# Patient Record
Sex: FEMALE | URBAN - METROPOLITAN AREA
[De-identification: names, ages, dates, MRNs, and addresses within clinical notes are randomized per-mention and may not be internally consistent; named-entity substitution may affect disease eponyms.]

---

## 2018-08-03 ENCOUNTER — EMERGENCY (EMERGENCY)
Facility: HOSPITAL | Age: 63
LOS: 1 days | Discharge: ANOTHER HOSPITAL | End: 2018-08-03
Attending: EMERGENCY MEDICINE
Payer: MEDICAID

## 2018-08-03 VITALS
TEMPERATURE: 98 F | DIASTOLIC BLOOD PRESSURE: 75 MMHG | OXYGEN SATURATION: 100 % | WEIGHT: 119.05 LBS | SYSTOLIC BLOOD PRESSURE: 184 MMHG | RESPIRATION RATE: 16 BRPM | HEART RATE: 70 BPM

## 2018-08-03 LAB
ALBUMIN SERPL ELPH-MCNC: 3.5 G/DL — SIGNIFICANT CHANGE UP (ref 3.3–5)
ALP SERPL-CCNC: 99 U/L — SIGNIFICANT CHANGE UP (ref 40–120)
ALT FLD-CCNC: 15 U/L — SIGNIFICANT CHANGE UP (ref 12–78)
ANION GAP SERPL CALC-SCNC: 9 MMOL/L — SIGNIFICANT CHANGE UP (ref 5–17)
APPEARANCE UR: CLEAR — SIGNIFICANT CHANGE UP
APTT BLD: 30.6 SEC — SIGNIFICANT CHANGE UP (ref 27.5–37.4)
AST SERPL-CCNC: 23 U/L — SIGNIFICANT CHANGE UP (ref 15–37)
BASOPHILS # BLD AUTO: 0.11 K/UL — SIGNIFICANT CHANGE UP (ref 0–0.2)
BASOPHILS NFR BLD AUTO: 1.3 % — SIGNIFICANT CHANGE UP (ref 0–2)
BILIRUB SERPL-MCNC: 0.3 MG/DL — SIGNIFICANT CHANGE UP (ref 0.2–1.2)
BILIRUB UR-MCNC: NEGATIVE — SIGNIFICANT CHANGE UP
BUN SERPL-MCNC: 19 MG/DL — SIGNIFICANT CHANGE UP (ref 7–23)
CALCIUM SERPL-MCNC: 9.2 MG/DL — SIGNIFICANT CHANGE UP (ref 8.5–10.1)
CHLORIDE SERPL-SCNC: 106 MMOL/L — SIGNIFICANT CHANGE UP (ref 96–108)
CK MB CFR SERPL CALC: <1 NG/ML — SIGNIFICANT CHANGE UP (ref 0–3.6)
CO2 SERPL-SCNC: 24 MMOL/L — SIGNIFICANT CHANGE UP (ref 22–31)
COLOR SPEC: SIGNIFICANT CHANGE UP
CREAT SERPL-MCNC: 1.4 MG/DL — HIGH (ref 0.5–1.3)
D DIMER BLD IA.RAPID-MCNC: <150 NG/ML DDU — SIGNIFICANT CHANGE UP
DIFF PNL FLD: NEGATIVE — SIGNIFICANT CHANGE UP
EOSINOPHIL # BLD AUTO: 0.5 K/UL — SIGNIFICANT CHANGE UP (ref 0–0.5)
EOSINOPHIL NFR BLD AUTO: 5.8 % — SIGNIFICANT CHANGE UP (ref 0–6)
GLUCOSE SERPL-MCNC: 147 MG/DL — HIGH (ref 70–99)
GLUCOSE UR QL: NEGATIVE — SIGNIFICANT CHANGE UP
HCT VFR BLD CALC: 40.7 % — SIGNIFICANT CHANGE UP (ref 34.5–45)
HGB BLD-MCNC: 13.8 G/DL — SIGNIFICANT CHANGE UP (ref 11.5–15.5)
IMM GRANULOCYTES NFR BLD AUTO: 0.2 % — SIGNIFICANT CHANGE UP (ref 0–1.5)
INR BLD: 0.96 RATIO — SIGNIFICANT CHANGE UP (ref 0.88–1.16)
KETONES UR-MCNC: NEGATIVE — SIGNIFICANT CHANGE UP
LACTATE SERPL-SCNC: 2.3 MMOL/L — HIGH (ref 0.7–2)
LEUKOCYTE ESTERASE UR-ACNC: ABNORMAL
LYMPHOCYTES # BLD AUTO: 2.76 K/UL — SIGNIFICANT CHANGE UP (ref 1–3.3)
LYMPHOCYTES # BLD AUTO: 32.3 % — SIGNIFICANT CHANGE UP (ref 13–44)
MCHC RBC-ENTMCNC: 27.6 PG — SIGNIFICANT CHANGE UP (ref 27–34)
MCHC RBC-ENTMCNC: 33.9 GM/DL — SIGNIFICANT CHANGE UP (ref 32–36)
MCV RBC AUTO: 81.4 FL — SIGNIFICANT CHANGE UP (ref 80–100)
MONOCYTES # BLD AUTO: 0.84 K/UL — SIGNIFICANT CHANGE UP (ref 0–0.9)
MONOCYTES NFR BLD AUTO: 9.8 % — SIGNIFICANT CHANGE UP (ref 2–14)
NEUTROPHILS # BLD AUTO: 4.32 K/UL — SIGNIFICANT CHANGE UP (ref 1.8–7.4)
NEUTROPHILS NFR BLD AUTO: 50.6 % — SIGNIFICANT CHANGE UP (ref 43–77)
NITRITE UR-MCNC: NEGATIVE — SIGNIFICANT CHANGE UP
PH UR: 8 — SIGNIFICANT CHANGE UP (ref 5–8)
PLATELET # BLD AUTO: 263 K/UL — SIGNIFICANT CHANGE UP (ref 150–400)
POTASSIUM SERPL-MCNC: 4.9 MMOL/L — SIGNIFICANT CHANGE UP (ref 3.5–5.3)
POTASSIUM SERPL-SCNC: 4.9 MMOL/L — SIGNIFICANT CHANGE UP (ref 3.5–5.3)
PROT SERPL-MCNC: 7.6 G/DL — SIGNIFICANT CHANGE UP (ref 6–8.3)
PROT UR-MCNC: NEGATIVE — SIGNIFICANT CHANGE UP
PROTHROM AB SERPL-ACNC: 10.4 SEC — SIGNIFICANT CHANGE UP (ref 9.8–12.7)
RBC # BLD: 5 M/UL — SIGNIFICANT CHANGE UP (ref 3.8–5.2)
RBC # FLD: 14.3 % — SIGNIFICANT CHANGE UP (ref 10.3–14.5)
SODIUM SERPL-SCNC: 139 MMOL/L — SIGNIFICANT CHANGE UP (ref 135–145)
SP GR SPEC: 1.01 — SIGNIFICANT CHANGE UP (ref 1.01–1.02)
TROPONIN I SERPL-MCNC: <.015 NG/ML — SIGNIFICANT CHANGE UP (ref 0.01–0.04)
UROBILINOGEN FLD QL: NEGATIVE — SIGNIFICANT CHANGE UP
WBC # BLD: 8.55 K/UL — SIGNIFICANT CHANGE UP (ref 3.8–10.5)
WBC # FLD AUTO: 8.55 K/UL — SIGNIFICANT CHANGE UP (ref 3.8–10.5)

## 2018-08-03 PROCEDURE — 99285 EMERGENCY DEPT VISIT HI MDM: CPT

## 2018-08-03 PROCEDURE — 99291 CRITICAL CARE FIRST HOUR: CPT

## 2018-08-03 RX ORDER — SODIUM CHLORIDE 9 MG/ML
1000 INJECTION INTRAMUSCULAR; INTRAVENOUS; SUBCUTANEOUS ONCE
Qty: 0 | Refills: 0 | Status: COMPLETED | OUTPATIENT
Start: 2018-08-03 | End: 2018-08-03

## 2018-08-03 RX ORDER — ASPIRIN/CALCIUM CARB/MAGNESIUM 324 MG
325 TABLET ORAL ONCE
Qty: 0 | Refills: 0 | Status: COMPLETED | OUTPATIENT
Start: 2018-08-03 | End: 2018-08-03

## 2018-08-03 RX ADMIN — SODIUM CHLORIDE 1000 MILLILITER(S): 9 INJECTION INTRAMUSCULAR; INTRAVENOUS; SUBCUTANEOUS at 22:56

## 2018-08-03 RX ADMIN — SODIUM CHLORIDE 1000 MILLILITER(S): 9 INJECTION INTRAMUSCULAR; INTRAVENOUS; SUBCUTANEOUS at 22:31

## 2018-08-03 NOTE — ED ADULT NURSE NOTE - NSIMPLEMENTINTERV_GEN_ALL_ED
Implemented All Universal Safety Interventions:  [place] to call system. Call bell, personal items and telephone within reach. Instruct patient to call for assistance. Room bathroom lighting operational. Non-slip footwear when patient is off stretcher. Physically safe environment: no spills, clutter or unnecessary equipment. Stretcher in lowest position, wheels locked, appropriate side rails in place.

## 2018-08-03 NOTE — ED PROVIDER NOTE - OBJECTIVE STATEMENT
61yo F h/o htn, dm flight from [facility] 2 wks prior p/w bilateral upper chest pain sharp intermittent radiating to LUE, neck x 2-3 days associated w/ sob. denies f/c/n/v/d/cough/abd pain/swelling. pos family h/o cardiac disease in brother.

## 2018-08-03 NOTE — ED PROVIDER NOTE - MEDICAL DECISION MAKING DETAILS
61yo F h/o htn, dm flight from [facility] 2 wks prior p/w bilateral upper chest pain sharp intermittent radiating to LUE, neck x 2-3 days associated w/ sob. denies f/c/n/v/d/cough/abd pain/swelling. pos family h/o cardiac disease in brother.

## 2018-08-03 NOTE — ED PROVIDER NOTE - NS ED ROS FT
GEN: no fever, no chills, no weakness  HENT: no eye pain, no visual changes, no ear pain, no visual or hearing changes, no sore throat, no swelling or neck pain  CV: +chest pain, no palpitations, no dizziness, no swelling  RESP: no coughing, +sob, no IWOB, no ALVAREZ  GI: no abd pain, no distension, no nausea, no vomiting, no diarrhea, no constipation  : no dysuria,  no frequency, no hematuria, no discharge, no flank pain  MUSCULOSKELETAL: no myalgia, no arthralgia, no joint swelling, no bruising   SKIN: no rash, no wounds, no itching  NEURO: no change in mentation, no visual changes, no HA, no focal weakness, no trouble speaking, no gait abnormalities, no dizziness  PSYCH: no suidical ideation, no homicidal ideation, no depression, no anxiety, no hallucinations

## 2018-08-03 NOTE — ED PROVIDER NOTE - PHYSICAL EXAMINATION
GEN: awake, alert, well appearing, NAD   HENT: atraumatic, normocephalic, CINDY, EOMI, no midline instability, oropharynx w/o erythema or exudates, no lymphadenopathy  CV: normal rate and rhythm, S1, S2, no MRG, equal pulses throughout, no JVD  RESP: no distress, no IWOB, no retraction, clear to auscultation bilaterally   ABD: soft, nontender, nondistended, no rebound, no guarding, normoactive bowel sounds, no organomegally  MUSCULOSKELETAL: strenght 5/5 x 4, full range of motion, CMS intact   SKIN: normal color, no turgor, no wounds or rash   NEURO: Awake alert oriented x 3, no facial asymmetry, no slurred speech, no pronator drift, moving all extremities  PSYCH: no suicial ideation, no homicidal ideation, no depression, no anxiety, no hallucination

## 2018-08-04 ENCOUNTER — INPATIENT (INPATIENT)
Facility: HOSPITAL | Age: 63
LOS: 10 days | Discharge: HOME | DRG: 233 | End: 2018-08-15
Attending: STUDENT IN AN ORGANIZED HEALTH CARE EDUCATION/TRAINING PROGRAM | Admitting: STUDENT IN AN ORGANIZED HEALTH CARE EDUCATION/TRAINING PROGRAM
Payer: MEDICAID

## 2018-08-04 VITALS
RESPIRATION RATE: 18 BRPM | TEMPERATURE: 98 F | DIASTOLIC BLOOD PRESSURE: 81 MMHG | SYSTOLIC BLOOD PRESSURE: 164 MMHG | OXYGEN SATURATION: 100 % | HEART RATE: 73 BPM

## 2018-08-04 VITALS
DIASTOLIC BLOOD PRESSURE: 65 MMHG | WEIGHT: 119.05 LBS | HEART RATE: 73 BPM | OXYGEN SATURATION: 98 % | RESPIRATION RATE: 16 BRPM | HEIGHT: 55 IN | SYSTOLIC BLOOD PRESSURE: 150 MMHG | TEMPERATURE: 98 F

## 2018-08-04 DIAGNOSIS — I25.10 ATHEROSCLEROTIC HEART DISEASE OF NATIVE CORONARY ARTERY WITHOUT ANGINA PECTORIS: ICD-10-CM

## 2018-08-04 DIAGNOSIS — R07.9 CHEST PAIN, UNSPECIFIED: ICD-10-CM

## 2018-08-04 DIAGNOSIS — Z90.49 ACQUIRED ABSENCE OF OTHER SPECIFIED PARTS OF DIGESTIVE TRACT: Chronic | ICD-10-CM

## 2018-08-04 DIAGNOSIS — E11.9 TYPE 2 DIABETES MELLITUS WITHOUT COMPLICATIONS: ICD-10-CM

## 2018-08-04 LAB
ALBUMIN SERPL ELPH-MCNC: 3.6 G/DL — SIGNIFICANT CHANGE UP (ref 3.3–5)
ALP SERPL-CCNC: 94 U/L — SIGNIFICANT CHANGE UP (ref 40–120)
ALT FLD-CCNC: 11 U/L — SIGNIFICANT CHANGE UP (ref 10–45)
APTT BLD: 25 SEC — LOW (ref 27.5–37.4)
AST SERPL-CCNC: 14 U/L — SIGNIFICANT CHANGE UP (ref 10–40)
BILIRUB DIRECT SERPL-MCNC: <0.1 MG/DL — SIGNIFICANT CHANGE UP (ref 0–0.2)
BILIRUB INDIRECT FLD-MCNC: >0.1 MG/DL — LOW (ref 0.2–1)
BILIRUB SERPL-MCNC: 0.2 MG/DL — SIGNIFICANT CHANGE UP (ref 0.2–1.2)
BLD GP AB SCN SERPL QL: NEGATIVE — SIGNIFICANT CHANGE UP
CHOLEST SERPL-MCNC: 179 MG/DL — SIGNIFICANT CHANGE UP (ref 10–199)
CK MB BLD-MCNC: 2.4 % — SIGNIFICANT CHANGE UP (ref 0–3.5)
CK MB CFR SERPL CALC: 1 NG/ML — SIGNIFICANT CHANGE UP (ref 0–3.6)
CK MB CFR SERPL CALC: 1.1 NG/ML — SIGNIFICANT CHANGE UP (ref 0–3.6)
CK SERPL-CCNC: 46 U/L — SIGNIFICANT CHANGE UP (ref 26–192)
GLUCOSE BLDC GLUCOMTR-MCNC: 264 MG/DL — HIGH (ref 70–99)
GLUCOSE BLDC GLUCOMTR-MCNC: 335 MG/DL — HIGH (ref 70–99)
HBA1C BLD-MCNC: 7.2 % — HIGH (ref 4–5.6)
HDLC SERPL-MCNC: 35 MG/DL — LOW (ref 40–125)
INR BLD: 0.99 RATIO — SIGNIFICANT CHANGE UP (ref 0.88–1.16)
LACTATE SERPL-SCNC: 0.9 MMOL/L — SIGNIFICANT CHANGE UP (ref 0.7–2)
LIPID PNL WITH DIRECT LDL SERPL: 100 MG/DL — SIGNIFICANT CHANGE UP
MRSA PCR RESULT.: SIGNIFICANT CHANGE UP
NT-PROBNP SERPL-SCNC: 1589 PG/ML — HIGH (ref 0–300)
PROT SERPL-MCNC: 7.2 G/DL — SIGNIFICANT CHANGE UP (ref 6–8.3)
PROTHROM AB SERPL-ACNC: 10.8 SEC — SIGNIFICANT CHANGE UP (ref 9.8–12.7)
RH IG SCN BLD-IMP: POSITIVE — SIGNIFICANT CHANGE UP
S AUREUS DNA NOSE QL NAA+PROBE: SIGNIFICANT CHANGE UP
T3 SERPL-MCNC: 81 NG/DL — SIGNIFICANT CHANGE UP (ref 80–200)
T4 AB SER-ACNC: 6.9 UG/DL — SIGNIFICANT CHANGE UP (ref 4.6–12)
TOTAL CHOLESTEROL/HDL RATIO MEASUREMENT: 5.1 RATIO — SIGNIFICANT CHANGE UP (ref 3.3–7.1)
TRIGL SERPL-MCNC: 221 MG/DL — HIGH (ref 10–149)
TROPONIN I SERPL-MCNC: <.015 NG/ML — SIGNIFICANT CHANGE UP (ref 0.01–0.04)
TROPONIN I SERPL-MCNC: <.015 NG/ML — SIGNIFICANT CHANGE UP (ref 0.01–0.04)
TSH SERPL-MCNC: 4.04 UIU/ML — SIGNIFICANT CHANGE UP (ref 0.27–4.2)

## 2018-08-04 PROCEDURE — 71275 CT ANGIOGRAPHY CHEST: CPT

## 2018-08-04 PROCEDURE — 71045 X-RAY EXAM CHEST 1 VIEW: CPT | Mod: 26

## 2018-08-04 PROCEDURE — 99152 MOD SED SAME PHYS/QHP 5/>YRS: CPT

## 2018-08-04 PROCEDURE — 85610 PROTHROMBIN TIME: CPT

## 2018-08-04 PROCEDURE — 80053 COMPREHEN METABOLIC PANEL: CPT

## 2018-08-04 PROCEDURE — 36415 COLL VENOUS BLD VENIPUNCTURE: CPT

## 2018-08-04 PROCEDURE — 71275 CT ANGIOGRAPHY CHEST: CPT | Mod: 26

## 2018-08-04 PROCEDURE — 87040 BLOOD CULTURE FOR BACTERIA: CPT

## 2018-08-04 PROCEDURE — 93306 TTE W/DOPPLER COMPLETE: CPT | Mod: 26

## 2018-08-04 PROCEDURE — 99285 EMERGENCY DEPT VISIT HI MDM: CPT | Mod: 25

## 2018-08-04 PROCEDURE — 85730 THROMBOPLASTIN TIME PARTIAL: CPT

## 2018-08-04 PROCEDURE — 82553 CREATINE MB FRACTION: CPT

## 2018-08-04 PROCEDURE — 85027 COMPLETE CBC AUTOMATED: CPT

## 2018-08-04 PROCEDURE — 81001 URINALYSIS AUTO W/SCOPE: CPT

## 2018-08-04 PROCEDURE — 84484 ASSAY OF TROPONIN QUANT: CPT

## 2018-08-04 PROCEDURE — 83605 ASSAY OF LACTIC ACID: CPT

## 2018-08-04 PROCEDURE — 82550 ASSAY OF CK (CPK): CPT

## 2018-08-04 PROCEDURE — 71045 X-RAY EXAM CHEST 1 VIEW: CPT

## 2018-08-04 PROCEDURE — 85379 FIBRIN DEGRADATION QUANT: CPT

## 2018-08-04 PROCEDURE — 93458 L HRT ARTERY/VENTRICLE ANGIO: CPT | Mod: 26

## 2018-08-04 PROCEDURE — 93005 ELECTROCARDIOGRAM TRACING: CPT

## 2018-08-04 RX ORDER — ATORVASTATIN CALCIUM 80 MG/1
40 TABLET, FILM COATED ORAL AT BEDTIME
Qty: 0 | Refills: 0 | Status: DISCONTINUED | OUTPATIENT
Start: 2018-08-04 | End: 2018-08-08

## 2018-08-04 RX ORDER — AMLODIPINE BESYLATE 2.5 MG/1
10 TABLET ORAL DAILY
Qty: 0 | Refills: 0 | Status: DISCONTINUED | OUTPATIENT
Start: 2018-08-04 | End: 2018-08-08

## 2018-08-04 RX ORDER — DEXTROSE 50 % IN WATER 50 %
12.5 SYRINGE (ML) INTRAVENOUS ONCE
Qty: 0 | Refills: 0 | Status: DISCONTINUED | OUTPATIENT
Start: 2018-08-04 | End: 2018-08-08

## 2018-08-04 RX ORDER — SODIUM CHLORIDE 9 MG/ML
1000 INJECTION, SOLUTION INTRAVENOUS
Qty: 0 | Refills: 0 | Status: DISCONTINUED | OUTPATIENT
Start: 2018-08-04 | End: 2018-08-08

## 2018-08-04 RX ORDER — SODIUM CHLORIDE 9 MG/ML
1000 INJECTION INTRAMUSCULAR; INTRAVENOUS; SUBCUTANEOUS
Qty: 0 | Refills: 0 | Status: DISCONTINUED | OUTPATIENT
Start: 2018-08-04 | End: 2018-08-07

## 2018-08-04 RX ORDER — DEXTROSE 50 % IN WATER 50 %
25 SYRINGE (ML) INTRAVENOUS ONCE
Qty: 0 | Refills: 0 | Status: DISCONTINUED | OUTPATIENT
Start: 2018-08-04 | End: 2018-08-08

## 2018-08-04 RX ORDER — GLUCAGON INJECTION, SOLUTION 0.5 MG/.1ML
1 INJECTION, SOLUTION SUBCUTANEOUS ONCE
Qty: 0 | Refills: 0 | Status: DISCONTINUED | OUTPATIENT
Start: 2018-08-04 | End: 2018-08-08

## 2018-08-04 RX ORDER — ASPIRIN/CALCIUM CARB/MAGNESIUM 324 MG
325 TABLET ORAL ONCE
Qty: 0 | Refills: 0 | Status: COMPLETED | OUTPATIENT
Start: 2018-08-04 | End: 2018-08-04

## 2018-08-04 RX ORDER — ASPIRIN/CALCIUM CARB/MAGNESIUM 324 MG
81 TABLET ORAL DAILY
Qty: 0 | Refills: 0 | Status: DISCONTINUED | OUTPATIENT
Start: 2018-08-04 | End: 2018-08-08

## 2018-08-04 RX ORDER — METOPROLOL TARTRATE 50 MG
12.5 TABLET ORAL
Qty: 0 | Refills: 0 | Status: DISCONTINUED | OUTPATIENT
Start: 2018-08-04 | End: 2018-08-08

## 2018-08-04 RX ORDER — INSULIN LISPRO 100/ML
VIAL (ML) SUBCUTANEOUS AT BEDTIME
Qty: 0 | Refills: 0 | Status: DISCONTINUED | OUTPATIENT
Start: 2018-08-04 | End: 2018-08-08

## 2018-08-04 RX ORDER — HEPARIN SODIUM 5000 [USP'U]/ML
5000 INJECTION INTRAVENOUS; SUBCUTANEOUS EVERY 8 HOURS
Qty: 0 | Refills: 0 | Status: DISCONTINUED | OUTPATIENT
Start: 2018-08-04 | End: 2018-08-05

## 2018-08-04 RX ORDER — DEXTROSE 50 % IN WATER 50 %
15 SYRINGE (ML) INTRAVENOUS ONCE
Qty: 0 | Refills: 0 | Status: DISCONTINUED | OUTPATIENT
Start: 2018-08-04 | End: 2018-08-08

## 2018-08-04 RX ORDER — SODIUM CHLORIDE 9 MG/ML
1000 INJECTION INTRAMUSCULAR; INTRAVENOUS; SUBCUTANEOUS ONCE
Qty: 0 | Refills: 0 | Status: COMPLETED | OUTPATIENT
Start: 2018-08-04 | End: 2018-08-04

## 2018-08-04 RX ORDER — INSULIN LISPRO 100/ML
VIAL (ML) SUBCUTANEOUS
Qty: 0 | Refills: 0 | Status: DISCONTINUED | OUTPATIENT
Start: 2018-08-04 | End: 2018-08-08

## 2018-08-04 RX ADMIN — Medication 3: at 16:38

## 2018-08-04 RX ADMIN — ATORVASTATIN CALCIUM 40 MILLIGRAM(S): 80 TABLET, FILM COATED ORAL at 21:39

## 2018-08-04 RX ADMIN — AMLODIPINE BESYLATE 10 MILLIGRAM(S): 2.5 TABLET ORAL at 14:24

## 2018-08-04 RX ADMIN — SODIUM CHLORIDE 1000 MILLILITER(S): 9 INJECTION INTRAMUSCULAR; INTRAVENOUS; SUBCUTANEOUS at 00:00

## 2018-08-04 RX ADMIN — Medication 2: at 21:40

## 2018-08-04 RX ADMIN — SODIUM CHLORIDE 75 MILLILITER(S): 9 INJECTION INTRAMUSCULAR; INTRAVENOUS; SUBCUTANEOUS at 10:02

## 2018-08-04 RX ADMIN — Medication 12.5 MILLIGRAM(S): at 17:05

## 2018-08-04 RX ADMIN — HEPARIN SODIUM 5000 UNIT(S): 5000 INJECTION INTRAVENOUS; SUBCUTANEOUS at 21:39

## 2018-08-04 RX ADMIN — SODIUM CHLORIDE 1000 MILLILITER(S): 9 INJECTION INTRAMUSCULAR; INTRAVENOUS; SUBCUTANEOUS at 00:56

## 2018-08-04 RX ADMIN — Medication 325 MILLIGRAM(S): at 09:49

## 2018-08-04 NOTE — CONSULT NOTE ADULT - SUBJECTIVE AND OBJECTIVE BOX
History of Present Illness:  62y Female    Past Medical History  Retinopathy  Neuropathy  DM (diabetes mellitus)  HTN (hypertension)      Past Surgical History  History of cholecystectomy  No significant past surgical history      MEDICATIONS  (STANDING):  amLODIPine   Tablet 10 milliGRAM(s) Oral daily  aspirin enteric coated 81 milliGRAM(s) Oral daily  atorvastatin 40 milliGRAM(s) Oral at bedtime  dextrose 5%. 1000 milliLiter(s) (50 mL/Hr) IV Continuous <Continuous>  dextrose 50% Injectable 12.5 Gram(s) IV Push once  dextrose 50% Injectable 25 Gram(s) IV Push once  dextrose 50% Injectable 25 Gram(s) IV Push once  insulin lispro (HumaLOG) corrective regimen sliding scale   SubCutaneous three times a day before meals  insulin lispro (HumaLOG) corrective regimen sliding scale   SubCutaneous at bedtime    MEDICATIONS  (PRN):  dextrose 40% Gel 15 Gram(s) Oral once PRN Blood Glucose LESS THAN 70 milliGRAM(s)/deciliter  glucagon  Injectable 1 milliGRAM(s) IntraMuscular once PRN Glucose LESS THAN 70 milligrams/deciliter    Antiplatelet therapy:                           Last dose/amt:    Allergies: No Known Allergies      SOCIAL HISTORY:  Smoker: [ ] Yes  [ ] No        PACK YEARS:                         WHEN QUIT?  ETOH use: [ ] Yes  [ ] No              FREQUENCY / QUANTITY:  Ilicit Drug use:  [ ] Yes  [ ] No  Occupation:  Live with:  Assist device use:    PMD:  Referring Cardiologist:    Relevant Family History  FAMILY HISTORY:  Coronary artery disease (Sibling): pre mature CAD      Review of Systems  GENERAL:  Fevers[] chills[] sweats[] fatigue[] weight loss[] weight gain []                                      [x] NEGATIVE  NEURO:    positive b/l feet neuropathy                                                                                                     EYES:   H  eye bleed                                                                                                                        ENMT:  difficulty hearing []  vertigo[]  dysphagia[] epistaxis[]                                                      [ x] NEGATIVE                 CV:    occasional chest pain  denies  palpitations ALVAREZ denies                                                                                          RESPIRATORY:  wheezing[] SOB[] cough [] sputum[] hemoptysis[]                                                   x[x ] NEGATIVE               GI:  nausea[]  vomiting []  diarrhea[] constipation [] melena []                                                          [x ] NEGATIVE            : hematuria[ ]  dysuria[ ] urgency[] incontinence[]                                                                          [ x] NEGATIVE                    MUSKULOSKELETAL:  arthritis[ ]  joint swelling [ ] muscle weakness [ ]                                            [x ] NEGATIVE                     SKIN/BREAST:  rash[ ] itching [ ]  hair loss[ ] masses[ ]                                                                          [x] NEGATIVE                     PSYCH:  dementia [ ] depresion [ ] anxiety[ ]                                                                                          [x ] NEGATIVE                                            ENDOCRINE:    +DM2 cold intolerance[ ] heat intolerance[ ] polydipsia[ ]                                                      [ ] NEGATIVE                        PHYSICAL EXAM  Vital Signs Last 24 Hrs  T(C): 36.7 (04 Aug 2018 13:20), Max: 37.1 (04 Aug 2018 00:12)  T(F): 98.1 (04 Aug 2018 13:20), Max: 98.7 (04 Aug 2018 00:12)  HR: 63 (04 Aug 2018 13:20) (63 - 77)  BP: 151/69 (04 Aug 2018 13:20) (140/71 - 184/75)  BP(mean): 93 (04 Aug 2018 11:26) (93 - 93)  RR: 18 (04 Aug 2018 13:20) (16 - 18)  SpO2: 100% (04 Aug 2018 13:20) (97% - 100%)    General: Well nourished, well developed, no acute distress.                                                         Neuro: Normal exam oriented to person/place & time with no focal motor or sensory  deficits.                    Eyes: Normal exam of conjunctiva & lids, pupils equally reactive.   ENT: Normal exam of nasal/oral mucosa with absence of cyanosis.   Neck: Normal exam of jugular veins, trachea & thyroid.   Chest: Normal lung exam with good air movement absence of wheezes, rales, or rhonchi:                                                                          CV:  Auscultation: normal [x ] S3[ ] S4[ ] Irregular [ ] Rub[ ] Clicks[ ]  Murmurs none:[x ]  Carotids: No Bruits Other____________ Abdominal Aorta: normal [ ] nonpalpable[ ]                                                                         GI: Normal exam of abdomen, liver & spleen with no noted masses or tenderness.                                                                                              Extremities: Normal no evidence of cyanosis or deformity Edema: none[ x]trace  Lower Extremity Pulses:   SKIN : Normal exam to inspection & palation.                                                           LABS:                        13.8   8.55  )-----------( 263      ( 03 Aug 2018 21:55 )             40.7         139  |  106  |  19  ----------------------------<  147<H>  4.9   |  24  |  1.40<H>    Ca    9.2      03 Aug 2018 21:55    TPro  7.2  /  Alb  3.6  /  TBili  0.2  /  DBili  <0.1  /  AST  14  /  ALT  11  /  AlkPhos  94  08-04    PT/INR - ( 04 Aug 2018 12:11 )   PT: 10.8 sec;   INR: 0.99 ratio         PTT - ( 04 Aug 2018 12:11 )  PTT:25.0 sec  Urinalysis Basic - ( 03 Aug 2018 22:41 )    Color: Pale Yellow / Appearance: Clear / S.010 / pH: x  Gluc: x / Ketone: Negative  / Bili: Negative / Urobili: Negative   Blood: x / Protein: Negative / Nitrite: Negative   Leuk Esterase: Small / RBC: 0-2 /HPF / WBC 0-2   Sq Epi: x / Non Sq Epi: Occasional / Bacteria: Occasional      CARDIAC MARKERS ( 04 Aug 2018 08:26 )  <.015 ng/mL / x     / 46 U/L / x     / 1.1 ng/mL  CARDIAC MARKERS ( 04 Aug 2018 03:19 )  <.015 ng/mL / x     / x     / x     / 1.0 ng/mL  CARDIAC MARKERS ( 03 Aug 2018 21:55 )  <.015 ng/mL / x     / x     / x     / <1.0 ng/mL

## 2018-08-04 NOTE — H&P CARDIOLOGY - HISTORY OF PRESENT ILLNESS
This is a 63 yo female,  from [facility] arrived to [place] on 7/24, with PMH of HTN and long standing hx of  type 2 DM ( last A1C unknown,  complicated by neuropathy and retinopathy,  well managed as per patient). She reports as strong family hx of premature atherosclerosis.  She does not have any known structural heart disease.  She is generally sedentary at baseline due to pain in her feet, likely neuropathic.  She has not had prior S&S  of angina, HR or arrythmia.  She presents to [facility] ED on 8/3 with c/c of several days of " random left sided chest discomfort", with intermittent radiation to her left arm, occasionally associated with dyspnea.  The sxs may be brief, but have lasted up to 30 minutes.  CP is without clear provocation, and without a convincing explanation.  She had several episodes yesterday, and continued to have sxs even yesterday in the ER.  In the ER she was found to have an abnormal ekg, without comparison available.  Jyoti x3 negative.  Consultation w/ cards, Dr Presley  obtained, whom despite x3 neg enzymes given FH and symptomatolgy transferred pt to [facility] for cardiac cath to r/o significant CAD.  Now meeting patient for the first time, post cath, and as per unofficial cath report: mLAD 90%, Cx 100% and  % via RFA.  Needs CTS, awaiting on Dr Zaragoza for consult.  Presently patient is asymptomatic, recovering from cath.     < from: 12 Lead ECG (08.04.18 @ 08:14) >  Ventricular Rate 63 BPM    Atrial Rate 63 BPM    P-R Interval 158 ms    QRS Duration 88 ms    Q-T Interval 430 ms    QTC Calculation(Bezet) 440 ms    P Axis 61 degrees    R Axis -21 degrees    T Axis 122 degrees    Diagnosis Line Normal sinus rhythm  ST & T wave abnormality, consider lateral ischemia  Abnormal ECG  When compared with ECG of 03-AUG-2018 21:54,  No significant change was found  Confirmed by Maico Presley MD ([id number]) on 8/4/2018 9:52:19 AM    < end of copied text >      < from: CT Angio Chest w/ IV Cont (08.04.18 @ 00:15) >  EXAM:  CT ANGIO CHEST (W)AW IC                            PROCEDURE DATE:  08/04/2018          INTERPRETATION:  CLINICAL INDICATION: Shortness of breath, chest pain.   Check for pulmonary embolism    COMPARISON: Same day radiograph    TECHNIQUE: CTangiogram of the chest was performed utilizing a PE   protocol. 96 mL of Optiray were intravenously administered without   adverse reaction.  4 mL of contrast were discarded. Coronal and sagittal   reconstructions are provided. In addition, maximum intensity projection   (MIP) images were also acquired on the axial     plane on a separate   workstation.      FINDINGS: There is no evidence for pulmonary embolism. The main pulmonary   artery is of normal caliber.    There is moderate atherosclerotic calcification of the aorta. Heart size   is normal and there is no pericardial effusion.    The lungs are clear. No pleural effusions are seen. The central airways   are patent.    No enlarged thoracic lymph nodes are seen.    There is a severe atherosclerotic calcification of the splenic artery.     No significant osseous abnormality is seen.    IMPRESSION: No pulmonary embolism.    < end of copied text >    < from: Xray Chest 1 View- PORTABLE-Urgent (08.04.18 @ 01:24) >  EXAM:  XR CHEST PORTABLE URGENT 1V                            PROCEDURE DATE:  08/04/2018          INTERPRETATION:  Short of breath.    AP chest.    Normal heart size. Atherosclerotic aortic arch. No consolidation or   effusion. Mild thoracic levoscoliosis.    Impression: No active disease.    < end of copied text > This is a 61 yo female,  from [facility] arrived to [place] on 7/24, with PMH of HTN and long standing hx of  type 2 DM ( last A1C unknown,  complicated by neuropathy and retinopathy,  well managed as per patient). She reports as strong family hx of premature atherosclerosis.  She does not have any known structural heart disease.  She is generally sedentary at baseline due to pain in her feet, likely neuropathic.  She has not had prior S&S  of angina, HR or arrythmia.  She presents to [facility] ED on 8/3 with c/c of several days of " random left sided chest discomfort", with intermittent radiation to her left arm, occasionally associated with dyspnea.  The sxs may be brief, but have lasted up to 30 minutes.  CP is without clear provocation, and without a convincing explanation.  She had several episodes yesterday, and continued to have sxs even yesterday in the ER.  In the ER she was found to have an abnormal ekg, without comparison available, loaded with Aspirin 325mg.  Jyoti x3 negative.  Consultation w/ cards, Dr Presley  obtained, whom despite x3 neg enzymes given FH and symptomatolgy transferred pt to [facility] for cardiac cath to r/o significant CAD.  Now meeting patient for the first time, post cath, and as per unofficial cath report: mLAD 90%, Cx 100% and  % via RFA.  Needs CTS, awaiting on Dr Zaragoza for consult.  Presently patient is asymptomatic, recovering from cath.     < from: 12 Lead ECG (08.04.18 @ 08:14) >  Ventricular Rate 63 BPM    Atrial Rate 63 BPM    P-R Interval 158 ms    QRS Duration 88 ms    Q-T Interval 430 ms    QTC Calculation(Bezet) 440 ms    P Axis 61 degrees    R Axis -21 degrees    T Axis 122 degrees    Diagnosis Line Normal sinus rhythm  ST & T wave abnormality, consider lateral ischemia  Abnormal ECG  When compared with ECG of 03-AUG-2018 21:54,  No significant change was found  Confirmed by Maico Presley MD ([id number]) on 8/4/2018 9:52:19 AM    < end of copied text >      < from: CT Angio Chest w/ IV Cont (08.04.18 @ 00:15) >  EXAM:  CT ANGIO CHEST (W)AW IC                            PROCEDURE DATE:  08/04/2018          INTERPRETATION:  CLINICAL INDICATION: Shortness of breath, chest pain.   Check for pulmonary embolism    COMPARISON: Same day radiograph    TECHNIQUE: CTangiogram of the chest was performed utilizing a PE   protocol. 96 mL of Optiray were intravenously administered without   adverse reaction.  4 mL of contrast were discarded. Coronal and sagittal   reconstructions are provided. In addition, maximum intensity projection   (MIP) images were also acquired on the axial     plane on a separate   workstation.      FINDINGS: There is no evidence for pulmonary embolism. The main pulmonary   artery is of normal caliber.    There is moderate atherosclerotic calcification of the aorta. Heart size   is normal and there is no pericardial effusion.    The lungs are clear. No pleural effusions are seen. The central airways   are patent.    No enlarged thoracic lymph nodes are seen.    There is a severe atherosclerotic calcification of the splenic artery.     No significant osseous abnormality is seen.    IMPRESSION: No pulmonary embolism.    < end of copied text >    < from: Xray Chest 1 View- PORTABLE-Urgent (08.04.18 @ 01:24) >  EXAM:  XR CHEST PORTABLE URGENT 1V                            PROCEDURE DATE:  08/04/2018          INTERPRETATION:  Short of breath.    AP chest.    Normal heart size. Atherosclerotic aortic arch. No consolidation or   effusion. Mild thoracic levoscoliosis.    Impression: No active disease.    < end of copied text > This is a 61 yo female,  from [facility] arrived to [place] on 7/24 here visiting, with PMH of HTN and long standing hx of  type 2 DM ( last A1C unknown,  complicated by neuropathy and retinopathy,  well managed as per patient). She reports as strong family hx of premature atherosclerosis ( both her brothers had MI in their early 40's).  She does not have any known structural heart disease.  She is generally sedentary at baseline due to pain in her feet, likely neuropathic.  She has not had prior S&S  of angina, HR or arrythmia.  She presents to [facility] ED on 8/3 with c/c of several days of " random left sided chest discomfort", with intermittent radiation to her left arm, occasionally associated with dyspnea.  The sxs may be brief, but have lasted up to 30 minutes.  CP is without clear provocation, and without a convincing explanation.  She had several episodes yesterday, and continued to have sxs even yesterday in the ER.  In the ER she was found to have an abnormal ekg, without comparison available, loaded with Aspirin 325mg.  Jyoti x3 negative.  Consultation w/ cards, Dr Presley  obtained, whom despite x3 neg enzymes given FH and symptomatolgy transferred pt to [facility] for cardiac cath to r/o significant CAD.  Now meeting patient for the first time, post cath, and as per unofficial cath report: mLAD 90%, Cx 100% and  % via RFA.  Needs CTS, awaiting on Dr Zaragoza for consult.  Presently patient is asymptomatic, recovering from cath.     < from: 12 Lead ECG (08.04.18 @ 08:14) >  Ventricular Rate 63 BPM    Atrial Rate 63 BPM    P-R Interval 158 ms    QRS Duration 88 ms    Q-T Interval 430 ms    QTC Calculation(Bezet) 440 ms    P Axis 61 degrees    R Axis -21 degrees    T Axis 122 degrees    Diagnosis Line Normal sinus rhythm  ST & T wave abnormality, consider lateral ischemia  Abnormal ECG  When compared with ECG of 03-AUG-2018 21:54,  No significant change was found  Confirmed by Maico Presley MD ([id number]) on 8/4/2018 9:52:19 AM    < end of copied text >      < from: CT Angio Chest w/ IV Cont (08.04.18 @ 00:15) >  EXAM:  CT ANGIO CHEST (W)AW IC                            PROCEDURE DATE:  08/04/2018          INTERPRETATION:  CLINICAL INDICATION: Shortness of breath, chest pain.   Check for pulmonary embolism    COMPARISON: Same day radiograph    TECHNIQUE: CTangiogram of the chest was performed utilizing a PE   protocol. 96 mL of Optiray were intravenously administered without   adverse reaction.  4 mL of contrast were discarded. Coronal and sagittal   reconstructions are provided. In addition, maximum intensity projection   (MIP) images were also acquired on the axial     plane on a separate   workstation.      FINDINGS: There is no evidence for pulmonary embolism. The main pulmonary   artery is of normal caliber.    There is moderate atherosclerotic calcification of the aorta. Heart size   is normal and there is no pericardial effusion.    The lungs are clear. No pleural effusions are seen. The central airways   are patent.    No enlarged thoracic lymph nodes are seen.    There is a severe atherosclerotic calcification of the splenic artery.     No significant osseous abnormality is seen.    IMPRESSION: No pulmonary embolism.    < end of copied text >    < from: Xray Chest 1 View- PORTABLE-Urgent (08.04.18 @ 01:24) >  EXAM:  XR CHEST PORTABLE URGENT 1V                            PROCEDURE DATE:  08/04/2018          INTERPRETATION:  Short of breath.    AP chest.    Normal heart size. Atherosclerotic aortic arch. No consolidation or   effusion. Mild thoracic levoscoliosis.    Impression: No active disease.    < end of copied text >

## 2018-08-04 NOTE — CONSULT NOTE ADULT - SUBJECTIVE AND OBJECTIVE BOX
[facility]         Keith Torrez, James, Fernandez, Nohelia, Osvaldo, Loraine        [phone] (office)    CHIEF COMPLAINT: Patient is a 62y old  Female who presents with a chief complaint of cp    HPI: 62f visiting from [facility], arrived 7/24.  She has long-standing dm with associated neuropathy and retinopathy, likely with mild nephropathy.  History of htn, and a strong family hx of premature atherosclerosis.  She does not have any known structural heart disease.  She is generally sedentary at baseline due to pain in her feet, likely neuropathic.  She has not had prior sxs of angina, hf or arhythmia.    She presents with several days of random left sided chest discomfort, with intermittent radiation to her left arm, occasionally associated with dyspnea.  The sxs may be brief, but have lasted up to 30 minutes.  She had several episodes yesterday, and continued to have sxs even yesterday in the er.  In the er she was found to have an abnormal ekg, without comparison available.  ce x3 negative.  Consultation is now being obtained.      PAST MEDICAL & SURGICAL HISTORY:  DM (diabetes mellitus)  HTN (hypertension)  laser retinal procedures    SOCIAL HISTORY: No active tobacco, alcohol or illicit drug use    FAMILY HISTORY:   No pertinent family history of CAD    Outpatient medications:  metformin  metoprolol  MEDICATIONS  (STANDING):    MEDICATIONS  (PRN):      Allergies    No Known Allergies    Intolerances        REVIEW OF SYSTEMS: Is negative for eye, ENT, GI, , allergic, dermatologic, musculoskeletal and neurologic, except as described above.    VITAL SIGNS:   Vital Signs Last 24 Hrs  T(C): 36.7 (04 Aug 2018 07:28), Max: 37.1 (04 Aug 2018 00:12)  T(F): 98 (04 Aug 2018 07:28), Max: 98.7 (04 Aug 2018 00:12)  HR: 70 (04 Aug 2018 07:28) (65 - 77)  BP: 162/79 (04 Aug 2018 07:28) (140/71 - 184/75)  BP(mean): --  RR: 18 (04 Aug 2018 07:28) (16 - 18)  SpO2: 100% (04 Aug 2018 07:28) (97% - 100%)    I&O's Summary      PHYSICAL EXAM:    Constitutional: NAD, awake and alert, well-developed  Eyes:  EOMI, no oral cyanosis, conjunctivae clear, anicteric.  Pulmonary: Non-labored, breath sounds are clear bilaterally, no wheezing, rales or rhonchi  Cardiovascular:  regular S1 and S2. No murmur.  No rubs, gallops or clicks  Gastrointestinal: Bowel Sounds present, soft, nontender.   Lymph: No peripheral edema.   Neurological: Alert, strength and sensitivity are grossly intact  Skin: No obvious lesions/rashes.   Psych:  Mood & affect appropriate .    LABS: All Labs Reviewed:                        13.8   8.55  )-----------( 263      ( 03 Aug 2018 21:55 )             40.7     03 Aug 2018 21:55    139    |  106    |  19     ----------------------------<  147    4.9     |  24     |  1.40     Ca    9.2        03 Aug 2018 21:55    TPro  7.6    /  Alb  3.5    /  TBili  0.3    /  DBili  x      /  AST  23     /  ALT  15     /  AlkPhos  99     03 Aug 2018 21:55    PT/INR - ( 03 Aug 2018 21:55 )   PT: 10.4 sec;   INR: 0.96 ratio         PTT - ( 03 Aug 2018 21:55 )  PTT:30.6 sec  CARDIAC MARKERS ( 04 Aug 2018 08:26 )  <.015 ng/mL / x     / 46 U/L / x     / 1.1 ng/mL  CARDIAC MARKERS ( 04 Aug 2018 03:19 )  <.015 ng/mL / x     / x     / x     / 1.0 ng/mL  CARDIAC MARKERS ( 03 Aug 2018 21:55 )  <.015 ng/mL / x     / x     / x     / <1.0 ng/mL      Blood Culture:         RADIOLOGY:  < from: CT Angio Chest w/ IV Cont (08.04.18 @ 00:15) >    EXAM:  CT ANGIO CHEST (W)AW IC                            PROCEDURE DATE:  08/04/2018          INTERPRETATION:  CLINICAL INDICATION: Shortness of breath, chest pain.   Check for pulmonary embolism    COMPARISON: Same day radiograph    TECHNIQUE: CTangiogram of the chest was performed utilizing a PE   protocol. 96 mL of Optiray were intravenously administered without   adverse reaction.  4 mL of contrast were discarded. Coronal and sagittal   reconstructions are provided. In addition, maximum intensity projection   (MIP) images were also acquired on the axial     plane on a separate   workstation.      FINDINGS: There is no evidence for pulmonary embolism. The main pulmonary   artery is of normal caliber.    There is moderate atherosclerotic calcification of the aorta. Heart size   is normal and there is no pericardial effusion.    The lungs are clear. No pleural effusions are seen. The central airways   are patent.    No enlarged thoracic lymph nodes are seen.    There is a severe atherosclerotic calcification of the splenic artery.     No significant osseous abnormality is seen.    IMPRESSION: No pulmonary embolism.                ROSALINDA MARTINEZ M.D., ATTENDING RADIOLOGIST  This document has been electronically signed. Aug  4 2018 12:33AM                < end of copied text >    EKG: sr, lateral stt abn

## 2018-08-04 NOTE — CONSULT NOTE ADULT - ASSESSMENT
61 y/o  female sp cardiac cath  with LAD 90 %, Cx 100% and % disease  H diabetes 2 , diabetic retinopathy with "eye bleed" per patient 2012.  preop for CABG

## 2018-08-04 NOTE — CONSULT NOTE ADULT - ASSESSMENT
62f visiting from [facility], arrived 7/24.  She has long-standing dm with associated neuropathy and retinopathy, likely with mild nephropathy.  History of htn, and a strong family hx of premature atherosclerosis.  She does not have any known structural heart disease.  She is generally sedentary at baseline due to pain in her feet, likely neuropathic.  She has not had prior sxs of angina, hf or arhythmia.    She presents with several days of random left sided chest discomfort, with intermittent radiation to her left arm, occasionally associated with dyspnea.  The sxs may be brief, but have lasted up to 30 minutes.  She had several episodes yesterday, and continued to have sxs even yesterday in the er.  In the er she was found to have an abnormal ekg, without comparison available.  ce x3 negative.  Consultation is now being obtained.    -there is no convincing evidence of ischemia at this time.  -her ekg is abnormal without a comparison to review  -she is a very elevated risk individual, with long-standing dm with complications, a strong fh of atherosclerosis, and atherosclerotic calcification noted on ct of the chest  -she has had recurrent  episodes of cp without clear provocation, and without a convincing explanation thus far  -it seems likely that her sxs will recur in the near future  -despite negative cardiac enzymes, there is no non-invasive test sufficiently accurate in this patient to exclude cad as the etiology of her sxs.  -in my opinion, given her risks and clinical course, she requires angiography  -will to try to expedite these arrangements, though this may need to wait till monday.  If we can arrange the transfer today, she can be tx from the er.  Otherwise will need to be admitted.  -asa 81  -cont bb  -atorva 40  -there is no evidence of significant arrhythmia.  -there is no evidence for meaningful  volume overload.  -DVT prophylaxis  -monitor electrolytes, keep k>4, Mg>2  -will follow    The patient is at risk of abrupt decompensation.  35 minutes was spent with this patient.

## 2018-08-04 NOTE — H&P CARDIOLOGY - FAMILY HISTORY
Father  Still living? Unknown  Coronary artery disease, Age at diagnosis: Age Unknown     Mother  Still living? Unknown  Coronary artery disease, Age at diagnosis: Age Unknown Sibling  Still living? Unknown  Coronary artery disease, Age at diagnosis: Age Unknown

## 2018-08-04 NOTE — ED ADULT NURSE REASSESSMENT NOTE - NS ED NURSE REASSESS COMMENT FT1
Patient in CT scan at this time.
Received patient from Priscilla RN lying comfortably in bed informed of the plan of care with understanding.
Patient was able to sleep during the night informed of the plan of care with understanding with daughter at the bedside.
Pt received in report alert and oriented and resting in bed. Pt denies any pain or discomfort as of this time. Pt awaiting cardiology consult. Nursing care ongoing safety maintained.

## 2018-08-05 LAB
ANION GAP SERPL CALC-SCNC: 12 MMOL/L — SIGNIFICANT CHANGE UP (ref 5–17)
BASOPHILS # BLD AUTO: 0.1 K/UL — SIGNIFICANT CHANGE UP (ref 0–0.2)
BASOPHILS NFR BLD AUTO: 0.8 % — SIGNIFICANT CHANGE UP (ref 0–2)
BUN SERPL-MCNC: 15 MG/DL — SIGNIFICANT CHANGE UP (ref 7–23)
CALCIUM SERPL-MCNC: 9.5 MG/DL — SIGNIFICANT CHANGE UP (ref 8.4–10.5)
CHLORIDE SERPL-SCNC: 103 MMOL/L — SIGNIFICANT CHANGE UP (ref 96–108)
CO2 SERPL-SCNC: 21 MMOL/L — LOW (ref 22–31)
CREAT SERPL-MCNC: 1.1 MG/DL — SIGNIFICANT CHANGE UP (ref 0.5–1.3)
EOSINOPHIL # BLD AUTO: 0.5 K/UL — SIGNIFICANT CHANGE UP (ref 0–0.5)
EOSINOPHIL NFR BLD AUTO: 7 % — HIGH (ref 0–6)
GLUCOSE BLDC GLUCOMTR-MCNC: 153 MG/DL — HIGH (ref 70–99)
GLUCOSE BLDC GLUCOMTR-MCNC: 174 MG/DL — HIGH (ref 70–99)
GLUCOSE BLDC GLUCOMTR-MCNC: 218 MG/DL — HIGH (ref 70–99)
GLUCOSE BLDC GLUCOMTR-MCNC: 233 MG/DL — HIGH (ref 70–99)
GLUCOSE SERPL-MCNC: 148 MG/DL — HIGH (ref 70–99)
HCT VFR BLD CALC: 45.3 % — HIGH (ref 34.5–45)
HGB BLD-MCNC: 15.1 G/DL — SIGNIFICANT CHANGE UP (ref 11.5–15.5)
LYMPHOCYTES # BLD AUTO: 2.1 K/UL — SIGNIFICANT CHANGE UP (ref 1–3.3)
LYMPHOCYTES # BLD AUTO: 27.3 % — SIGNIFICANT CHANGE UP (ref 13–44)
MCHC RBC-ENTMCNC: 27.5 PG — SIGNIFICANT CHANGE UP (ref 27–34)
MCHC RBC-ENTMCNC: 33.5 GM/DL — SIGNIFICANT CHANGE UP (ref 32–36)
MCV RBC AUTO: 82.1 FL — SIGNIFICANT CHANGE UP (ref 80–100)
MONOCYTES # BLD AUTO: 0.5 K/UL — SIGNIFICANT CHANGE UP (ref 0–0.9)
MONOCYTES NFR BLD AUTO: 6.9 % — SIGNIFICANT CHANGE UP (ref 2–14)
NEUTROPHILS # BLD AUTO: 4.4 K/UL — SIGNIFICANT CHANGE UP (ref 1.8–7.4)
NEUTROPHILS NFR BLD AUTO: 57.9 % — SIGNIFICANT CHANGE UP (ref 43–77)
PA ADP PRP-ACNC: 163 PRU — LOW (ref 194–417)
PLATELET # BLD AUTO: 230 K/UL — SIGNIFICANT CHANGE UP (ref 150–400)
POTASSIUM SERPL-MCNC: 4.5 MMOL/L — SIGNIFICANT CHANGE UP (ref 3.5–5.3)
POTASSIUM SERPL-SCNC: 4.5 MMOL/L — SIGNIFICANT CHANGE UP (ref 3.5–5.3)
RBC # BLD: 5.51 M/UL — HIGH (ref 3.8–5.2)
RBC # FLD: 12.9 % — SIGNIFICANT CHANGE UP (ref 10.3–14.5)
SODIUM SERPL-SCNC: 136 MMOL/L — SIGNIFICANT CHANGE UP (ref 135–145)
WBC # BLD: 7.6 K/UL — SIGNIFICANT CHANGE UP (ref 3.8–10.5)
WBC # FLD AUTO: 7.6 K/UL — SIGNIFICANT CHANGE UP (ref 3.8–10.5)

## 2018-08-05 PROCEDURE — 93880 EXTRACRANIAL BILAT STUDY: CPT | Mod: 26

## 2018-08-05 PROCEDURE — 99255 IP/OBS CONSLTJ NEW/EST HI 80: CPT

## 2018-08-05 RX ORDER — ENOXAPARIN SODIUM 100 MG/ML
50 INJECTION SUBCUTANEOUS EVERY 12 HOURS
Qty: 0 | Refills: 0 | Status: DISCONTINUED | OUTPATIENT
Start: 2018-08-05 | End: 2018-08-07

## 2018-08-05 RX ADMIN — Medication 2: at 17:11

## 2018-08-05 RX ADMIN — ATORVASTATIN CALCIUM 40 MILLIGRAM(S): 80 TABLET, FILM COATED ORAL at 21:54

## 2018-08-05 RX ADMIN — AMLODIPINE BESYLATE 10 MILLIGRAM(S): 2.5 TABLET ORAL at 05:23

## 2018-08-05 RX ADMIN — Medication 1: at 07:55

## 2018-08-05 RX ADMIN — Medication 12.5 MILLIGRAM(S): at 17:11

## 2018-08-05 RX ADMIN — HEPARIN SODIUM 5000 UNIT(S): 5000 INJECTION INTRAVENOUS; SUBCUTANEOUS at 05:23

## 2018-08-05 RX ADMIN — Medication 81 MILLIGRAM(S): at 13:14

## 2018-08-05 RX ADMIN — Medication 12.5 MILLIGRAM(S): at 05:22

## 2018-08-05 RX ADMIN — ENOXAPARIN SODIUM 50 MILLIGRAM(S): 100 INJECTION SUBCUTANEOUS at 17:25

## 2018-08-05 NOTE — CONSULT NOTE ADULT - ASSESSMENT
62 year old [place]ol[place] female with evidence of CAD, and carotid stenosis, asymptomatic and neurologically stable.    - care per CTSx team  - plan for outpatient follow up with Dr. Kaplan following recovery from cardiac surgery, to discuss carotid surgery  - no acute vascular surgery needs, please contact Vascular team ([phone]) with further questions or concerns    Discussed with Dr. Marinelli,  --LATISHA Suggs, [phone] 62 year old [place] female with evidence of CAD, and R carotid stenosis, asymptomatic and neurologically stable.    - care per CTSx team  - plan for outpatient follow up with Dr. Kaplan following recovery from cardiac surgery, to discuss carotid surgery  - no acute vascular surgery needs, please contact Vascular team ([phone]) with further questions or concerns    Discussed with Dr. Marinelli,  --LATISHA Suggs, [phone]

## 2018-08-05 NOTE — PROGRESS NOTE ADULT - ASSESSMENT
61 y/o  female sp cardiac cath  with LAD 90 %, Cx 100% and % disease  H diabetes 2 , diabetic retinopathy with "eye bleed" per patient 2012.  preop for CABG  8/5 P2Y12 163, Vascular Surgery consulted for right ICA stenosis

## 2018-08-05 NOTE — CONSULT NOTE ADULT - ASSESSMENT
Pt is a 61 y/o F with PMH HTN, DM, fam hx CAD who presented with CP found to have multi-vessel disease, plan for CABG     - CTS input greatly appreciated  - c/w ASA  - c/w statin  - c/w metoprolol, amlodipine  - No signs of active ischemia or HF  - monitor electrolytes  - will follow along closely

## 2018-08-05 NOTE — PROGRESS NOTE ADULT - SUBJECTIVE AND OBJECTIVE BOX
VITAL SIGNS    Telemetry:  SR 60-70  Vital Signs Last 24 Hrs  T(C): 36.7 (08-05-18 @ 12:27), Max: 36.8 (08-05-18 @ 05:00)  T(F): 98.1 (08-05-18 @ 12:27), Max: 98.2 (08-05-18 @ 05:00)  HR: 66 (08-05-18 @ 12:27) (66 - 77)  BP: 147/79 (08-05-18 @ 12:27) (121/82 - 148/81)  RR: 18 (08-05-18 @ 12:27) (18 - 18)  SpO2: 99% (08-05-18 @ 12:27) (98% - 100%)            08-04 @ 07:01  -  08-05 @ 07:00  --------------------------------------------------------  IN: 220 mL / OUT: 450 mL / NET: -230 mL    08-05 @ 07:01  -  08-05 @ 15:33  --------------------------------------------------------  IN: 600 mL / OUT: 0 mL / NET: 600 mL    Daily   Admit Wt: Drug Dosing Weight  Height (cm): 134.62 (04 Aug 2018 11:50)  Weight (kg): 54 (04 Aug 2018 11:50)  BMI (kg/m2): 29.8 (04 Aug 2018 11:50)  BSA (m2): 1.37 (04 Aug 2018 11:50)      POCT Blood Glucose.: 174 mg/dL (05 Aug 2018 11:21)  POCT Blood Glucose.: 153 mg/dL (05 Aug 2018 07:29)  POCT Blood Glucose.: 335 mg/dL (04 Aug 2018 21:37)  POCT Blood Glucose.: 264 mg/dL (04 Aug 2018 16:32)          MEDICATIONS  amLODIPine   Tablet 10 milliGRAM(s) Oral daily  aspirin enteric coated 81 milliGRAM(s) Oral daily  atorvastatin 40 milliGRAM(s) Oral at bedtime  dextrose 40% Gel 15 Gram(s) Oral once PRN  dextrose 5%. 1000 milliLiter(s) IV Continuous <Continuous>  dextrose 50% Injectable 12.5 Gram(s) IV Push once  dextrose 50% Injectable 25 Gram(s) IV Push once  dextrose 50% Injectable 25 Gram(s) IV Push once  enoxaparin Injectable 50 milliGRAM(s) SubCutaneous every 12 hours  glucagon  Injectable 1 milliGRAM(s) IntraMuscular once PRN  insulin lispro (HumaLOG) corrective regimen sliding scale   SubCutaneous three times a day before meals  insulin lispro (HumaLOG) corrective regimen sliding scale   SubCutaneous at bedtime  metoprolol tartrate 12.5 milliGRAM(s) Oral two times a day      PHYSICAL EXAM  Subjective: NAD  Neurology: alert and oriented x 3, nonfocal, no gross deficits  CV : S1S2  Sternal Wound :  CDI , Stable  Lungs: CTA  Abdomen: soft, NT,ND, (+ )BM  :  voiding  Extremities: -c/c/e      LABS  08-05    136  |  103  |  15  ----------------------------<  148<H>  4.5   |  21<L>  |  1.10    Ca    9.5      05 Aug 2018 06:26    TPro  7.2  /  Alb  3.6  /  TBili  0.2  /  DBili  <0.1  /  AST  14  /  ALT  11  /  AlkPhos  94  08-04                                 15.1   7.6   )-----------( 230      ( 05 Aug 2018 06:26 )             45.3          PT/INR - ( 04 Aug 2018 12:11 )   PT: 10.8 sec;   INR: 0.99 ratio         PTT - ( 04 Aug 2018 12:11 )  PTT:25.0 sec       PAST MEDICAL & SURGICAL HISTORY:  Retinopathy  Neuropathy  DM (diabetes mellitus)  HTN (hypertension)  History of cholecystectomy

## 2018-08-05 NOTE — CONSULT NOTE ADULT - SUBJECTIVE AND OBJECTIVE BOX
· Subjective and Objective:   [facility]:    Keith Torrez, James, Fernandez, Osvaldo Pozo, Jose Baron      [phone]    CHIEF COMPLAINT: Patient is a 62y old  Female who presents with a chief complaint of     This is a 63 yo female,  from [facility] arrived to [place] on  here visiting, with PMH of HTN and long standing hx of  type 2 DM ( last A1C unknown, complicated by neuropathy and retinopathy). She reports a strong family hx of premature atherosclerosis ( both her brothers had MI in their early 40's).  She does not have any known structural heart disease.  She presented to [facility] ED on 8/3 with c/c of several days of " random left sided chest discomfort", with intermittent radiation to her left arm, occasionally associated with dyspnea.  The sxs may be brief, but have lasted up to 30 minutes.  CP is without clear provocation, and without a convincing explanation.  She had several episodes yesterday, and continued to have sxs even yesterday in the ER.  In the ER she was found to have an abnormal ekg, without comparison available, loaded with Aspirin 325mg.  Jyoti x3 negative. PT transferred pt to [facility] for cardiac cath to r/o significant CAD.  Cath showed: mLAD 90%, Cx 100% and  % via RFA.  Plan for CABG.  Presently patient is asymptomatic, with daughter at bedside.     TELEMETRY: NSR 60-70's    REVIEW OF SYSTEMS:  CONSTITUTIONAL: No fever, weight loss, or fatigue  EYES: No eye pain, visual disturbances, or discharge  ENMT:  No difficulty hearing, tinnitus, vertigo; No sinus or throat pain  NECK: No pain or stiffness  RESPIRATORY: denies cough,No wheezing, chills or hemoptysis; No shortness of breath  CARDIOVASCULAR: No chest pain,No palpitations, dizziness, or leg swelling  GASTROINTESTINAL: No abdominal or epigastric pain. No nausea, vomiting, or hematemesis; No diarrhea , no constipation. No melena or hematochezia.  GENITOURINARY: No dysuria, frequency, hematuria, or incontinence  NEUROLOGICAL: No headaches, memory loss, loss of strength, numbness, or tremors  SKIN: No itching, burning, rashes, or lesions   LYMPH NODES: No enlarged glands  ENDOCRINE: No heat or cold intolerance; No hair loss  MUSCULOSKELETAL: No joint pain or swelling; No muscle, back, or extremity pain  PSYCHIATRIC: No depression, anxiety, mood swings, or difficulty sleeping  HEME/LYMPH: No easy bruising, or bleeding gums  ALLERGY AND IMMUNOLOGIC: No hives or eczema          PAST MEDICAL & SURGICAL HISTORY:  Retinopathy  Neuropathy  DM (diabetes mellitus)  HTN (hypertension)  History of cholecystectomy      MEDICATIONS  (STANDING):  amLODIPine   Tablet 10 milliGRAM(s) Oral daily  aspirin enteric coated 81 milliGRAM(s) Oral daily  atorvastatin 40 milliGRAM(s) Oral at bedtime  dextrose 5%. 1000 milliLiter(s) (50 mL/Hr) IV Continuous <Continuous>  dextrose 50% Injectable 12.5 Gram(s) IV Push once  dextrose 50% Injectable 25 Gram(s) IV Push once  dextrose 50% Injectable 25 Gram(s) IV Push once  enoxaparin Injectable 50 milliGRAM(s) SubCutaneous every 12 hours  insulin lispro (HumaLOG) corrective regimen sliding scale   SubCutaneous three times a day before meals  insulin lispro (HumaLOG) corrective regimen sliding scale   SubCutaneous at bedtime  metoprolol tartrate 12.5 milliGRAM(s) Oral two times a day      Allergies    No Known Allergies    Intolerances                              15.1   7.6   )-----------( 230      ( 05 Aug 2018 06:26 )             45.3       08-05    136  |  103  |  15  ----------------------------<  148<H>  4.5   |  21<L>  |  1.10    Ca    9.5      05 Aug 2018 06:26    TPro  7.2  /  Alb  3.6  /  TBili  0.2  /  DBili  <0.1  /  AST  14  /  ALT  11  /  AlkPhos  94  08-04      LIVER FUNCTIONS - ( 04 Aug 2018 12:11 )  Alb: 3.6 g/dL / Pro: 7.2 g/dL / ALK PHOS: 94 U/L / ALT: 11 U/L / AST: 14 U/L / GGT: x             PT/INR - ( 04 Aug 2018 12:11 )   PT: 10.8 sec;   INR: 0.99 ratio         PTT - ( 04 Aug 2018 12:11 )  PTT:25.0 sec    CARDIAC MARKERS ( 04 Aug 2018 08:26 )  <.015 ng/mL / x     / 46 U/L / x     / 1.1 ng/mL  CARDIAC MARKERS ( 04 Aug 2018 03:19 )  <.015 ng/mL / x     / x     / x     / 1.0 ng/mL  CARDIAC MARKERS ( 03 Aug 2018 21:55 )  <.015 ng/mL / x     / x     / x     / <1.0 ng/mL                    Daily Height in cm: 134.62 (04 Aug 2018 11:26)    Daily Weight in k (04 Aug 2018 11:26)    I&O's Summary    04 Aug 2018 07:01  -  05 Aug 2018 07:00  --------------------------------------------------------  IN: 220 mL / OUT: 450 mL / NET: -230 mL        Vital Signs Last 24 Hrs  T(C): 36.8 (05 Aug 2018 05:00), Max: 36.8 (05 Aug 2018 05:00)  T(F): 98.2 (05 Aug 2018 05:00), Max: 98.2 (05 Aug 2018 05:00)  HR: 77 (05 Aug 2018 05:00) (63 - 77)  BP: 137/78 (05 Aug 2018 05:00) (121/82 - 151/69)  BP(mean): 93 (04 Aug 2018 11:26) (93 - 93)  RR: 18 (05 Aug 2018 05:00) (16 - 18)  SpO2: 98% (05 Aug 2018 05:00) (98% - 100%)    PHYSICAL EXAM:   · Constitutional	Well-developed, well nourished  · Eyes	EOMI; PERRL; no drainage or redness  · ENMT	No oral lesions; no gross abnormalities  · Neck	No bruits; no thyromegaly or nodules  · Respiratory	Normal breath sounds b/l, No RRW  · Cardiovascular	Regular rate & rhythm, normal S1, S2; no murmurs, gallops or rubs; no S3, S4  · Gastrointestinal	Soft, non-tender, no hepatosplenomegaly, normal bowel sounds  · Extremities	No cyanosis, clubbing or edema  · Vascular	Equal and normal pulses (carotid, femoral, dorsalis pedis)  · Neurological	Alert & oriented; no sensory, motor or coordination deficits, normal reflexes

## 2018-08-05 NOTE — PROGRESS NOTE ADULT - PROBLEM SELECTOR PLAN 1
BBlocker, asa, statin  Vascular consult BBlocker, asa, statin  Vascular consult  vein mapping pending

## 2018-08-05 NOTE — CONSULT NOTE ADULT - SUBJECTIVE AND OBJECTIVE BOX
HPI: 62 year old female admitted for cardiac workup, underwent a screening carotid duplex that showed stenosis. Patient has never had any neurologic symptoms, no CVA or TIAs in the past. She has never smoked, never had prior screening of carotids. Otherwise feeling well.    PMHx: HTN, DM II (neuropathy, retinopathy)  PSHx: History of cholecystectomy    Medications (inpatient): amLODIPine   Tablet 10 milliGRAM(s) Oral daily  aspirin enteric coated 81 milliGRAM(s) Oral daily  atorvastatin 40 milliGRAM(s) Oral at bedtime  dextrose 5%. 1000 milliLiter(s) IV Continuous <Continuous>  dextrose 50% Injectable 12.5 Gram(s) IV Push once  dextrose 50% Injectable 25 Gram(s) IV Push once  dextrose 50% Injectable 25 Gram(s) IV Push once  enoxaparin Injectable 50 milliGRAM(s) SubCutaneous every 12 hours  insulin lispro (HumaLOG) corrective regimen sliding scale   SubCutaneous three times a day before meals  insulin lispro (HumaLOG) corrective regimen sliding scale   SubCutaneous at bedtime  metoprolol tartrate 12.5 milliGRAM(s) Oral two times a day    Medications (PRN):dextrose 40% Gel 15 Gram(s) Oral once PRN  glucagon  Injectable 1 milliGRAM(s) IntraMuscular once PRN    Allergies: No Known Allergies  (Intolerances: None)  Social Hx: Never smoker, denies EtOH, eats a vegan diet, lives in [facility], visiting on a visa  Family Hx: Coronary artery disease (Sibling): pre mature CAD      Physical Exam  T(C): 36.7  HR: 66 (66 - 77)  BP: 147/79 (121/82 - 148/81)  RR: 18 (18 - 18)  SpO2: 99% (98% - 100%)  Tmax: T(C): , Max: 36.8 (18 @ 05:00)    18  -  18  --------------------------------------------------------  IN:    Oral Fluid: 220 mL  Total IN: 220 mL    OUT:    Voided: 450 mL  Total OUT: 450 mL    Total NET: -230 mL      18  -  18  --------------------------------------------------------  IN:    Oral Fluid: 600 mL  Total IN: 600 mL    OUT:  Total OUT: 0 mL    Total NET: 600 mL      General: well developed, well nourished, NAD  Neuro: alert and oriented, no focal deficits, moves all extremities spontaneously  HEENT: NCAT, anicteric, mucosa moist  Respiratory: airway patent, respirations unlabored  CVS: regular rate and rhythm  Abdomen: soft, nontender, nondistended  Extremities: no edema, sensation and movement grossly intact  Skin: warm, dry, appropriate color    Labs:                        15.1   7.6   )-----------( 230      ( 05 Aug 2018 06:26 )             45.3     PT/INR - ( 04 Aug 2018 12:11 )   PT: 10.8 sec;   INR: 0.99 ratio         PTT - ( 04 Aug 2018 12:11 )  PTT:25.0 sec      136  |  103  |  15  ----------------------------<  148<H>  4.5   |  21<L>  |  1.10    Ca    9.5      05 Aug 2018 06:26    TPro  7.2  /  Alb  3.6  /  TBili  0.2  /  DBili  <0.1  /  AST  14  /  ALT  11  /  AlkPhos  94  08-04    Urinalysis Basic - ( 03 Aug 2018 22:41 )    Color: Pale Yellow / Appearance: Clear / S.010 / pH: x  Gluc: x / Ketone: Negative  / Bili: Negative / Urobili: Negative   Blood: x / Protein: Negative / Nitrite: Negative   Leuk Esterase: Small / RBC: 0-2 /HPF / WBC 0-2   Sq Epi: x / Non Sq Epi: Occasional / Bacteria: Occasional    Imaging and other studies:  Carotid duplex final read pending HPI: 62 year old female admitted for cardiac workup, underwent a screening carotid duplex that showed stenosis of the R carotid (> 70%). Patient has never had any neurologic symptoms, no CVA or TIAs in the past. She has never smoked, never had prior screening of carotids. Otherwise feeling well.    PMHx: HTN, DM II (neuropathy, retinopathy)  PSHx: History of cholecystectomy    Medications (inpatient): amLODIPine   Tablet 10 milliGRAM(s) Oral daily  aspirin enteric coated 81 milliGRAM(s) Oral daily  atorvastatin 40 milliGRAM(s) Oral at bedtime  dextrose 5%. 1000 milliLiter(s) IV Continuous <Continuous>  dextrose 50% Injectable 12.5 Gram(s) IV Push once  dextrose 50% Injectable 25 Gram(s) IV Push once  dextrose 50% Injectable 25 Gram(s) IV Push once  enoxaparin Injectable 50 milliGRAM(s) SubCutaneous every 12 hours  insulin lispro (HumaLOG) corrective regimen sliding scale   SubCutaneous three times a day before meals  insulin lispro (HumaLOG) corrective regimen sliding scale   SubCutaneous at bedtime  metoprolol tartrate 12.5 milliGRAM(s) Oral two times a day    Medications (PRN):dextrose 40% Gel 15 Gram(s) Oral once PRN  glucagon  Injectable 1 milliGRAM(s) IntraMuscular once PRN    Allergies: No Known Allergies  (Intolerances: None)  Social Hx: Never smoker, denies EtOH, eats a vegan diet, lives in [facility], visiting on a visa  Family Hx: Coronary artery disease (Sibling): pre mature CAD      Physical Exam  T(C): 36.7  HR: 66 (66 - 77)  BP: 147/79 (121/82 - 148/81)  RR: 18 (18 - 18)  SpO2: 99% (98% - 100%)  Tmax: T(C): , Max: 36.8 (18 @ 05:00)    18  -  18  --------------------------------------------------------  IN:    Oral Fluid: 220 mL  Total IN: 220 mL    OUT:    Voided: 450 mL  Total OUT: 450 mL    Total NET: -230 mL      18  18  --------------------------------------------------------  IN:    Oral Fluid: 600 mL  Total IN: 600 mL    OUT:  Total OUT: 0 mL    Total NET: 600 mL      General: well developed, well nourished, NAD  Neuro: alert and oriented, no focal deficits, moves all extremities spontaneously  HEENT: NCAT, anicteric, mucosa moist  Respiratory: airway patent, respirations unlabored  CVS: regular rate and rhythm  Abdomen: soft, nontender, nondistended  Extremities: no edema, sensation and movement grossly intact  Skin: warm, dry, appropriate color    Labs:                        15.1   7.6   )-----------( 230      ( 05 Aug 2018 06:26 )             45.3     PT/INR - ( 04 Aug 2018 12:11 )   PT: 10.8 sec;   INR: 0.99 ratio         PTT - ( 04 Aug 2018 12:11 )  PTT:25.0 sec      136  |  103  |  15  ----------------------------<  148<H>  4.5   |  21<L>  |  1.10    Ca    9.5      05 Aug 2018 06:26    TPro  7.2  /  Alb  3.6  /  TBili  0.2  /  DBili  <0.1  /  AST  14  /  ALT  11  /  AlkPhos  94  08-04    Urinalysis Basic - ( 03 Aug 2018 22:41 )    Color: Pale Yellow / Appearance: Clear / S.010 / pH: x  Gluc: x / Ketone: Negative  / Bili: Negative / Urobili: Negative   Blood: x / Protein: Negative / Nitrite: Negative   Leuk Esterase: Small / RBC: 0-2 /HPF / WBC 0-2   Sq Epi: x / Non Sq Epi: Occasional / Bacteria: Occasional    Imaging and other studies:  < from: [facility] Duplex Carotid, Bilat (18 @ 12:37) >  RIGHT CAROTID ARTERIAL SYSTEM: There is extensive calcified plaque extending from the carotid bulb into the proximal internal carotid artery causing luminal narrowing of the proximal ICA. Elevated peak systolic velocity in the proximal right internal carotid artery reach up to 337 cm/s. Normal peak systolic velocities in the right mid internal carotid artery up to 115 cm/s and distal right internal carotid artery up to 68 cm/s. Elevated right ICA to CCA ratio of 8.1.    LEFT CAROTID ARTERIAL SYSTEM: Moderate intimal thickening and soft plaque in the left common carotid artery. Mild to moderate calcified plaque is seen in the left carotid bulb extending into the left external and proximal internal carotid arteries. Normal peak systolic velocities are seen in the left internal carotid artery, reaching up to 54 cm/s. Normal left ICA to CCA ratio of 1.5.     < end of copied text >

## 2018-08-06 LAB
ANION GAP SERPL CALC-SCNC: 11 MMOL/L — SIGNIFICANT CHANGE UP (ref 5–17)
BUN SERPL-MCNC: 16 MG/DL — SIGNIFICANT CHANGE UP (ref 7–23)
CALCIUM SERPL-MCNC: 9.8 MG/DL — SIGNIFICANT CHANGE UP (ref 8.4–10.5)
CHLORIDE SERPL-SCNC: 104 MMOL/L — SIGNIFICANT CHANGE UP (ref 96–108)
CO2 SERPL-SCNC: 23 MMOL/L — SIGNIFICANT CHANGE UP (ref 22–31)
CREAT SERPL-MCNC: 1.27 MG/DL — SIGNIFICANT CHANGE UP (ref 0.5–1.3)
GLUCOSE BLDC GLUCOMTR-MCNC: 163 MG/DL — HIGH (ref 70–99)
GLUCOSE BLDC GLUCOMTR-MCNC: 225 MG/DL — HIGH (ref 70–99)
GLUCOSE BLDC GLUCOMTR-MCNC: 238 MG/DL — HIGH (ref 70–99)
GLUCOSE BLDC GLUCOMTR-MCNC: 269 MG/DL — HIGH (ref 70–99)
GLUCOSE SERPL-MCNC: 159 MG/DL — HIGH (ref 70–99)
HCT VFR BLD CALC: 43.9 % — SIGNIFICANT CHANGE UP (ref 34.5–45)
HGB BLD-MCNC: 15.2 G/DL — SIGNIFICANT CHANGE UP (ref 11.5–15.5)
INR BLD: 0.98 RATIO — SIGNIFICANT CHANGE UP (ref 0.88–1.16)
MCHC RBC-ENTMCNC: 28.6 PG — SIGNIFICANT CHANGE UP (ref 27–34)
MCHC RBC-ENTMCNC: 34.7 GM/DL — SIGNIFICANT CHANGE UP (ref 32–36)
MCV RBC AUTO: 82.3 FL — SIGNIFICANT CHANGE UP (ref 80–100)
PLATELET # BLD AUTO: 212 K/UL — SIGNIFICANT CHANGE UP (ref 150–400)
POTASSIUM SERPL-MCNC: 5.1 MMOL/L — SIGNIFICANT CHANGE UP (ref 3.5–5.3)
POTASSIUM SERPL-SCNC: 5.1 MMOL/L — SIGNIFICANT CHANGE UP (ref 3.5–5.3)
PROTHROM AB SERPL-ACNC: 10.6 SEC — SIGNIFICANT CHANGE UP (ref 9.8–12.7)
RBC # BLD: 5.33 M/UL — HIGH (ref 3.8–5.2)
RBC # FLD: 12.9 % — SIGNIFICANT CHANGE UP (ref 10.3–14.5)
SODIUM SERPL-SCNC: 138 MMOL/L — SIGNIFICANT CHANGE UP (ref 135–145)
WBC # BLD: 7.5 K/UL — SIGNIFICANT CHANGE UP (ref 3.8–10.5)
WBC # FLD AUTO: 7.5 K/UL — SIGNIFICANT CHANGE UP (ref 3.8–10.5)

## 2018-08-06 PROCEDURE — 99231 SBSQ HOSP IP/OBS SF/LOW 25: CPT

## 2018-08-06 PROCEDURE — 99233 SBSQ HOSP IP/OBS HIGH 50: CPT

## 2018-08-06 PROCEDURE — 99221 1ST HOSP IP/OBS SF/LOW 40: CPT

## 2018-08-06 RX ADMIN — Medication 12.5 MILLIGRAM(S): at 06:56

## 2018-08-06 RX ADMIN — AMLODIPINE BESYLATE 10 MILLIGRAM(S): 2.5 TABLET ORAL at 06:56

## 2018-08-06 RX ADMIN — ENOXAPARIN SODIUM 50 MILLIGRAM(S): 100 INJECTION SUBCUTANEOUS at 06:56

## 2018-08-06 RX ADMIN — Medication 12.5 MILLIGRAM(S): at 17:29

## 2018-08-06 RX ADMIN — ENOXAPARIN SODIUM 50 MILLIGRAM(S): 100 INJECTION SUBCUTANEOUS at 17:29

## 2018-08-06 RX ADMIN — ATORVASTATIN CALCIUM 40 MILLIGRAM(S): 80 TABLET, FILM COATED ORAL at 22:03

## 2018-08-06 RX ADMIN — Medication 2: at 12:01

## 2018-08-06 RX ADMIN — Medication 81 MILLIGRAM(S): at 12:02

## 2018-08-06 RX ADMIN — Medication 1: at 07:25

## 2018-08-06 RX ADMIN — Medication 3: at 16:52

## 2018-08-06 NOTE — CONSULT NOTE ADULT - ATTENDING COMMENTS
I have interviewed and examined the patient and reviewed the residents note including the history, exam, assessment, and plan.  I agree with the residents assessment and plan.     62 y.o F with history of proliferative diabetic retinopathy (PDR) OS> OD. Patient s/p PRP with room for fill in laser.   Risks of medically necessary anticoagulation/antiplatelets in the setting of a life threatening condition outweighs the risks of any visually threatening condition. Diabetic retinopathy can be further managed and treated in an outpatient setting.   - BP/BG management as per primary team, strict control  - Cont management of acute condition as per primary team  - Outpatient follow up stressed to patient; pt reports will be staying in [place] until November at least; directed to follow up in ophthalmology clinic for further evaluation within 1 week of discharge.   - findings and plan discussed with patient and primary team    Follow-Up:  Patient should follow up his/her ophthalmologist or in the [facility] within 1 week of discharge, sooner if symptoms worsen or change.    Neli Holliday MD

## 2018-08-06 NOTE — GOALS OF CARE CONVERSATION - PERSONAL ADVANCE DIRECTIVE - CONVERSATION DETAILS
LMSW assisted patient with completing HCP form. Patient assigned primary agent as daughter Merary Alberto ([phone]) and alternative agent as Son in law, Oxana Kinney ([phone]). Original HCP form provided to patient. Copy placed in paper chart. Copy uploaded to [facility].

## 2018-08-06 NOTE — PROGRESS NOTE ADULT - PROBLEM SELECTOR PLAN 1
preop workup in progress  continue asa/ statin/ b-blockers  shower daily  opth consult pending  vein mapping today  ? OR date- d/w Dr. patel

## 2018-08-06 NOTE — PROGRESS NOTE ADULT - SUBJECTIVE AND OBJECTIVE BOX
[facility]    Keith Torrez, James, Fernandez, Nohelia, Osvaldo, Loraine      [phone]    CHIEF COMPLAINT: Patient is a 62y old  Female who presents with a chief complaint of     Follow Up: cad, carotid disease    Interim history: The patient reports no new symptoms.  Reports "slight" chest discomfort, without clear provocation, unlikely anginal.  She denies shortness of breath.  No abdominal pain.  No new neurologic symptoms.      MEDICATIONS  (STANDING):  amLODIPine   Tablet 10 milliGRAM(s) Oral daily  aspirin enteric coated 81 milliGRAM(s) Oral daily  atorvastatin 40 milliGRAM(s) Oral at bedtime  dextrose 5%. 1000 milliLiter(s) (50 mL/Hr) IV Continuous <Continuous>  dextrose 50% Injectable 12.5 Gram(s) IV Push once  dextrose 50% Injectable 25 Gram(s) IV Push once  dextrose 50% Injectable 25 Gram(s) IV Push once  enoxaparin Injectable 50 milliGRAM(s) SubCutaneous every 12 hours  insulin lispro (HumaLOG) corrective regimen sliding scale   SubCutaneous three times a day before meals  insulin lispro (HumaLOG) corrective regimen sliding scale   SubCutaneous at bedtime  metoprolol tartrate 12.5 milliGRAM(s) Oral two times a day    MEDICATIONS  (PRN):  dextrose 40% Gel 15 Gram(s) Oral once PRN Blood Glucose LESS THAN 70 milliGRAM(s)/deciliter  glucagon  Injectable 1 milliGRAM(s) IntraMuscular once PRN Glucose LESS THAN 70 milligrams/deciliter      REVIEW OF SYSTEMS:  eye, ent, GI, , allergic, dermatologic, musculoskeletal and neurologic are negative except as described above    Vital Signs Last 24 Hrs  T(C): 36.8 (06 Aug 2018 06:41), Max: 36.8 (06 Aug 2018 06:41)  T(F): 98.3 (06 Aug 2018 06:41), Max: 98.3 (06 Aug 2018 06:41)  HR: 81 (06 Aug 2018 06:41) (66 - 81)  BP: 135/88 (06 Aug 2018 06:41) (135/88 - 147/79)  BP(mean): --  RR: 18 (06 Aug 2018 06:41) (18 - 18)  SpO2: 98% (06 Aug 2018 06:41) (98% - 100%)    I&O's Summary    05 Aug 2018 07:01  -  06 Aug 2018 07:00  --------------------------------------------------------  IN: 860 mL / OUT: 0 mL / NET: 860 mL        Telemetry past 24h: sr    PHYSICAL EXAM:    Constitutional: well-nourished, well-developed, NAD   HEENT:  MMM, sclerae anicteric, conjunctivae clear, no oral cyanosis.  Pulmonary: Non-labored, breath sounds are clear bilaterally, No wheezing, rales or rhonchi  Cardiovascular: Regular, S1 and S2.  No murmur.  No rubs, gallops or clicks  Gastrointestinal: Bowel Sounds present, soft, nontender.   Lymph: No peripheral edema.   Neurological: Alert, no focal deficits  Skin: No rashes.  Psych:  Mood & affect appropriate    LABS: All Labs Reviewed:                        15.2   7.5   )-----------( 212      ( 06 Aug 2018 06:06 )             43.9                         15.1   7.6   )-----------( 230      ( 05 Aug 2018 06:26 )             45.3                         13.8   8.55  )-----------( 263      ( 03 Aug 2018 21:55 )             40.7     06 Aug 2018 06:11    138    |  104    |  16     ----------------------------<  159    5.1     |  23     |  1.27   05 Aug 2018 06:26    136    |  103    |  15     ----------------------------<  148    4.5     |  21     |  1.10   03 Aug 2018 21:55    139    |  106    |  19     ----------------------------<  147    4.9     |  24     |  1.40     Ca    9.8        06 Aug 2018 06:11  Ca    9.5        05 Aug 2018 06:26  Ca    9.2        03 Aug 2018 21:55    TPro  7.2    /  Alb  3.6    /  TBili  0.2    /  DBili  <0.1   /  AST  14     /  ALT  11     /  AlkPhos  94     04 Aug 2018 12:11  TPro  7.6    /  Alb  3.5    /  TBili  0.3    /  DBili  x      /  AST  23     /  ALT  15     /  AlkPhos  99     03 Aug 2018 21:55    PT/INR - ( 06 Aug 2018 06:06 )   PT: 10.6 sec;   INR: 0.98 ratio         PTT - ( 04 Aug 2018 12:11 )  PTT:25.0 sec      Blood Culture: Organism --  Gram Stain Blood -- Gram Stain --  Specimen Source .Blood Blood  Culture-Blood --      08-04 @ 12:11  Pro Bnp [id number]    08-04 @ 14:18  TSH: 4.04      RADIOLOGY:    EKG:    Echo:    < from: Transthoracic Echocardiogram (08.04.18 @ 12:05) >    Patient name: AGAPITO REYNOSO  YOB: 1955   Age: 62 (F)   MR#: [id number]  Study Date: 8/4/2018  [id number]  Study quality: Technically good  Referring Physician: Joce Dawson MD  Blood Pressure: 163/74 mmHg  Height: 152 cm  Weight: 54 kg  BSA: 1.5 m2  ------------------------------------------------------------------------  PROCEDURE: Transthoracic echocardiogram with 2-D, M-Mode  and complete spectral and color flow Doppler.  INDICATION: Atherosclerotic heart disease of native  coronary artery without angina pectoris (I25.10)  ------------------------------------------------------------------------  Dimensions:    Normal Values:  LA:     2.8    2.0 - 4.0 cm  Ao:     3.2    2.0 - 3.8 cm  SEPTUM: 1.0    0.6 - 1.2 cm  PWT:    0.8    0.6 - 1.1 cm  LVIDd:  4.1    3.0 - 5.6 cm  LVIDs:  3.0    1.8 - 4.0 cm  Derived variables:  LVMI: 76 g/m2  RWT: 0.39  Fractional short: 27 %  EF (Teicholtz): 53 %  ------------------------------------------------------------------------  Observations:  Mitral Valve: Mitral annular calcification, otherwise  normal mitral valve. Mild-moderate mitral regurgitation.  Aortic Valve/Aorta: Calcified trileaflet aortic valve with  normal opening. Mild aortic regurgitation.  Aortic Root: 3.2 cm.  Left Atrium: LA volume index = 14 cc/m2.  Left Ventricle: Endocardium not well visualized; grossly  preserved  left ventricular systolic function. The basal  inferior wall is hypokinetic.  Normal left ventricular  internal dimensions and wall thicknesses. Mild diastolic  dysfunction (Stage I).  Right Heart: Normal right atrium. The right ventricle is  not well visualized; grossly normal right ventricular  systolic function. Tricuspid valve not well visualized.  Pulmonic valve not well visualized.  Pericardium/Pleura: Normal pericardium with no pericardial  effusion.  Hemodynamic: RAP about  3 mmHG (IVC small)  ------------------------------------------------------------------------  Conclusions:  1. Mild-moderate mitral regurgitation.  2. Calcified trileaflet aortic valve with normal opening.  Mild aortic regurgitation.  3. Endocardium not well visualized; grossly preserved  left  ventricular systolic function. The basal inferior wall is  hypokinetic.  4. The right ventricle is not well visualized; grossly  normal right ventricular systolic function.  5. RAP about  3 mmHG (IVC small)  *** No previous Echo exam.  ------------------------------------------------------------------------  Confirmedon  8/4/2018 - 18:30:49 by Shonda Longoria M.D.  ------------------------------------------------------------------------    < end of copied text >

## 2018-08-06 NOTE — PROGRESS NOTE ADULT - SUBJECTIVE AND OBJECTIVE BOX
VITAL SIGNS    Telemetry:  rsr 70-80   Vital Signs Last 24 Hrs  T(C): 36.8 (08-06-18 @ 06:41), Max: 36.8 (08-06-18 @ 06:41)  T(F): 98.3 (08-06-18 @ 06:41), Max: 98.3 (08-06-18 @ 06:41)  HR: 81 (08-06-18 @ 06:41) (71 - 81)  BP: 135/88 (08-06-18 @ 06:41) (135/88 - 137/80)  RR: 18 (08-06-18 @ 06:41) (18 - 18)  SpO2: 98% (08-06-18 @ 06:41) (98% - 100%)            08-05 @ 07:01  -  08-06 @ 07:00  --------------------------------------------------------  IN: 860 mL / OUT: 0 mL / NET: 860 mL       Daily     Daily   Admit Wt: Drug Dosing Weight  Height (cm): 134.62 (04 Aug 2018 11:50)  Weight (kg): 54 (04 Aug 2018 11:50)  BMI (kg/m2): 29.8 (04 Aug 2018 11:50)  BSA (m2): 1.37 (04 Aug 2018 11:50)      CAPILLARY BLOOD GLUCOSE      POCT Blood Glucose.: 238 mg/dL (06 Aug 2018 11:45)  POCT Blood Glucose.: 163 mg/dL (06 Aug 2018 06:51)  POCT Blood Glucose.: 218 mg/dL (05 Aug 2018 21:52)  POCT Blood Glucose.: 233 mg/dL (05 Aug 2018 16:27)          amLODIPine   Tablet 10 milliGRAM(s) Oral daily  aspirin enteric coated 81 milliGRAM(s) Oral daily  atorvastatin 40 milliGRAM(s) Oral at bedtime  dextrose 40% Gel 15 Gram(s) Oral once PRN  dextrose 5%. 1000 milliLiter(s) IV Continuous <Continuous>  dextrose 50% Injectable 12.5 Gram(s) IV Push once  dextrose 50% Injectable 25 Gram(s) IV Push once  dextrose 50% Injectable 25 Gram(s) IV Push once  enoxaparin Injectable 50 milliGRAM(s) SubCutaneous every 12 hours  glucagon  Injectable 1 milliGRAM(s) IntraMuscular once PRN  insulin lispro (HumaLOG) corrective regimen sliding scale   SubCutaneous three times a day before meals  insulin lispro (HumaLOG) corrective regimen sliding scale   SubCutaneous at bedtime  metoprolol tartrate 12.5 milliGRAM(s) Oral two times a day      PHYSICAL EXAM    Subjective: "When is my surgery."   Neurology: alert and oriented x 3, nonfocal, no gross deficits  CV : tele:  RSR 70-80   Lungs: clear. RR easy, unlabored   Abdomen: soft, nontender, nondistended, positive bowel sounds, + bowel movement   Neg N/V/D   :  pt voiding without difficulty   Extremities:   ELMORE; neg LE edema, neg calf tenderness.   PPP bilaterally; cath site cdi mitchel - neg hematoma

## 2018-08-06 NOTE — PROGRESS NOTE ADULT - ASSESSMENT
61 y/o  female sp cardiac cath  with LAD 90 %, Cx 100% and % disease  H diabetes 2 , diabetic retinopathy with "eye bleed" per patient 2012.  preop for CABG  8/5 P2Y12 163, Vascular Surgery consulted for right ICA stenosis - no intervention at this time; f/u as an outpatient after OHS  preop workup in progress  8/6 VSS; pt denies cp/sob; obtain vein mapping today by PA's; opth consulted called for bilateral retinal bleeding s/p laser sx 2013; continue asa/statin/bb  ?OR date - d/w Dr. Zaragoza

## 2018-08-06 NOTE — CONSULT NOTE ADULT - SUBJECTIVE AND OBJECTIVE BOX
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 PT transferred pt to [facility] for cardiac cath to r/o significant CAD.  Cath showed: mLAD 90%, Cx 100% and  % via RFA.  Pending CABG.  Ophthalmology consulted due to history of diabetic retinopathy. Patient reports laser treatement OU. h.o CE/PCIOL, no retina surgeries. Patient reports poor vision OS for many years 2/2 diabetes. Patient reports follow up with an ophthalmologist in [facility], last visit Jan 2018. Denies injection.     Patient denies any acute visual complaints.     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 PT transferred pt to [facility] for cardiac cath to r/o significant CAD.  Cath showed: mLAD 90%, Cx 100% and  % via RFA.  Pending CABG.  Ophthalmology consulted due to history of diabetic retinopathy. Patient reports laser treatement OU. h.o CE/PCIOL, no retina surgeries. Patient reports poor vision OS for many years 2/2 diabetes with history of "eye bleeding". Patient reports follow up with an ophthalmologist in [facility], last visit Jan 2018. Denies any intravitreal injection.     Patient denies any acute visual complaints.       PMH: above  POcHx:  CE/PCIOL OU, laser OU  FamOcHx: Denies   Meds: no gtts   Allergies: NKDA    ROS:  General (neg), Vision (per HPI), Head and Neck (neg), Pulm (neg), CV (neg), GI (neg),  (neg), Musculoskeletal (neg), Skin/Integ (neg), Neuro (neg), Endocrine (neg), Heme (neg), All/Immuno (neg)    Mood and Affect Appropriate ( x ),  Oriented to Time, Place, and Person x 3 ( x )    Ophthalmology Exam    Visual acuity (sc): 20/20 OU  Pupils: PERRL OU, no APD  Ttono: 17 OU  Extraocular movements (EOMs): Full OU, no pain, no diplopia   Confrontational Visual Field (CVF):  Full OU  Color Plates: 12/12 OU    PLE:   External:  Flat   Lids/Lashes/Lacrimal Ducts: WNL OU  Sclera/ Conj: W+Q OU  Cornea: Cl OU  Anterior Chamber: D+F OU  Iris:  Flat OU  Lens: PCIOL OU    Fundus Exam: dilated with 1% tropicamide and 2.5% phenylephrine  Approval obtained from primary team for dilation  Patient aware that pupils can remained dilated for at least 4-6 hours  Exam performed with 20D lens    Vitreous: wnl OU  Disc, cup/disc: sharp and pink OU  Macula:  wnl OU  Vessels:  wnl OU  Periphery: sparse PRP OU fibrosis/regressed NVE OS       A/P. 62 y.o F with diabetic retinopathy OS> OD. Patient s/p PRP with room for fill in laser.   Risks of medically necessary anticoagulaton/antiplatelets in the setting of a life threatening condition outweighs the risks of any visually threatening condition. Diabetic retinopathy must be further managed and treated in an outpatient setting.   - BP/BG management as per primary team  - Cont management of acute condition as per team  - Outpatient follow up stressed to patient; pt reports will be staying in [place] until November at least; directed to follow up in clinic for further evaluation within 1 week of discharge.     Follow-Up:  Patient should follow up his/her ophthalmologist or in the [facility] within 1 week of discharge, sooner if symptoms worsen or change.  [address] [place] [address].  [place] [address] [phone]    Please call the ophthalmologist to confirm an appointment time and date prior to discharge.     s/d/w Dr. 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 Pt transferred pt to [facility] for cardiac cath to r/o significant CAD.  Cath showed: mLAD 90%, Cx 100% and  % via RFA.  Pending CABG.  Ophthalmology consulted due to history of diabetic retinopathy. Patient reports laser treatement OU. h.o CE/PCIOL, no retina surgeries. Patient reports poor vision OS for many years 2/2 diabetes with history of "eye bleeding". Patient reports follow up with an ophthalmologist in [facility], last visit Jan 2018. Denies any intravitreal injection.     Patient denies any acute visual complaints.       PMH: above  POcHx:  CE/PCIOL OU, laser OU  FamOcHx: Denies   Meds: no gtts, meds reviewed   Allergies: NKDA    ROS:  General (neg), Vision (per HPI), Head and Neck (neg), Pulm (neg), CV (neg), GI (neg),  (neg), Musculoskeletal (neg), Skin/Integ (neg), Neuro (neg), Endocrine (neg), Heme (neg), All/Immuno (neg)    Mood and Affect Appropriate ( x ),  Oriented to Time, Place, and Person x 3 ( x )    Ophthalmology Exam    Visual acuity (sc): 20/20 OU  Pupils: PERRL OU, no APD OU  Ttono: 17 OU  Extraocular movements (EOMs): Full OU, no pain, no diplopia   Confrontational Visual Field (CVF):  Full OU  Color Plates: 12/12 OU    PLE:   External:  Flat   Lids/Lashes/Lacrimal Ducts: WNL OU  Sclera/ Conj: W+Q OU  Cornea: Cl OU  Anterior Chamber: D+F OU  Iris:  Flat OU  Lens: PCIOL OU    Fundus Exam: dilated with 1% tropicamide and 2.5% phenylephrine  Approval obtained from primary team for dilation  Patient aware that pupils can remained dilated for at least 4-6 hours  Exam performed with 20D lens    Vitreous: wnl OU  Disc, cup/disc: 0.4 OU, sharp and pink OU, no NVD OU  Macula:  grossly wnl OU  Vessels:  wnl OU  Periphery: sparse PRP OU, few scattered DBH OD,  fibrosis/regressed NVE along the superior arcade OS       A/P. 62 y.o F with history of proliferative diabetic retinopathy OS> OD. Patient s/p PRP with room for fill in laser.   Risks of medically necessary anticoagulation/antiplatelets in the setting of a life threatening condition outweighs the risks of any visually threatening condition. Diabetic retinopathy can be further managed and treated in an outpatient setting.   - BP/BG management as per primary team, strict control  - Cont management of acute condition as per primary team  - Outpatient follow up stressed to patient; pt reports will be staying in [place] until November at least; directed to follow up in ophthalmology clinic for further evaluation within 1 week of discharge.   - findings and plan discussed with patient and primary team    Follow-Up:  Patient should follow up his/her ophthalmologist or in the [facility] within 1 week of discharge, sooner if symptoms worsen or change.  [address] [place] [address].  [place] [address] [phone]    Please call the ophthalmologist to confirm an appointment time and date prior to discharge.     s/d/w Dr. Holliday

## 2018-08-06 NOTE — PROGRESS NOTE ADULT - ASSESSMENT
63 y/o F with PMH HTN, DM, fam hx CAD who presented with CP found to have multi-vessel disease, plan for CABG     -there is no evidence of acute ischemia.  -her chest discomfort is not suspicious at this time for an unstable syndrome, ,though noting her disease we must be vigilant  -workup of significant carotid disease in progress, with plan for cabg when workup complete  -there is no evidence of significant arrhythmia.  -there is no evidence for meaningful  volume overload.  - c/w ASA  - c/w statin  - c/w metoprolol, amlodipine  - monitor electrolytes  - will follow

## 2018-08-07 DIAGNOSIS — I77.9 DISORDER OF ARTERIES AND ARTERIOLES, UNSPECIFIED: ICD-10-CM

## 2018-08-07 LAB
ANION GAP SERPL CALC-SCNC: 10 MMOL/L — SIGNIFICANT CHANGE UP (ref 5–17)
APTT BLD: 46.5 SEC — HIGH (ref 27.5–37.4)
BLD GP AB SCN SERPL QL: NEGATIVE — SIGNIFICANT CHANGE UP
BUN SERPL-MCNC: 16 MG/DL — SIGNIFICANT CHANGE UP (ref 7–23)
CALCIUM SERPL-MCNC: 9.4 MG/DL — SIGNIFICANT CHANGE UP (ref 8.4–10.5)
CHLORIDE SERPL-SCNC: 103 MMOL/L — SIGNIFICANT CHANGE UP (ref 96–108)
CO2 SERPL-SCNC: 23 MMOL/L — SIGNIFICANT CHANGE UP (ref 22–31)
CREAT SERPL-MCNC: 1.17 MG/DL — SIGNIFICANT CHANGE UP (ref 0.5–1.3)
GLUCOSE BLDC GLUCOMTR-MCNC: 106 MG/DL — HIGH (ref 70–99)
GLUCOSE BLDC GLUCOMTR-MCNC: 183 MG/DL — HIGH (ref 70–99)
GLUCOSE BLDC GLUCOMTR-MCNC: 236 MG/DL — HIGH (ref 70–99)
GLUCOSE BLDC GLUCOMTR-MCNC: 332 MG/DL — HIGH (ref 70–99)
GLUCOSE SERPL-MCNC: 205 MG/DL — HIGH (ref 70–99)
HCT VFR BLD CALC: 44.1 % — SIGNIFICANT CHANGE UP (ref 34.5–45)
HGB BLD-MCNC: 14.5 G/DL — SIGNIFICANT CHANGE UP (ref 11.5–15.5)
INR BLD: 1.04 RATIO — SIGNIFICANT CHANGE UP (ref 0.88–1.16)
MCHC RBC-ENTMCNC: 27 PG — SIGNIFICANT CHANGE UP (ref 27–34)
MCHC RBC-ENTMCNC: 32.9 GM/DL — SIGNIFICANT CHANGE UP (ref 32–36)
MCV RBC AUTO: 82.3 FL — SIGNIFICANT CHANGE UP (ref 80–100)
PLATELET # BLD AUTO: 223 K/UL — SIGNIFICANT CHANGE UP (ref 150–400)
POTASSIUM SERPL-MCNC: 4.5 MMOL/L — SIGNIFICANT CHANGE UP (ref 3.5–5.3)
POTASSIUM SERPL-SCNC: 4.5 MMOL/L — SIGNIFICANT CHANGE UP (ref 3.5–5.3)
PROTHROM AB SERPL-ACNC: 11.2 SEC — SIGNIFICANT CHANGE UP (ref 9.8–12.7)
RBC # BLD: 5.36 M/UL — HIGH (ref 3.8–5.2)
RBC # FLD: 13 % — SIGNIFICANT CHANGE UP (ref 10.3–14.5)
RH IG SCN BLD-IMP: POSITIVE — SIGNIFICANT CHANGE UP
SODIUM SERPL-SCNC: 136 MMOL/L — SIGNIFICANT CHANGE UP (ref 135–145)
WBC # BLD: 7.1 K/UL — SIGNIFICANT CHANGE UP (ref 3.8–10.5)
WBC # FLD AUTO: 7.1 K/UL — SIGNIFICANT CHANGE UP (ref 3.8–10.5)

## 2018-08-07 PROCEDURE — 70450 CT HEAD/BRAIN W/O DYE: CPT | Mod: 26

## 2018-08-07 PROCEDURE — 99233 SBSQ HOSP IP/OBS HIGH 50: CPT

## 2018-08-07 RX ORDER — CHLORHEXIDINE GLUCONATE 213 G/1000ML
1 SOLUTION TOPICAL ONCE
Qty: 0 | Refills: 0 | Status: COMPLETED | OUTPATIENT
Start: 2018-08-07 | End: 2018-08-07

## 2018-08-07 RX ORDER — CEFUROXIME AXETIL 250 MG
1500 TABLET ORAL ONCE
Qty: 0 | Refills: 0 | Status: DISCONTINUED | OUTPATIENT
Start: 2018-08-07 | End: 2018-08-08

## 2018-08-07 RX ORDER — CHLORHEXIDINE GLUCONATE 213 G/1000ML
15 SOLUTION TOPICAL ONCE
Qty: 0 | Refills: 0 | Status: DISCONTINUED | OUTPATIENT
Start: 2018-08-07 | End: 2018-08-08

## 2018-08-07 RX ADMIN — Medication 12.5 MILLIGRAM(S): at 05:22

## 2018-08-07 RX ADMIN — ENOXAPARIN SODIUM 50 MILLIGRAM(S): 100 INJECTION SUBCUTANEOUS at 05:22

## 2018-08-07 RX ADMIN — Medication 12.5 MILLIGRAM(S): at 17:18

## 2018-08-07 RX ADMIN — Medication 81 MILLIGRAM(S): at 12:08

## 2018-08-07 RX ADMIN — Medication 2: at 12:09

## 2018-08-07 RX ADMIN — Medication 1: at 08:11

## 2018-08-07 RX ADMIN — Medication 4: at 17:18

## 2018-08-07 RX ADMIN — ATORVASTATIN CALCIUM 40 MILLIGRAM(S): 80 TABLET, FILM COATED ORAL at 21:53

## 2018-08-07 RX ADMIN — CHLORHEXIDINE GLUCONATE 1 APPLICATION(S): 213 SOLUTION TOPICAL at 20:25

## 2018-08-07 RX ADMIN — AMLODIPINE BESYLATE 10 MILLIGRAM(S): 2.5 TABLET ORAL at 05:22

## 2018-08-07 NOTE — PROGRESS NOTE ADULT - ASSESSMENT
MA called patient back, patient not able to make her appt to see Dr. Chun. Inform her no available appt Adiel is booked all the way to [place]. Patient is willing to see NP. Patient accepted 3/24/17 to see NP Michael. Patient stated that she have to see somebody because of her fluid. KARI    63 y/o  female sp cardiac cath  with LAD 90 %, Cx 100% and % disease  H diabetes 2 , diabetic retinopathy with "eye bleed" per patient 2012.  preop for CABG  8/5 P2Y12 163, Vascular Surgery consulted for right ICA stenosis - no intervention at this time; f/u as an outpatient after OHS  preop workup in progress  8/6 VSS; pt denies cp/sob; obtain vein mapping today by PA's; opth consulted called for bilateral retinal bleeding s/p laser sx 2013; continue asa/statin/bb  8/7 Pt. transferred to Dr. Anastasia Rojas's service - for CABG in am - 2nd case- will obtain head CT non con d/t carotid dz- OR Wednesday - 2nd case

## 2018-08-07 NOTE — PROGRESS NOTE ADULT - PROBLEM SELECTOR PLAN 1
or wed- 2nd case  continue asa/ statin/ b-blockers  shower daily  d/c plan - social work involvement as pt. is undocumented & visiting the [place] from [facility].    opth consult pending  vein mapping today  ? OR date- d/w Dr. paetl

## 2018-08-07 NOTE — PROGRESS NOTE ADULT - SUBJECTIVE AND OBJECTIVE BOX
[facility] -- Keith Torrez, Fernandez Ramirez, Osvaldo Pozo Savella  Office # [phone]      Follow Up:  CAD    Subjective/Observations: Patient seen and examined. Events noted. Resting comfortably in bed. No complaints of chest pain, dyspnea, or palpitations reported. No signs of orthopnea or PND.       REVIEW OF SYSTEMS: All other review of systems is negative unless indicated above    PAST MEDICAL & SURGICAL HISTORY:  Retinopathy  Neuropathy  DM (diabetes mellitus)  HTN (hypertension)  History of cholecystectomy      MEDICATIONS  (STANDING):  amLODIPine   Tablet 10 milliGRAM(s) Oral daily  aspirin enteric coated 81 milliGRAM(s) Oral daily  atorvastatin 40 milliGRAM(s) Oral at bedtime  dextrose 5%. 1000 milliLiter(s) (50 mL/Hr) IV Continuous <Continuous>  dextrose 50% Injectable 12.5 Gram(s) IV Push once  dextrose 50% Injectable 25 Gram(s) IV Push once  dextrose 50% Injectable 25 Gram(s) IV Push once  enoxaparin Injectable 50 milliGRAM(s) SubCutaneous every 12 hours  insulin lispro (HumaLOG) corrective regimen sliding scale   SubCutaneous three times a day before meals  insulin lispro (HumaLOG) corrective regimen sliding scale   SubCutaneous at bedtime  metoprolol tartrate 12.5 milliGRAM(s) Oral two times a day    MEDICATIONS  (PRN):  dextrose 40% Gel 15 Gram(s) Oral once PRN Blood Glucose LESS THAN 70 milliGRAM(s)/deciliter  glucagon  Injectable 1 milliGRAM(s) IntraMuscular once PRN Glucose LESS THAN 70 milligrams/deciliter      Allergies    No Known Allergies    Intolerances            Vital Signs Last 24 Hrs  T(C): 36.7 (07 Aug 2018 04:53), Max: 36.8 (06 Aug 2018 19:56)  T(F): 98.1 (07 Aug 2018 04:53), Max: 98.2 (06 Aug 2018 19:56)  HR: 100 (07 Aug 2018 04:53) (73 - 100)  BP: 113/72 (07 Aug 2018 04:53) (113/72 - 144/82)  BP(mean): --  RR: 18 (07 Aug 2018 04:53) (18 - 19)  SpO2: 100% (07 Aug 2018 04:53) (98% - 100%)    I&O's Summary    06 Aug 2018 07:01  -  07 Aug 2018 07:00  --------------------------------------------------------  IN: 840 mL / OUT: 0 mL / NET: 840 mL          PHYSICAL EXAM:  TELE: SR  Constitutional: NAD, awake and alert, well-developed  HEENT: Moist Mucous Membranes, Anicteric  Pulmonary: Non-labored, breath sounds are clear bilaterally, No wheezing, rales or rhonchi  Cardiovascular: Regular, S1 and S2, No murmurs, rubs, gallops or clicks  Gastrointestinal: Bowel Sounds present, soft, nontender.   Lymph: No peripheral edema. No lymphadenopathy.  Skin: No visible rashes or ulcers.  Psych:  Mood & affect appropriate    LABS: All Labs Reviewed:                        14.5   7.1   )-----------( 223      ( 07 Aug 2018 06:41 )             44.1                         15.2   7.5   )-----------( 212      ( 06 Aug 2018 06:06 )             43.9                         15.1   7.6   )-----------( 230      ( 05 Aug 2018 06:26 )             45.3     07 Aug 2018 06:40    136    |  103    |  16     ----------------------------<  205    4.5     |  23     |  1.17   06 Aug 2018 06:11    138    |  104    |  16     ----------------------------<  159    5.1     |  23     |  1.27   05 Aug 2018 06:26    136    |  103    |  15     ----------------------------<  148    4.5     |  21     |  1.10     Ca    9.4        07 Aug 2018 06:40  Ca    9.8        06 Aug 2018 06:11  Ca    9.5        05 Aug 2018 06:26    TPro  7.2    /  Alb  3.6    /  TBili  0.2    /  DBili  <0.1   /  AST  14     /  ALT  11     /  AlkPhos  94     04 Aug 2018 12:11    PT/INR - ( 06 Aug 2018 06:06 )   PT: 10.6 sec;   INR: 0.98 ratio                < from: Transthoracic Echocardiogram (08.04.18 @ 12:05) >    Patient name: AGAPITO REYNOSO  YOB: 1955   Age: 62 (F)   MR#: [id number]  Study Date: 8/4/2018  [id number]: [email or web address]  Study quality: Technically good  Referring Physician: Joce Dawson MD  Blood Pressure: 163/74 mmHg  Height: 152 cm  Weight: 54 kg  BSA: 1.5 m2  ------------------------------------------------------------------------  PROCEDURE: Transthoracic echocardiogram with 2-D, M-Mode  and complete spectral and color flow Doppler.  INDICATION: Atherosclerotic heart disease of native  coronary artery without angina pectoris (I25.10)  ------------------------------------------------------------------------  Dimensions:    Normal Values:  LA:     2.8    2.0 - 4.0 cm  Ao:     3.2    2.0 - 3.8 cm  SEPTUM: 1.0    0.6 - 1.2 cm  PWT:    0.8    0.6 - 1.1 cm  LVIDd:  4.1    3.0 - 5.6 cm  LVIDs:  3.0    1.8 - 4.0 cm  Derived variables:  LVMI: 76 g/m2  RWT: 0.39  Fractional short: 27 %  EF (Wanda): 53 %  ------------------------------------------------------------------------  Observations:  Mitral Valve: Mitral annular calcification, otherwise  normal mitral valve. Mild-moderate mitral regurgitation.  Aortic Valve/Aorta: Calcified trileaflet aortic valve with  normal opening. Mild aortic regurgitation.  Aortic Root: 3.2 cm.  Left Atrium: LA volume index = 14 cc/m2.  Left Ventricle: Endocardium not well visualized; grossly  preserved  left ventricular systolic function. The basal  inferior wall is hypokinetic.  Normal left ventricular  internal dimensions and wall thicknesses. Mild diastolic  dysfunction (Stage I).  Right Heart: Normal right atrium. The right ventricle is  not well visualized; grossly normal right ventricular  systolic function. Tricuspid valve not well visualized.  Pulmonic valve not well visualized.  Pericardium/Pleura: Normal pericardium with no pericardial  effusion.  Hemodynamic: RAP about  3 mmHG (IVC small)  ------------------------------------------------------------------------  Conclusions:  1. Mild-moderate mitral regurgitation.  2. Calcified trileaflet aortic valve with normal opening.  Mild aortic regurgitation.  3. Endocardium not well visualized; grossly preserved  left  ventricular systolic function. The basal inferior wall is  hypokinetic.  4. The right ventricle is not well visualized; grossly  normal right ventricular systolic function.  5. RAP about  3 mmHG (IVC small)  *** No previous Echo exam.  ------------------------------------------------------------------------  Confirmedon  8/4/2018 - 18:30:49 by Shonda Longoria M.D.  ------------------------------------------------------------------------    < end of copied text >

## 2018-08-07 NOTE — PROGRESS NOTE ADULT - SUBJECTIVE AND OBJECTIVE BOX
Cardiac Surgery Pre-op Note:  CC: Patient is a 62y old  Female who presents with a chief complaint of     Referring Physician:                                                                                             Surgeon:  Procedure: (Date) (Procedure)    Allergies    No Known Allergies    Intolerances      HPI:  This is a 61 yo female,  from [facility] arrived to [place] on 7/24 here visiting, with PMH of HTN and long standing hx of  type 2 DM ( last A1C unknown,  complicated by neuropathy and retinopathy,  well managed as per patient). She reports as strong family hx of premature atherosclerosis ( both her brothers had MI in their early 40's).  She does not have any known structural heart disease.  She is generally sedentary at baseline due to pain in her feet, likely neuropathic.  She has not had prior S&S  of angina, HR or arrythmia.  She presents to [facility] ED on 8/3 with c/c of several days of " random left sided chest discomfort", with intermittent radiation to her left arm, occasionally associated with dyspnea.  The sxs may be brief, but have lasted up to 30 minutes.  CP is without clear provocation, and without a convincing explanation.  She had several episodes yesterday, and continued to have sxs even yesterday in the ER.  In the ER she was found to have an abnormal ekg, without comparison available, loaded with Aspirin 325mg.  Jyoti x3 negative.  Consultation w/ cards, Dr Presley  obtained, whom despite x3 neg enzymes given FH and symptomatolgy transferred pt to [facility] for cardiac cath to r/o significant CAD.  Now meeting patient for the first time, post cath, and as per unofficial cath report: mLAD 90%, Cx 100% and  % via RFA.  Needs CTS, awaiting on Dr Zaragoza for consult.  Presently patient is asymptomatic, recovering from cath.     < from: 12 Lead ECG (08.04.18 @ 08:14) >  Ventricular Rate 63 BPM    Atrial Rate 63 BPM    P-R Interval 158 ms    QRS Duration 88 ms    Q-T Interval 430 ms    QTC Calculation(Bezet) 440 ms    P Axis 61 degrees    R Axis -21 degrees    T Axis 122 degrees    Diagnosis Line Normal sinus rhythm  ST & T wave abnormality, consider lateral ischemia  Abnormal ECG  When compared with ECG of 03-AUG-2018 21:54,  No significant change was found  Confirmed by Maico Presley MD ([id number]) on 8/4/2018 9:52:19 AM    < end of copied text >      < from: CT Angio Chest w/ IV Cont (08.04.18 @ 00:15) >  EXAM:  CT ANGIO CHEST (W)AW IC                            PROCEDURE DATE:  08/04/2018          INTERPRETATION:  CLINICAL INDICATION: Shortness of breath, chest pain.   Check for pulmonary embolism    COMPARISON: Same day radiograph    TECHNIQUE: CTangiogram of the chest was performed utilizing a PE   protocol. 96 mL of Optiray were intravenously administered without   adverse reaction.  4 mL of contrast were discarded. Coronal and sagittal   reconstructions are provided. In addition, maximum intensity projection   (MIP) images were also acquired on the axial     plane on a separate   workstation.      FINDINGS: There is no evidence for pulmonary embolism. The main pulmonary   artery is of normal caliber.    There is moderate atherosclerotic calcification of the aorta. Heart size   is normal and there is no pericardial effusion.    The lungs are clear. No pleural effusions are seen. The central airways   are patent.    No enlarged thoracic lymph nodes are seen.    There is a severe atherosclerotic calcification of the splenic artery.     No significant osseous abnormality is seen.    IMPRESSION: No pulmonary embolism.    < end of copied text >    < from: Xray Chest 1 View- PORTABLE-Urgent (08.04.18 @ 01:24) >  EXAM:  XR CHEST PORTABLE URGENT 1V                            PROCEDURE DATE:  08/04/2018          INTERPRETATION:  Short of breath.    AP chest.    Normal heart size. Atherosclerotic aortic arch. No consolidation or   effusion. Mild thoracic levoscoliosis.    Impression: No active disease.    < end of copied text > (04 Aug 2018 11:26)      PAST MEDICAL & SURGICAL HISTORY:  Retinopathy  Neuropathy  DM (diabetes mellitus)  HTN (hypertension)  History of cholecystectomy      MEDICATIONS  (STANDING):  amLODIPine   Tablet 10 milliGRAM(s) Oral daily  aspirin enteric coated 81 milliGRAM(s) Oral daily  atorvastatin 40 milliGRAM(s) Oral at bedtime  cefuroxime  IVPB 1500 milliGRAM(s) IV Intermittent once  chlorhexidine 0.12% Liquid 15 milliLiter(s) Swish and Spit once  chlorhexidine 4% Liquid 1 Application(s) Topical once  dextrose 5%. 1000 milliLiter(s) (50 mL/Hr) IV Continuous <Continuous>  dextrose 50% Injectable 12.5 Gram(s) IV Push once  dextrose 50% Injectable 25 Gram(s) IV Push once  dextrose 50% Injectable 25 Gram(s) IV Push once  enoxaparin Injectable 50 milliGRAM(s) SubCutaneous every 12 hours  insulin lispro (HumaLOG) corrective regimen sliding scale   SubCutaneous three times a day before meals  insulin lispro (HumaLOG) corrective regimen sliding scale   SubCutaneous at bedtime  metoprolol tartrate 12.5 milliGRAM(s) Oral two times a day    MEDICATIONS  (PRN):  dextrose 40% Gel 15 Gram(s) Oral once PRN Blood Glucose LESS THAN 70 milliGRAM(s)/deciliter  glucagon  Injectable 1 milliGRAM(s) IntraMuscular once PRN Glucose LESS THAN 70 milligrams/deciliter    Labs:             14.5   7.1   )-----------( 223      ( 07 Aug 2018 06:41 )             44.1     136  |  103  |  16  ----------------------------<  205<H>  4.5   |  23  |  1.17    Ca    9.4      07 Aug 2018 06:40    PT/INR - ( 06 Aug 2018 06:06 )   PT: 10.6 sec;   INR: 0.98 ratio      Blood Type: ABO Interpretation: O (08-04 @ 11:43)    HGB A1C: Hemoglobin A1C, Whole Blood: 7.2 % (08-04 @ 14:18)    Prealbumin:   Pro-BNP: Serum Pro-Brain Natriuretic Peptide: 1589 pg/mL (08-04 @ 12:11)    Thyroid Panel: 08-04 @ 14:18/4.04  --/6.9/81    MRSA: MRSA PCR Result.: NotDetec (08-04 @ 17:49)   / MSSA:     CXR: NAD  EKG: SR  Carotid Duplex:  hemodynamically significant stenosis right proximal ICA - >70%  PFT's:  Echocardiogram: mild MR-calcified AV w/ nl opening    Cardiac catheterization: TVD    Gen: WN/WD NAD  Neuro: AAOx3, nonfocal  Pulm: CTA B/L  CV: RRR, S1S2 +systolic murmur  Abd: Soft, NT, ND +BS  Ext: No edema, + peripheral pulses      Pt has AICD/PPM [ ] Yes  [x ] No             Brand Name:  Pre-op Beta Blocker ordered within 24 hrs of surgery (CABG ONLY)?  [x ] Yes  [ ] No  If not, Why?  Type & Cross  [x ] Yes  [ ] No  NPO after Midnight [x ] Yes  [ ] No  Pre-op ABX ordered, to be taped on chart:  [ x] Yes  [ ] No     Hibiclens/Peridex ordered [x ] Yes  [ ] No  Intraop on Hold: PRBCs, CXR, MILES [x ]   Consent obtained  [x ] Yes  [ ] No Cardiac Surgery Pre-op Note:  CC: Patient is a 62y old  Female who presents with a chief complaint of chest pain who is visiting from [facility]    Referring Physician:     Dr. Kirt Presley                                                                                        Surgeon: Dr Anastasia Rojas  Procedure: (Date) (Procedure) CABG    Allergies - NKDA  62F,  from [facility] arrived to [place] on 7/24 here visiting, with PMH of HTN and long standing hx of  type 2 DM ( last A1C unknown,  complicated by neuropathy and retinopathy,  well managed as per patient). She reports as strong family hx of premature atherosclerosis ( both her brothers had MI in their early 40's).  She does not have any known structural heart disease.  She is generally sedentary at baseline due to pain in her feet, likely neuropathic.  She has not had prior S&S  of angina, HR or arrythmia.  She presents to [facility] ED on 8/3 with c/c of several days of " random left sided chest discomfort", with intermittent radiation to her left arm, occasionally associated with dyspnea.  The sxs may be brief, but have lasted up to 30 minutes.  CP is without clear provocation, and without a convincing explanation.  She had several episodes yesterday, and continued to have sxs even yesterday in the ER.  In the ER she was found to have an abnormal ekg, without comparison available, loaded with Aspirin 325mg.  Jyoti x3 negative.  Consultation w/ cards, Dr Presley  obtained, whom despite x3 neg enzymes given FH and symptomatolgy transferred pt to [facility] for cardiac cath to r/o significant CAD.  Now meeting patient for the first time, post cath, and as per unofficial cath report: mLAD 90%, Cx 100% and  % via RFA.  Needs CTS, awaiting on Dr Zaragoza for consult.  Presently patient is asymptomatic, recovering from cath.     < from: 12 Lead ECG (08.04.18 @ 08:14) >  Ventricular Rate 63 BPM    Atrial Rate 63 BPM    P-R Interval 158 ms    QRS Duration 88 ms    Q-T Interval 430 ms    QTC Calculation(Bezet) 440 ms    P Axis 61 degrees    R Axis -21 degrees    T Axis 122 degrees    Diagnosis Line Normal sinus rhythm  ST & T wave abnormality, consider lateral ischemia  Abnormal ECG  When compared with ECG of 03-AUG-2018 21:54,  No significant change was found  Confirmed by Maico Presley MD ([id number]) on 8/4/2018 9:52:19 AM    < from: CT Angio Chest w/ IV Cont (08.04.18 @ 00:15) >  EXAM:  CT ANGIO CHEST (W)AW IC                          PROCEDURE DATE:  08/04/2018     INTERPRETATION:  CLINICAL INDICATION: Shortness of breath, chest pain.   Check for pulmonary embolism    COMPARISON: Same day radiograph    TECHNIQUE: CTangiogram of the chest was performed utilizing a PE   protocol. 96 mL of Optiray were intravenously administered without   adverse reaction.  4 mL of contrast were discarded. Coronal and sagittal   reconstructions are provided. In addition, maximum intensity projection   (MIP) images were also acquired on the axial     plane on a separate   workstation.    FINDINGS: There is no evidence for pulmonary embolism. The main pulmonary   artery is of normal caliber.    There is moderate atherosclerotic calcification of the aorta. Heart size   is normal and there is no pericardial effusion.    The lungs are clear. No pleural effusions are seen. The central airways   are patent.    No enlarged thoracic lymph nodes are seen.    There is a severe atherosclerotic calcification of the splenic artery.     No significant osseous abnormality is seen.    IMPRESSION: No pulmonary embolism.    < from: Xray Chest 1 View- PORTABLE-Urgent (08.04.18 @ 01:24) >  EXAM:  XR CHEST PORTABLE URGENT 1V                        PROCEDURE DATE:  08/04/2018      CXR-Normal heart size. Atherosclerotic aortic arch. No consolidation or   effusion. Mild thoracic levoscoliosis./ NAD    PAST MEDICAL & SURGICAL HISTORY:  Retinopathy  Neuropathy  DM (diabetes mellitus)  HTN (hypertension)  History of cholecystectomy      MEDICATIONS  (STANDING):  amLODIPine   Tablet 10 milliGRAM(s) Oral daily  aspirin enteric coated 81 milliGRAM(s) Oral daily  atorvastatin 40 milliGRAM(s) Oral at bedtime  cefuroxime  IVPB 1500 milliGRAM(s) IV Intermittent once  chlorhexidine 0.12% Liquid 15 milliLiter(s) Swish and Spit once  chlorhexidine 4% Liquid 1 Application(s) Topical once  enoxaparin Injectable 50 milliGRAM(s) SubCutaneous every 12 hours  insulin lispro (HumaLOG) corrective regimen sliding scale   SubCutaneous three times a day before meals  insulin lispro (HumaLOG) corrective regimen sliding scale   SubCutaneous at bedtime  metoprolol tartrate 12.5 milliGRAM(s) Oral two times a day    MEDICATIONS  (PRN):  dextrose 40% Gel 15 Gram(s) Oral once PRN Blood Glucose LESS THAN 70 milliGRAM(s)/deciliter  glucagon  Injectable 1 milliGRAM(s) IntraMuscular once PRN Glucose LESS THAN 70 milligrams/deciliter    Labs:             14.5   7.1   )-----------( 223      ( 07 Aug 2018 06:41 )             44.1     136  |  103  |  16  ----------------------------<  205<H>  4.5   |  23  |  1.17    Ca    9.4      07 Aug 2018 06:40    PT/INR - ( 06 Aug 2018 06:06 )   PT: 10.6 sec;   INR: 0.98 ratio      Blood Type: ABO Interpretation: O (08-04 @ 11:43)    HGB A1C: Hemoglobin A1C, Whole Blood: 7.2 % (08-04 @ 14:18)    Prealbumin:   Pro-BNP: Serum Pro-Brain Natriuretic Peptide: 1589 pg/mL (08-04 @ 12:11)    Thyroid Panel: 08-04 @ 14:18/4.04  --/6.9/81    MRSA: MRSA PCR Result.: NotDetec (08-04 @ 17:49)   / MSSA:     CXR: NAD  EKG: SR  Carotid Duplex:  hemodynamically significant stenosis right proximal ICA - >70%  PFT's:  Echocardiogram: mild MR-calcified AV w/ nl opening    Cardiac catheterization: TVD    Gen: WN/WD NAD  Neuro: AAOx3, nonfocal  Pulm: CTA B/L  CV: RRR, S1S2 +systolic murmur  Abd: Soft, NT, ND +BS  Ext: No edema, + peripheral pulses      Pt has AICD/PPM [ ] Yes  [x ] No             Brand Name:  Pre-op Beta Blocker ordered within 24 hrs of surgery (CABG ONLY)?  [x ] Yes  [ ] No  If not, Why?  Type & Cross  [x ] Yes  [ ] No  NPO after Midnight [x ] Yes  [ ] No  Pre-op ABX ordered, to be taped on chart:  [ x] Yes  [ ] No     Hibiclens/Peridex ordered [x ] Yes  [ ] No  Intraop on Hold: PRBCs, CXR, MILES [x ]   Consent obtained  [x ] Yes  [ ] No Cardiac Surgery Pre-op Note:  CC: Patient is a 62y old  Female who presents with a chief complaint of chest pain who is visiting from [facility]    Referring Physician:     Dr. Kirt Presley                                                                                        Surgeon: Dr Anastasia Rojas  Procedure: (Date) (Procedure) CABG    Allergies - NKDA  62F,  from [facility] arrived to [place] on 7/24 here visiting, with PMH of HTN and long standing hx of  type 2 DM ( last A1C unknown,  complicated by neuropathy and retinopathy,  well managed as per patient). She reports as strong family hx of premature atherosclerosis ( both her brothers had MI in their early 40's).  She does not have any known structural heart disease.  She is generally sedentary at baseline due to pain in her feet, likely neuropathic.  She has not had prior S&S  of angina, HR or arrythmia.  She presents to [facility] ED on 8/3 with c/c of several days of " random left sided chest discomfort", with intermittent radiation to her left arm, occasionally associated with dyspnea.  The sxs may be brief, but have lasted up to 30 minutes.  CP is without clear provocation, and without a convincing explanation.  She had several episodes yesterday, and continued to have sxs even yesterday in the ER.  In the ER she was found to have an abnormal ekg, without comparison available, loaded with Aspirin 325mg.  Jyoti x3 negative.  Consultation w/ cards, Dr Presley  obtained, whom despite x3 neg enzymes given FH and symptomatolgy transferred pt to [facility] for cardiac cath to r/o significant CAD.  Now meeting patient for the first time, post cath, and as per unofficial cath report: mLAD 90%, Cx 100% and  % via RFA.  Needs CTS, awaiting on Dr Zaragoza for consult.  Presently patient is asymptomatic, recovering from cath.     < from: 12 Lead ECG (08.04.18 @ 08:14) >  Ventricular Rate 63 BPM    Atrial Rate 63 BPM    P-R Interval 158 ms    QRS Duration 88 ms    Q-T Interval 430 ms    QTC Calculation(Bezet) 440 ms    P Axis 61 degrees    R Axis -21 degrees    T Axis 122 degrees    Diagnosis Line Normal sinus rhythm  ST & T wave abnormality, consider lateral ischemia  Abnormal ECG  When compared with ECG of 03-AUG-2018 21:54,  No significant change was found  Confirmed by Maico Presley MD ([id number]) on 8/4/2018 9:52:19 AM    < from: CT Angio Chest w/ IV Cont (08.04.18 @ 00:15) >  EXAM:  CT ANGIO CHEST (W)AW IC                          PROCEDURE DATE:  08/04/2018     INTERPRETATION:  CLINICAL INDICATION: Shortness of breath, chest pain.   Check for pulmonary embolism    COMPARISON: Same day radiograph    TECHNIQUE: CTangiogram of the chest was performed utilizing a PE   protocol. 96 mL of Optiray were intravenously administered without   adverse reaction.  4 mL of contrast were discarded. Coronal and sagittal   reconstructions are provided. In addition, maximum intensity projection   (MIP) images were also acquired on the axial     plane on a separate   workstation.    FINDINGS: There is no evidence for pulmonary embolism. The main pulmonary   artery is of normal caliber.    There is moderate atherosclerotic calcification of the aorta. Heart size   is normal and there is no pericardial effusion.    The lungs are clear. No pleural effusions are seen. The central airways   are patent.    No enlarged thoracic lymph nodes are seen.    There is a severe atherosclerotic calcification of the splenic artery.     No significant osseous abnormality is seen.    IMPRESSION: No pulmonary embolism.    < from: Xray Chest 1 View- PORTABLE-Urgent (08.04.18 @ 01:24) >  EXAM:  XR CHEST PORTABLE URGENT 1V                        PROCEDURE DATE:  08/04/2018      CXR-Normal heart size. Atherosclerotic aortic arch. No consolidation or   effusion. Mild thoracic levoscoliosis./ NAD    Head CT:  < from: CT Head No Cont (08.07.18 @ 11:43) >  FINDINGS:  There is no hydrocephalus, mass effect, midline shift, or   acute intracranial hemorrhage.  There is no CT evidence of acute   territorial infarct.  Chronic infarct versus enlarged perivascular space   in the right putamen.    The visualized paranasal sinuses and mastoid air cells are clear.    < end of copied text >    PAST MEDICAL & SURGICAL HISTORY:  Retinopathy  Neuropathy  DM (diabetes mellitus)  HTN (hypertension)  History of cholecystectomy      MEDICATIONS  (STANDING):  amLODIPine   Tablet 10 milliGRAM(s) Oral daily  aspirin enteric coated 81 milliGRAM(s) Oral daily  atorvastatin 40 milliGRAM(s) Oral at bedtime  cefuroxime  IVPB 1500 milliGRAM(s) IV Intermittent once  chlorhexidine 0.12% Liquid 15 milliLiter(s) Swish and Spit once  chlorhexidine 4% Liquid 1 Application(s) Topical once  enoxaparin Injectable 50 milliGRAM(s) SubCutaneous every 12 hours  insulin lispro (HumaLOG) corrective regimen sliding scale   SubCutaneous three times a day before meals  insulin lispro (HumaLOG) corrective regimen sliding scale   SubCutaneous at bedtime  metoprolol tartrate 12.5 milliGRAM(s) Oral two times a day    MEDICATIONS  (PRN):  dextrose 40% Gel 15 Gram(s) Oral once PRN Blood Glucose LESS THAN 70 milliGRAM(s)/deciliter  glucagon  Injectable 1 milliGRAM(s) IntraMuscular once PRN Glucose LESS THAN 70 milligrams/deciliter    Labs:             14.5   7.1   )-----------( 223      ( 07 Aug 2018 06:41 )             44.1     136  |  103  |  16  ----------------------------<  205<H>  4.5   |  23  |  1.17    Ca    9.4      07 Aug 2018 06:40    PT/INR - ( 06 Aug 2018 06:06 )   PT: 10.6 sec;   INR: 0.98 ratio      Blood Type: ABO Interpretation: O (08-04 @ 11:43)    HGB A1C: Hemoglobin A1C, Whole Blood: 7.2 % (08-04 @ 14:18)    Prealbumin:   Pro-BNP: Serum Pro-Brain Natriuretic Peptide: 1589 pg/mL (08-04 @ 12:11)    Thyroid Panel: 08-04 @ 14:18/4.04  --/6.9/81    MRSA: MRSA PCR Result.: NotDetec (08-04 @ 17:49)   / MSSA:     CXR: NAD  EKG: SR  Carotid Duplex:  hemodynamically significant stenosis right proximal ICA - >70%  PFT's:  Echocardiogram: mild MR-calcified AV w/ nl opening    Cardiac catheterization: TVD    Gen: WN/WD NAD  Neuro: AAOx3, nonfocal  Pulm: CTA B/L  CV: RRR, S1S2 +systolic murmur  Abd: Soft, NT, ND +BS  Ext: No edema, + peripheral pulses      Pt has AICD/PPM [ ] Yes  [x ] No             Brand Name:  Pre-op Beta Blocker ordered within 24 hrs of surgery (CABG ONLY)?  [x ] Yes  [ ] No  If not, Why?  Type & Cross  [x ] Yes  [ ] No  NPO after Midnight [x ] Yes  [ ] No  Pre-op ABX ordered, to be taped on chart:  [ x] Yes  [ ] No     Hibiclens/Peridex ordered [x ] Yes  [ ] No  Intraop on Hold: PRBCs, CXR, MILES [x ]   Consent obtained  [x ] Yes  [ ] No

## 2018-08-07 NOTE — PROGRESS NOTE ADULT - ASSESSMENT
61 y/o F with PMH HTN, DM, fam hx CAD who presented with CP found to have multi-vessel disease, plan for CABG     -there is no evidence of acute ischemia.  -no reccurent chest pain. Though need to watch closely given underlying CAD.   - carotid disease will need intervention as an outpt  -there is no evidence of significant arrhythmia.  -there is no evidence for meaningful  volume overload.  - c/w ASA  - c/w statin  - c/w metoprolol, amlodipine  - monitor electrolytes  - now pending CABG hopefulling in 24-48 hours.   - will follow

## 2018-08-08 ENCOUNTER — APPOINTMENT (OUTPATIENT)
Dept: CARDIOTHORACIC SURGERY | Facility: HOSPITAL | Age: 63
End: 2018-08-08

## 2018-08-08 PROBLEM — Z00.00 ENCOUNTER FOR PREVENTIVE HEALTH EXAMINATION: Status: ACTIVE | Noted: 2018-08-08

## 2018-08-08 PROBLEM — G62.9 POLYNEUROPATHY, UNSPECIFIED: Chronic | Status: ACTIVE | Noted: 2018-08-04

## 2018-08-08 PROBLEM — I10 ESSENTIAL (PRIMARY) HYPERTENSION: Chronic | Status: ACTIVE | Noted: 2018-08-03

## 2018-08-08 PROBLEM — H35.00 UNSPECIFIED BACKGROUND RETINOPATHY: Chronic | Status: ACTIVE | Noted: 2018-08-04

## 2018-08-08 PROBLEM — E11.9 TYPE 2 DIABETES MELLITUS WITHOUT COMPLICATIONS: Chronic | Status: ACTIVE | Noted: 2018-08-03

## 2018-08-08 LAB
ALBUMIN SERPL ELPH-MCNC: 3.6 G/DL — SIGNIFICANT CHANGE UP (ref 3.3–5)
ALP SERPL-CCNC: 28 U/L — LOW (ref 40–120)
ALT FLD-CCNC: 18 U/L — SIGNIFICANT CHANGE UP (ref 10–45)
ANION GAP SERPL CALC-SCNC: 10 MMOL/L — SIGNIFICANT CHANGE UP (ref 5–17)
ANION GAP SERPL CALC-SCNC: 16 MMOL/L — SIGNIFICANT CHANGE UP (ref 5–17)
APTT BLD: 50.3 SEC — HIGH (ref 27.5–37.4)
AST SERPL-CCNC: 40 U/L — SIGNIFICANT CHANGE UP (ref 10–40)
BASOPHILS # BLD AUTO: 0 K/UL — SIGNIFICANT CHANGE UP (ref 0–0.2)
BASOPHILS NFR BLD AUTO: 0.3 % — SIGNIFICANT CHANGE UP (ref 0–2)
BILIRUB SERPL-MCNC: 1 MG/DL — SIGNIFICANT CHANGE UP (ref 0.2–1.2)
BUN SERPL-MCNC: 14 MG/DL — SIGNIFICANT CHANGE UP (ref 7–23)
BUN SERPL-MCNC: 17 MG/DL — SIGNIFICANT CHANGE UP (ref 7–23)
CALCIUM SERPL-MCNC: 7.5 MG/DL — LOW (ref 8.4–10.5)
CALCIUM SERPL-MCNC: 9.3 MG/DL — SIGNIFICANT CHANGE UP (ref 8.4–10.5)
CHLORIDE SERPL-SCNC: 103 MMOL/L — SIGNIFICANT CHANGE UP (ref 96–108)
CHLORIDE SERPL-SCNC: 107 MMOL/L — SIGNIFICANT CHANGE UP (ref 96–108)
CK MB BLD-MCNC: 10.8 % — HIGH (ref 0–3.5)
CK MB CFR SERPL CALC: 16.8 NG/ML — HIGH (ref 0–3.8)
CK SERPL-CCNC: 155 U/L — SIGNIFICANT CHANGE UP (ref 25–170)
CO2 SERPL-SCNC: 20 MMOL/L — LOW (ref 22–31)
CO2 SERPL-SCNC: 23 MMOL/L — SIGNIFICANT CHANGE UP (ref 22–31)
CREAT SERPL-MCNC: 0.92 MG/DL — SIGNIFICANT CHANGE UP (ref 0.5–1.3)
CREAT SERPL-MCNC: 1.07 MG/DL — SIGNIFICANT CHANGE UP (ref 0.5–1.3)
EOSINOPHIL # BLD AUTO: 0.2 K/UL — SIGNIFICANT CHANGE UP (ref 0–0.5)
EOSINOPHIL NFR BLD AUTO: 1.7 % — SIGNIFICANT CHANGE UP (ref 0–6)
FIBRINOGEN PPP-MCNC: 163 MG/DL — LOW (ref 310–510)
GAS PNL BLDA: SIGNIFICANT CHANGE UP
GLUCOSE BLDC GLUCOMTR-MCNC: 197 MG/DL — HIGH (ref 70–99)
GLUCOSE BLDC GLUCOMTR-MCNC: 200 MG/DL — HIGH (ref 70–99)
GLUCOSE BLDC GLUCOMTR-MCNC: 214 MG/DL — HIGH (ref 70–99)
GLUCOSE BLDC GLUCOMTR-MCNC: 215 MG/DL — HIGH (ref 70–99)
GLUCOSE BLDC GLUCOMTR-MCNC: 267 MG/DL — HIGH (ref 70–99)
GLUCOSE SERPL-MCNC: 130 MG/DL — HIGH (ref 70–99)
GLUCOSE SERPL-MCNC: 214 MG/DL — HIGH (ref 70–99)
HCT VFR BLD CALC: 30.4 % — LOW (ref 34.5–45)
HCT VFR BLD CALC: 43.5 % — SIGNIFICANT CHANGE UP (ref 34.5–45)
HGB BLD-MCNC: 10.1 G/DL — LOW (ref 11.5–15.5)
HGB BLD-MCNC: 14.5 G/DL — SIGNIFICANT CHANGE UP (ref 11.5–15.5)
INR BLD: 1 RATIO — SIGNIFICANT CHANGE UP (ref 0.88–1.16)
INR BLD: 1.75 RATIO — HIGH (ref 0.88–1.16)
LYMPHOCYTES # BLD AUTO: 17.8 % — SIGNIFICANT CHANGE UP (ref 13–44)
LYMPHOCYTES # BLD AUTO: 2.2 K/UL — SIGNIFICANT CHANGE UP (ref 1–3.3)
MAGNESIUM SERPL-MCNC: 1.9 MG/DL — SIGNIFICANT CHANGE UP (ref 1.6–2.6)
MCHC RBC-ENTMCNC: 27.9 PG — SIGNIFICANT CHANGE UP (ref 27–34)
MCHC RBC-ENTMCNC: 28.7 PG — SIGNIFICANT CHANGE UP (ref 27–34)
MCHC RBC-ENTMCNC: 33.4 GM/DL — SIGNIFICANT CHANGE UP (ref 32–36)
MCHC RBC-ENTMCNC: 33.4 GM/DL — SIGNIFICANT CHANGE UP (ref 32–36)
MCV RBC AUTO: 83.3 FL — SIGNIFICANT CHANGE UP (ref 80–100)
MCV RBC AUTO: 85.9 FL — SIGNIFICANT CHANGE UP (ref 80–100)
MONOCYTES # BLD AUTO: 0.7 K/UL — SIGNIFICANT CHANGE UP (ref 0–0.9)
MONOCYTES NFR BLD AUTO: 5.5 % — SIGNIFICANT CHANGE UP (ref 2–14)
NEUTROPHILS # BLD AUTO: 9.2 K/UL — HIGH (ref 1.8–7.4)
NEUTROPHILS NFR BLD AUTO: 74.7 % — SIGNIFICANT CHANGE UP (ref 43–77)
PHOSPHATE SERPL-MCNC: 1.9 MG/DL — LOW (ref 2.5–4.5)
PLAT MORPH BLD: NORMAL — SIGNIFICANT CHANGE UP
PLATELET # BLD AUTO: 201 K/UL — SIGNIFICANT CHANGE UP (ref 150–400)
PLATELET # BLD AUTO: 57 K/UL — LOW (ref 150–400)
POTASSIUM SERPL-MCNC: 4.4 MMOL/L — SIGNIFICANT CHANGE UP (ref 3.5–5.3)
POTASSIUM SERPL-MCNC: 4.4 MMOL/L — SIGNIFICANT CHANGE UP (ref 3.5–5.3)
POTASSIUM SERPL-SCNC: 4.4 MMOL/L — SIGNIFICANT CHANGE UP (ref 3.5–5.3)
POTASSIUM SERPL-SCNC: 4.4 MMOL/L — SIGNIFICANT CHANGE UP (ref 3.5–5.3)
PROT SERPL-MCNC: 5 G/DL — LOW (ref 6–8.3)
PROTHROM AB SERPL-ACNC: 10.8 SEC — SIGNIFICANT CHANGE UP (ref 9.8–12.7)
PROTHROM AB SERPL-ACNC: 19.3 SEC — HIGH (ref 9.8–12.7)
RBC # BLD: 3.54 M/UL — LOW (ref 3.8–5.2)
RBC # BLD: 5.22 M/UL — HIGH (ref 3.8–5.2)
RBC # FLD: 13.5 % — SIGNIFICANT CHANGE UP (ref 10.3–14.5)
RBC # FLD: 13.8 % — SIGNIFICANT CHANGE UP (ref 10.3–14.5)
RBC BLD AUTO: NORMAL — SIGNIFICANT CHANGE UP
SODIUM SERPL-SCNC: 136 MMOL/L — SIGNIFICANT CHANGE UP (ref 135–145)
SODIUM SERPL-SCNC: 143 MMOL/L — SIGNIFICANT CHANGE UP (ref 135–145)
TROPONIN T, HIGH SENSITIVITY RESULT: 452 NG/L — HIGH (ref 0–51)
WBC # BLD: 12.3 K/UL — HIGH (ref 3.8–10.5)
WBC # BLD: 7 K/UL — SIGNIFICANT CHANGE UP (ref 3.8–10.5)
WBC # FLD AUTO: 12.3 K/UL — HIGH (ref 3.8–10.5)
WBC # FLD AUTO: 7 K/UL — SIGNIFICANT CHANGE UP (ref 3.8–10.5)

## 2018-08-08 PROCEDURE — 33533 CABG ARTERIAL SINGLE: CPT

## 2018-08-08 PROCEDURE — 99291 CRITICAL CARE FIRST HOUR: CPT

## 2018-08-08 PROCEDURE — 93010 ELECTROCARDIOGRAM REPORT: CPT

## 2018-08-08 PROCEDURE — 33517 CABG ARTERY-VEIN SINGLE: CPT

## 2018-08-08 PROCEDURE — 99232 SBSQ HOSP IP/OBS MODERATE 35: CPT

## 2018-08-08 PROCEDURE — 71045 X-RAY EXAM CHEST 1 VIEW: CPT | Mod: 26

## 2018-08-08 RX ORDER — METOCLOPRAMIDE HCL 10 MG
5 TABLET ORAL EVERY 8 HOURS
Qty: 0 | Refills: 0 | Status: COMPLETED | OUTPATIENT
Start: 2018-08-08 | End: 2018-08-10

## 2018-08-08 RX ORDER — POTASSIUM CHLORIDE 20 MEQ
10 PACKET (EA) ORAL
Qty: 0 | Refills: 0 | Status: DISCONTINUED | OUTPATIENT
Start: 2018-08-08 | End: 2018-08-09

## 2018-08-08 RX ORDER — CLOPIDOGREL BISULFATE 75 MG/1
75 TABLET, FILM COATED ORAL ONCE
Qty: 0 | Refills: 0 | Status: COMPLETED | OUTPATIENT
Start: 2018-08-09 | End: 2018-08-09

## 2018-08-08 RX ORDER — NICARDIPINE HYDROCHLORIDE 30 MG/1
5 CAPSULE, EXTENDED RELEASE ORAL
Qty: 40 | Refills: 0 | Status: DISCONTINUED | OUTPATIENT
Start: 2018-08-08 | End: 2018-08-10

## 2018-08-08 RX ORDER — PROTAMINE SULFATE 10 MG/ML
25 AMPUL (ML) INTRAVENOUS ONCE
Qty: 0 | Refills: 0 | Status: COMPLETED | OUTPATIENT
Start: 2018-08-08 | End: 2018-08-08

## 2018-08-08 RX ORDER — POTASSIUM CHLORIDE 20 MEQ
10 PACKET (EA) ORAL
Qty: 0 | Refills: 0 | Status: COMPLETED | OUTPATIENT
Start: 2018-08-08 | End: 2018-08-08

## 2018-08-08 RX ORDER — METOCLOPRAMIDE HCL 10 MG
5 TABLET ORAL EVERY 8 HOURS
Qty: 0 | Refills: 0 | Status: DISCONTINUED | OUTPATIENT
Start: 2018-08-08 | End: 2018-08-08

## 2018-08-08 RX ORDER — CEFUROXIME AXETIL 250 MG
1500 TABLET ORAL EVERY 8 HOURS
Qty: 0 | Refills: 0 | Status: COMPLETED | OUTPATIENT
Start: 2018-08-08 | End: 2018-08-10

## 2018-08-08 RX ORDER — MEPERIDINE HYDROCHLORIDE 50 MG/ML
25 INJECTION INTRAMUSCULAR; INTRAVENOUS; SUBCUTANEOUS ONCE
Qty: 0 | Refills: 0 | Status: DISCONTINUED | OUTPATIENT
Start: 2018-08-08 | End: 2018-08-09

## 2018-08-08 RX ORDER — DEXTROSE 50 % IN WATER 50 %
50 SYRINGE (ML) INTRAVENOUS
Qty: 0 | Refills: 0 | Status: DISCONTINUED | OUTPATIENT
Start: 2018-08-08 | End: 2018-08-15

## 2018-08-08 RX ORDER — DEXTROSE 50 % IN WATER 50 %
25 SYRINGE (ML) INTRAVENOUS
Qty: 0 | Refills: 0 | Status: DISCONTINUED | OUTPATIENT
Start: 2018-08-08 | End: 2018-08-15

## 2018-08-08 RX ORDER — POLYETHYLENE GLYCOL 3350 17 G/17G
17 POWDER, FOR SOLUTION ORAL DAILY
Qty: 0 | Refills: 0 | Status: DISCONTINUED | OUTPATIENT
Start: 2018-08-08 | End: 2018-08-15

## 2018-08-08 RX ORDER — CHLORHEXIDINE GLUCONATE 213 G/1000ML
5 SOLUTION TOPICAL EVERY 4 HOURS
Qty: 0 | Refills: 0 | Status: DISCONTINUED | OUTPATIENT
Start: 2018-08-08 | End: 2018-08-10

## 2018-08-08 RX ORDER — SODIUM CHLORIDE 9 MG/ML
1000 INJECTION INTRAMUSCULAR; INTRAVENOUS; SUBCUTANEOUS
Qty: 0 | Refills: 0 | Status: DISCONTINUED | OUTPATIENT
Start: 2018-08-08 | End: 2018-08-15

## 2018-08-08 RX ORDER — ASPIRIN/CALCIUM CARB/MAGNESIUM 324 MG
300 TABLET ORAL ONCE
Qty: 0 | Refills: 0 | Status: DISCONTINUED | OUTPATIENT
Start: 2018-08-08 | End: 2018-08-09

## 2018-08-08 RX ORDER — DOCUSATE SODIUM 100 MG
100 CAPSULE ORAL THREE TIMES A DAY
Qty: 0 | Refills: 0 | Status: DISCONTINUED | OUTPATIENT
Start: 2018-08-08 | End: 2018-08-15

## 2018-08-08 RX ADMIN — Medication 81 MILLIGRAM(S): at 13:00

## 2018-08-08 RX ADMIN — Medication 210 MILLIGRAM(S): at 21:53

## 2018-08-08 RX ADMIN — Medication 100 MILLIEQUIVALENT(S): at 22:23

## 2018-08-08 RX ADMIN — AMLODIPINE BESYLATE 10 MILLIGRAM(S): 2.5 TABLET ORAL at 05:21

## 2018-08-08 RX ADMIN — Medication 12.5 MILLIGRAM(S): at 05:20

## 2018-08-08 RX ADMIN — CHLORHEXIDINE GLUCONATE 5 MILLILITER(S): 213 SOLUTION TOPICAL at 23:05

## 2018-08-08 RX ADMIN — Medication 5 MILLIGRAM(S): at 23:05

## 2018-08-08 RX ADMIN — Medication 100 MILLIEQUIVALENT(S): at 21:22

## 2018-08-08 RX ADMIN — Medication 100 MILLIGRAM(S): at 23:06

## 2018-08-08 RX ADMIN — Medication 2: at 13:01

## 2018-08-08 RX ADMIN — Medication 1: at 09:32

## 2018-08-08 NOTE — PROGRESS NOTE ADULT - SUBJECTIVE AND OBJECTIVE BOX
[facility] -- Keith Torrez, Fernandez Ramirez, Osvaldo Pozo Savella  Office # [phone]      Follow Up:  CAD, carotid dz    Subjective/Observations: Patient seen and examined. Events noted. Resting comfortably in bed. No complaints of chest pain, dyspnea, or palpitations reported. No signs of orthopnea or PND.       REVIEW OF SYSTEMS: All other review of systems is negative unless indicated above    PAST MEDICAL & SURGICAL HISTORY:  Retinopathy  Neuropathy  DM (diabetes mellitus)  HTN (hypertension)  History of cholecystectomy      MEDICATIONS  (STANDING):  amLODIPine   Tablet 10 milliGRAM(s) Oral daily  aspirin enteric coated 81 milliGRAM(s) Oral daily  atorvastatin 40 milliGRAM(s) Oral at bedtime  cefuroxime  IVPB 1500 milliGRAM(s) IV Intermittent once  chlorhexidine 0.12% Liquid 15 milliLiter(s) Swish and Spit once  dextrose 5%. 1000 milliLiter(s) (50 mL/Hr) IV Continuous <Continuous>  dextrose 50% Injectable 12.5 Gram(s) IV Push once  dextrose 50% Injectable 25 Gram(s) IV Push once  dextrose 50% Injectable 25 Gram(s) IV Push once  insulin lispro (HumaLOG) corrective regimen sliding scale   SubCutaneous three times a day before meals  insulin lispro (HumaLOG) corrective regimen sliding scale   SubCutaneous at bedtime  metoprolol tartrate 12.5 milliGRAM(s) Oral two times a day    MEDICATIONS  (PRN):  dextrose 40% Gel 15 Gram(s) Oral once PRN Blood Glucose LESS THAN 70 milliGRAM(s)/deciliter  glucagon  Injectable 1 milliGRAM(s) IntraMuscular once PRN Glucose LESS THAN 70 milligrams/deciliter      Allergies    No Known Allergies    Intolerances            Vital Signs Last 24 Hrs  T(C): 36.7 (08 Aug 2018 05:15), Max: 36.7 (07 Aug 2018 14:15)  T(F): 98.1 (08 Aug 2018 05:15), Max: 98.1 (07 Aug 2018 19:54)  HR: 83 (08 Aug 2018 05:15) (71 - 84)  BP: 137/83 (08 Aug 2018 05:15) (104/69 - 137/83)  BP(mean): --  RR: 18 (08 Aug 2018 05:15) (16 - 18)  SpO2: 100% (08 Aug 2018 05:15) (99% - 100%)    I&O's Summary    07 Aug 2018 07:01  -  08 Aug 2018 07:00  --------------------------------------------------------  IN: 660 mL / OUT: 0 mL / NET: 660 mL          PHYSICAL EXAM:  TELE: SR  Constitutional: NAD, awake and alert, well-developed  HEENT: Moist Mucous Membranes, Anicteric  Pulmonary: Decreased breath sounds b/l. No rales, crackles or wheeze appreciated.   Cardiovascular: Regular, S1 and S2, No murmurs, rubs, gallops or clicks  Gastrointestinal: Bowel Sounds present, soft, nontender.   Lymph: No peripheral edema. No lymphadenopathy.  Skin: No visible rashes or ulcers.  Psych:  Mood & affect appropriate    LABS: All Labs Reviewed:                        14.5   7.0   )-----------( 201      ( 08 Aug 2018 05:51 )             43.5                         14.5   7.1   )-----------( 223      ( 07 Aug 2018 06:41 )             44.1                         15.2   7.5   )-----------( 212      ( 06 Aug 2018 06:06 )             43.9     08 Aug 2018 05:51    136    |  103    |  17     ----------------------------<  214    4.4     |  23     |  1.07   07 Aug 2018 06:40    136    |  103    |  16     ----------------------------<  205    4.5     |  23     |  1.17   06 Aug 2018 06:11    138    |  104    |  16     ----------------------------<  159    5.1     |  23     |  1.27     Ca    9.3        08 Aug 2018 05:51  Ca    9.4        07 Aug 2018 06:40  Ca    9.8        06 Aug 2018 06:11      PT/INR - ( 08 Aug 2018 05:51 )   PT: 10.8 sec;   INR: 1.00 ratio         PTT - ( 07 Aug 2018 11:50 )  PTT:46.5 sec    < from: CT Head No Cont (08.07.18 @ 11:43) >    EXAM:  CT BRAIN                            PROCEDURE DATE:  08/07/2018            INTERPRETATION:  Noncontrast CT of the brain.    CLINICAL INDICATION:  cabg in am. Patient unable to communicate.    TECHNIQUE : Axial CT scanning of the brain was obtained from the skull   base to the vertex without the administration of intravenous contrast.      COMPARISON: None available    FINDINGS:  There is no hydrocephalus, mass effect, midline shift, or   acute intracranial hemorrhage.  There is no CT evidence of acute   territorial infarct.  Chronic infarct versus enlarged perivascular space   in the right putamen.    The visualized paranasal sinuses and mastoid air cells are clear.    IMPRESSION:    No acute intracranial hemorrhage, mass effect, vasogenic edema, or   evidence of acute territorial infarct.                        CARLENE DUTTON M.D., ATTENDING RADIOLOGIST  This document has been electronically signed. Aug  7 2018 12:51PM                < end of copied text >

## 2018-08-08 NOTE — PROGRESS NOTE ADULT - SUBJECTIVE AND OBJECTIVE BOX
HPI:  This is a 63 yo female,  from [facility] arrived to [place] on 7/24 here visiting, with PMH of HTN and long standing hx of  type 2 DM ( last A1C unknown,  complicated by neuropathy and retinopathy,  well managed as per patient). She reports as strong family hx of premature atherosclerosis ( both her brothers had MI in their early 40's).  She does not have any known structural heart disease.  She is generally sedentary at baseline due to pain in her feet, likely neuropathic.  She has not had prior S&S  of angina, HR or arrythmia.  She presents to [facility] ED on 8/3 with c/c of several days of " random left sided chest discomfort", with intermittent radiation to her left arm, occasionally associated with dyspnea.  The sxs may be brief, but have lasted up to 30 minutes.  CP is without clear provocation, and without a convincing explanation.  She had several episodes yesterday, and continued to have sxs even yesterday in the ER.  In the ER she was found to have an abnormal ekg, without comparison available, loaded with Aspirin 325mg.  Jyoti x3 negative.  Consultation w/ cards, Dr Presley  obtained, whom despite x3 neg enzymes given FH and symptomatolgy transferred pt to [facility] for cardiac cath to r/o significant CAD.  Now meeting patient for the first time, post cath, and as per unofficial cath report: mLAD 90%, Cx 100% and  % via RFA.  Needs CTS, awaiting on Dr Zaragoza for consult.  Presently patient is asymptomatic, recovering from cath.     < from: 12 Lead ECG (08.04.18 @ 08:14) >  Ventricular Rate 63 BPM    Atrial Rate 63 BPM    P-R Interval 158 ms    QRS Duration 88 ms    Q-T Interval 430 ms    QTC Calculation(Bezet) 440 ms    P Axis 61 degrees    R Axis -21 degrees    T Axis 122 degrees    Diagnosis Line Normal sinus rhythm  ST & T wave abnormality, consider lateral ischemia  Abnormal ECG  When compared with ECG of 03-AUG-2018 21:54,  No significant change was found  Confirmed by Maico Presley MD ([id number]) on 8/4/2018 9:52:19 AM    < end of copied text >      < from: CT Angio Chest w/ IV Cont (08.04.18 @ 00:15) >  EXAM:  CT ANGIO CHEST (W)AW IC                            PROCEDURE DATE:  08/04/2018          INTERPRETATION:  CLINICAL INDICATION: Shortness of breath, chest pain.   Check for pulmonary embolism AGAPITO REYNOSO  MRN#:  [id number]    The patient is a 62y Female with PMH of HTN and long standing hx of type 2 DM, neuropathy and retinopathy, admitted with angina and diagnosed with multivessel CAD, LVH, mild to moderate MR and carotid disease, now s/p C2L today who was seen, evaluated, & examined with the CTICU staff on admission and later in the evening with a multidisciplinary care plan formulated & implemented.  All available clinical, laboratory, radiographic, pharmacologic, and electrocardiographic data were reviewed & analyzed.      The patient was in the CTICU in critical condition secondary to persistent cardiopulmonary dysfunction, hemodynamically significant anemia/hypovolemia-shock, persistent cardiovascular dysfunction, acidosis, & hyperglycemia.      Respiratory status required full ventilatory support, the following of ABG’s with A-line monitoring, continuous pulse oximetry monitoring, for support & to evaluate for & prevent further decompensation secondary to persistent cardiopulmonary dysfunction.     Invasive hemodynamic monitoring with a central venous and arterial catheter were required for continuous central venous and MAP/BP monitoring to ensure adequate cardiovascular support and to evaluate for & help prevent decompensation while receiving intermittent volume expansion, platelet transfusion and an IV Nicardipine drip secondary to hemodynamically significant anemia/hypovolemia-shock, hypertension, acute postoperative blood loss anemia, thrombocytopenia and bleeding from the chest tubes.    Metabolic stability, uncontrolled type 2 diabetes-hyperglycemia, & infection prophylaxis required an IV regular Insulin drip & the following of serial glucose levels to help achieve & maintain euglycemia.      Patient required acute critical care management and I provided 35 minutes of non-continuous care to the patient.  Discussed at length with the CTICU staff and helped coordinate care.

## 2018-08-08 NOTE — PROGRESS NOTE ADULT - SUBJECTIVE AND OBJECTIVE BOX
Patient has   asymptomatic  high  grade  right  ICA stenosis  Plan  staged  intervention  First CABG followed by  CTA  and  if  it confirms  more than 80 % stenosis  then onluy  perform  right  CEA  I explained  this  to  the  patient  and  diiscussed  with Dr sepulveda

## 2018-08-08 NOTE — BRIEF OPERATIVE NOTE - PROCEDURE
<<-----Click on this checkbox to enter Procedure CABG (coronary artery bypass graft)  08/08/2018  Patient was taken to the OR on 8/8/2018 and underwent C2L with LIMA to LAD; RSVG to RCA  Active  PGELLERT

## 2018-08-08 NOTE — PROGRESS NOTE ADULT - ASSESSMENT
63 y/o F with PMH HTN, DM, fam hx CAD who presented with CP found to have multi-vessel disease, plan for CABG     -no reccurent chest pain. Though need to watch closely given underlying severe CAD.   - carotid disease will need intervention as an outpt. CT head was ok. No signs of infarcts   -there is no evidence of significant arrhythmia.  -there is no evidence for meaningful  volume overload.  - c/w ASA  - c/w statin  - c/w metoprolol, amlodipine  - monitor electrolytes  - now pending CABG later today  - will follow post op

## 2018-08-09 LAB
ALBUMIN SERPL ELPH-MCNC: 4.1 G/DL — SIGNIFICANT CHANGE UP (ref 3.3–5)
ALBUMIN SERPL ELPH-MCNC: 4.6 G/DL — SIGNIFICANT CHANGE UP (ref 3.3–5)
ALP SERPL-CCNC: 27 U/L — LOW (ref 40–120)
ALP SERPL-CCNC: 37 U/L — LOW (ref 40–120)
ALT FLD-CCNC: 70 U/L — HIGH (ref 10–45)
ALT FLD-CCNC: 73 U/L — HIGH (ref 10–45)
ANION GAP SERPL CALC-SCNC: 18 MMOL/L — HIGH (ref 5–17)
ANION GAP SERPL CALC-SCNC: 18 MMOL/L — HIGH (ref 5–17)
APTT BLD: 28.5 SEC — SIGNIFICANT CHANGE UP (ref 27.5–37.4)
AST SERPL-CCNC: 110 U/L — HIGH (ref 10–40)
AST SERPL-CCNC: 98 U/L — HIGH (ref 10–40)
BILIRUB SERPL-MCNC: 1.5 MG/DL — HIGH (ref 0.2–1.2)
BILIRUB SERPL-MCNC: 2.3 MG/DL — HIGH (ref 0.2–1.2)
BUN SERPL-MCNC: 14 MG/DL — SIGNIFICANT CHANGE UP (ref 7–23)
BUN SERPL-MCNC: 15 MG/DL — SIGNIFICANT CHANGE UP (ref 7–23)
CALCIUM SERPL-MCNC: 7.7 MG/DL — LOW (ref 8.4–10.5)
CALCIUM SERPL-MCNC: 7.8 MG/DL — LOW (ref 8.4–10.5)
CHLORIDE SERPL-SCNC: 103 MMOL/L — SIGNIFICANT CHANGE UP (ref 96–108)
CHLORIDE SERPL-SCNC: 105 MMOL/L — SIGNIFICANT CHANGE UP (ref 96–108)
CK MB BLD-MCNC: 7.9 % — HIGH (ref 0–3.5)
CK MB CFR SERPL CALC: 18.4 NG/ML — HIGH (ref 0–3.8)
CK SERPL-CCNC: 232 U/L — HIGH (ref 25–170)
CO2 SERPL-SCNC: 17 MMOL/L — LOW (ref 22–31)
CO2 SERPL-SCNC: 17 MMOL/L — LOW (ref 22–31)
CREAT SERPL-MCNC: 0.94 MG/DL — SIGNIFICANT CHANGE UP (ref 0.5–1.3)
CREAT SERPL-MCNC: 1 MG/DL — SIGNIFICANT CHANGE UP (ref 0.5–1.3)
CULTURE RESULTS: SIGNIFICANT CHANGE UP
CULTURE RESULTS: SIGNIFICANT CHANGE UP
GAS PNL BLDA: SIGNIFICANT CHANGE UP
GLUCOSE BLDC GLUCOMTR-MCNC: 105 MG/DL — HIGH (ref 70–99)
GLUCOSE BLDC GLUCOMTR-MCNC: 107 MG/DL — HIGH (ref 70–99)
GLUCOSE BLDC GLUCOMTR-MCNC: 109 MG/DL — HIGH (ref 70–99)
GLUCOSE BLDC GLUCOMTR-MCNC: 110 MG/DL — HIGH (ref 70–99)
GLUCOSE BLDC GLUCOMTR-MCNC: 116 MG/DL — HIGH (ref 70–99)
GLUCOSE BLDC GLUCOMTR-MCNC: 117 MG/DL — HIGH (ref 70–99)
GLUCOSE BLDC GLUCOMTR-MCNC: 118 MG/DL — HIGH (ref 70–99)
GLUCOSE BLDC GLUCOMTR-MCNC: 150 MG/DL — HIGH (ref 70–99)
GLUCOSE BLDC GLUCOMTR-MCNC: 193 MG/DL — HIGH (ref 70–99)
GLUCOSE BLDC GLUCOMTR-MCNC: 196 MG/DL — HIGH (ref 70–99)
GLUCOSE BLDC GLUCOMTR-MCNC: 94 MG/DL — SIGNIFICANT CHANGE UP (ref 70–99)
GLUCOSE SERPL-MCNC: 135 MG/DL — HIGH (ref 70–99)
GLUCOSE SERPL-MCNC: 202 MG/DL — HIGH (ref 70–99)
HCT VFR BLD CALC: 21.7 % — LOW (ref 34.5–45)
HCT VFR BLD CALC: 26.8 % — LOW (ref 34.5–45)
HGB BLD-MCNC: 7.7 G/DL — LOW (ref 11.5–15.5)
HGB BLD-MCNC: 9 G/DL — LOW (ref 11.5–15.5)
INR BLD: 1.26 RATIO — HIGH (ref 0.88–1.16)
MCHC RBC-ENTMCNC: 28.7 PG — SIGNIFICANT CHANGE UP (ref 27–34)
MCHC RBC-ENTMCNC: 30.6 PG — SIGNIFICANT CHANGE UP (ref 27–34)
MCHC RBC-ENTMCNC: 33.6 GM/DL — SIGNIFICANT CHANGE UP (ref 32–36)
MCHC RBC-ENTMCNC: 35.7 GM/DL — SIGNIFICANT CHANGE UP (ref 32–36)
MCV RBC AUTO: 85.6 FL — SIGNIFICANT CHANGE UP (ref 80–100)
MCV RBC AUTO: 85.6 FL — SIGNIFICANT CHANGE UP (ref 80–100)
PLATELET # BLD AUTO: 161 K/UL — SIGNIFICANT CHANGE UP (ref 150–400)
PLATELET # BLD AUTO: 92 K/UL — LOW (ref 150–400)
POTASSIUM SERPL-MCNC: 5.1 MMOL/L — SIGNIFICANT CHANGE UP (ref 3.5–5.3)
POTASSIUM SERPL-MCNC: 6 MMOL/L — HIGH (ref 3.5–5.3)
POTASSIUM SERPL-SCNC: 5.1 MMOL/L — SIGNIFICANT CHANGE UP (ref 3.5–5.3)
POTASSIUM SERPL-SCNC: 6 MMOL/L — HIGH (ref 3.5–5.3)
PROT SERPL-MCNC: 5.5 G/DL — LOW (ref 6–8.3)
PROT SERPL-MCNC: 5.6 G/DL — LOW (ref 6–8.3)
PROTHROM AB SERPL-ACNC: 13.8 SEC — HIGH (ref 9.8–12.7)
RBC # BLD: 2.53 M/UL — LOW (ref 3.8–5.2)
RBC # BLD: 3.13 M/UL — LOW (ref 3.8–5.2)
RBC # FLD: 13.1 % — SIGNIFICANT CHANGE UP (ref 10.3–14.5)
RBC # FLD: 13.6 % — SIGNIFICANT CHANGE UP (ref 10.3–14.5)
SODIUM SERPL-SCNC: 138 MMOL/L — SIGNIFICANT CHANGE UP (ref 135–145)
SODIUM SERPL-SCNC: 140 MMOL/L — SIGNIFICANT CHANGE UP (ref 135–145)
SPECIMEN SOURCE: SIGNIFICANT CHANGE UP
SPECIMEN SOURCE: SIGNIFICANT CHANGE UP
TROPONIN T, HIGH SENSITIVITY RESULT: 1606 NG/L — HIGH (ref 0–51)
WBC # BLD: 13.2 K/UL — HIGH (ref 3.8–10.5)
WBC # BLD: 8 K/UL — SIGNIFICANT CHANGE UP (ref 3.8–10.5)
WBC # FLD AUTO: 13.2 K/UL — HIGH (ref 3.8–10.5)
WBC # FLD AUTO: 8 K/UL — SIGNIFICANT CHANGE UP (ref 3.8–10.5)

## 2018-08-09 PROCEDURE — 99233 SBSQ HOSP IP/OBS HIGH 50: CPT

## 2018-08-09 PROCEDURE — 71045 X-RAY EXAM CHEST 1 VIEW: CPT | Mod: 26

## 2018-08-09 PROCEDURE — 93010 ELECTROCARDIOGRAM REPORT: CPT

## 2018-08-09 PROCEDURE — 99291 CRITICAL CARE FIRST HOUR: CPT

## 2018-08-09 RX ORDER — METOPROLOL TARTRATE 50 MG
5 TABLET ORAL ONCE
Qty: 0 | Refills: 0 | Status: COMPLETED | OUTPATIENT
Start: 2018-08-09 | End: 2018-08-09

## 2018-08-09 RX ORDER — ASPIRIN/CALCIUM CARB/MAGNESIUM 324 MG
81 TABLET ORAL DAILY
Qty: 0 | Refills: 0 | Status: DISCONTINUED | OUTPATIENT
Start: 2018-08-10 | End: 2018-08-15

## 2018-08-09 RX ORDER — CLOPIDOGREL BISULFATE 75 MG/1
75 TABLET, FILM COATED ORAL DAILY
Qty: 0 | Refills: 0 | Status: DISCONTINUED | OUTPATIENT
Start: 2018-08-09 | End: 2018-08-10

## 2018-08-09 RX ORDER — INSULIN HUMAN 100 [IU]/ML
4 INJECTION, SOLUTION SUBCUTANEOUS
Qty: 100 | Refills: 0 | Status: DISCONTINUED | OUTPATIENT
Start: 2018-08-09 | End: 2018-08-15

## 2018-08-09 RX ORDER — CALCIUM GLUCONATE 100 MG/ML
1 VIAL (ML) INTRAVENOUS ONCE
Qty: 0 | Refills: 0 | Status: COMPLETED | OUTPATIENT
Start: 2018-08-09 | End: 2018-08-09

## 2018-08-09 RX ORDER — ALBUMIN HUMAN 25 %
250 VIAL (ML) INTRAVENOUS ONCE
Qty: 0 | Refills: 0 | Status: COMPLETED | OUTPATIENT
Start: 2018-08-09 | End: 2018-08-09

## 2018-08-09 RX ORDER — ATORVASTATIN CALCIUM 80 MG/1
40 TABLET, FILM COATED ORAL AT BEDTIME
Qty: 0 | Refills: 0 | Status: DISCONTINUED | OUTPATIENT
Start: 2018-08-09 | End: 2018-08-10

## 2018-08-09 RX ORDER — POTASSIUM CHLORIDE 20 MEQ
10 PACKET (EA) ORAL
Qty: 0 | Refills: 0 | Status: COMPLETED | OUTPATIENT
Start: 2018-08-09 | End: 2018-08-09

## 2018-08-09 RX ORDER — MAGNESIUM SULFATE 500 MG/ML
2 VIAL (ML) INJECTION ONCE
Qty: 0 | Refills: 0 | Status: COMPLETED | OUTPATIENT
Start: 2018-08-09 | End: 2018-08-09

## 2018-08-09 RX ORDER — PANTOPRAZOLE SODIUM 20 MG/1
40 TABLET, DELAYED RELEASE ORAL
Qty: 0 | Refills: 0 | Status: DISCONTINUED | OUTPATIENT
Start: 2018-08-09 | End: 2018-08-10

## 2018-08-09 RX ORDER — ACETAMINOPHEN 500 MG
1000 TABLET ORAL ONCE
Qty: 0 | Refills: 0 | Status: COMPLETED | OUTPATIENT
Start: 2018-08-09 | End: 2018-08-09

## 2018-08-09 RX ORDER — INSULIN HUMAN 100 [IU]/ML
10 INJECTION, SOLUTION SUBCUTANEOUS ONCE
Qty: 0 | Refills: 0 | Status: COMPLETED | OUTPATIENT
Start: 2018-08-09 | End: 2018-08-09

## 2018-08-09 RX ORDER — METOPROLOL TARTRATE 50 MG
25 TABLET ORAL EVERY 12 HOURS
Qty: 0 | Refills: 0 | Status: DISCONTINUED | OUTPATIENT
Start: 2018-08-09 | End: 2018-08-14

## 2018-08-09 RX ORDER — FUROSEMIDE 40 MG
20 TABLET ORAL ONCE
Qty: 0 | Refills: 0 | Status: COMPLETED | OUTPATIENT
Start: 2018-08-09 | End: 2018-08-09

## 2018-08-09 RX ORDER — ASPIRIN/CALCIUM CARB/MAGNESIUM 324 MG
325 TABLET ORAL ONCE
Qty: 0 | Refills: 0 | Status: COMPLETED | OUTPATIENT
Start: 2018-08-09 | End: 2018-08-09

## 2018-08-09 RX ORDER — SODIUM BICARBONATE 1 MEQ/ML
50 SYRINGE (ML) INTRAVENOUS ONCE
Qty: 0 | Refills: 0 | Status: COMPLETED | OUTPATIENT
Start: 2018-08-09 | End: 2018-08-09

## 2018-08-09 RX ORDER — DEXTROSE 50 % IN WATER 50 %
50 SYRINGE (ML) INTRAVENOUS ONCE
Qty: 0 | Refills: 0 | Status: COMPLETED | OUTPATIENT
Start: 2018-08-09 | End: 2018-08-09

## 2018-08-09 RX ADMIN — Medication 400 MILLIGRAM(S): at 05:30

## 2018-08-09 RX ADMIN — Medication 125 MILLILITER(S): at 05:03

## 2018-08-09 RX ADMIN — INSULIN HUMAN 10 UNIT(S): 100 INJECTION, SOLUTION SUBCUTANEOUS at 12:28

## 2018-08-09 RX ADMIN — POLYETHYLENE GLYCOL 3350 17 GRAM(S): 17 POWDER, FOR SOLUTION ORAL at 13:57

## 2018-08-09 RX ADMIN — Medication 5 MILLIGRAM(S): at 13:57

## 2018-08-09 RX ADMIN — ATORVASTATIN CALCIUM 40 MILLIGRAM(S): 80 TABLET, FILM COATED ORAL at 22:07

## 2018-08-09 RX ADMIN — CHLORHEXIDINE GLUCONATE 5 MILLILITER(S): 213 SOLUTION TOPICAL at 05:30

## 2018-08-09 RX ADMIN — Medication 100 MILLIGRAM(S): at 06:03

## 2018-08-09 RX ADMIN — Medication 100 MILLIEQUIVALENT(S): at 00:00

## 2018-08-09 RX ADMIN — INSULIN HUMAN 4 UNIT(S)/HR: 100 INJECTION, SOLUTION SUBCUTANEOUS at 07:15

## 2018-08-09 RX ADMIN — CHLORHEXIDINE GLUCONATE 5 MILLILITER(S): 213 SOLUTION TOPICAL at 02:55

## 2018-08-09 RX ADMIN — Medication 20 MILLIGRAM(S): at 12:25

## 2018-08-09 RX ADMIN — Medication 125 MILLILITER(S): at 10:35

## 2018-08-09 RX ADMIN — Medication 100 MILLIGRAM(S): at 14:26

## 2018-08-09 RX ADMIN — INSULIN HUMAN 4 UNIT(S)/HR: 100 INJECTION, SOLUTION SUBCUTANEOUS at 05:30

## 2018-08-09 RX ADMIN — Medication 200 GRAM(S): at 03:25

## 2018-08-09 RX ADMIN — Medication 125 MILLILITER(S): at 03:41

## 2018-08-09 RX ADMIN — Medication 200 GRAM(S): at 12:20

## 2018-08-09 RX ADMIN — Medication 325 MILLIGRAM(S): at 05:57

## 2018-08-09 RX ADMIN — Medication 125 MILLILITER(S): at 03:25

## 2018-08-09 RX ADMIN — Medication 1000 MILLIGRAM(S): at 05:45

## 2018-08-09 RX ADMIN — Medication 1000 MILLIGRAM(S): at 16:45

## 2018-08-09 RX ADMIN — Medication 400 MILLIGRAM(S): at 16:15

## 2018-08-09 RX ADMIN — PANTOPRAZOLE SODIUM 40 MILLIGRAM(S): 20 TABLET, DELAYED RELEASE ORAL at 13:57

## 2018-08-09 RX ADMIN — Medication 100 MILLIGRAM(S): at 13:57

## 2018-08-09 RX ADMIN — Medication 50 MILLIEQUIVALENT(S): at 12:35

## 2018-08-09 RX ADMIN — Medication 100 MILLIEQUIVALENT(S): at 00:47

## 2018-08-09 RX ADMIN — Medication 50 GRAM(S): at 01:50

## 2018-08-09 RX ADMIN — NICARDIPINE HYDROCHLORIDE 25 MG/HR: 30 CAPSULE, EXTENDED RELEASE ORAL at 05:31

## 2018-08-09 RX ADMIN — Medication 100 MILLIGRAM(S): at 22:07

## 2018-08-09 RX ADMIN — Medication 125 MILLILITER(S): at 03:00

## 2018-08-09 RX ADMIN — Medication 100 MILLIEQUIVALENT(S): at 02:00

## 2018-08-09 RX ADMIN — Medication 50 MILLILITER(S): at 12:30

## 2018-08-09 RX ADMIN — CLOPIDOGREL BISULFATE 75 MILLIGRAM(S): 75 TABLET, FILM COATED ORAL at 05:57

## 2018-08-09 RX ADMIN — SODIUM CHLORIDE 10 MILLILITER(S): 9 INJECTION INTRAMUSCULAR; INTRAVENOUS; SUBCUTANEOUS at 07:15

## 2018-08-09 RX ADMIN — Medication 5 MILLIGRAM(S): at 16:20

## 2018-08-09 RX ADMIN — Medication 25 MILLIGRAM(S): at 19:56

## 2018-08-09 RX ADMIN — CHLORHEXIDINE GLUCONATE 5 MILLILITER(S): 213 SOLUTION TOPICAL at 22:07

## 2018-08-09 RX ADMIN — Medication 125 MILLILITER(S): at 13:30

## 2018-08-09 RX ADMIN — Medication 5 MILLIGRAM(S): at 22:07

## 2018-08-09 RX ADMIN — Medication 125 MILLILITER(S): at 10:25

## 2018-08-09 RX ADMIN — Medication 100 MILLIGRAM(S): at 22:06

## 2018-08-09 RX ADMIN — CHLORHEXIDINE GLUCONATE 5 MILLILITER(S): 213 SOLUTION TOPICAL at 10:45

## 2018-08-09 RX ADMIN — Medication 5 MILLIGRAM(S): at 05:29

## 2018-08-09 NOTE — ANESTHESIA FOLLOW-UP NOTE - NSEVALATIONFT_GEN_ALL_CORE
s/p CABG yesterday. Still intubated, Hemodynamically stable. No apparent anesthesia complication at this time.

## 2018-08-09 NOTE — PROGRESS NOTE ADULT - ASSESSMENT
63 y/o F with PMH HTN, DM, fam hx CAD who presented with CP found to have multi-vessel disease, s/p CABG on 8/8 with LIMA to LAD and RSVG to RCA      - seems to have tolerated procedure well  - off pressors, and hemodynamically stable  - Junctional rhythm on telemetry this morning. This should recover. Hold off on beta blockers.  - mild-moderate MR noted on intra-op MILES.  - diabetes control, currently on insulin gtt  - carotid disease will need intervention as an outpt. Vascular surgery input appreciated.  - there is no evidence for meaningful volume overload. Chest tube management per surgery  - c/w ASA  - c/w statin  - c/w metoprolol, amlodipine  - monitor electrolytes. Keep K>4, Mg >2  - will follow with you

## 2018-08-09 NOTE — PROGRESS NOTE ADULT - SUBJECTIVE AND OBJECTIVE BOX
Operation  C2l   EF nl  POD  1   Narrative  pt on cmv, post op , opens eyes and follow commands  pt is off pressor       Vital Signs Last 24 Hrs  T(C): 36.3 (08 Aug 2018 23:00), Max: 36.7 (08 Aug 2018 05:15)  T(F): 97.3 (08 Aug 2018 23:00), Max: 98.1 (08 Aug 2018 05:15)  HR: 93 (09 Aug 2018 01:15) (83 - 112)  BP: 137/83 (08 Aug 2018 05:15) (137/83 - 137/83)  BP(mean): --  RR: 25 (09 Aug 2018 01:15) (12 - 25)  SpO2: 100% (09 Aug 2018 01:15) (100% - 100%)  I&O's Detail    07 Aug 2018 07:01  -  08 Aug 2018 07:00  --------------------------------------------------------  IN:    Oral Fluid: 660 mL  Total IN: 660 mL    OUT:  Total OUT: 0 mL    Total NET: 660 mL      08 Aug 2018 07:01  -  09 Aug 2018 01:38  --------------------------------------------------------  IN:    insulin Infusion: 21 mL    niCARdipine Infusion: 90 mL    Plasma: 500 mL    Platelets - Single Donor: 300 mL    sodium chloride 0.9%.: 50 mL    Solution: 150 mL  Total IN: 1111 mL    OUT:    Chest Tube: 430 mL    Chest Tube: 170 mL    Indwelling Catheter - Urethral: 975 mL  Total OUT: 1575 mL    Total NET: -464 mL      I&O's Summary    07 Aug 2018 07:01  -  08 Aug 2018 07:00  --------------------------------------------------------  IN: 660 mL / OUT: 0 mL / NET: 660 mL    08 Aug 2018 07:01  -  09 Aug 2018 01:38  --------------------------------------------------------  IN: 1111 mL / OUT: 1575 mL / NET: -464 mL        LABS:  CBC Full  -  ( 09 Aug 2018 01:20 )  WBC Count : 13.2 K/uL  Hemoglobin : 9.0 g/dL  Hematocrit : 26.8 %  Platelet Count - Automated : 161 K/uL  Mean Cell Volume : 85.6 fl  Mean Cell Hemoglobin : 28.7 pg  Mean Cell Hemoglobin Concentration : 33.6 gm/dL  Auto Neutrophil # : x  Auto Lymphocyte # : x  Auto Monocyte # : x  Auto Eosinophil # : x  Auto Basophil # : x  Auto Neutrophil % : x  Auto Lymphocyte % : x  Auto Monocyte % : x  Auto Eosinophil % : x  Auto Basophil % : x    08-08    143  |  107  |  14  ----------------------------<  130<H>  4.4   |  20<L>  |  0.92    Ca    7.5<L>      08 Aug 2018 21:16  Phos  1.9     08-08  Mg     1.9     08-08    TPro  5.0<L>  /  Alb  3.6  /  TBili  1.0  /  DBili  x   /  AST  40  /  ALT  18  /  AlkPhos  28<L>  08-08    PT/INR - ( 08 Aug 2018 21:16 )   PT: 19.3 sec;   INR: 1.75 ratio         PTT - ( 08 Aug 2018 21:16 )  PTT:50.3 sec    Daily     Daily     MEDICATIONS  (STANDING):  aspirin Suppository 300 milliGRAM(s) Rectal once  cefuroxime  IVPB 1500 milliGRAM(s) IV Intermittent every 8 hours  chlorhexidine 0.12% Liquid 5 milliLiter(s) Swish and Spit every 4 hours  clopidogrel Tablet 75 milliGRAM(s) Oral once  dextrose 50% Injectable 50 milliLiter(s) IV Push every 15 minutes  dextrose 50% Injectable 25 milliLiter(s) IV Push every 15 minutes  docusate sodium 100 milliGRAM(s) Oral three times a day  insulin Infusion 4 Unit(s)/Hr (4 mL/Hr) IV Continuous <Continuous>  magnesium sulfate  IVPB 2 Gram(s) IV Intermittent once  meperidine     Injectable 25 milliGRAM(s) IV Push once  metoclopramide Injectable 5 milliGRAM(s) IV Push every 8 hours  niCARdipine Infusion 5 mG/Hr (25 mL/Hr) IV Continuous <Continuous>  polyethylene glycol 3350 17 Gram(s) Oral daily  potassium chloride  10 mEq/50 mL IVPB 10 milliEquivalent(s) IV Intermittent every 1 hour  potassium chloride  10 mEq/50 mL IVPB 10 milliEquivalent(s) IV Intermittent every 1 hour  potassium chloride  10 mEq/50 mL IVPB 10 milliEquivalent(s) IV Intermittent every 1 hour  potassium chloride  10 mEq/50 mL IVPB 10 milliEquivalent(s) IV Intermittent every 1 hour  sodium chloride 0.9%. 1000 milliLiter(s) (10 mL/Hr) IV Continuous <Continuous>    MEDICATIONS  (PRN):      General: alert , in NAD  Chest: sternal dressing C/D/I  Heart: S1, S2, RRR  Lungs: CTA B/L, without W/R/R  Abdomen: Soft, ND, NT, positive BS  Extremeties: without edema B/L LE, positive DP pulses B/L     Does the patient have a history of CHF:  yes   -If yes, systolic or diastolic:  -pre-operative EF:  nl    Extubation within 24 hours:  no    Does the patient have a history of kidney disease:  no  -give CKD stage if applicable:  -what is patient's baseline Creatinine:   0.92    Beta Blockers contraindicated for the first 24 hours due to vasopressor/inotrpic medication: yes   -If on pressors, indication: pt is low dose pressor    Did the patient receive transfusion of blood and/or products: yes   -If yes, indication:  post op bleeding     DVT PPX: yes     Diane-operative antibiotics discontinued within 48 hours of  ctu   zinacef     62 yr female s/p C2L      GI / DVT prophylaxis. keep K>4, mag >2.0 -wean pressors - asa, statin - glycemic control -cont post op ABX -d/c chest tube- pain management.   RADIOLOGY & ADDITIONAL TESTS:    Patient care discussed on morning interdisciplinary rounds with CTS team.

## 2018-08-09 NOTE — PROGRESS NOTE ADULT - SUBJECTIVE AND OBJECTIVE BOX
[facility] - Keith Torrez, James, Fernandez, Nohelia, Loraine Riley  Office Number:  [phone]    Patient resting comfortably in bed in [facility].  Remains intubated. Off sedation and responsive.    ROS: negative unless otherwise mentioned.    Telemetry:  SR, now junctional    MEDICATIONS  (STANDING):  atorvastatin 40 milliGRAM(s) Oral at bedtime  cefuroxime  IVPB 1500 milliGRAM(s) IV Intermittent every 8 hours  chlorhexidine 0.12% Liquid 5 milliLiter(s) Swish and Spit every 4 hours  dextrose 50% Injectable 50 milliLiter(s) IV Push every 15 minutes  dextrose 50% Injectable 25 milliLiter(s) IV Push every 15 minutes  docusate sodium 100 milliGRAM(s) Oral three times a day  insulin Infusion 4 Unit(s)/Hr (4 mL/Hr) IV Continuous <Continuous>  metoclopramide Injectable 5 milliGRAM(s) IV Push every 8 hours  niCARdipine Infusion 5 mG/Hr (25 mL/Hr) IV Continuous <Continuous>  pantoprazole    Tablet 40 milliGRAM(s) Oral before breakfast  polyethylene glycol 3350 17 Gram(s) Oral daily  sodium chloride 0.9%. 1000 milliLiter(s) (10 mL/Hr) IV Continuous <Continuous>    MEDICATIONS  (PRN):      Allergies    No Known Allergies    Intolerances        Vital Signs Last 24 Hrs  T(C): 37.4 (09 Aug 2018 08:00), Max: 37.4 (09 Aug 2018 03:00)  T(F): 99.3 (09 Aug 2018 08:00), Max: 99.3 (09 Aug 2018 03:00)  HR: 66 (09 Aug 2018 11:00) (66 - 112)  BP: 95/52 (09 Aug 2018 11:00) (94/50 - 95/52)  BP(mean): 65 (09 Aug 2018 11:00) (65 - 70)  RR: 22 (09 Aug 2018 11:00) (12 - 28)  SpO2: 100% (09 Aug 2018 11:00) (100% - 100%)    I&O's Summary    08 Aug 2018 07:01  -  09 Aug 2018 07:00  --------------------------------------------------------  IN: 2707 mL / OUT: 2315 mL / NET: 392 mL    09 Aug 2018 07:01  -  09 Aug 2018 11:10  --------------------------------------------------------  IN: 34 mL / OUT: 240 mL / NET: -206 mL        ON EXAM:    General: NAD, intubated, lethargic  HEENT: Mucous membranes are moist, anicteric  Lungs: Non-labored, decrease bs at bases; ct remains in  Cardiovascular: Regular, S1 and S2, + faint systolic murmurs at bay/llsb, no rubs, or gallops  Gastrointestinal: Bowel Sounds present, soft, nontender.   Lymph: No peripheral edema. No lymphadenopathy.  Skin: No rashes or ulcers  Psych:  unable to asses    LABS: All Labs Reviewed:                        9.0    13.2  )-----------( 161      ( 09 Aug 2018 01:20 )             26.8                         10.1   12.3  )-----------( 57       ( 08 Aug 2018 21:16 )             30.4                         14.5   7.0   )-----------( 201      ( 08 Aug 2018 05:51 )             43.5     09 Aug 2018 01:19    140    |  105    |  14     ----------------------------<  202    5.1     |  17     |  0.94   08 Aug 2018 21:16    143    |  107    |  14     ----------------------------<  130    4.4     |  20     |  0.92   08 Aug 2018 05:51    136    |  103    |  17     ----------------------------<  214    4.4     |  23     |  1.07     Ca    7.7        09 Aug 2018 01:19  Ca    7.5        08 Aug 2018 21:16  Ca    9.3        08 Aug 2018 05:51  Phos  1.9       08 Aug 2018 21:16  Mg     1.9       08 Aug 2018 21:16    TPro  5.5    /  Alb  4.1    /  TBili  2.3    /  DBili  x      /  AST  110    /  ALT  70     /  AlkPhos  37     09 Aug 2018 01:19  TPro  5.0    /  Alb  3.6    /  TBili  1.0    /  DBili  x      /  AST  40     /  ALT  18     /  AlkPhos  28     08 Aug 2018 21:16    PT/INR - ( 09 Aug 2018 01:20 )   PT: 13.8 sec;   INR: 1.26 ratio         PTT - ( 09 Aug 2018 01:20 )  PTT:28.5 sec  CARDIAC MARKERS ( 09 Aug 2018 01:19 )  x     / x     / 232 U/L / x     / 18.4 ng/mL  CARDIAC MARKERS ( 08 Aug 2018 21:16 )  x     / x     / 155 U/L / x     / 16.8 ng/mL      Blood Culture:

## 2018-08-09 NOTE — AIRWAY REMOVAL NOTE  ADULT & PEDS - ARTIFICAL AIRWAY REMOVAL COMMENTS
Written order for extubation verified. The patient was identified by full name and birth date compared to the identification band. Present during the procedure was NOLBERTO Carey

## 2018-08-09 NOTE — PROGRESS NOTE ADULT - SUBJECTIVE AND OBJECTIVE BOX
CRITICAL CARE ATTENDING - CTICU    MEDICATIONS  (STANDING):  atorvastatin 40 milliGRAM(s) Oral at bedtime  cefuroxime  IVPB 1500 milliGRAM(s) IV Intermittent every 8 hours  chlorhexidine 0.12% Liquid 5 milliLiter(s) Swish and Spit every 4 hours  dextrose 50% Injectable 50 milliLiter(s) IV Push every 15 minutes  dextrose 50% Injectable 25 milliLiter(s) IV Push every 15 minutes  docusate sodium 100 milliGRAM(s) Oral three times a day  insulin Infusion 4 Unit(s)/Hr (4 mL/Hr) IV Continuous <Continuous>  metoclopramide Injectable 5 milliGRAM(s) IV Push every 8 hours  niCARdipine Infusion 5 mG/Hr (25 mL/Hr) IV Continuous <Continuous>  pantoprazole    Tablet 40 milliGRAM(s) Oral before breakfast  polyethylene glycol 3350 17 Gram(s) Oral daily  sodium chloride 0.9%. 1000 milliLiter(s) (10 mL/Hr) IV Continuous <Continuous>                                    7.7    8.0   )-----------( 92       ( 09 Aug 2018 11:24 )             21.7           138  |  103  |  15  ----------------------------<  135<H>  6.0<H>   |  17<L>  |  1.00    Ca    7.8<L>      09 Aug 2018 11:24  Phos  1.9     08-  Mg     1.9     08    TPro  5.6<L>  /  Alb  4.6  /  TBili  1.5<H>  /  DBili  x   /  AST  98<H>  /  ALT  73<H>  /  AlkPhos  27<L>        PT/INR - ( 09 Aug 2018 01:20 )   PT: 13.8 sec;   INR: 1.26 ratio         PTT - ( 09 Aug 2018 01:20 )  PTT:28.5 sec    Mode: CPAP with PS  FiO2: 40  PEEP: 5  PS: 15  MAP: 9  PIP: 21      Daily     Daily Weight in k.9 (09 Aug 2018 03:00)       @ 07:01  -   @ 07:00  --------------------------------------------------------  IN: 2707 mL / OUT: 2315 mL / NET: 392 mL     @ 07:01   @ 13:09  --------------------------------------------------------  IN: 1023 mL / OUT: 505 mL / NET: 518 mL        Critically Ill patient  : [ ] preoperative ,   [ x] post operative    Requires :  [x ] Arterial Line   [x ] Central Line  [ ] PA catheter  [ ] IABP  [ ] ECMO  [ ] LVAD  [ x] Ventilator  [x ] pacemaker [ ] Impella.    Bedside evaluation , monitoring , treatment of hemodynamics , fluids , IVP/ IVCD meds.        Diagnosis:     POD 1 CABG X 2 L    respiratory failure     Requires chest PT, pulmonary toilet, ambu bagging, suctioning to maintain SaO2,  patent airway and treat atelectasis.     Ventilator Management:  [x ]AC-rest    [x ]CPAP-PS Wean    [ ]Trach Collar     [x ]Extubate    [x ] T-Piece  [ ]peep>5     fluid overload     Temporary pacemaker (TPM) interrogation and setting.     Episodes of complete heart block    Hypertension    Hemodynamic lability,instability. Requires IVCD [ ] vasopressors [ ] inotropes  [x ] vasodilator  [x ]IVSS fluid  to maintain MAP, perfusion, C.I.     Metabolic Acidosis - lactic acid    Thrombocytopenia                    Discussed with CT surgeon, Physician's Assistant - Nurse Practitioner- Critical care medicine team.   Dicussed at  AM / PM rounds.   Chart, labs , films reviewed.    Total Time: 60 min.

## 2018-08-09 NOTE — PROGRESS NOTE ADULT - SUBJECTIVE AND OBJECTIVE BOX
AGAPITO REYNOSO  MRN#:  [id number]    The patient is a 62y Female with PMH of HTN and long standing hx of type 2 DM, neuropathy and retinopathy, admitted with angina and diagnosed with multivessel CAD, LVH, mild to moderate MR and carotid disease, now s/p C2L today who was seen, evaluated, & examined with the CTICU staff on admission, overnight and during the early morning hours with a multidisciplinary care plan formulated & implemented.  All available clinical, laboratory, radiographic, pharmacologic, and electrocardiographic data were reviewed & analyzed.      The patient was in the CTICU in critical condition secondary to persistent cardiopulmonary dysfunction, hemodynamically significant anemia/hypovolemia-shock, persistent cardiovascular dysfunction, acidosis, & hyperglycemia.      Respiratory status required full ventilatory support, the following of ABG’s with A-line monitoring, continuous pulse oximetry monitoring, for support & to evaluate for & prevent further decompensation secondary to persistent cardiopulmonary dysfunction.     Invasive hemodynamic monitoring with a central venous and arterial catheter were required for continuous central venous and MAP/BP monitoring to ensure adequate cardiovascular support and to evaluate for & help prevent decompensation while receiving intermittent volume expansion, and an IV Nicardipine drip secondary to hemodynamically significant anemia/hypovolemia-shock, hypertension, acute postoperative blood loss anemia, and lactic acidosis    Metabolic stability, uncontrolled type 2 diabetes-hyperglycemia, & infection prophylaxis required an IV regular Insulin drip & the following of serial glucose levels to help achieve & maintain euglycemia.      Patient required acute critical care management and I provided 35 minutes of non-continuous care to the patient.  Discussed at length with the CTICU staff and helped coordinate care.

## 2018-08-10 DIAGNOSIS — E78.4 OTHER HYPERLIPIDEMIA: ICD-10-CM

## 2018-08-10 DIAGNOSIS — I10 ESSENTIAL (PRIMARY) HYPERTENSION: ICD-10-CM

## 2018-08-10 DIAGNOSIS — E11.65 TYPE 2 DIABETES MELLITUS WITH HYPERGLYCEMIA: ICD-10-CM

## 2018-08-10 DIAGNOSIS — H35.00 UNSPECIFIED BACKGROUND RETINOPATHY: ICD-10-CM

## 2018-08-10 DIAGNOSIS — Z95.1 PRESENCE OF AORTOCORONARY BYPASS GRAFT: ICD-10-CM

## 2018-08-10 LAB
ALBUMIN SERPL ELPH-MCNC: 3.9 G/DL — SIGNIFICANT CHANGE UP (ref 3.3–5)
ALBUMIN SERPL ELPH-MCNC: 4 G/DL — SIGNIFICANT CHANGE UP (ref 3.3–5)
ALBUMIN SERPL ELPH-MCNC: 4.3 G/DL — SIGNIFICANT CHANGE UP (ref 3.3–5)
ALP SERPL-CCNC: 35 U/L — LOW (ref 40–120)
ALP SERPL-CCNC: 41 U/L — SIGNIFICANT CHANGE UP (ref 40–120)
ALP SERPL-CCNC: 52 U/L — SIGNIFICANT CHANGE UP (ref 40–120)
ALT FLD-CCNC: 602 U/L — HIGH (ref 10–45)
ALT FLD-CCNC: 614 U/L — HIGH (ref 10–45)
ALT FLD-CCNC: 662 U/L — HIGH (ref 10–45)
ANION GAP SERPL CALC-SCNC: 12 MMOL/L — SIGNIFICANT CHANGE UP (ref 5–17)
ANION GAP SERPL CALC-SCNC: 14 MMOL/L — SIGNIFICANT CHANGE UP (ref 5–17)
ANION GAP SERPL CALC-SCNC: 17 MMOL/L — SIGNIFICANT CHANGE UP (ref 5–17)
APTT BLD: 33.5 SEC — SIGNIFICANT CHANGE UP (ref 27.5–37.4)
APTT BLD: 39.1 SEC — HIGH (ref 27.5–37.4)
APTT BLD: 42.6 SEC — HIGH (ref 27.5–37.4)
AST SERPL-CCNC: 584 U/L — HIGH (ref 10–40)
AST SERPL-CCNC: 750 U/L — HIGH (ref 10–40)
AST SERPL-CCNC: 900 U/L — HIGH (ref 10–40)
BILIRUB DIRECT SERPL-MCNC: 0.4 MG/DL — HIGH (ref 0–0.2)
BILIRUB INDIRECT FLD-MCNC: 0.5 MG/DL — SIGNIFICANT CHANGE UP (ref 0.2–1)
BILIRUB SERPL-MCNC: 0.9 MG/DL — SIGNIFICANT CHANGE UP (ref 0.2–1.2)
BILIRUB SERPL-MCNC: 1.1 MG/DL — SIGNIFICANT CHANGE UP (ref 0.2–1.2)
BILIRUB SERPL-MCNC: 1.4 MG/DL — HIGH (ref 0.2–1.2)
BUN SERPL-MCNC: 19 MG/DL — SIGNIFICANT CHANGE UP (ref 7–23)
BUN SERPL-MCNC: 22 MG/DL — SIGNIFICANT CHANGE UP (ref 7–23)
BUN SERPL-MCNC: 24 MG/DL — HIGH (ref 7–23)
CALCIUM SERPL-MCNC: 8.2 MG/DL — LOW (ref 8.4–10.5)
CALCIUM SERPL-MCNC: 8.4 MG/DL — SIGNIFICANT CHANGE UP (ref 8.4–10.5)
CALCIUM SERPL-MCNC: 8.5 MG/DL — SIGNIFICANT CHANGE UP (ref 8.4–10.5)
CHLORIDE SERPL-SCNC: 100 MMOL/L — SIGNIFICANT CHANGE UP (ref 96–108)
CHLORIDE SERPL-SCNC: 101 MMOL/L — SIGNIFICANT CHANGE UP (ref 96–108)
CHLORIDE SERPL-SCNC: 98 MMOL/L — SIGNIFICANT CHANGE UP (ref 96–108)
CO2 SERPL-SCNC: 21 MMOL/L — LOW (ref 22–31)
CO2 SERPL-SCNC: 22 MMOL/L — SIGNIFICANT CHANGE UP (ref 22–31)
CO2 SERPL-SCNC: 23 MMOL/L — SIGNIFICANT CHANGE UP (ref 22–31)
CREAT SERPL-MCNC: 1.21 MG/DL — SIGNIFICANT CHANGE UP (ref 0.5–1.3)
CREAT SERPL-MCNC: 1.31 MG/DL — HIGH (ref 0.5–1.3)
CREAT SERPL-MCNC: 1.31 MG/DL — HIGH (ref 0.5–1.3)
GAS PNL BLDA: SIGNIFICANT CHANGE UP
GLUCOSE BLDC GLUCOMTR-MCNC: 103 MG/DL — HIGH (ref 70–99)
GLUCOSE BLDC GLUCOMTR-MCNC: 119 MG/DL — HIGH (ref 70–99)
GLUCOSE BLDC GLUCOMTR-MCNC: 174 MG/DL — HIGH (ref 70–99)
GLUCOSE BLDC GLUCOMTR-MCNC: 190 MG/DL — HIGH (ref 70–99)
GLUCOSE BLDC GLUCOMTR-MCNC: 211 MG/DL — HIGH (ref 70–99)
GLUCOSE BLDC GLUCOMTR-MCNC: 92 MG/DL — SIGNIFICANT CHANGE UP (ref 70–99)
GLUCOSE SERPL-MCNC: 103 MG/DL — HIGH (ref 70–99)
GLUCOSE SERPL-MCNC: 124 MG/DL — HIGH (ref 70–99)
GLUCOSE SERPL-MCNC: 190 MG/DL — HIGH (ref 70–99)
HCT VFR BLD CALC: 27.8 % — LOW (ref 34.5–45)
HCT VFR BLD CALC: 33.8 % — LOW (ref 34.5–45)
HGB BLD-MCNC: 10.7 G/DL — LOW (ref 11.5–15.5)
HGB BLD-MCNC: 9.7 G/DL — LOW (ref 11.5–15.5)
INR BLD: 1.87 RATIO — HIGH (ref 0.88–1.16)
INR BLD: 1.98 RATIO — HIGH (ref 0.88–1.16)
MAGNESIUM SERPL-MCNC: 2.3 MG/DL — SIGNIFICANT CHANGE UP (ref 1.6–2.6)
MCHC RBC-ENTMCNC: 27.2 PG — SIGNIFICANT CHANGE UP (ref 27–34)
MCHC RBC-ENTMCNC: 29.7 PG — SIGNIFICANT CHANGE UP (ref 27–34)
MCHC RBC-ENTMCNC: 31.7 GM/DL — LOW (ref 32–36)
MCHC RBC-ENTMCNC: 34.7 GM/DL — SIGNIFICANT CHANGE UP (ref 32–36)
MCV RBC AUTO: 85.6 FL — SIGNIFICANT CHANGE UP (ref 80–100)
MCV RBC AUTO: 85.8 FL — SIGNIFICANT CHANGE UP (ref 80–100)
PHOSPHATE SERPL-MCNC: 2.4 MG/DL — LOW (ref 2.5–4.5)
PLATELET # BLD AUTO: 62 K/UL — LOW (ref 150–400)
PLATELET # BLD AUTO: 70 K/UL — LOW (ref 150–400)
POTASSIUM SERPL-MCNC: 4.9 MMOL/L — SIGNIFICANT CHANGE UP (ref 3.5–5.3)
POTASSIUM SERPL-MCNC: 4.9 MMOL/L — SIGNIFICANT CHANGE UP (ref 3.5–5.3)
POTASSIUM SERPL-MCNC: 5.2 MMOL/L — SIGNIFICANT CHANGE UP (ref 3.5–5.3)
POTASSIUM SERPL-SCNC: 4.9 MMOL/L — SIGNIFICANT CHANGE UP (ref 3.5–5.3)
POTASSIUM SERPL-SCNC: 4.9 MMOL/L — SIGNIFICANT CHANGE UP (ref 3.5–5.3)
POTASSIUM SERPL-SCNC: 5.2 MMOL/L — SIGNIFICANT CHANGE UP (ref 3.5–5.3)
PROT SERPL-MCNC: 5.6 G/DL — LOW (ref 6–8.3)
PROT SERPL-MCNC: 5.7 G/DL — LOW (ref 6–8.3)
PROT SERPL-MCNC: 5.9 G/DL — LOW (ref 6–8.3)
PROTHROM AB SERPL-ACNC: 20.5 SEC — HIGH (ref 9.8–12.7)
PROTHROM AB SERPL-ACNC: 21.7 SEC — HIGH (ref 9.8–12.7)
RBC # BLD: 3.25 M/UL — LOW (ref 3.8–5.2)
RBC # BLD: 3.94 M/UL — SIGNIFICANT CHANGE UP (ref 3.8–5.2)
RBC # FLD: 13.7 % — SIGNIFICANT CHANGE UP (ref 10.3–14.5)
RBC # FLD: 13.8 % — SIGNIFICANT CHANGE UP (ref 10.3–14.5)
SODIUM SERPL-SCNC: 134 MMOL/L — LOW (ref 135–145)
SODIUM SERPL-SCNC: 135 MMOL/L — SIGNIFICANT CHANGE UP (ref 135–145)
SODIUM SERPL-SCNC: 139 MMOL/L — SIGNIFICANT CHANGE UP (ref 135–145)
WBC # BLD: 10.1 K/UL — SIGNIFICANT CHANGE UP (ref 3.8–10.5)
WBC # BLD: 9.5 K/UL — SIGNIFICANT CHANGE UP (ref 3.8–10.5)
WBC # FLD AUTO: 10.1 K/UL — SIGNIFICANT CHANGE UP (ref 3.8–10.5)
WBC # FLD AUTO: 9.5 K/UL — SIGNIFICANT CHANGE UP (ref 3.8–10.5)

## 2018-08-10 PROCEDURE — 71045 X-RAY EXAM CHEST 1 VIEW: CPT | Mod: 26

## 2018-08-10 PROCEDURE — 99254 IP/OBS CNSLTJ NEW/EST MOD 60: CPT

## 2018-08-10 PROCEDURE — 99291 CRITICAL CARE FIRST HOUR: CPT

## 2018-08-10 PROCEDURE — 93010 ELECTROCARDIOGRAM REPORT: CPT

## 2018-08-10 RX ORDER — DEXTROSE 50 % IN WATER 50 %
15 SYRINGE (ML) INTRAVENOUS ONCE
Qty: 0 | Refills: 0 | Status: DISCONTINUED | OUTPATIENT
Start: 2018-08-10 | End: 2018-08-15

## 2018-08-10 RX ORDER — OXYCODONE HYDROCHLORIDE 5 MG/1
5 TABLET ORAL EVERY 6 HOURS
Qty: 0 | Refills: 0 | Status: DISCONTINUED | OUTPATIENT
Start: 2018-08-10 | End: 2018-08-15

## 2018-08-10 RX ORDER — INSULIN GLARGINE 100 [IU]/ML
10 INJECTION, SOLUTION SUBCUTANEOUS AT BEDTIME
Qty: 0 | Refills: 0 | Status: DISCONTINUED | OUTPATIENT
Start: 2018-08-10 | End: 2018-08-14

## 2018-08-10 RX ORDER — DEXTROSE 50 % IN WATER 50 %
12.5 SYRINGE (ML) INTRAVENOUS ONCE
Qty: 0 | Refills: 0 | Status: DISCONTINUED | OUTPATIENT
Start: 2018-08-10 | End: 2018-08-15

## 2018-08-10 RX ORDER — FAMOTIDINE 10 MG/ML
20 INJECTION INTRAVENOUS DAILY
Qty: 0 | Refills: 0 | Status: DISCONTINUED | OUTPATIENT
Start: 2018-08-10 | End: 2018-08-15

## 2018-08-10 RX ORDER — SODIUM CHLORIDE 9 MG/ML
1000 INJECTION, SOLUTION INTRAVENOUS
Qty: 0 | Refills: 0 | Status: DISCONTINUED | OUTPATIENT
Start: 2018-08-10 | End: 2018-08-15

## 2018-08-10 RX ORDER — INSULIN LISPRO 100/ML
3 VIAL (ML) SUBCUTANEOUS
Qty: 0 | Refills: 0 | Status: DISCONTINUED | OUTPATIENT
Start: 2018-08-10 | End: 2018-08-12

## 2018-08-10 RX ORDER — GLUCAGON INJECTION, SOLUTION 0.5 MG/.1ML
1 INJECTION, SOLUTION SUBCUTANEOUS ONCE
Qty: 0 | Refills: 0 | Status: DISCONTINUED | OUTPATIENT
Start: 2018-08-10 | End: 2018-08-15

## 2018-08-10 RX ORDER — SODIUM CHLORIDE 9 MG/ML
3 INJECTION INTRAMUSCULAR; INTRAVENOUS; SUBCUTANEOUS EVERY 8 HOURS
Qty: 0 | Refills: 0 | Status: DISCONTINUED | OUTPATIENT
Start: 2018-08-10 | End: 2018-08-15

## 2018-08-10 RX ORDER — INSULIN LISPRO 100/ML
VIAL (ML) SUBCUTANEOUS AT BEDTIME
Qty: 0 | Refills: 0 | Status: DISCONTINUED | OUTPATIENT
Start: 2018-08-10 | End: 2018-08-15

## 2018-08-10 RX ORDER — INSULIN LISPRO 100/ML
VIAL (ML) SUBCUTANEOUS
Qty: 0 | Refills: 0 | Status: DISCONTINUED | OUTPATIENT
Start: 2018-08-10 | End: 2018-08-10

## 2018-08-10 RX ORDER — INSULIN LISPRO 100/ML
VIAL (ML) SUBCUTANEOUS
Qty: 0 | Refills: 0 | Status: DISCONTINUED | OUTPATIENT
Start: 2018-08-10 | End: 2018-08-15

## 2018-08-10 RX ORDER — DEXTROSE 50 % IN WATER 50 %
25 SYRINGE (ML) INTRAVENOUS ONCE
Qty: 0 | Refills: 0 | Status: DISCONTINUED | OUTPATIENT
Start: 2018-08-10 | End: 2018-08-15

## 2018-08-10 RX ORDER — ENOXAPARIN SODIUM 100 MG/ML
30 INJECTION SUBCUTANEOUS DAILY
Qty: 0 | Refills: 0 | Status: DISCONTINUED | OUTPATIENT
Start: 2018-08-10 | End: 2018-08-10

## 2018-08-10 RX ORDER — ONDANSETRON 8 MG/1
4 TABLET, FILM COATED ORAL ONCE
Qty: 0 | Refills: 0 | Status: COMPLETED | OUTPATIENT
Start: 2018-08-10 | End: 2018-08-10

## 2018-08-10 RX ADMIN — Medication 100 MILLIGRAM(S): at 21:26

## 2018-08-10 RX ADMIN — Medication 100 MILLIGRAM(S): at 13:16

## 2018-08-10 RX ADMIN — OXYCODONE HYDROCHLORIDE 5 MILLIGRAM(S): 5 TABLET ORAL at 13:16

## 2018-08-10 RX ADMIN — PANTOPRAZOLE SODIUM 40 MILLIGRAM(S): 20 TABLET, DELAYED RELEASE ORAL at 08:14

## 2018-08-10 RX ADMIN — Medication 81 MILLIGRAM(S): at 11:24

## 2018-08-10 RX ADMIN — INSULIN GLARGINE 10 UNIT(S): 100 INJECTION, SOLUTION SUBCUTANEOUS at 22:33

## 2018-08-10 RX ADMIN — SODIUM CHLORIDE 3 MILLILITER(S): 9 INJECTION INTRAMUSCULAR; INTRAVENOUS; SUBCUTANEOUS at 22:30

## 2018-08-10 RX ADMIN — ENOXAPARIN SODIUM 30 MILLIGRAM(S): 100 INJECTION SUBCUTANEOUS at 11:24

## 2018-08-10 RX ADMIN — SODIUM CHLORIDE 3 MILLILITER(S): 9 INJECTION INTRAMUSCULAR; INTRAVENOUS; SUBCUTANEOUS at 13:12

## 2018-08-10 RX ADMIN — Medication 8: at 12:45

## 2018-08-10 RX ADMIN — Medication 62.5 MILLIMOLE(S): at 03:31

## 2018-08-10 RX ADMIN — Medication 25 MILLIGRAM(S): at 21:26

## 2018-08-10 RX ADMIN — ONDANSETRON 4 MILLIGRAM(S): 8 TABLET, FILM COATED ORAL at 17:29

## 2018-08-10 RX ADMIN — Medication 1: at 17:06

## 2018-08-10 RX ADMIN — Medication 100 MILLIGRAM(S): at 08:14

## 2018-08-10 RX ADMIN — Medication 100 MILLIGRAM(S): at 16:16

## 2018-08-10 RX ADMIN — Medication 5 MILLIGRAM(S): at 13:17

## 2018-08-10 RX ADMIN — Medication 25 MILLIGRAM(S): at 08:14

## 2018-08-10 RX ADMIN — Medication 100 MILLIGRAM(S): at 05:34

## 2018-08-10 RX ADMIN — FAMOTIDINE 20 MILLIGRAM(S): 10 INJECTION INTRAVENOUS at 11:24

## 2018-08-10 RX ADMIN — Medication 3 UNIT(S): at 17:05

## 2018-08-10 RX ADMIN — Medication 5 MILLIGRAM(S): at 05:34

## 2018-08-10 NOTE — CONSULT NOTE ADULT - PROBLEM SELECTOR RECOMMENDATION 2
sliding scale
- Goal BP is <130/80, currently at goal  - Recommend continue Amlodipine, monitor for LE edema

## 2018-08-10 NOTE — PROGRESS NOTE ADULT - PROBLEM SELECTOR PLAN 1
Continue asa, beta-blocker.   Plavix for poor targets on hold due to thrombocytopenia, trend platelets - check daily CBC  Hold statin due to elevated LFTs & INR, trend liver enzymes-check daily CMP & coags  Ambulate 4x daily as tolerated and with PT.  Cough and deep breathe, Incentive Spirometry Q1h, Chest PT.   Disposition: Pending PT eval, home when medically cleared

## 2018-08-10 NOTE — PROGRESS NOTE ADULT - ASSESSMENT
63 y/o F with PMH HTN, DM, fam hx CAD who presented with CP found to have multi-vessel disease, s/p CABG on 8/8 with LIMA to LAD and RSVG to RCA      - seems to have tolerated procedure well  - off pressors, and hemodynamically stable  - Junctional rhythm on telemetry seems to have resolved.  She has been started on metoprolol 25 BID and seems to be tolerating this.  - mild-moderate MR noted on intra-op MILES.  - diabetes control  - carotid disease will need intervention as an outpt. Vascular surgery input appreciated.  - there is no evidence for meaningful volume overload. Chest tube management per surgery  - c/w ASA  - statin on hold for transaminitis.  Continue to follow to resolution and resume when  normalized  - I would start amlodipine 5 QD for better BP control  - Follow platelet count.  Check HIT painel  - monitor electrolytes. Keep K>4, Mg >2  - will follow with you.  Critically ill with high risk of decompensation.  Time greater than 35 minutes.

## 2018-08-10 NOTE — CONSULT NOTE ADULT - PROBLEM SELECTOR PROBLEM 1
Coronary artery disease
Controlled type 2 diabetes mellitus with hyperglycemia, without long-term current use of insulin

## 2018-08-10 NOTE — PROGRESS NOTE ADULT - SUBJECTIVE AND OBJECTIVE BOX
Subjective: Pt states " " denies any CP, SOB, N/V and palpitations. No acute events overnight.     Telemetry:    Vital Signs Last 24 Hrs  T(C): 36.9 (08-10-18 @ 12:10), Max: 38.1 (18 @ 16:00)  T(F): 98.4 (08-10-18 @ 12:10), Max: 100.6 (18 @ 16:00)  HR: 77 (08-10-18 @ 12:10) (69 - 107)  BP: 120/62 (08-10-18 @ 12:10) (114/53 - 151/64)  RR: 20 (08-10-18 @ 12:10) (10 - 35)  SpO2: 96% (08-10-18 @ 12:10) (96% - 100%)              @ 07:01  -  08-10 @ 07:00  --------------------------------------------------------  IN: 1934 mL / OUT: 2500 mL / NET: -566 mL    08-10 @ 07:01  -  08-10 @ 14:34  --------------------------------------------------------  IN: 560 mL / OUT: 145 mL / NET: 415 mL        Daily     Daily Weight in k.3 (10 Aug 2018 00:00)    CAPILLARY BLOOD GLUCOSE  119 (10 Aug 2018 08:00)  125 (10 Aug 2018 07:00)  103 (10 Aug 2018 02:00)  100 (10 Aug 2018 01:00)  109 (09 Aug 2018 23:00)  107 (09 Aug 2018 22:00)  105 (09 Aug 2018 21:00)  110 (09 Aug 2018 20:00)  117 (09 Aug 2018 19:00)  116 (09 Aug 2018 18:00)  116 (09 Aug 2018 17:00)  99 (09 Aug 2018 16:00)  118 (09 Aug 2018 15:00)      POCT Blood Glucose.: 211 mg/dL (10 Aug 2018 12:39)  POCT Blood Glucose.: 119 mg/dL (10 Aug 2018 08:10)  POCT Blood Glucose.: 103 mg/dL (10 Aug 2018 04:08)  POCT Blood Glucose.: 92 mg/dL (10 Aug 2018 03:34)  POCT Blood Glucose.: 109 mg/dL (09 Aug 2018 23:12)  POCT Blood Glucose.: 105 mg/dL (09 Aug 2018 21:34)  POCT Blood Glucose.: 110 mg/dL (09 Aug 2018 20:22)  POCT Blood Glucose.: 117 mg/dL (09 Aug 2018 18:59)  POCT Blood Glucose.: 116 mg/dL (09 Aug 2018 17:06)  POCT Blood Glucose.: 118 mg/dL (09 Aug 2018 14:58)          PHYSICAL EXAM    Neurology: A&Ox3, nonfocal, no gross deficits  CV : RRR+S1S2  Sternal Wound: MSI CDI , Stable  Lungs: Respirations non-labored, B/L BS  Abdomen: Soft, NT/ND, +BSx4Q, last BM   (-)N/V/D  : Voiding without difficulty  Extremities: B/L LE edema, negative calf tenderness, +PP , SVG incision           MEDICATIONS  aspirin enteric coated 81 milliGRAM(s) Oral daily  cefuroxime  IVPB 1500 milliGRAM(s) IV Intermittent every 8 hours  docusate sodium 100 milliGRAM(s) Oral three times a day  famotidine    Tablet 20 milliGRAM(s) Oral daily  glucagon  Injectable 1 milliGRAM(s) IntraMuscular once PRN  insulin Infusion 4 Unit(s)/Hr IV Continuous <Continuous>  insulin lispro (HumaLOG) corrective regimen sliding scale   SubCutaneous Before meals and at bedtime  metoprolol tartrate 25 milliGRAM(s) Oral every 12 hours  oxyCODONE    IR 5 milliGRAM(s) Oral every 6 hours PRN  polyethylene glycol 3350 17 Gram(s) Oral daily  sodium chloride 0.9% lock flush 3 milliLiter(s) IV Push every 8 hours  sodium chloride 0.9%. 1000 milliLiter(s) IV Continuous <Continuous>      Physical Therapy Rec:   Home  [  ]   Home w/ PT  [  ]  Rehab  [  ]    Discussed with Cardiothoracic Team at AM rounds. Subjective: Pt states "I feel very weak" denies any CP, SOB, N/V and palpitations. No acute events overnight.     Telemetry:  SR 70 - 90  Vital Signs Last 24 Hrs  T(C): 36.9 (08-10-18 @ 12:10), Max: 38.1 (18 @ 16:00)  T(F): 98.4 (08-10-18 @ 12:10), Max: 100.6 (18 @ 16:00)  HR: 77 (08-10-18 @ 12:10) (69 - 107)  BP: 120/62 (08-10-18 @ 12:10) (114/53 - 151/64)  RR: 20 (08-10-18 @ 12:10) (10 - 35)  SpO2: 96% (08-10-18 @ 12:10) (96% - 100%)             08-10 @ 07:01  -  08-10 @ 14:34  --------------------------------------------------------  IN: 560 mL / OUT: 145 mL / NET: 415 mL    Daily Weight in k.3 (10 Aug 2018 00:00)    CAPILLARY BLOOD GLUCOSE  119  - 125          PHYSICAL EXAM  Neurology: A&Ox3, nonfocal, no gross deficits  CV : RRR+S1S2  Sternal Wound: MSI CDI w/DSD, Stable  +PW - EPM off  Lungs: Respirations non-labored, B/L BS clear, diminished at bases  Abdomen: Soft, NT/ND, +BSx4Q, last BM 8/10 (-)N/V/D  : Voiding without difficulty  Extremities: B/L LE no edema, negative calf tenderness, +PP, RLE SVG incision w/ACE wrap CDI          MEDICATIONS  aspirin enteric coated 81 milliGRAM(s) Oral daily  cefuroxime  IVPB 1500 milliGRAM(s) IV Intermittent every 8 hours  docusate sodium 100 milliGRAM(s) Oral three times a day  famotidine    Tablet 20 milliGRAM(s) Oral daily  glucagon  Injectable 1 milliGRAM(s) IntraMuscular once PRN  insulin Infusion 4 Unit(s)/Hr IV Continuous <Continuous>  insulin lispro (HumaLOG) corrective regimen sliding scale   SubCutaneous Before meals and at bedtime  metoprolol tartrate 25 milliGRAM(s) Oral every 12 hours  oxyCODONE    IR 5 milliGRAM(s) Oral every 6 hours PRN  polyethylene glycol 3350 17 Gram(s) Oral daily  sodium chloride 0.9% lock flush 3 milliLiter(s) IV Push every 8 hours  sodium chloride 0.9%. 1000 milliLiter(s) IV Continuous <Continuous>      Physical Therapy Rec:   Home  [  ]   Home w/ PT  [  ]  Rehab  [  ]    Discussed with Cardiothoracic Team at AM rounds.

## 2018-08-10 NOTE — CONSULT NOTE ADULT - PROBLEM SELECTOR RECOMMENDATION 9
- FS Goal inpatient is 100-180  - HbA1c goal is 6.5% with no hypoglycemia. Patient's current HbA1c is nearly at goal if check prior to the diagnosis of anemia with PRBC transfusion  - Recommend Lantus 10 units QHS  - Recommend Humalog 3 units TID AC and low SSI AC and HS  - Recommend RD consultation when she feels better    Outpatient Plan:  - Likely Metformin 1000 mg BID if renal function is improved  - F/u with Internal Medicine as courtesy visit is provided for her following d/c  - If able[facility] - FS Goal inpatient is 100-180  - HbA1c goal is 6.5% with no hypoglycemia. Patient's current HbA1c is nearly at goal if check prior to the diagnosis of anemia with PRBC transfusion  - Recommend Lantus 10 units QHS  - Recommend Humalog 3 units TID AC and low SSI AC and HS  - Recommend RD consultation when she feels better    Outpatient Plan:  - Likely Metformin 1000 mg BID if eGFR>30-45ml/min  - F/u with Internal Medicine as courtesy visit is provided for her following d/c  - If able, f/u with [facility] End[facility]

## 2018-08-10 NOTE — CONSULT NOTE ADULT - ASSESSMENT
61 y/o female with poorly controlled T2DM (HbA1c 7.2% in the setting of anemia) complicated with proliferative retinopathy, neuropathy, and acute kidney injury. She is also noted to have right internal carotid artery stenosis and hyperlipidemia with acute transaminitis. Patient with severe CAD s/p CABG on 08/08/18, course complicated with post-op anemia requiring several units of PRBC transfusion.

## 2018-08-10 NOTE — CONSULT NOTE ADULT - SUBJECTIVE AND OBJECTIVE BOX
Reason For Consult: T2DM Management    HPI: This is a 63 yo female, from [facility] arrived to [place] on 7/24 here visiting, with PMH of HTN and long standing hx of  type 2 DM ( last A1C unknown,  complicated by neuropathy and retinopathy,  well managed as per patient). She reports as strong family hx of premature atherosclerosis ( both her brothers had MI in their early 40's).  She does not have any known structural heart disease.  She is generally sedentary at baseline due to pain in her feet, likely neuropathic.  She has not had prior S&S  of angina, HR or arrythmia.  She presents to [facility] ED on 8/3 with c/c of several days of " random left sided chest discomfort", with intermittent radiation to her left arm, occasionally associated with dyspnea.  The sxs may be brief, but have lasted up to 30 minutes.  CP is without clear provocation, and without a convincing explanation.  She had several episodes yesterday, and continued to have sxs even yesterday in the ER.  In the ER she was found to have an abnormal ekg, without comparison available, loaded with Aspirin 325mg.  Jyoti x3 negative.  Consultation w/ cards, Dr Presley  obtained, whom despite x3 neg enzymes given FH and symptomatolgy transferred pt to [facility] for cardiac cath to r/o significant CAD.  Post cath, and as per unofficial cath report: mLAD 90%, Cx 100% and  % via RFA.      Hospital Course:  Patient is s/p CABG on 08/08/18. Post-op course was complicated by anemia and patient received several units of PRBC. She was extubated on POD#1 and was weaned off pressor support. Her Transthoracic Echo was done pre-op and EF was 53% and intra-op echo revealed EF of 77%. She is also noted to have transaminitis and statins have been held. Amlodipine was restarted for BP control given uncontrolled HTN. Patient was evaluated by ophthalmologist while inpatient for "bleeding in her eye" and is known to have a h/o proliferative retinopathy s/o VEGF injections in the past. Inpatient recommendations by ophthalmologist is to f/u outpatient soon after hospital discharge for management. Patient is also noted to have Right Internal Carotid Artery Stenosis and was evaluated by Vascular surgery team with recommendations to f/u outpatient.  Currently, doing well.    Endocrine History:      PAST MEDICAL & SURGICAL HISTORY:  Retinopathy  Neuropathy  DM (diabetes mellitus)  HTN (hypertension)  History of cholecystectomy      FAMILY HISTORY:  Coronary artery disease (Sibling): pre mature CAD      Social History:    Outpatient Medications: As per H&P:  Home Medications:   * Patient Currently Takes Medications as of 04-Aug-2018 11:49 documented in Structured Notes  · 	amLODIPine 10 mg oral tablet: Last Dose Taken:  , 1 tab(s) orally once a day  	home & hosp  · 	metFORMIN 500 mg oral tablet: Last Dose Taken:  , 1 tab(s) orally 2 times a day  	home & hosp  · 	Aspirin Enteric Coated 81 mg oral delayed release tablet: Last Dose Taken:  , 1 tab(s) orally once a day  	hospital   · 	atorvastatin 40 mg oral tablet: Last Dose Taken:  , 1 tab(s) orally once a day  	hosp    MEDICATIONS  (STANDING):  aspirin enteric coated 81 milliGRAM(s) Oral daily  cefuroxime  IVPB 1500 milliGRAM(s) IV Intermittent every 8 hours  dextrose 5%. 1000 milliLiter(s) (50 mL/Hr) IV Continuous <Continuous>  dextrose 50% Injectable 12.5 Gram(s) IV Push once  dextrose 50% Injectable 25 Gram(s) IV Push once  dextrose 50% Injectable 25 Gram(s) IV Push once  dextrose 50% Injectable 50 milliLiter(s) IV Push every 15 minutes  dextrose 50% Injectable 25 milliLiter(s) IV Push every 15 minutes  docusate sodium 100 milliGRAM(s) Oral three times a day  famotidine Tablet 20 milliGRAM(s) Oral daily  insulin Infusion 4 Unit(s)/Hr (4 mL/Hr) IV Continuous <Continuous>  insulin lispro (HumaLOG) corrective regimen sliding scale   SubCutaneous Before meals and at bedtime  metoprolol tartrate 25 milliGRAM(s) Oral every 12 hours  polyethylene glycol 3350 17 Gram(s) Oral daily  sodium chloride 0.9% lock flush 3 milliLiter(s) IV Push every 8 hours  sodium chloride 0.9%. 1000 milliLiter(s) (10 mL/Hr) IV Continuous <Continuous>    MEDICATIONS  (PRN):  dextrose 40% Gel 15 Gram(s) Oral once PRN Blood Glucose LESS THAN 70 milliGRAM(s)/deciliter  glucagon  Injectable 1 milliGRAM(s) IntraMuscular once PRN Glucose LESS THAN 70 milligrams/deciliter  oxyCODONE    IR 5 milliGRAM(s) Oral every 6 hours PRN Moderate Pain (4 - 6)      Allergies  No Known Allergies      Review of Systems:  Constitutional: No fever  Eyes: No blurry vision  Neuro: No tremors  HEENT: No pain  Cardiovascular: No chest pain, palpitations  Respiratory: No SOB, no cough  GI: No nausea, vomiting, abdominal pain  : No dysuria  Skin: no rash  Psych: no depression  Endocrine: no polyuria, polydipsia  Hem/lymph: no swelling  Osteoporosis: no fractures    ALL OTHER SYSTEMS REVIEWED AND NEGATIVE    PHYSICAL EXAM:  VITALS: T(C): 36.9 (08-10-18 @ 12:10)  T(F): 98.4 (08-10-18 @ 12:10), Max: 100.6 (08-09-18 @ 16:00)  HR: 77 (08-10-18 @ 12:10) (69 - 107)  BP: 120/62 (08-10-18 @ 12:10) (114/53 - 151/64)  RR:  (10 - 35)  SpO2:  (96% - 100%)  Wt(kg): 54 kg    GENERAL: NAD, well-groomed, well-developed  EYES: No proptosis, no lid lag, anicteric  HEENT:  Atraumatic, Normocephalic, moist mucous membranes  THYROID: Normal size, no palpable nodules  RESPIRATORY: Clear to auscultation bilaterally; No rales, rhonchi, wheezing, or rubs  CARDIOVASCULAR: Regular rate and rhythm; No murmurs; no peripheral edema  GI: Soft, nontender, non distended, normal bowel sounds  SKIN: Dry, intact, No rashes or lesions  MUSCULOSKELETAL: Full range of motion, normal strength  NEURO: sensation intact, extraocular movements intact, no tremor, normal reflexes  PSYCH: Alert and oriented x 3, normal affect, normal mood  CUSHING'S SIGNS: no striae    POCT Blood Glucose.: 211 mg/dL (08-10-18 @ 12:39)  POCT Blood Glucose.: 119 mg/dL (08-10-18 @ 08:10)  POCT Blood Glucose.: 103 mg/dL (08-10-18 @ 04:08)  POCT Blood Glucose.: 92 mg/dL (08-10-18 @ 03:34)    POCT Blood Glucose.: 109 mg/dL (08-09-18 @ 23:12)  POCT Blood Glucose.: 105 mg/dL (08-09-18 @ 21:34)  POCT Blood Glucose.: 110 mg/dL (08-09-18 @ 20:22)  POCT Blood Glucose.: 117 mg/dL (08-09-18 @ 18:59)  POCT Blood Glucose.: 116 mg/dL (08-09-18 @ 17:06)  POCT Blood Glucose.: 118 mg/dL (08-09-18 @ 14:58)  POCT Blood Glucose.: 150 mg/dL (08-09-18 @ 10:11)  POCT Blood Glucose.: 107 mg/dL (08-09-18 @ 08:35)  POCT Blood Glucose.: 94 mg/dL (08-09-18 @ 08:00)  POCT Blood Glucose.: 193 mg/dL (08-09-18 @ 01:48)  POCT Blood Glucose.: 196 mg/dL (08-09-18 @ 01:05)    POCT Blood Glucose.: 267 mg/dL (08-08-18 @ 22:57)  POCT Blood Glucose.: 215 mg/dL (08-08-18 @ 13:00)  POCT Blood Glucose.: 214 mg/dL (08-08-18 @ 11:27)  POCT Blood Glucose.: 197 mg/dL (08-08-18 @ 09:25)  POCT Blood Glucose.: 200 mg/dL (08-08-18 @ 07:28)    POCT Blood Glucose.: 106 mg/dL (08-07-18 @ 21:47)  POCT Blood Glucose.: 332 mg/dL (08-07-18 @ 16:21)                            9.7    9.5   )-----------( 62       ( 10 Aug 2018 01:01 )             27.8       08-10    139  |  101  |  22  ----------------------------<  124<H>  4.9   |  21<L>  |  1.31<H>    EGFR if : 50<L>  EGFR if non : 44<L>    Ca    8.4      08-10  Mg     2.3     08-10  Phos  2.4     08-10    TPro  5.7<L>  /  Alb  4.0  /  TBili  1.1  /  DBili  x   /  AST  750<H>  /  ALT  602<H>  /  AlkPhos  41  08-10      Thyroid Function Tests:  08-04 @ 14:18   TSH 4.04   FreeT4 --   T3 81     Hemoglobin A1C, Whole Blood: 7.2 % <H> [4.0 - 5.6] (08-04-18 @ 14:18)      08-04 Chol 179  HDL 35<L> Trig 221<H> Reason For Consult: T2DM Management    HPI: This is a 61 yo female, from [facility] arrived to [place] on 7/24 here visiting, with PMH of HTN and long standing hx of  type 2 DM ( last A1C unknown,  complicated by neuropathy and retinopathy,  well managed as per patient). She reports as strong family hx of premature atherosclerosis ( both her brothers had MI in their early 40's).  She does not have any known structural heart disease.  She is generally sedentary at baseline due to pain in her feet, likely neuropathic.  She has not had prior S&S  of angina, HR or arrythmia.  She presents to [facility] ED on 8/3 with c/c of several days of " random left sided chest discomfort", with intermittent radiation to her left arm, occasionally associated with dyspnea.  The sxs may be brief, but have lasted up to 30 minutes.  CP is without clear provocation, and without a convincing explanation.  She had several episodes yesterday, and continued to have sxs even yesterday in the ER.  In the ER she was found to have an abnormal ekg, without comparison available, loaded with Aspirin 325mg.  Jyoti x3 negative.  Consultation w/ cards, Dr Presley  obtained, whom despite x3 neg enzymes given FH and symptomatolgy transferred pt to [facility] for cardiac cath to r/o significant CAD.  Post cath, and as per unofficial cath report: mLAD 90%, Cx 100% and  % via RFA.      Hospital Course:  Patient is s/p CABG on 08/08/18. Post-op course was complicated by anemia and patient received several units of PRBC. She was extubated on POD#1 and was weaned off pressor support. Her Transthoracic Echo was done pre-op and EF was 53% and intra-op echo revealed EF of 77%. She is also noted to have transaminitis and statins have been held. Amlodipine was restarted for BP control given uncontrolled HTN. Patient was evaluated by ophthalmologist while inpatient for "bleeding in her eye" and is known to have a h/o proliferative retinopathy s/o VEGF injections in the past. Inpatient recommendations by ophthalmologist is to f/u outpatient soon after hospital discharge for management. Patient is also noted to have Right Internal Carotid Artery Stenosis and was evaluated by Vascular surgery team with recommendations to f/u outpatient.  Currently, doing well but admits to a poor appetite. Patient was already transferred to the floor but spoke with CTICU nurse who said, insulin gtt was stopped overnight and AM FS was 119, she was unaware of insulin gtt rate.     Endocrine History:  T2DM diagnosed 25 years ago complicated with bilateral proliferative retinopathy s/p laser surgery, bilateral cataract surgery, bilateral foot ulcers that healed, neuropathy with newly diagnosed CAD s/p CABG POD#2, and PAD with acute Right Internal Carotid artery stenosis. Patient admits to taking Metformin at home, possibly 500 mg BID and if BG is 150-200, she will take half tablet BID and if FS>200, she will take 1 tablet BID. She also admits to taking Dional (alpha glucosidase inhibitor) 1 tablet daily and complaints of flatulence. She is a vegetarian and avoid dairy as well. She checks FS BID.   Breakfast is usually fruits if FS  in AM. Occ FS is 75 in AM. If FS>100, she eats biscuit, fried beans  For lunch, she eats rice with vegetables, green plantains 3-4 slices, or sweet potato  For dinner, she eats rice and thomas  Occ, she will have bedtime snack which could be biscuits and tea.    She has a strong family history of T2DM in 6 siblings and mother.    PAST MEDICAL & SURGICAL HISTORY:  Retinopathy  Neuropathy  DM (diabetes mellitus)  HTN (hypertension)  History of cholecystectomy      FAMILY HISTORY:  Coronary artery disease (Sibling): pre mature CAD  Strong family history of T2DM in 6 siblings and her mother    Social History: No tobacco, alcohol, illicit drug use. She plans to return to [facility] after November.    Outpatient Medications: As per H&P:  Home Medications:   * Patient Currently Takes Medications as of 04-Aug-2018 11:49 documented in Structured Notes  · 	amLODIPine 10 mg oral tablet: Last Dose Taken:  , 1 tab(s) orally once a day  	home & hosp  · 	metFORMIN 500 mg oral tablet: Last Dose Taken:  , 1 tab(s) orally 2 times a day  	home & hosp  · 	Aspirin Enteric Coated 81 mg oral delayed release tablet: Last Dose Taken:  , 1 tab(s) orally once a day  	hospital   · 	atorvastatin 40 mg oral tablet: Last Dose Taken:  , 1 tab(s) orally once a day  	hosp    MEDICATIONS  (STANDING):  aspirin enteric coated 81 milliGRAM(s) Oral daily  cefuroxime  IVPB 1500 milliGRAM(s) IV Intermittent every 8 hours  dextrose 5%. 1000 milliLiter(s) (50 mL/Hr) IV Continuous <Continuous>  dextrose 50% Injectable 12.5 Gram(s) IV Push once  dextrose 50% Injectable 25 Gram(s) IV Push once  dextrose 50% Injectable 25 Gram(s) IV Push once  dextrose 50% Injectable 50 milliLiter(s) IV Push every 15 minutes  dextrose 50% Injectable 25 milliLiter(s) IV Push every 15 minutes  docusate sodium 100 milliGRAM(s) Oral three times a day  famotidine Tablet 20 milliGRAM(s) Oral daily  insulin Infusion 4 Unit(s)/Hr (4 mL/Hr) IV Continuous <Continuous>  insulin lispro (HumaLOG) corrective regimen sliding scale   SubCutaneous Before meals and at bedtime  metoprolol tartrate 25 milliGRAM(s) Oral every 12 hours  polyethylene glycol 3350 17 Gram(s) Oral daily  sodium chloride 0.9% lock flush 3 milliLiter(s) IV Push every 8 hours  sodium chloride 0.9%. 1000 milliLiter(s) (10 mL/Hr) IV Continuous <Continuous>    MEDICATIONS  (PRN):  dextrose 40% Gel 15 Gram(s) Oral once PRN Blood Glucose LESS THAN 70 milliGRAM(s)/deciliter  glucagon  Injectable 1 milliGRAM(s) IntraMuscular once PRN Glucose LESS THAN 70 milligrams/deciliter  oxyCODONE    IR 5 milliGRAM(s) Oral every 6 hours PRN Moderate Pain (4 - 6)      Allergies  No Known Allergies      Review of Systems:  Constitutional: No fever, + poor appetite  Eyes: No blurry vision  Neuro: No tremors  HEENT: No pain  Cardiovascular: No chest pain, palpitations  Respiratory: + SOB, no cough  GI: No nausea, vomiting, abdominal pain  : No dysuria  Skin: no rash  Psych: no depression  Endocrine: no polyuria, polydipsia  Hem/lymph: no swelling  Osteoporosis: no fractures    ALL OTHER SYSTEMS REVIEWED AND NEGATIVE    PHYSICAL EXAM:  VITALS: T(C): 36.9 (08-10-18 @ 12:10)  T(F): 98.4 (08-10-18 @ 12:10), Max: 100.6 (08-09-18 @ 16:00)  HR: 77 (08-10-18 @ 12:10) (69 - 107)  BP: 120/62 (08-10-18 @ 12:10) (114/53 - 151/64)  RR:  (10 - 35)  SpO2:  (96% - 100%)  Wt(kg): 54 kg    GENERAL: NAD, well-groomed, well-developed, thin female  EYES: No proptosis, no lid lag, anicteric  HEENT:  Atraumatic, Normocephalic, moist mucous membranes. Noted to have bandage on R-neck at site of IJ catheter.  THYROID: Normal size, no palpable nodules  RESPIRATORY: Clear to auscultation bilaterally; No rales, rhonchi, wheezing, or rubs  CARDIOVASCULAR: Regular rate and rhythm; No murmurs; no peripheral edema, noted to have bandage on thoracotomy scar  Vascular: Left leg in ACE wrap, +1/2 DP and PT pulses in b/l LE  GI: Soft, nontender, non distended, normal bowel sounds  SKIN: Dry, intact, No rashes or lesions. No AN  MUSCULOSKELETAL: Full range of motion, limited strength  NEURO: sensation intact, extraocular movements intact, no tremor  PSYCH: Alert and oriented x 3, normal affect, normal mood  CUSHING'S SIGNS: no striae    POCT Blood Glucose.: 211 mg/dL (08-10-18 @ 12:39)  POCT Blood Glucose.: 119 mg/dL (08-10-18 @ 08:10)  POCT Blood Glucose.: 103 mg/dL (08-10-18 @ 04:08)  POCT Blood Glucose.: 92 mg/dL (08-10-18 @ 03:34)    POCT Blood Glucose.: 109 mg/dL (08-09-18 @ 23:12)  POCT Blood Glucose.: 105 mg/dL (08-09-18 @ 21:34)  POCT Blood Glucose.: 110 mg/dL (08-09-18 @ 20:22)  POCT Blood Glucose.: 117 mg/dL (08-09-18 @ 18:59)  POCT Blood Glucose.: 116 mg/dL (08-09-18 @ 17:06)  POCT Blood Glucose.: 118 mg/dL (08-09-18 @ 14:58)  POCT Blood Glucose.: 150 mg/dL (08-09-18 @ 10:11)  POCT Blood Glucose.: 107 mg/dL (08-09-18 @ 08:35)  POCT Blood Glucose.: 94 mg/dL (08-09-18 @ 08:00)  POCT Blood Glucose.: 193 mg/dL (08-09-18 @ 01:48)  POCT Blood Glucose.: 196 mg/dL (08-09-18 @ 01:05)    POCT Blood Glucose.: 267 mg/dL (08-08-18 @ 22:57)  POCT Blood Glucose.: 215 mg/dL (08-08-18 @ 13:00)  POCT Blood Glucose.: 214 mg/dL (08-08-18 @ 11:27)  POCT Blood Glucose.: 197 mg/dL (08-08-18 @ 09:25)  POCT Blood Glucose.: 200 mg/dL (08-08-18 @ 07:28)    POCT Blood Glucose.: 106 mg/dL (08-07-18 @ 21:47)  POCT Blood Glucose.: 332 mg/dL (08-07-18 @ 16:21)                            9.7    9.5   )-----------( 62       ( 10 Aug 2018 01:01 )             27.8       08-10    139  |  101  |  22  ----------------------------<  124<H>  4.9   |  21<L>  |  1.31<H>    EGFR if : 50<L>  EGFR if non : 44<L>    Ca    8.4      08-10  Mg     2.3     08-10  Phos  2.4     08-10    TPro  5.7<L>  /  Alb  4.0  /  TBili  1.1  /  DBili  x   /  AST  750<H>  /  ALT  602<H>  /  AlkPhos  41  08-10      Thyroid Function Tests:  08-04 @ 14:18   TSH 4.04   FreeT4 --   T3 81     Hemoglobin A1C, Whole Blood: 7.2 % <H> [4.0 - 5.6] (08-04-18 @ 14:18)      08-04 Chol 179  HDL 35<L> Trig 221<H>

## 2018-08-10 NOTE — PROGRESS NOTE ADULT - SUBJECTIVE AND OBJECTIVE BOX
[facility] - Keith Torrez, James, Fernandez, Nohelia, Loraine Riley  Office Number:  [phone]    Patient resting comfortably in chair in NAD.  Laying flat with no respiratory distress.  No complaints of chest pain, dyspnea, palpitations, PND, or orthopnea.    F/U for:  CAD, CABG, HTN    Telemetry:  Now in NSR and SB overnight    MEDICATIONS  (STANDING):  aspirin enteric coated 81 milliGRAM(s) Oral daily  cefuroxime  IVPB 1500 milliGRAM(s) IV Intermittent every 8 hours  dextrose 5%. 1000 milliLiter(s) (50 mL/Hr) IV Continuous <Continuous>  dextrose 50% Injectable 12.5 Gram(s) IV Push once  dextrose 50% Injectable 25 Gram(s) IV Push once  dextrose 50% Injectable 25 Gram(s) IV Push once  dextrose 50% Injectable 50 milliLiter(s) IV Push every 15 minutes  dextrose 50% Injectable 25 milliLiter(s) IV Push every 15 minutes  docusate sodium 100 milliGRAM(s) Oral three times a day  enoxaparin Injectable 30 milliGRAM(s) SubCutaneous daily  famotidine    Tablet 20 milliGRAM(s) Oral daily  insulin Infusion 4 Unit(s)/Hr (4 mL/Hr) IV Continuous <Continuous>  insulin lispro (HumaLOG) corrective regimen sliding scale   SubCutaneous Before meals and at bedtime  metoclopramide Injectable 5 milliGRAM(s) IV Push every 8 hours  metoprolol tartrate 25 milliGRAM(s) Oral every 12 hours  polyethylene glycol 3350 17 Gram(s) Oral daily  sodium chloride 0.9%. 1000 milliLiter(s) (10 mL/Hr) IV Continuous <Continuous>    MEDICATIONS  (PRN):  dextrose 40% Gel 15 Gram(s) Oral once PRN Blood Glucose LESS THAN 70 milliGRAM(s)/deciliter  glucagon  Injectable 1 milliGRAM(s) IntraMuscular once PRN Glucose LESS THAN 70 milligrams/deciliter      Allergies    No Known Allergies    Intolerances        Vital Signs Last 24 Hrs  T(C): 37 (10 Aug 2018 08:00), Max: 38.1 (09 Aug 2018 16:00)  T(F): 98.6 (10 Aug 2018 08:00), Max: 100.6 (09 Aug 2018 16:00)  HR: 79 (10 Aug 2018 08:00) (62 - 107)  BP: 151/64 (10 Aug 2018 08:00) (93/51 - 151/64)  BP(mean): 92 (10 Aug 2018 08:00) (65 - 94)  RR: 31 (10 Aug 2018 08:00) (10 - 35)  SpO2: 98% (10 Aug 2018 08:00) (96% - 100%)    I&O's Summary    09 Aug 2018 07:01  -  10 Aug 2018 07:00  --------------------------------------------------------  IN: 1934 mL / OUT: 2500 mL / NET: -566 mL    10 Aug 2018 07:01  -  10 Aug 2018 08:21  --------------------------------------------------------  IN: 60 mL / OUT: 45 mL / NET: 15 mL        ON EXAM:    General: NAD, awake and alert, oriented x 3  HEENT: Mucous membranes are moist, anicteric  Lungs: Non-labored, breath sounds are clear bilaterally, No wheezing, rales or rhonchi  Cardiovascular: Regular, S1 and S2, no murmurs, rubs, or gallops  Gastrointestinal: Bowel Sounds present, soft, nontender.   Lymph: No peripheral edema. No lymphadenopathy.  Skin: No rashes or ulcers  Psych:  Mood & affect appropriate    LABS: All Labs Reviewed:                        9.7    9.5   )-----------( 62       ( 10 Aug 2018 01:01 )             27.8                         7.7    8.0   )-----------( 92       ( 09 Aug 2018 11:24 )             21.7                         9.0    13.2  )-----------( 161      ( 09 Aug 2018 01:20 )             26.8     10 Aug 2018 05:56    139    |  101    |  22     ----------------------------<  124    4.9     |  21     |  1.31   10 Aug 2018 01:01    134    |  100    |  19     ----------------------------<  103    5.2     |  22     |  1.21   09 Aug 2018 11:24    138    |  103    |  15     ----------------------------<  135    6.0     |  17     |  1.00     Ca    8.4        10 Aug 2018 05:56  Ca    8.2        10 Aug 2018 01:01  Ca    7.8        09 Aug 2018 11:24  Phos  2.4       10 Aug 2018 01:01  Phos  1.9       08 Aug 2018 21:16  Mg     2.3       10 Aug 2018 01:01  Mg     1.9       08 Aug 2018 21:16    TPro  5.7    /  Alb  4.0    /  TBili  1.1    /  DBili  x      /  AST  750    /  ALT  602    /  AlkPhos  41     10 Aug 2018 05:56  TPro  5.6    /  Alb  4.3    /  TBili  1.4    /  DBili  x      /  AST  900    /  ALT  614    /  AlkPhos  35     10 Aug 2018 01:01  TPro  5.6    /  Alb  4.6    /  TBili  1.5    /  DBili  x      /  AST  98     /  ALT  73     /  AlkPhos  27     09 Aug 2018 11:24    PT/INR - ( 10 Aug 2018 01:01 )   PT: 21.7 sec;   INR: 1.98 ratio         PTT - ( 10 Aug 2018 01:01 )  PTT:42.6 sec  CARDIAC MARKERS ( 09 Aug 2018 01:19 )  x     / x     / 232 U/L / x     / 18.4 ng/mL  CARDIAC MARKERS ( 08 Aug 2018 21:16 )  x     / x     / 155 U/L / x     / 16.8 ng/mL      Blood Culture:

## 2018-08-11 LAB
ALBUMIN SERPL ELPH-MCNC: 3.7 G/DL — SIGNIFICANT CHANGE UP (ref 3.3–5)
ALBUMIN SERPL ELPH-MCNC: 4 G/DL — SIGNIFICANT CHANGE UP (ref 3.3–5)
ALP SERPL-CCNC: 59 U/L — SIGNIFICANT CHANGE UP (ref 40–120)
ALP SERPL-CCNC: 78 U/L — SIGNIFICANT CHANGE UP (ref 40–120)
ALT FLD-CCNC: 1376 U/L — HIGH (ref 10–45)
ALT FLD-CCNC: 1415 U/L — HIGH (ref 10–45)
AMYLASE P1 CFR SERPL: 122 U/L — SIGNIFICANT CHANGE UP (ref 25–125)
ANION GAP SERPL CALC-SCNC: 19 MMOL/L — HIGH (ref 5–17)
AST SERPL-CCNC: 1399 U/L — HIGH (ref 10–40)
AST SERPL-CCNC: 950 U/L — HIGH (ref 10–40)
BILIRUB DIRECT SERPL-MCNC: 0.3 MG/DL — HIGH (ref 0–0.2)
BILIRUB INDIRECT FLD-MCNC: 0.5 MG/DL — SIGNIFICANT CHANGE UP (ref 0.2–1)
BILIRUB SERPL-MCNC: 0.8 MG/DL — SIGNIFICANT CHANGE UP (ref 0.2–1.2)
BILIRUB SERPL-MCNC: 0.9 MG/DL — SIGNIFICANT CHANGE UP (ref 0.2–1.2)
BUN SERPL-MCNC: 20 MG/DL — SIGNIFICANT CHANGE UP (ref 7–23)
CALCIUM SERPL-MCNC: 8.9 MG/DL — SIGNIFICANT CHANGE UP (ref 8.4–10.5)
CHLORIDE SERPL-SCNC: 101 MMOL/L — SIGNIFICANT CHANGE UP (ref 96–108)
CO2 SERPL-SCNC: 22 MMOL/L — SIGNIFICANT CHANGE UP (ref 22–31)
CREAT SERPL-MCNC: 1.1 MG/DL — SIGNIFICANT CHANGE UP (ref 0.5–1.3)
GLUCOSE BLDC GLUCOMTR-MCNC: 168 MG/DL — HIGH (ref 70–99)
GLUCOSE BLDC GLUCOMTR-MCNC: 202 MG/DL — HIGH (ref 70–99)
GLUCOSE BLDC GLUCOMTR-MCNC: 209 MG/DL — HIGH (ref 70–99)
GLUCOSE BLDC GLUCOMTR-MCNC: 242 MG/DL — HIGH (ref 70–99)
GLUCOSE BLDC GLUCOMTR-MCNC: 298 MG/DL — HIGH (ref 70–99)
GLUCOSE BLDC GLUCOMTR-MCNC: 438 MG/DL — HIGH (ref 70–99)
GLUCOSE SERPL-MCNC: 184 MG/DL — HIGH (ref 70–99)
HCT VFR BLD CALC: 32.7 % — LOW (ref 34.5–45)
HGB BLD-MCNC: 10.1 G/DL — LOW (ref 11.5–15.5)
LIDOCAIN IGE QN: 29 U/L — SIGNIFICANT CHANGE UP (ref 7–60)
MCHC RBC-ENTMCNC: 27 PG — SIGNIFICANT CHANGE UP (ref 27–34)
MCHC RBC-ENTMCNC: 31 GM/DL — LOW (ref 32–36)
MCV RBC AUTO: 87.2 FL — SIGNIFICANT CHANGE UP (ref 80–100)
PLATELET # BLD AUTO: 69 K/UL — LOW (ref 150–400)
POTASSIUM SERPL-MCNC: 4.5 MMOL/L — SIGNIFICANT CHANGE UP (ref 3.5–5.3)
POTASSIUM SERPL-SCNC: 4.5 MMOL/L — SIGNIFICANT CHANGE UP (ref 3.5–5.3)
PROT SERPL-MCNC: 6 G/DL — SIGNIFICANT CHANGE UP (ref 6–8.3)
PROT SERPL-MCNC: 6.2 G/DL — SIGNIFICANT CHANGE UP (ref 6–8.3)
RBC # BLD: 3.76 M/UL — LOW (ref 3.8–5.2)
RBC # FLD: 14.3 % — SIGNIFICANT CHANGE UP (ref 10.3–14.5)
SODIUM SERPL-SCNC: 133 MMOL/L — LOW (ref 135–145)
WBC # BLD: 9 K/UL — SIGNIFICANT CHANGE UP (ref 3.8–10.5)
WBC # FLD AUTO: 9 K/UL — SIGNIFICANT CHANGE UP (ref 3.8–10.5)

## 2018-08-11 PROCEDURE — 99233 SBSQ HOSP IP/OBS HIGH 50: CPT

## 2018-08-11 PROCEDURE — 71045 X-RAY EXAM CHEST 1 VIEW: CPT | Mod: 26

## 2018-08-11 PROCEDURE — 93976 VASCULAR STUDY: CPT | Mod: 26

## 2018-08-11 PROCEDURE — 93306 TTE W/DOPPLER COMPLETE: CPT | Mod: 26

## 2018-08-11 PROCEDURE — 76700 US EXAM ABDOM COMPLETE: CPT | Mod: 26,59

## 2018-08-11 RX ADMIN — Medication 100 MILLIGRAM(S): at 06:04

## 2018-08-11 RX ADMIN — POLYETHYLENE GLYCOL 3350 17 GRAM(S): 17 POWDER, FOR SOLUTION ORAL at 12:16

## 2018-08-11 RX ADMIN — FAMOTIDINE 20 MILLIGRAM(S): 10 INJECTION INTRAVENOUS at 12:16

## 2018-08-11 RX ADMIN — Medication 3 UNIT(S): at 12:16

## 2018-08-11 RX ADMIN — Medication 3 UNIT(S): at 08:03

## 2018-08-11 RX ADMIN — Medication 100 MILLIGRAM(S): at 22:27

## 2018-08-11 RX ADMIN — Medication 2: at 12:16

## 2018-08-11 RX ADMIN — Medication 25 MILLIGRAM(S): at 22:26

## 2018-08-11 RX ADMIN — SODIUM CHLORIDE 3 MILLILITER(S): 9 INJECTION INTRAMUSCULAR; INTRAVENOUS; SUBCUTANEOUS at 14:32

## 2018-08-11 RX ADMIN — Medication 3 UNIT(S): at 19:10

## 2018-08-11 RX ADMIN — SODIUM CHLORIDE 3 MILLILITER(S): 9 INJECTION INTRAMUSCULAR; INTRAVENOUS; SUBCUTANEOUS at 22:27

## 2018-08-11 RX ADMIN — INSULIN GLARGINE 10 UNIT(S): 100 INJECTION, SOLUTION SUBCUTANEOUS at 23:17

## 2018-08-11 RX ADMIN — Medication 25 MILLIGRAM(S): at 09:56

## 2018-08-11 RX ADMIN — Medication 3: at 17:24

## 2018-08-11 RX ADMIN — SODIUM CHLORIDE 3 MILLILITER(S): 9 INJECTION INTRAMUSCULAR; INTRAVENOUS; SUBCUTANEOUS at 06:05

## 2018-08-11 RX ADMIN — Medication 1: at 08:03

## 2018-08-11 RX ADMIN — Medication 81 MILLIGRAM(S): at 12:16

## 2018-08-11 NOTE — PROGRESS NOTE ADULT - SUBJECTIVE AND OBJECTIVE BOX
[facility] - Keith Torrez, James, Fernandez, Noheila, Loraine Riley  Office Number:  [phone]    Patient resting comfortably in bed in NAD.  Laying flat with no respiratory distress.  No complaints of chest pain, dyspnea, palpitations, PND, or orthopnea.    F/U for:  CAD    Telemetry:  Sinus rhythm    MEDICATIONS  (STANDING):  aspirin enteric coated 81 milliGRAM(s) Oral daily  dextrose 5%. 1000 milliLiter(s) (50 mL/Hr) IV Continuous <Continuous>  dextrose 50% Injectable 12.5 Gram(s) IV Push once  dextrose 50% Injectable 25 Gram(s) IV Push once  dextrose 50% Injectable 25 Gram(s) IV Push once  dextrose 50% Injectable 50 milliLiter(s) IV Push every 15 minutes  dextrose 50% Injectable 25 milliLiter(s) IV Push every 15 minutes  docusate sodium 100 milliGRAM(s) Oral three times a day  famotidine    Tablet 20 milliGRAM(s) Oral daily  insulin glargine Injectable (LANTUS) 10 Unit(s) SubCutaneous at bedtime  insulin Infusion 4 Unit(s)/Hr (4 mL/Hr) IV Continuous <Continuous>  insulin lispro (HumaLOG) corrective regimen sliding scale   SubCutaneous three times a day before meals  insulin lispro (HumaLOG) corrective regimen sliding scale   SubCutaneous at bedtime  insulin lispro Injectable (HumaLOG) 3 Unit(s) SubCutaneous three times a day before meals  metoprolol tartrate 25 milliGRAM(s) Oral every 12 hours  polyethylene glycol 3350 17 Gram(s) Oral daily  sodium chloride 0.9% lock flush 3 milliLiter(s) IV Push every 8 hours  sodium chloride 0.9%. 1000 milliLiter(s) (10 mL/Hr) IV Continuous <Continuous>    MEDICATIONS  (PRN):  dextrose 40% Gel 15 Gram(s) Oral once PRN Blood Glucose LESS THAN 70 milliGRAM(s)/deciliter  glucagon  Injectable 1 milliGRAM(s) IntraMuscular once PRN Glucose LESS THAN 70 milligrams/deciliter  oxyCODONE    IR 5 milliGRAM(s) Oral every 6 hours PRN Moderate Pain (4 - 6)      Allergies    No Known Allergies          Vital Signs Last 24 Hrs  T(C): 37.2 (11 Aug 2018 05:41), Max: 37.2 (10 Aug 2018 20:20)  T(F): 99 (11 Aug 2018 05:41), Max: 99 (10 Aug 2018 20:20)  HR: 84 (11 Aug 2018 05:41) (73 - 84)  BP: 124/76 (11 Aug 2018 05:41) (114/53 - 132/68)  BP(mean): 89 (10 Aug 2018 11:00) (77 - 89)  RR: 18 (11 Aug 2018 05:41) (18 - 27)  SpO2: 96% (11 Aug 2018 05:41) (94% - 98%)    I&O's Summary    10 Aug 2018 07:01  -  11 Aug 2018 07:00  --------------------------------------------------------  IN: 730 mL / OUT: 695 mL / NET: 35 mL        ON EXAM:    General: NAD, awake and alert, oriented x 3  HEENT: Mucous membranes are moist, anicteric  Lungs: Non-labored, breath sounds are clear bilaterally, No wheezing, rales or rhonchi  Cardiovascular: Regular, S1 and S2, 1/6 systolic murmur  Gastrointestinal: Bowel Sounds present, soft, nontender.   Lymph: No peripheral edema. No lymphadenopathy.  Skin: No rashes or ulcers  Psych:  Mood & affect appropriate    LABS: All Labs Reviewed:                        10.7   10.1  )-----------( 70       ( 10 Aug 2018 19:18 )             33.8                         9.7    9.5   )-----------( 62       ( 10 Aug 2018 01:01 )             27.8                         7.7    8.0   )-----------( 92       ( 09 Aug 2018 11:24 )             21.7     10 Aug 2018 16:05    135    |  98     |  24     ----------------------------<  190    4.9     |  23     |  1.31   10 Aug 2018 05:56    139    |  101    |  22     ----------------------------<  124    4.9     |  21     |  1.31   10 Aug 2018 01:01    134    |  100    |  19     ----------------------------<  103    5.2     |  22     |  1.21     Ca    8.5        10 Aug 2018 16:05  Ca    8.4        10 Aug 2018 05:56  Ca    8.2        10 Aug 2018 01:01  Phos  2.4       10 Aug 2018 01:01  Phos  1.9       08 Aug 2018 21:16  Mg     2.3       10 Aug 2018 01:01  Mg     1.9       08 Aug 2018 21:16    TPro  5.9    /  Alb  3.9    /  TBili  0.9    /  DBili  0.4    /  AST  584    /  ALT  662    /  AlkPhos  52     10 Aug 2018 16:05  TPro  5.7    /  Alb  4.0    /  TBili  1.1    /  DBili  x      /  AST  750    /  ALT  602    /  AlkPhos  41     10 Aug 2018 05:56  TPro  5.6    /  Alb  4.3    /  TBili  1.4    /  DBili  x      /  AST  900    /  ALT  614    /  AlkPhos  35     10 Aug 2018 01:01    PT/INR - ( 10 Aug 2018 08:13 )   PT: 20.5 sec;   INR: 1.87 ratio         PTT - ( 10 Aug 2018 20:30 )  PTT:33.5 sec

## 2018-08-11 NOTE — PROGRESS NOTE ADULT - SUBJECTIVE AND OBJECTIVE BOX
VITAL SIGNS    Telemetry:    Vital Signs Last 24 Hrs  T(C): 36.5 (18 @ 14:07), Max: 37.2 (08-10-18 @ 20:20)  T(F): 97.7 (18 @ 14:07), Max: 99 (08-10-18 @ 20:20)  HR: 81 (18 @ 14:07) (81 - 84)  BP: 140/67 (18 @ 14:07) (124/76 - 140/67)  RR: 18 (18 @ 14:07) (18 - 18)  SpO2: 97% (18 @ 14:07) (94% - 97%)            08-10 @ 07:01  -   @ 07:00  --------------------------------------------------------  IN: 730 mL / OUT: 695 mL / NET: 35 mL     07:01  -   @ 16:05  --------------------------------------------------------  IN: 480 mL / OUT: 600 mL / NET: -120 mL       Daily     Daily Weight in k.1 (11 Aug 2018 08:59)  Admit Wt: Drug Dosing Weight  Height (cm): 134.62 (04 Aug 2018 11:50)  Weight (kg): 54 (04 Aug 2018 11:50)  BMI (kg/m2): 29.8 (04 Aug 2018 11:50)  BSA (m2): 1.37 (04 Aug 2018 11:50)     Daily Weight in k.1 (18 @ 08:59)    [facility]      133<L>  |  101  |  20  ----------------------------<  184<H>  4.5   |  22  |  1.10    Ca    8.9      11 Aug 2018 08:29  Phos  2.4     08-10  Mg     2.3     08-10    TPro  6.2  /  Alb  4.0  /  TBili  0.9  /  DBili  x   /  AST  1399<H>  /  ALT  1415<H>  /  AlkPhos  59  -11                                 10.1   9.0   )-----------( 69       ( 11 Aug 2018 08:29 )             32.7          PT/INR - ( 10 Aug 2018 08:13 )   PT: 20.5 sec;   INR: 1.87 ratio         PTT - ( 10 Aug 2018 20:30 )  PTT:33.5 sec        Bilirubin Total, Serum: 0.9 mg/dL ( @ 08:29)    CAPILLARY BLOOD GLUCOSE      POCT Blood Glucose.: 202 mg/dL (11 Aug 2018 12:08)  POCT Blood Glucose.: 168 mg/dL (11 Aug 2018 07:51)  POCT Blood Glucose.: 190 mg/dL (10 Aug 2018 21:56)  POCT Blood Glucose.: 174 mg/dL (10 Aug 2018 16:37)          Drains:     MS       [  ]   [  ]            L Pleural [  ]            R Pleural  [  ]            SEBASTIEN  [  ]           Infante  [  ]    Pacing Wires      [  ]   Settings:                                  Isolated  [  ]                    CXR:      MEDICATIONS  aspirin enteric coated 81 milliGRAM(s) Oral daily  dextrose 40% Gel 15 Gram(s) Oral once PRN  dextrose 5%. 1000 milliLiter(s) IV Continuous <Continuous>  dextrose 50% Injectable 12.5 Gram(s) IV Push once  dextrose 50% Injectable 25 Gram(s) IV Push once  dextrose 50% Injectable 25 Gram(s) IV Push once  dextrose 50% Injectable 50 milliLiter(s) IV Push every 15 minutes  dextrose 50% Injectable 25 milliLiter(s) IV Push every 15 minutes  docusate sodium 100 milliGRAM(s) Oral three times a day  famotidine    Tablet 20 milliGRAM(s) Oral daily  glucagon  Injectable 1 milliGRAM(s) IntraMuscular once PRN  insulin glargine Injectable (LANTUS) 10 Unit(s) SubCutaneous at bedtime  insulin Infusion 4 Unit(s)/Hr IV Continuous <Continuous>  insulin lispro (HumaLOG) corrective regimen sliding scale   SubCutaneous three times a day before meals  insulin lispro (HumaLOG) corrective regimen sliding scale   SubCutaneous at bedtime  insulin lispro Injectable (HumaLOG) 3 Unit(s) SubCutaneous three times a day before meals  metoprolol tartrate 25 milliGRAM(s) Oral every 12 hours  oxyCODONE    IR 5 milliGRAM(s) Oral every 6 hours PRN  polyethylene glycol 3350 17 Gram(s) Oral daily  sodium chloride 0.9% lock flush 3 milliLiter(s) IV Push every 8 hours  sodium chloride 0.9%. 1000 milliLiter(s) IV Continuous <Continuous>      PHYSICAL EXAM      Neurology: alert and oriented x 3, nonfocal, no gross deficits  CV :S1S2  Sternal Wound :  CDI , Stable  Lungs: shallow breaths  Abdomen: soft, tender upon deep palpation right and left upper quadrant, nondistended, positive bowel sounds, last bowel movement   :  voids     Extremities:   no edema warm to touch               PAST MEDICAL & SURGICAL HISTORY:  Retinopathy  Neuropathy  DM (diabetes mellitus)  HTN (hypertension)  History of cholecystectomy                 Discussed with Cardiothoracic Team at AM rounds.

## 2018-08-11 NOTE — PROGRESS NOTE ADULT - ASSESSMENT
61 y/o F with PMH HTN, DM, fam hx CAD who presented with CP found to have multi-vessel disease, s/p CABG on 8/8 with LIMA to LAD and RSVG to RCA      - seems to have tolerated procedure well  - off pressors, and hemodynamically stable  - Junctional rhythm on telemetry seems to have resolved.  She has been started on metoprolol 25 BID and seems to be tolerating this.  - mild-moderate MR noted on intra-op MILES.  - diabetes control  - carotid disease will need intervention as an outpt. Vascular surgery input appreciated.  - there is no evidence for meaningful volume overload. Chest tubes have bene discontinued  - c/w ASA  - statin on hold for transaminitis.  Continue to follow to resolution and resume when  normalized  - BP acceptable  - Follow platelet count.  - monitor electrolytes. Keep K>4, Mg >2  - Ambulate and IS  - will follow with you.

## 2018-08-11 NOTE — PROGRESS NOTE ADULT - ASSESSMENT
61 y/o  female visiting from [facility], s/p cardiac cath  with LAD 90 %, Cx 100% and % disease  H diabetes 2 , diabetic retinopathy with "eye bleed" per patient 2012.   8/5 P2Y12 163, Vascular Surgery consulted for right ICA stenosis - no intervention at this time; f/u as an outpatient after OHS  preop workup in progress  8/6 VSS; pt denies cp/sob; obtain vein mapping today by PA's; opth consulted called for bilateral retinal bleeding s/p laser sx 2013; continue asa/statin/bb  8/7 Pt. transferred to Dr. Anastasia Rojas's service - for CABG in am - 2nd case- will obtain head CT non con d/t carotid dz- OR Wednesday - 2nd case  s/p 8/8 CABGx2 LIMA  Post-op Course:   -Multiple blood products for acute post-op anemia 2/2 surgery  -Extubated POD 1, weaned off pressor support  -Thrombocytopenia, DVT prophylaxis and plavix on hold  -Transaminitis, now off statin.    8/10 VSS, transferred to floor. Plts 62, plavix and sq lovenox on hold. AST/ALT trending down to 750/602. No statin/tylenol.  8/11: Lfts in the thousands. Total jeromy remains WNL. ABD us ordered spoke to tech numerous times, escalated to Dr. Rojas. Echo done-heart function appears appropriate no pericardial effusion. amylase and lipase repeat lfts and hep pannel.

## 2018-08-12 DIAGNOSIS — E11.65 TYPE 2 DIABETES MELLITUS WITH HYPERGLYCEMIA: ICD-10-CM

## 2018-08-12 LAB
ALBUMIN SERPL ELPH-MCNC: 3.8 G/DL — SIGNIFICANT CHANGE UP (ref 3.3–5)
ALP SERPL-CCNC: 65 U/L — SIGNIFICANT CHANGE UP (ref 40–120)
ALT FLD-CCNC: 1012 U/L — HIGH (ref 10–45)
ANION GAP SERPL CALC-SCNC: 11 MMOL/L — SIGNIFICANT CHANGE UP (ref 5–17)
AST SERPL-CCNC: 402 U/L — HIGH (ref 10–40)
BILIRUB DIRECT SERPL-MCNC: 0.3 MG/DL — HIGH (ref 0–0.2)
BILIRUB INDIRECT FLD-MCNC: 0.8 MG/DL — SIGNIFICANT CHANGE UP (ref 0.2–1)
BILIRUB SERPL-MCNC: 1.1 MG/DL — SIGNIFICANT CHANGE UP (ref 0.2–1.2)
BUN SERPL-MCNC: 17 MG/DL — SIGNIFICANT CHANGE UP (ref 7–23)
CALCIUM SERPL-MCNC: 8.8 MG/DL — SIGNIFICANT CHANGE UP (ref 8.4–10.5)
CHLORIDE SERPL-SCNC: 103 MMOL/L — SIGNIFICANT CHANGE UP (ref 96–108)
CO2 SERPL-SCNC: 24 MMOL/L — SIGNIFICANT CHANGE UP (ref 22–31)
CREAT SERPL-MCNC: 1.01 MG/DL — SIGNIFICANT CHANGE UP (ref 0.5–1.3)
GLUCOSE BLDC GLUCOMTR-MCNC: 132 MG/DL — HIGH (ref 70–99)
GLUCOSE BLDC GLUCOMTR-MCNC: 191 MG/DL — HIGH (ref 70–99)
GLUCOSE BLDC GLUCOMTR-MCNC: 220 MG/DL — HIGH (ref 70–99)
GLUCOSE BLDC GLUCOMTR-MCNC: 286 MG/DL — HIGH (ref 70–99)
GLUCOSE BLDC GLUCOMTR-MCNC: 307 MG/DL — HIGH (ref 70–99)
GLUCOSE SERPL-MCNC: 146 MG/DL — HIGH (ref 70–99)
HAV IGM SER-ACNC: SIGNIFICANT CHANGE UP
HAV IGM SER-ACNC: SIGNIFICANT CHANGE UP
HBV CORE IGM SER-ACNC: SIGNIFICANT CHANGE UP
HBV CORE IGM SER-ACNC: SIGNIFICANT CHANGE UP
HBV SURFACE AG SER-ACNC: SIGNIFICANT CHANGE UP
HBV SURFACE AG SER-ACNC: SIGNIFICANT CHANGE UP
HCT VFR BLD CALC: 30.9 % — LOW (ref 34.5–45)
HCV AB S/CO SERPL IA: 0.11 S/CO — SIGNIFICANT CHANGE UP
HCV AB S/CO SERPL IA: 0.12 S/CO — SIGNIFICANT CHANGE UP
HCV AB SERPL-IMP: SIGNIFICANT CHANGE UP
HCV AB SERPL-IMP: SIGNIFICANT CHANGE UP
HGB BLD-MCNC: 10.3 G/DL — LOW (ref 11.5–15.5)
INR BLD: 1.28 RATIO — HIGH (ref 0.88–1.16)
LDH SERPL L TO P-CCNC: 286 U/L — HIGH (ref 50–242)
MCHC RBC-ENTMCNC: 29.2 PG — SIGNIFICANT CHANGE UP (ref 27–34)
MCHC RBC-ENTMCNC: 33.3 GM/DL — SIGNIFICANT CHANGE UP (ref 32–36)
MCV RBC AUTO: 87.6 FL — SIGNIFICANT CHANGE UP (ref 80–100)
PLATELET # BLD AUTO: 88 K/UL — LOW (ref 150–400)
POTASSIUM SERPL-MCNC: 4.3 MMOL/L — SIGNIFICANT CHANGE UP (ref 3.5–5.3)
POTASSIUM SERPL-SCNC: 4.3 MMOL/L — SIGNIFICANT CHANGE UP (ref 3.5–5.3)
PROT SERPL-MCNC: 6.1 G/DL — SIGNIFICANT CHANGE UP (ref 6–8.3)
PROTHROM AB SERPL-ACNC: 13.9 SEC — HIGH (ref 9.8–12.7)
RBC # BLD: 3.53 M/UL — LOW (ref 3.8–5.2)
RBC # FLD: 14.4 % — SIGNIFICANT CHANGE UP (ref 10.3–14.5)
SODIUM SERPL-SCNC: 138 MMOL/L — SIGNIFICANT CHANGE UP (ref 135–145)
WBC # BLD: 8.1 K/UL — SIGNIFICANT CHANGE UP (ref 3.8–10.5)
WBC # FLD AUTO: 8.1 K/UL — SIGNIFICANT CHANGE UP (ref 3.8–10.5)

## 2018-08-12 PROCEDURE — 99232 SBSQ HOSP IP/OBS MODERATE 35: CPT

## 2018-08-12 PROCEDURE — 74177 CT ABD & PELVIS W/CONTRAST: CPT | Mod: 26

## 2018-08-12 RX ORDER — CLOPIDOGREL BISULFATE 75 MG/1
75 TABLET, FILM COATED ORAL DAILY
Qty: 0 | Refills: 0 | Status: DISCONTINUED | OUTPATIENT
Start: 2018-08-12 | End: 2018-08-15

## 2018-08-12 RX ORDER — INSULIN LISPRO 100/ML
5 VIAL (ML) SUBCUTANEOUS
Qty: 0 | Refills: 0 | Status: DISCONTINUED | OUTPATIENT
Start: 2018-08-12 | End: 2018-08-13

## 2018-08-12 RX ADMIN — INSULIN GLARGINE 10 UNIT(S): 100 INJECTION, SOLUTION SUBCUTANEOUS at 21:59

## 2018-08-12 RX ADMIN — Medication 25 MILLIGRAM(S): at 07:52

## 2018-08-12 RX ADMIN — Medication 5 UNIT(S): at 12:04

## 2018-08-12 RX ADMIN — Medication 3 UNIT(S): at 07:52

## 2018-08-12 RX ADMIN — CLOPIDOGREL BISULFATE 75 MILLIGRAM(S): 75 TABLET, FILM COATED ORAL at 12:03

## 2018-08-12 RX ADMIN — Medication 81 MILLIGRAM(S): at 12:04

## 2018-08-12 RX ADMIN — Medication 100 MILLIGRAM(S): at 21:33

## 2018-08-12 RX ADMIN — Medication 100 MILLIGRAM(S): at 12:04

## 2018-08-12 RX ADMIN — SODIUM CHLORIDE 3 MILLILITER(S): 9 INJECTION INTRAMUSCULAR; INTRAVENOUS; SUBCUTANEOUS at 21:34

## 2018-08-12 RX ADMIN — POLYETHYLENE GLYCOL 3350 17 GRAM(S): 17 POWDER, FOR SOLUTION ORAL at 12:03

## 2018-08-12 RX ADMIN — FAMOTIDINE 20 MILLIGRAM(S): 10 INJECTION INTRAVENOUS at 12:03

## 2018-08-12 RX ADMIN — Medication 25 MILLIGRAM(S): at 20:18

## 2018-08-12 RX ADMIN — Medication 100 MILLIGRAM(S): at 06:50

## 2018-08-12 RX ADMIN — Medication 2: at 17:04

## 2018-08-12 RX ADMIN — OXYCODONE HYDROCHLORIDE 5 MILLIGRAM(S): 5 TABLET ORAL at 13:55

## 2018-08-12 RX ADMIN — SODIUM CHLORIDE 3 MILLILITER(S): 9 INJECTION INTRAMUSCULAR; INTRAVENOUS; SUBCUTANEOUS at 06:50

## 2018-08-12 RX ADMIN — OXYCODONE HYDROCHLORIDE 5 MILLIGRAM(S): 5 TABLET ORAL at 14:30

## 2018-08-12 RX ADMIN — Medication 3: at 12:04

## 2018-08-12 RX ADMIN — SODIUM CHLORIDE 3 MILLILITER(S): 9 INJECTION INTRAMUSCULAR; INTRAVENOUS; SUBCUTANEOUS at 10:52

## 2018-08-12 NOTE — PROGRESS NOTE ADULT - PROBLEM SELECTOR PLAN 1
Continue asa, beta-blocker.   Plavix for poor targets restarted as thrombocytopenia improves today, trend platelets - check daily CBC  Hold statin due to elevated LFTs & INR, trend liver enzymes-check daily CMP & coags  Ambulate 4x daily as tolerated and with PT.  Cough and deep breathe, Incentive Spirometry Q1h, Chest PT.   Disposition: Home

## 2018-08-12 NOTE — PROGRESS NOTE ADULT - PROBLEM SELECTOR PLAN 1
-test BG AC/HS  -diet order changed to consistent carb  -c/w Lantus 10 units QHS  -Increase Humalog 5 units w/meals  -c/w Humalog low correction scale AC and Low HS scale  Discharge plan:  TBD based on insulin requirements. Metformin may not be appropriate at this time 2/2 elevated LFTs (team to trend for now). Pt uninsured so medication options are limited.  -discussed w/pt and team  pager: [phone]

## 2018-08-12 NOTE — CONSULT NOTE ADULT - SUBJECTIVE AND OBJECTIVE BOX
Patient is a 62y Female     Patient is a 62y old  Female who presents with a chief complaint of     HPI:  This is a 61 yo female,  from [facility] arrived to [place] on 7/24 here visiting, with PMH of HTN and long standing hx of  type 2 DM ( last A1C unknown,  complicated by neuropathy and retinopathy,  well managed as per patient). She reports as strong family hx of premature atherosclerosis ( both her brothers had MI in their early 40's).  She does not have any known structural heart disease.  She is generally sedentary at baseline due to pain in her feet, likely neuropathic.  She has not had prior S&S  of angina, HR or arrythmia.  She presents to [facility] ED on 8/3 with c/c of several days of " random left sided chest discomfort", with intermittent radiation to her left arm, occasionally associated with dyspnea.  The sxs may be brief, but have lasted up to 30 minutes.  CP is without clear provocation, and without a convincing explanation.  She had several episodes yesterday, and continued to have sxs even yesterday in the ER.  In the ER she was found to have an abnormal ekg, without comparison available, loaded with Aspirin 325mg.  Jyoti x3 negative.  Consultation w/ cards, Dr Presley  obtained, whom despite x3 neg enzymes given FH and symptomatolgy transferred pt to [facility] for cardiac cath to r/o significant CAD.  Now meeting patient for the first time, post cath, and as per unofficial cath report: mLAD 90%, Cx 100% and  % via RFA.  Pt now s/p CABG 4 days ago, noted to have elevated transaminases. No prior h/o liver disease. Sono shows probable AVM. LFTs trending down.     < from: 12 Lead ECG (08.04.18 @ 08:14) >  Ventricular Rate 63 BPM    Atrial Rate 63 BPM    P-R Interval 158 ms    QRS Duration 88 ms    Q-T Interval 430 ms    QTC Calculation(Bezet) 440 ms    P Axis 61 degrees    R Axis -21 degrees    T Axis 122 degrees    Diagnosis Line Normal sinus rhythm  ST & T wave abnormality, consider lateral ischemia  Abnormal ECG  When compared with ECG of 03-AUG-2018 21:54,  No significant change was found  Confirmed by Maico Presley MD ([id number]) on 8/4/2018 9:52:19 AM    < end of copied text >      < from: CT Angio Chest w/ IV Cont (08.04.18 @ 00:15) >  EXAM:  CT ANGIO CHEST (W)AW IC                            PROCEDURE DATE:  08/04/2018          INTERPRETATION:  CLINICAL INDICATION: Shortness of breath, chest pain.   Check for pulmonary embolism    COMPARISON: Same day radiograph    TECHNIQUE: CTangiogram of the chest was performed utilizing a PE   protocol. 96 mL of Optiray were intravenously administered without   adverse reaction.  4 mL of contrast were discarded. Coronal and sagittal   reconstructions are provided. In addition, maximum intensity projection   (MIP) images were also acquired on the axial     plane on a separate   workstation.      FINDINGS: There is no evidence for pulmonary embolism. The main pulmonary   artery is of normal caliber.    There is moderate atherosclerotic calcification of the aorta. Heart size   is normal and there is no pericardial effusion.    The lungs are clear. No pleural effusions are seen. The central airways   are patent.    No enlarged thoracic lymph nodes are seen.    There is a severe atherosclerotic calcification of the splenic artery.     No significant osseous abnormality is seen.    IMPRESSION: No pulmonary embolism.    < end of copied text >    < from: Xray Chest 1 View- PORTABLE-Urgent (08.04.18 @ 01:24) >  EXAM:  XR CHEST PORTABLE URGENT 1V                            PROCEDURE DATE:  08/04/2018          INTERPRETATION:  Short of breath.    AP chest.    Normal heart size. Atherosclerotic aortic arch. No consolidation or   effusion. Mild thoracic levoscoliosis.    Impression: No active disease.    < end of copied text > (04 Aug 2018 11:26)      PAST MEDICAL & SURGICAL HISTORY:  Retinopathy  Neuropathy  DM (diabetes mellitus)  HTN (hypertension)  History of cholecystectomy      MEDICATIONS  (STANDING):  aspirin enteric coated 81 milliGRAM(s) Oral daily  clopidogrel Tablet 75 milliGRAM(s) Oral daily  dextrose 5%. 1000 milliLiter(s) (50 mL/Hr) IV Continuous <Continuous>  dextrose 50% Injectable 12.5 Gram(s) IV Push once  dextrose 50% Injectable 25 Gram(s) IV Push once  dextrose 50% Injectable 25 Gram(s) IV Push once  dextrose 50% Injectable 50 milliLiter(s) IV Push every 15 minutes  dextrose 50% Injectable 25 milliLiter(s) IV Push every 15 minutes  docusate sodium 100 milliGRAM(s) Oral three times a day  famotidine    Tablet 20 milliGRAM(s) Oral daily  insulin glargine Injectable (LANTUS) 10 Unit(s) SubCutaneous at bedtime  insulin Infusion 4 Unit(s)/Hr (4 mL/Hr) IV Continuous <Continuous>  insulin lispro (HumaLOG) corrective regimen sliding scale   SubCutaneous three times a day before meals  insulin lispro (HumaLOG) corrective regimen sliding scale   SubCutaneous at bedtime  insulin lispro Injectable (HumaLOG) 5 Unit(s) SubCutaneous three times a day before meals  metoprolol tartrate 25 milliGRAM(s) Oral every 12 hours  polyethylene glycol 3350 17 Gram(s) Oral daily  sodium chloride 0.9% lock flush 3 milliLiter(s) IV Push every 8 hours  sodium chloride 0.9%. 1000 milliLiter(s) (10 mL/Hr) IV Continuous <Continuous>      Allergies    No Known Allergies    Intolerances        SOCIAL HISTORY:  Denies ETOh,Smoking,     FAMILY HISTORY:  Coronary artery disease (Sibling): pre mature CAD      REVIEW OF SYSTEMS:    CONSTITUTIONAL: No weakness, fevers or chills  EYES/ENT: No visual changes;  No vertigo or throat pain   NECK: No pain or stiffness  RESPIRATORY: No cough, wheezing, hemoptysis; No shortness of breath  CARDIOVASCULAR: No chest pain or palpitations  GASTROINTESTINAL: No abdominal or epigastric pain. No nausea, vomiting, or hematemesis; No diarrhea or constipation. No melena or hematochezia.  GENITOURINARY: No dysuria, frequency or hematuria  NEUROLOGICAL: No numbness or weakness  SKIN: No itching, burning, rashes, or lesions   All other review of systems is negative unless indicated above.    VITAL:  T(C): , Max: 37.2 (08-11-18 @ 20:32)  T(F): , Max: 99 (08-11-18 @ 20:32)  HR: 77 (08-12-18 @ 06:18)  BP: 118/72 (08-12-18 @ 06:18)  BP(mean): --  RR: 18 (08-12-18 @ 06:18)  SpO2: 95% (08-12-18 @ 06:18)  Wt(kg): --    I and O's:    08-10 @ 07:01  -  08-11 @ 07:00  --------------------------------------------------------  IN: 730 mL / OUT: 695 mL / NET: 35 mL    08-11 @ 07:01 - 08-12 @ 07:00  --------------------------------------------------------  IN: 590 mL / OUT: 1500 mL / NET: -910 mL    08-12 @ 07:01 - 08-12 @ 14:01  --------------------------------------------------------  IN: 180 mL / OUT: 600 mL / NET: -420 mL          PHYSICAL EXAM:    Constitutional: NAD  HEENT: PERRLA,   Neck: No JVD  Respiratory: CTA B/L  Cardiovascular: S1 and S2  Gastrointestinal: BS+, soft, NT/ND  Extremities: No peripheral edema  Neurological: A/O x 3, no focal deficits  Psychiatric: Normal mood, normal affect  : No Infante  Skin: No rashes  Access: Not applicable  Back: No CVA tenderness    LABS:                        10.3   8.1   )-----------( 88       ( 12 Aug 2018 06:55 )             30.9     08-12    138  |  103  |  17  ----------------------------<  146<H>  4.3   |  24  |  1.01    Ca    8.8      12 Aug 2018 06:55    TPro  6.1  /  Alb  3.8  /  TBili  1.1  /  DBili  0.3<H>  /  AST  402<H>  /  ALT  1012<H>  /  AlkPhos  65  08-12          RADIOLOGY & ADDITIONAL STUDIES: esvin rodriguez

## 2018-08-12 NOTE — PROGRESS NOTE ADULT - ASSESSMENT
63 y/o female with poorly controlled T2DM (HbA1c 7.2% in the setting of anemia) complicated with proliferative retinopathy, neuropathy s/p CABG x 2 (8/8). BG values elevated yesterday in setting of improved PO intake and incorrect diet order. BG goal (100-180mg/dl). Discussed w/pt importance of glycemic control in post-op setting to minimize post-op complications. LFTs elevated so Metformin may not be an option at this time.

## 2018-08-12 NOTE — PROGRESS NOTE ADULT - SUBJECTIVE AND OBJECTIVE BOX
[facility] - Keith Torrez, James, Fernandez, Nohelia, Loraine Riley  Office Number:  [phone]    Patient resting comfortably in bed in NAD.  Laying flat with no respiratory distress.  No complaints of chest pain, dyspnea, palpitations, PND, or orthopnea.    F/U for:  CAD    Telemetry:  Sinus rhythm    MEDICATIONS  (STANDING):  aspirin enteric coated 81 milliGRAM(s) Oral daily  dextrose 5%. 1000 milliLiter(s) (50 mL/Hr) IV Continuous <Continuous>  dextrose 50% Injectable 12.5 Gram(s) IV Push once  dextrose 50% Injectable 25 Gram(s) IV Push once  dextrose 50% Injectable 25 Gram(s) IV Push once  dextrose 50% Injectable 50 milliLiter(s) IV Push every 15 minutes  dextrose 50% Injectable 25 milliLiter(s) IV Push every 15 minutes  docusate sodium 100 milliGRAM(s) Oral three times a day  famotidine    Tablet 20 milliGRAM(s) Oral daily  insulin glargine Injectable (LANTUS) 10 Unit(s) SubCutaneous at bedtime  insulin Infusion 4 Unit(s)/Hr (4 mL/Hr) IV Continuous <Continuous>  insulin lispro (HumaLOG) corrective regimen sliding scale   SubCutaneous three times a day before meals  insulin lispro (HumaLOG) corrective regimen sliding scale   SubCutaneous at bedtime  insulin lispro Injectable (HumaLOG) 3 Unit(s) SubCutaneous three times a day before meals  metoprolol tartrate 25 milliGRAM(s) Oral every 12 hours  polyethylene glycol 3350 17 Gram(s) Oral daily  sodium chloride 0.9% lock flush 3 milliLiter(s) IV Push every 8 hours  sodium chloride 0.9%. 1000 milliLiter(s) (10 mL/Hr) IV Continuous <Continuous>    MEDICATIONS  (PRN):  dextrose 40% Gel 15 Gram(s) Oral once PRN Blood Glucose LESS THAN 70 milliGRAM(s)/deciliter  glucagon  Injectable 1 milliGRAM(s) IntraMuscular once PRN Glucose LESS THAN 70 milligrams/deciliter  oxyCODONE    IR 5 milliGRAM(s) Oral every 6 hours PRN Moderate Pain (4 - 6)      Allergies    No Known Allergies    Intolerances        Vital Signs Last 24 Hrs  T(C): 37.2 (12 Aug 2018 06:18), Max: 37.2 (11 Aug 2018 20:32)  T(F): 99 (12 Aug 2018 06:18), Max: 99 (11 Aug 2018 20:32)  HR: 77 (12 Aug 2018 06:18) (77 - 85)  BP: 118/72 (12 Aug 2018 06:18) (118/72 - 140/67)  BP(mean): --  RR: 18 (12 Aug 2018 06:18) (18 - 18)  SpO2: 95% (12 Aug 2018 06:18) (95% - 97%)    I&O's Summary    11 Aug 2018 07:01  -  12 Aug 2018 07:00  --------------------------------------------------------  IN: 590 mL / OUT: 1500 mL / NET: -910 mL        ON EXAM:    General: NAD, awake and alert, oriented x 3  HEENT: Mucous membranes are moist, anicteric  Lungs: Non-labored, breath sounds are clear bilaterally, No wheezing, rales or rhonchi  Cardiovascular: Regular, S1 and S2, 1/6 systolic murmur  Gastrointestinal: Bowel Sounds present, soft, nontender.   Lymph: No peripheral edema. No lymphadenopathy.  Skin: No rashes or ulcers  Psych:  Mood & affect appropriate    LABS: All Labs Reviewed:                        10.3   8.1   )-----------( 88       ( 12 Aug 2018 06:55 )             30.9                         10.1   9.0   )-----------( 69       ( 11 Aug 2018 08:29 )             32.7                         10.7   10.1  )-----------( 70       ( 10 Aug 2018 19:18 )             33.8     12 Aug 2018 06:55    138    |  103    |  17     ----------------------------<  146    4.3     |  24     |  1.01   11 Aug 2018 08:29    133    |  101    |  20     ----------------------------<  184    4.5     |  22     |  1.10   10 Aug 2018 16:05    135    |  98     |  24     ----------------------------<  190    4.9     |  23     |  1.31     Ca    8.8        12 Aug 2018 06:55  Ca    8.9        11 Aug 2018 08:29  Ca    8.5        10 Aug 2018 16:05  Phos  2.4       10 Aug 2018 01:01  Mg     2.3       10 Aug 2018 01:01    TPro  6.0    /  Alb  3.7    /  TBili  0.8    /  DBili  0.3    /  AST  950    /  ALT  1376   /  AlkPhos  78     11 Aug 2018 15:56  TPro  6.2    /  Alb  4.0    /  TBili  0.9    /  DBili  x      /  AST  1399   /  ALT  1415   /  AlkPhos  59     11 Aug 2018 08:29  TPro  5.9    /  Alb  3.9    /  TBili  0.9    /  DBili  0.4    /  AST  584    /  ALT  662    /  AlkPhos  52     10 Aug 2018 16:05    PT/INR - ( 12 Aug 2018 06:55 )   PT: 13.9 sec;   INR: 1.28 ratio         PTT - ( 10 Aug 2018 20:30 )  PTT:33.5 sec

## 2018-08-12 NOTE — CONSULT NOTE ADULT - ASSESSMENT
Most likely ischemic liver injury aka "shock liver" and appears to be resolving  Avoid tylenol and other potentially hepatotoxic drugs  check hepatitis panel, autoimmune markers,  and LDH  may need triple phase CT scan based on final sono report to further characterize the incidental finding

## 2018-08-12 NOTE — PROGRESS NOTE ADULT - SUBJECTIVE AND OBJECTIVE BOX
Diabetes Follow up note:  Interval Hx:  62 year old female w/uncontrolled T2DM w/retinopathy, neuropathy here w/chest pain found to have TVD s/p CABG x 2 (8/8). Pt w/severe hyperglycemia yesterday spiking to 300-400's. Pt found to be on regular diet (not consistent carb). Per patient, not drinking juice, endorses drinking glucerna, eating fruit and beans provided by family. Pt endorses having glucometer at home and testing sugar on regular basis.       Review of Systems:  General: + incisional pain  GI: Tolerating POs without any N/V/D/ABD PAIN. + constipation (no BM since surgery per patient).   CV: No CP/SOB  ENDO: No S&Sx of hypoglycemia  MEDS:    insulin glargine Injectable (LANTUS) 10 Unit(s) SubCutaneous at bedtime  insulin Infusion 4 Unit(s)/Hr IV Continuous <Continuous>  insulin lispro (HumaLOG) corrective regimen sliding scale   SubCutaneous three times a day before meals  insulin lispro (HumaLOG) corrective regimen sliding scale   SubCutaneous at bedtime  insulin lispro Injectable (HumaLOG) 3 Unit(s) SubCutaneous three times a day before meals      Allergies    No Known Allergies          PE:  General: Female sitting in chair. NAD.   Vital Signs Last 24 Hrs  T(C): 37.2 (12 Aug 2018 06:18), Max: 37.2 (11 Aug 2018 20:32)  T(F): 99 (12 Aug 2018 06:18), Max: 99 (11 Aug 2018 20:32)  HR: 77 (12 Aug 2018 06:18) (77 - 85)  BP: 118/72 (12 Aug 2018 06:18) (118/72 - 140/67)  BP(mean): --  RR: 18 (12 Aug 2018 06:18) (18 - 18)  SpO2: 95% (12 Aug 2018 06:18) (95% - 97%)  Chest: Midsternal dsg c/d/i  CV: S1, S2. No murmurs.   Abd: Soft, NT,ND,   Extremities: Warm. no edema.   Neuro: A&O X3    LABS:    POCT Blood Glucose.: 132 mg/dL (08-12-18 @ 07:21)  POCT Blood Glucose.: 209 mg/dL (08-11-18 @ 23:11)  POCT Blood Glucose.: 242 mg/dL (08-11-18 @ 21:21)  POCT Blood Glucose.: 298 mg/dL (08-11-18 @ 17:20)  POCT Blood Glucose.: 438 mg/dL (08-11-18 @ 17:19)  POCT Blood Glucose.: 202 mg/dL (08-11-18 @ 12:08)  POCT Blood Glucose.: 168 mg/dL (08-11-18 @ 07:51)  POCT Blood Glucose.: 190 mg/dL (08-10-18 @ 21:56)  POCT Blood Glucose.: 174 mg/dL (08-10-18 @ 16:37)  POCT Blood Glucose.: 211 mg/dL (08-10-18 @ 12:39)  POCT Blood Glucose.: 119 mg/dL (08-10-18 @ 08:10)  POCT Blood Glucose.: 103 mg/dL (08-10-18 @ 04:08)  POCT Blood Glucose.: 92 mg/dL (08-10-18 @ 03:34)  POCT Blood Glucose.: 109 mg/dL (08-09-18 @ 23:12)  POCT Blood Glucose.: 105 mg/dL (08-09-18 @ 21:34)  POCT Blood Glucose.: 110 mg/dL (08-09-18 @ 20:22)  POCT Blood Glucose.: 117 mg/dL (08-09-18 @ 18:59)  POCT Blood Glucose.: 116 mg/dL (08-09-18 @ 17:06)  POCT Blood Glucose.: 118 mg/dL (08-09-18 @ 14:58)  POCT Blood Glucose.: 150 mg/dL (08-09-18 @ 10:11)                            10.3   8.1   )-----------( 88       ( 12 Aug 2018 06:55 )             30.9       08-12    138  |  103  |  17  ----------------------------<  146<H>  4.3   |  24  |  1.01    Ca    8.8      12 Aug 2018 06:55    TPro  6.0  /  Alb  3.7  /  TBili  0.8  /  DBili  0.3<H>  /  AST  950<H>  /  ALT  1376<H>  /  AlkPhos  78  08-11      Thyroid Function Tests:  08-04 @ 14:18 TSH 4.04 FreeT4 -- T3 81 Anti TPO -- Anti Thyroglobulin Ab -- TSI --      Hemoglobin A1C, Whole Blood: 7.2 % <H> [4.0 - 5.6] (08-04-18 @ 14:18)            Contact number: [place] [phone] or [phone]

## 2018-08-12 NOTE — PROGRESS NOTE ADULT - SUBJECTIVE AND OBJECTIVE BOX
VITAL SIGNS    Telemetry: SR   Vital Signs Last 24 Hrs  T(C): 37.2 (18 @ 06:18), Max: 37.2 (18 @ 20:32)  T(F): 99 (18 @ 06:18), Max: 99 (18 @ 20:32)  HR: 77 (18 @ 06:18) (77 - 85)  BP: 118/72 (18 @ 06:18) (118/72 - 140/67)  RR: 18 (18 @ 06:18) (18 - 18)  SpO2: 95% (18 @ 06:18) (95% - 97%)             @ 07:01  -   @ 07:00  --------------------------------------------------------  IN: 590 mL / OUT: 1500 mL / NET: -910 mL     @ 07:01  -   @ 11:35  --------------------------------------------------------  IN: 0 mL / OUT: 200 mL / NET: -200 mL       Daily     Daily Weight in k.3 (12 Aug 2018 08:57)  Admit Wt: Drug Dosing Weight  Height (cm): 134.62 (04 Aug 2018 11:50)  Weight (kg): 54 (04 Aug 2018 11:50)  BMI (kg/m2): 29.8 (04 Aug 2018 11:50)  BSA (m2): 1.37 (04 Aug 2018 11:50)     Daily Weight in k.3 (18 @ 08:57)    [facility]      138  |  103  |  17  ----------------------------<  146<H>  4.3   |  24  |  1.01    Ca    8.8      12 Aug 2018 06:55    TPro  6.1  /  Alb  3.8  /  TBili  1.1  /  DBili  0.3<H>  /  AST  402<H>  /  ALT  1012<H>  /  AlkPhos  65                                   10.3   8.1   )-----------( 88       ( 12 Aug 2018 06:55 )             30.9          PT/INR - ( 12 Aug 2018 06:55 )   PT: 13.9 sec;   INR: 1.28 ratio         PTT - ( 10 Aug 2018 20:30 )  PTT:33.5 sec        Bilirubin Direct, Serum: 0.3 mg/dL ( @ 06:55)  Bilirubin Total, Serum: 1.1 mg/dL ( @ 06:55)  Bilirubin Direct, Serum: 0.3 mg/dL ( @ 15:56)  Bilirubin Total, Serum: 0.8 mg/dL ( @ 15:56)    CAPILLARY BLOOD GLUCOSE      POCT Blood Glucose.: 132 mg/dL (12 Aug 2018 07:21)  POCT Blood Glucose.: 209 mg/dL (11 Aug 2018 23:11)  POCT Blood Glucose.: 242 mg/dL (11 Aug 2018 21:21)  POCT Blood Glucose.: 298 mg/dL (11 Aug 2018 17:20)  POCT Blood Glucose.: 438 mg/dL (11 Aug 2018 17:19)  POCT Blood Glucose.: 202 mg/dL (11 Aug 2018 12:08)          Drains:     MS       [  ]   [  ]            L Pleural [  ]            R Pleural  [  ]            SEBASTIEN  [  ]           Infante  [  ]    Pacing Wires      [  ]   Settings:                                  Isolated  [  ]                    CXR:      MEDICATIONS  aspirin enteric coated 81 milliGRAM(s) Oral daily  clopidogrel Tablet 75 milliGRAM(s) Oral daily  dextrose 40% Gel 15 Gram(s) Oral once PRN  dextrose 5%. 1000 milliLiter(s) IV Continuous <Continuous>  dextrose 50% Injectable 12.5 Gram(s) IV Push once  dextrose 50% Injectable 25 Gram(s) IV Push once  dextrose 50% Injectable 25 Gram(s) IV Push once  dextrose 50% Injectable 50 milliLiter(s) IV Push every 15 minutes  dextrose 50% Injectable 25 milliLiter(s) IV Push every 15 minutes  docusate sodium 100 milliGRAM(s) Oral three times a day  famotidine    Tablet 20 milliGRAM(s) Oral daily  glucagon  Injectable 1 milliGRAM(s) IntraMuscular once PRN  insulin glargine Injectable (LANTUS) 10 Unit(s) SubCutaneous at bedtime  insulin Infusion 4 Unit(s)/Hr IV Continuous <Continuous>  insulin lispro (HumaLOG) corrective regimen sliding scale   SubCutaneous three times a day before meals  insulin lispro (HumaLOG) corrective regimen sliding scale   SubCutaneous at bedtime  insulin lispro Injectable (HumaLOG) 5 Unit(s) SubCutaneous three times a day before meals  metoprolol tartrate 25 milliGRAM(s) Oral every 12 hours  oxyCODONE    IR 5 milliGRAM(s) Oral every 6 hours PRN  polyethylene glycol 3350 17 Gram(s) Oral daily  sodium chloride 0.9% lock flush 3 milliLiter(s) IV Push every 8 hours  sodium chloride 0.9%. 1000 milliLiter(s) IV Continuous <Continuous>      PHYSICAL EXAM      Neurology: alert and oriented x 3, nonfocal, no gross deficits  CV :S1S2  Sternal Wound :  CDI , Stable  Lungs: cta  Abdomen: soft, nontender, nondistended, positive bowel sounds, last bowel movement   :    voids   Extremities:   warm to touch no edema incision CDI               PAST MEDICAL & SURGICAL HISTORY:  Retinopathy  Neuropathy  DM (diabetes mellitus)  HTN (hypertension)  History of cholecystectomy                 Discussed with Cardiothoracic Team at AM rounds.

## 2018-08-12 NOTE — PROGRESS NOTE ADULT - ASSESSMENT
63 y/o  female visiting from [facility], s/p cardiac cath  with LAD 90 %, Cx 100% and % disease  H diabetes 2 , diabetic retinopathy with "eye bleed" per patient 2012.   8/5 P2Y12 163, Vascular Surgery consulted for right ICA stenosis - no intervention at this time; f/u as an outpatient after OHS  preop workup in progress  8/6 VSS; pt denies cp/sob; obtain vein mapping today by PA's; opth consulted called for bilateral retinal bleeding s/p laser sx 2013; continue asa/statin/bb  8/7 Pt. transferred to Dr. Anastasia Rojas's service - for CABG in am - 2nd case- will obtain head CT non con d/t carotid dz- OR Wednesday - 2nd case  s/p 8/8 CABGx2 LIMA  Post-op Course:   -Multiple blood products for acute post-op anemia 2/2 surgery  -Extubated POD 1, weaned off pressor support  -Thrombocytopenia, DVT prophylaxis and plavix on hold  -Transaminitis, now off statin.    8/10 VSS, transferred to floor. Plts 62, plavix and sq lovenox on hold. AST/ALT trending down to 750/602. No statin/tylenol.  8/11: Lfts in the thousands. Total jeromy remains WNL. ABD us ordered spoke to tech numerous times, escalated to Dr. Rojas. Echo done-heart function appears appropriate no pericardial effusion. amylase and lipase repeat lfts and hep pannel.  8/12: LFTs improved today. Prelim results of US noted today. Dr. House's service called for Consult. Start Plavix today as plt count also improving. Likely discharge Monday and work up as out patient

## 2018-08-13 LAB
ALBUMIN SERPL ELPH-MCNC: 4 G/DL — SIGNIFICANT CHANGE UP (ref 3.3–5)
ALP SERPL-CCNC: 80 U/L — SIGNIFICANT CHANGE UP (ref 40–120)
ALT FLD-CCNC: 731 U/L — HIGH (ref 10–45)
ANION GAP SERPL CALC-SCNC: 12 MMOL/L — SIGNIFICANT CHANGE UP (ref 5–17)
AST SERPL-CCNC: 106 U/L — HIGH (ref 10–40)
BILIRUB SERPL-MCNC: 1.2 MG/DL — SIGNIFICANT CHANGE UP (ref 0.2–1.2)
BUN SERPL-MCNC: 17 MG/DL — SIGNIFICANT CHANGE UP (ref 7–23)
CALCIUM SERPL-MCNC: 9.2 MG/DL — SIGNIFICANT CHANGE UP (ref 8.4–10.5)
CHLORIDE SERPL-SCNC: 96 MMOL/L — SIGNIFICANT CHANGE UP (ref 96–108)
CO2 SERPL-SCNC: 25 MMOL/L — SIGNIFICANT CHANGE UP (ref 22–31)
CREAT SERPL-MCNC: 0.94 MG/DL — SIGNIFICANT CHANGE UP (ref 0.5–1.3)
GLUCOSE BLDC GLUCOMTR-MCNC: 122 MG/DL — HIGH (ref 70–99)
GLUCOSE BLDC GLUCOMTR-MCNC: 200 MG/DL — HIGH (ref 70–99)
GLUCOSE BLDC GLUCOMTR-MCNC: 203 MG/DL — HIGH (ref 70–99)
GLUCOSE BLDC GLUCOMTR-MCNC: 284 MG/DL — HIGH (ref 70–99)
GLUCOSE SERPL-MCNC: 220 MG/DL — HIGH (ref 70–99)
HCT VFR BLD CALC: 34.8 % — SIGNIFICANT CHANGE UP (ref 34.5–45)
HGB BLD-MCNC: 11.4 G/DL — LOW (ref 11.5–15.5)
MCHC RBC-ENTMCNC: 28.8 PG — SIGNIFICANT CHANGE UP (ref 27–34)
MCHC RBC-ENTMCNC: 32.7 GM/DL — SIGNIFICANT CHANGE UP (ref 32–36)
MCV RBC AUTO: 88.1 FL — SIGNIFICANT CHANGE UP (ref 80–100)
PLATELET # BLD AUTO: 122 K/UL — LOW (ref 150–400)
POTASSIUM SERPL-MCNC: 4.5 MMOL/L — SIGNIFICANT CHANGE UP (ref 3.5–5.3)
POTASSIUM SERPL-SCNC: 4.5 MMOL/L — SIGNIFICANT CHANGE UP (ref 3.5–5.3)
PROT SERPL-MCNC: 7.1 G/DL — SIGNIFICANT CHANGE UP (ref 6–8.3)
RBC # BLD: 3.95 M/UL — SIGNIFICANT CHANGE UP (ref 3.8–5.2)
RBC # FLD: 14.7 % — HIGH (ref 10.3–14.5)
SODIUM SERPL-SCNC: 133 MMOL/L — LOW (ref 135–145)
WBC # BLD: 7.7 K/UL — SIGNIFICANT CHANGE UP (ref 3.8–10.5)
WBC # FLD AUTO: 7.7 K/UL — SIGNIFICANT CHANGE UP (ref 3.8–10.5)

## 2018-08-13 PROCEDURE — 99232 SBSQ HOSP IP/OBS MODERATE 35: CPT

## 2018-08-13 RX ORDER — INSULIN LISPRO 100/ML
7 VIAL (ML) SUBCUTANEOUS
Qty: 0 | Refills: 0 | Status: DISCONTINUED | OUTPATIENT
Start: 2018-08-13 | End: 2018-08-14

## 2018-08-13 RX ADMIN — SODIUM CHLORIDE 3 MILLILITER(S): 9 INJECTION INTRAMUSCULAR; INTRAVENOUS; SUBCUTANEOUS at 22:09

## 2018-08-13 RX ADMIN — Medication 7 UNIT(S): at 16:47

## 2018-08-13 RX ADMIN — Medication 5 UNIT(S): at 12:00

## 2018-08-13 RX ADMIN — Medication 1: at 16:47

## 2018-08-13 RX ADMIN — CLOPIDOGREL BISULFATE 75 MILLIGRAM(S): 75 TABLET, FILM COATED ORAL at 11:59

## 2018-08-13 RX ADMIN — Medication 100 MILLIGRAM(S): at 22:09

## 2018-08-13 RX ADMIN — Medication 100 MILLIGRAM(S): at 11:59

## 2018-08-13 RX ADMIN — Medication 5 UNIT(S): at 08:16

## 2018-08-13 RX ADMIN — INSULIN GLARGINE 10 UNIT(S): 100 INJECTION, SOLUTION SUBCUTANEOUS at 22:09

## 2018-08-13 RX ADMIN — Medication 25 MILLIGRAM(S): at 08:16

## 2018-08-13 RX ADMIN — Medication 2: at 08:15

## 2018-08-13 RX ADMIN — SODIUM CHLORIDE 3 MILLILITER(S): 9 INJECTION INTRAMUSCULAR; INTRAVENOUS; SUBCUTANEOUS at 05:22

## 2018-08-13 RX ADMIN — POLYETHYLENE GLYCOL 3350 17 GRAM(S): 17 POWDER, FOR SOLUTION ORAL at 11:59

## 2018-08-13 RX ADMIN — Medication 25 MILLIGRAM(S): at 19:33

## 2018-08-13 RX ADMIN — FAMOTIDINE 20 MILLIGRAM(S): 10 INJECTION INTRAVENOUS at 11:59

## 2018-08-13 RX ADMIN — Medication 3: at 11:59

## 2018-08-13 RX ADMIN — Medication 100 MILLIGRAM(S): at 05:22

## 2018-08-13 RX ADMIN — Medication 81 MILLIGRAM(S): at 11:59

## 2018-08-13 RX ADMIN — SODIUM CHLORIDE 3 MILLILITER(S): 9 INJECTION INTRAMUSCULAR; INTRAVENOUS; SUBCUTANEOUS at 11:28

## 2018-08-13 NOTE — PROGRESS NOTE ADULT - PROBLEM SELECTOR PLAN 1
-test BG AC/HS  -c/w Lantus 10 units QHS  -Increase Humalog 7 units w/meals. HOLD IF NOT EATING  -c/w Humalog low correction scale AC and Low HS scale  Discharge plan:  TBD based on insulin requirements. Metformin contraindicated while LFT remains elevated. Pt might benefit from 70/30 Humulin mix insulin since her insulin requirements keep going up and need to have better glycemic control to promote healing and prevent surgical complications.  -Plan discussed with pt/team.  Contact info: [phone] (24/7). pager [phone]

## 2018-08-13 NOTE — DIETITIAN INITIAL EVALUATION ADULT. - DIET TYPE
low sodium/consistent carbohydrate (no snacks)/Vegan; Glucerna 3 x daily (660 kcal; 30 grams protein daily)

## 2018-08-13 NOTE — PROGRESS NOTE ADULT - SUBJECTIVE AND OBJECTIVE BOX
Diabetes Follow up note: Saw pt earlier today  Interval Hx: 61 y/o F w/uncontrolled T2DM c/bretinopathy, neuropathy, here w/chest pain found to have TVD s/p CABG x 2 (8/8). Reports not eating well due to N/V. Didn't have any dinner and this am only ate 1/2 cereal container and 1/2 of milk container with 1 can of Glucerna. FBg 203 received total of 7 units of Humalog for breakfast and had BG of 284 ac lunch. No hypoglycemia. Reports eating food from home sometimes but none yesterday or today due to GI symptoms.    Review of Systems:  General: "I'm not eating much"  GI: Tolerating POs without any N/V/D/ABD PAIN. + constipation (no BM since surgery per patient).   CV: No CP/SOB  ENDO: No S&Sx of hypoglycemia      MEDS:  insulin glargine Injectable (LANTUS) 10 Unit(s) SubCutaneous at bedtime  insulin Infusion 4 Unit(s)/Hr IV Continuous <Continuous>  insulin lispro (HumaLOG) corrective regimen sliding scale   SubCutaneous three times a day before meals  insulin lispro (HumaLOG) corrective regimen sliding scale   SubCutaneous at bedtime  insulin lispro Injectable (HumaLOG) 5 Unit(s) SubCutaneous three times a day before meals    Allergies    No Known Allergies      PE:  General: Female laying in bed in NAD.   Vital Signs Last 24 Hrs  T(C): 37 (08-13-18 @ 04:17), Max: 37.4 (08-12-18 @ 13:30)  T(F): 98.6 (08-13-18 @ 04:17), Max: 99.3 (08-12-18 @ 13:30)  HR: 78 (08-13-18 @ 04:17) (78 - 86)  BP: 152/73 (08-13-18 @ 04:17) (148/75 - 153/81)  BP(mean): --  RR: 18 (08-13-18 @ 04:17) (18 - 18)  SpO2: 93% (08-13-18 @ 04:17) (92% - 93%)  Chest: Midsternal dsg c/d/i  Abd: Soft, NT, ND,   Extremities: Warm. no edema noted in all 4 exts.   Neuro: A&O X3    LABS:  POCT Blood Glucose.: 284 mg/dL (08-13-18 @ 11:57)  POCT Blood Glucose.: 203 mg/dL (08-13-18 @ 07:44)  POCT Blood Glucose.: 191 mg/dL (08-12-18 @ 21:35)  POCT Blood Glucose.: 220 mg/dL (08-12-18 @ 16:50)  POCT Blood Glucose.: 286 mg/dL (08-12-18 @ 12:00)  POCT Blood Glucose.: 307 mg/dL (08-12-18 @ 11:57)  POCT Blood Glucose.: 132 mg/dL (08-12-18 @ 07:21)  POCT Blood Glucose.: 209 mg/dL (08-11-18 @ 23:11)  POCT Blood Glucose.: 242 mg/dL (08-11-18 @ 21:21)  POCT Blood Glucose.: 298 mg/dL (08-11-18 @ 17:20)  POCT Blood Glucose.: 438 mg/dL (08-11-18 @ 17:19)                          11.4   7.7   )-----------( 122      ( 13 Aug 2018 04:47 )             34.8      08-13    133<L>  |  96  |  17  ----------------------------<  220<H>  4.5   |  25  |  0.94    Ca    9.2      13 Aug 2018 04:47    TPro  7.1  /  Alb  4.0  /  TBili  1.2  /  DBili  x   /  AST  106<H>  /  ALT  731<H>  /  AlkPhos  80  08-13  TPro  6.0  /  Alb  3.7  /  TBili  0.8  /  DBili  0.3<H>  /  AST  950<H>  /  ALT  1376<H>  /  AlkPhos  78  08-11      Thyroid Function Tests:  08-04 @ 14:18 TSH 4.04 FreeT4 -- T3 81 Anti TPO -- Anti Thyroglobulin Ab -- TSI --      Hemoglobin A1C, Whole Blood: 7.2 % <H> [4.0 - 5.6] (08-04-18 @ 14:18)

## 2018-08-13 NOTE — PROGRESS NOTE ADULT - SUBJECTIVE AND OBJECTIVE BOX
[facility] - Keith Torrez, James, Frenandez, Nohelia, Loraine Riley  Office Number:  [phone]    Patient resting comfortably in bed in NAD.  Laying flat with no respiratory distress.  No complaints of chest pain, dyspnea, palpitations, PND, or orthopnea.  Reports nausea/vomiting this morning.    ROS: negative unless otherwise mentioned.    Telemetry:  SR    MEDICATIONS  (STANDING):  aspirin enteric coated 81 milliGRAM(s) Oral daily  clopidogrel Tablet 75 milliGRAM(s) Oral daily  dextrose 5%. 1000 milliLiter(s) (50 mL/Hr) IV Continuous <Continuous>  dextrose 50% Injectable 12.5 Gram(s) IV Push once  dextrose 50% Injectable 25 Gram(s) IV Push once  dextrose 50% Injectable 25 Gram(s) IV Push once  dextrose 50% Injectable 50 milliLiter(s) IV Push every 15 minutes  dextrose 50% Injectable 25 milliLiter(s) IV Push every 15 minutes  docusate sodium 100 milliGRAM(s) Oral three times a day  famotidine    Tablet 20 milliGRAM(s) Oral daily  insulin glargine Injectable (LANTUS) 10 Unit(s) SubCutaneous at bedtime  insulin Infusion 4 Unit(s)/Hr (4 mL/Hr) IV Continuous <Continuous>  insulin lispro (HumaLOG) corrective regimen sliding scale   SubCutaneous three times a day before meals  insulin lispro (HumaLOG) corrective regimen sliding scale   SubCutaneous at bedtime  insulin lispro Injectable (HumaLOG) 5 Unit(s) SubCutaneous three times a day before meals  metoprolol tartrate 25 milliGRAM(s) Oral every 12 hours  polyethylene glycol 3350 17 Gram(s) Oral daily  sodium chloride 0.9% lock flush 3 milliLiter(s) IV Push every 8 hours  sodium chloride 0.9%. 1000 milliLiter(s) (10 mL/Hr) IV Continuous <Continuous>    MEDICATIONS  (PRN):  dextrose 40% Gel 15 Gram(s) Oral once PRN Blood Glucose LESS THAN 70 milliGRAM(s)/deciliter  glucagon  Injectable 1 milliGRAM(s) IntraMuscular once PRN Glucose LESS THAN 70 milligrams/deciliter  oxyCODONE    IR 5 milliGRAM(s) Oral every 6 hours PRN Moderate Pain (4 - 6)      Allergies    No Known Allergies    Intolerances        Vital Signs Last 24 Hrs  T(C): 37 (13 Aug 2018 04:17), Max: 37.4 (12 Aug 2018 13:30)  T(F): 98.6 (13 Aug 2018 04:17), Max: 99.3 (12 Aug 2018 13:30)  HR: 78 (13 Aug 2018 04:17) (78 - 86)  BP: 152/73 (13 Aug 2018 04:17) (148/75 - 153/81)  BP(mean): --  RR: 18 (13 Aug 2018 04:17) (18 - 18)  SpO2: 93% (13 Aug 2018 04:17) (92% - 93%)    I&O's Summary    12 Aug 2018 07:01  -  13 Aug 2018 07:00  --------------------------------------------------------  IN: 300 mL / OUT: 1450 mL / NET: -1150 mL    13 Aug 2018 07:01  -  13 Aug 2018 09:48  --------------------------------------------------------  IN: 0 mL / OUT: 450 mL / NET: -450 mL        ON EXAM:    General: NAD, awake and alert, oriented x 3  HEENT: Mucous membranes are moist, anicteric  Lungs: Non-labored, breath sounds are clear bilaterally, No wheezing, rales or rhonchi  Cardiovascular: Regular, S1 and S2, 1/6 systolic murmur  Gastrointestinal: Bowel Sounds present, soft, nontender.   Lymph: No peripheral edema. No lymphadenopathy.  Skin: No rashes or ulcers; vertical sternal wound healing well.  Psych:  Mood & affect appropriate    LABS: All Labs Reviewed:                        11.4   7.7   )-----------( 122      ( 13 Aug 2018 04:47 )             34.8                         10.3   8.1   )-----------( 88       ( 12 Aug 2018 06:55 )             30.9                         10.1   9.0   )-----------( 69       ( 11 Aug 2018 08:29 )             32.7     13 Aug 2018 04:47    133    |  96     |  17     ----------------------------<  220    4.5     |  25     |  0.94   12 Aug 2018 06:55    138    |  103    |  17     ----------------------------<  146    4.3     |  24     |  1.01   11 Aug 2018 08:29    133    |  101    |  20     ----------------------------<  184    4.5     |  22     |  1.10     Ca    9.2        13 Aug 2018 04:47  Ca    8.8        12 Aug 2018 06:55  Ca    8.9        11 Aug 2018 08:29    TPro  7.1    /  Alb  4.0    /  TBili  1.2    /  DBili  x      /  AST  106    /  ALT  731    /  AlkPhos  80     13 Aug 2018 04:47  TPro  6.1    /  Alb  3.8    /  TBili  1.1    /  DBili  0.3    /  AST  402    /  ALT  1012   /  AlkPhos  65     12 Aug 2018 06:55  TPro  6.0    /  Alb  3.7    /  TBili  0.8    /  DBili  0.3    /  AST  950    /  ALT  1376   /  AlkPhos  78     11 Aug 2018 15:56    PT/INR - ( 12 Aug 2018 06:55 )   PT: 13.9 sec;   INR: 1.28 ratio               Blood Culture:

## 2018-08-13 NOTE — DIETITIAN INITIAL EVALUATION ADULT. - NS AS NUTRI INTERV ED CONTENT
Reviewed T2DM diet/management and heart healthy diet education with pt. Discussed the following: SMBG, general goal fingerstick ranges, hypoglycemia s/s/treatment/prevention, consistent CHO diet recommendations, review of food/beverage sources of CHO, appropriate CHO portion sizes, recommended CHO servings per meals, balancing vegan/CHO-heavy diet with non-starchy vegetable intake, adequate protein via diet and supplements post-op, low sodium recommendations. Pt voiced understanding. T2DM vegetarian nutrition therapy handout; Heart-healthy nutrition therapy handout; Low sodium nutrition therapy handout provided to pt for review at her leisure.

## 2018-08-13 NOTE — DIETITIAN INITIAL EVALUATION ADULT. - ENERGY NEEDS
ht: 53 inches. wt: 123.4 pounds (current, standing, no edema). BMI: 30.9 kG/m2. UBW:  IBW: 65 pounds +/- 10%. %IBW: 190%  Other pertinent objective information: 62 year old female pt with PMH T2DM (Metformin PTA), HTN presents to [facility] emergency department on 8/3 with c/o of several days of "random left sided chest discomfort", with intermittent radiation to her left arm, occasionally associated with dyspnea. Found to have abnormal ekg, mLAD 90%, Cx 100% and  % via RFA on cath now s/p CABG 8/8/2018. Surgical incision noted. ht: 57 inches reported by pt. wt: 123.4 pounds (current, standing, no edema). BMI: 30.9 kG/m2. UBW: 120 pounds per pt. IBW: 85 pounds +/- 10%. %IBW: 145%  Other pertinent objective information: 62 year old female pt with PMH T2DM (Metformin PTA), HTN presents to [facility] emergency department on 8/3 with c/o of several days of "random left sided chest discomfort", with intermittent radiation to her left arm, occasionally associated with dyspnea. Found to have abnormal ekg, mLAD 90%, Cx 100% and  % via RFA on cath now s/p CABG 8/8/2018. Surgical incision noted.

## 2018-08-13 NOTE — PROGRESS NOTE ADULT - ASSESSMENT
61 y/o  female visiting from [facility], s/p cardiac cath  with LAD 90 %, Cx 100% and % disease  H diabetes 2 , diabetic retinopathy with "eye bleed" per patient 2012.   8/5 P2Y12 163, Vascular Surgery consulted for right ICA stenosis - no intervention at this time; f/u as an outpatient after [facility]  preop workup in progress  8/6 VSS; pt denies cp/sob; obtain vein mapping today by PA's; opth consulted called for bilateral retinal bleeding s/p laser sx 2013; continue asa/statin/bb  8/7 Pt. transferred to Dr. Anastasia Rojas's service - for CABG in am - 2nd case- will obtain head CT non con d/t carotid dz- OR Wednesday - 2nd case  s/p 8/8 CABGx2 LIMA  Post-op Course:   -Multiple blood products for acute post-op anemia 2/2 surgery  -Extubated POD 1, weaned off pressor support  -Thrombocytopenia, DVT prophylaxis and plavix on hold  -Transaminitis, now off statin.    8/10 VSS, transferred to floor. Plts 62, plavix and sq lovenox on hold. AST/ALT trending down to 750/602. No statin/tylenol.  8/11: Lfts in the thousands. Total jeromy remains WNL. ABD us ordered spoke to tech numerous times, escalated to Dr. Rojas. Echo done-heart function appears appropriate no pericardial effusion. amylase and lipase repeat lfts and hep pannel.  8/12: LFTs improved today. Prelim results of US noted today. Dr. House's service called for Consult. Start Plavix today as plt count also improving. Likely discharge Monday and work up as out patient  8/13 Pt w n/v this am Nutrition Diagnosis:   Inadequate energy intake    Related to (etiology):   NPO with no alternate means of nutrition    Signs and Symptoms (as evidenced by):   Pt receiving 0% estimated energy and protein needs at this time    Interventions/Recommendations (treatment strategy):  Initiate TPN, per recommendations. Advance to regular oral diet, texture per SLP, as medically able.     Nutrition Diagnosis Status:   New     63 y/o  female visiting from [facility], s/p cardiac cath  with LAD 90 %, Cx 100% and % disease  H diabetes 2 , diabetic retinopathy with "eye bleed" per patient 2012.   8/5 P2Y12 163, Vascular Surgery consulted for right ICA stenosis - no intervention at this time; f/u as an outpatient after [facility]  preop workup in progress  8/6 VSS; pt denies cp/sob; obtain vein mapping today by PA's; opth consulted called for bilateral retinal bleeding s/p laser sx 2013; continue asa/statin/bb  8/7 Pt. transferred to Dr. Anastasia Rojas's service - for CABG in am - 2nd case- will obtain head CT non con d/t carotid dz- OR Wednesday - 2nd case  s/p 8/8 CABGx2 LIMA  Post-op Course:   -Multiple blood products for acute post-op anemia 2/2 surgery  -Extubated POD 1, weaned off pressor support  -Thrombocytopenia, DVT prophylaxis and plavix on hold  -Transaminitis, now off statin.    8/10 VSS, transferred to floor. Plts 62, plavix and sq lovenox on hold. AST/ALT trending down to 750/602. No statin/tylenol.  8/11: Lfts in the thousands. Total jeromy remains WNL. ABD us ordered spoke to tech numerous times, escalated to Dr. Rojas. Echo done-heart function appears appropriate no pericardial effusion. amylase and lipase repeat lfts and hep pannel.  8/12: LFTs improved today. Prelim results of US noted today. Dr. House's service called for Consult. Start Plavix today as plt count also improving. Likely discharge Monday and work up as out patient  8/13 Pt w n/v this am, GI f/u today.  LFT's trending down

## 2018-08-13 NOTE — PROGRESS NOTE ADULT - ASSESSMENT
61 y/o female with uncontrolled T2DM (HbA1c 7.2% in the setting of anemia) complicated with proliferative retinopathy, neuropathy. Here with TVD >s/p CABG. Reporting poor PO intake due to GI symptoms. However, glycemic control remains above goal with BG >200s> will increase prandial insulin today and wait for FBG tomorrow prior to making changes to basal insulin doses since this is the 1st day pt has fasting hyperglycemia. No hypoglycemia. LFT improving slowly.  Reviewed with pt importance of diet. She is vegetarian and eats lots of beans and Ensure. Reviewed the indications for supplement and recommeded Glucerna if pt with poor PO intake. Also importance of portion control. Pt verbalized understanding 63 y/o female with uncontrolled T2DM complicated with proliferative retinopathy, neuropathy. Here with TVD >s/p CABG. Reporting poor PO intake due to GI symptoms. However, glycemic control remains above goal with BG >200s> will increase prandial insulin today and wait for FBG tomorrow prior to making changes to basal insulin doses since this is the 1st day pt has fasting hyperglycemia. No hypoglycemia. LFT improving slowly.  Reviewed with pt importance of diet. She is vegetarian and eats lots of beans and Ensure. Reviewed the indications for supplement and recommeded Glucerna if pt with poor PO intake. Also importance of portion control. Pt verbalized understanding

## 2018-08-13 NOTE — DIETITIAN INITIAL EVALUATION ADULT. - ADHERENCE
Pt denies following a particular modified diet PTA. Diet recall suggests low intake of non-starchy vegetables./n/a

## 2018-08-13 NOTE — DIETITIAN INITIAL EVALUATION ADULT. - ORAL INTAKE PTA
good/Pt reports good po intake PTA. Pt follows a vegan diet. Typical meal intake includes: Fruit with biscuits and tea for breakfast. Lunch: Rice with thomas. Dinner: Roti and rice with beans. Denies regular intake of juices/sodas. Pt denies food allergies or intolerance. Takes vitamin B complex supplementation PTA.

## 2018-08-13 NOTE — DIETITIAN INITIAL EVALUATION ADULT. - NS AS NUTRI INTERV MEALS SNACK
General/healthful diet/Carbohydrate - modified diet/Mineral - modified diet/Recommend continue current diet order to promote glucose control and heart health.

## 2018-08-13 NOTE — DIETITIAN INITIAL EVALUATION ADULT. - NS AS NUTRI INTERV COLLABORAT
Endo following and adjusting insulin as needed. Noted DM medication regimen for d/c TBD per pt's insulin requirements.

## 2018-08-13 NOTE — PROGRESS NOTE ADULT - SUBJECTIVE AND OBJECTIVE BOX
VITAL SIGNS    Telemetry:      Vital Signs Last 24 Hrs  T(C): 37 (18 @ 04:17), Max: 37.4 (18 @ 13:30)  T(F): 98.6 (18 @ 04:17), Max: 99.3 (18 @ 13:30)  HR: 78 (18 @ 04:17) (78 - 86)  BP: 152/73 (18 @ 04:17) (148/75 - 153/81)  RR: 18 (18 @ 04:17) (18 - 18)  SpO2: 93% (18 @ 04:17) (92% - 93%)                    @ 07:01  -   @ 07:00  --------------------------------------------------------  IN: 300 mL / OUT: 1450 mL / NET: -1150 mL     @ 07:01  -   @ 09:11  --------------------------------------------------------  IN: 0 mL / OUT: 450 mL / NET: -450 mL          Daily     Daily Weight in k (13 Aug 2018 07:57)        CAPILLARY BLOOD GLUCOSE      POCT Blood Glucose.: 203 mg/dL (13 Aug 2018 07:44)  POCT Blood Glucose.: 191 mg/dL (12 Aug 2018 21:35)  POCT Blood Glucose.: 220 mg/dL (12 Aug 2018 16:50)  POCT Blood Glucose.: 286 mg/dL (12 Aug 2018 12:00)  POCT Blood Glucose.: 307 mg/dL (12 Aug 2018 11:57)                Coumadin    [ ] YES          [  ]      NO                                   PHYSICAL EXAM        Neurology: alert and oriented x 3, nonfocal, no gross deficits  CV : .S1S2 RRR  Sternal Wound :  CDI , Stable  Pacing Wires        [  ]   Settings:                                  Isolated  [  ]  Lungs: bibasilar crackles   Drains:     MS         [  ] Drainage:                 L Pleural  [  ]  Drainage:                R Pleural  [  ]  Drainage:  Abdomen: soft, nontender, nondistended, positive bowel sounds, last bowel movement   :         voiding    Extremities:               aspirin enteric coated 81 milliGRAM(s) Oral daily  clopidogrel Tablet 75 milliGRAM(s) Oral daily  dextrose 40% Gel 15 Gram(s) Oral once PRN  dextrose 5%. 1000 milliLiter(s) IV Continuous <Continuous>  dextrose 50% Injectable 12.5 Gram(s) IV Push once  dextrose 50% Injectable 25 Gram(s) IV Push once  dextrose 50% Injectable 25 Gram(s) IV Push once  dextrose 50% Injectable 50 milliLiter(s) IV Push every 15 minutes  dextrose 50% Injectable 25 milliLiter(s) IV Push every 15 minutes  docusate sodium 100 milliGRAM(s) Oral three times a day  famotidine    Tablet 20 milliGRAM(s) Oral daily  glucagon  Injectable 1 milliGRAM(s) IntraMuscular once PRN  insulin glargine Injectable (LANTUS) 10 Unit(s) SubCutaneous at bedtime  insulin Infusion 4 Unit(s)/Hr IV Continuous <Continuous>  insulin lispro (HumaLOG) corrective regimen sliding scale   SubCutaneous three times a day before meals  insulin lispro (HumaLOG) corrective regimen sliding scale   SubCutaneous at bedtime  insulin lispro Injectable (HumaLOG) 5 Unit(s) SubCutaneous three times a day before meals  metoprolol tartrate 25 milliGRAM(s) Oral every 12 hours  oxyCODONE    IR 5 milliGRAM(s) Oral every 6 hours PRN  polyethylene glycol 3350 17 Gram(s) Oral daily  sodium chloride 0.9% lock flush 3 milliLiter(s) IV Push every 8 hours  sodium chloride 0.9%. 1000 milliLiter(s) IV Continuous <Continuous>                    Physical Therapy Rec:   Home  [  ]   Home w/ PT  [  ]  Rehab  [  ]  Discussed with Cardiothoracic Team at AM rounds. hello  VITAL SIGNS    Telemetry:  nsr 70    Vital Signs Last 24 Hrs  T(C): 37 (18 @ 04:17), Max: 37.4 (18 @ 13:30)  T(F): 98.6 (18 @ 04:17), Max: 99.3 (18 @ 13:30)  HR: 78 (18 @ 04:17) (78 - 86)  BP: 152/73 (18 @ 04:17) (148/75 - 153/81)  RR: 18 (18 @ 04:17) (18 - 18)  SpO2: 93% (18 @ 04:17) (92% - 93%)                    @ 07:01  -   @ 07:00  --------------------------------------------------------  IN: 300 mL / OUT: 1450 mL / NET: -1150 mL     @ 07:01  -   @ 09:11  --------------------------------------------------------  IN: 0 mL / OUT: 450 mL / NET: -450 mL          Daily     Daily Weight in k (13 Aug 2018 07:57)        CAPILLARY BLOOD GLUCOSE      POCT Blood Glucose.: 203 mg/dL (13 Aug 2018 07:44)  POCT Blood Glucose.: 191 mg/dL (12 Aug 2018 21:35)  POCT Blood Glucose.: 220 mg/dL (12 Aug 2018 16:50)  POCT Blood Glucose.: 286 mg/dL (12 Aug 2018 12:00)  POCT Blood Glucose.: 307 mg/dL (12 Aug 2018 11:57)                Coumadin    [ ] YES          [x ]      NO                                   PHYSICAL EXAM        Neurology: alert and oriented x 3, nonfocal, no gross deficits  CV : .S1S2 RRR  Sternal Wound :  CDI , Stable  Pacing Wires        [  ]   Settings:                                  Isolated  [ x ]  Lungs: bibasilar crackles   Abdomen: soft, nontender, nondistended, positive bowel sounds, last bowel movement  3 days ago  :         voiding    Extremities:     - edema - calve tenderness, leg inc echymotic.  L leg inc cdi          aspirin enteric coated 81 milliGRAM(s) Oral daily  clopidogrel Tablet 75 milliGRAM(s) Oral daily  dextrose 40% Gel 15 Gram(s) Oral once PRN  dextrose 5%. 1000 milliLiter(s) IV Continuous <Continuous>  dextrose 50% Injectable 12.5 Gram(s) IV Push once  dextrose 50% Injectable 25 Gram(s) IV Push once  dextrose 50% Injectable 25 Gram(s) IV Push once  dextrose 50% Injectable 50 milliLiter(s) IV Push every 15 minutes  dextrose 50% Injectable 25 milliLiter(s) IV Push every 15 minutes  docusate sodium 100 milliGRAM(s) Oral three times a day  famotidine    Tablet 20 milliGRAM(s) Oral daily  glucagon  Injectable 1 milliGRAM(s) IntraMuscular once PRN  insulin glargine Injectable (LANTUS) 10 Unit(s) SubCutaneous at bedtime  insulin Infusion 4 Unit(s)/Hr IV Continuous <Continuous>  insulin lispro (HumaLOG) corrective regimen sliding scale   SubCutaneous three times a day before meals  insulin lispro (HumaLOG) corrective regimen sliding scale   SubCutaneous at bedtime  insulin lispro Injectable (HumaLOG) 5 Unit(s) SubCutaneous three times a day before meals  metoprolol tartrate 25 milliGRAM(s) Oral every 12 hours  oxyCODONE    IR 5 milliGRAM(s) Oral every 6 hours PRN  polyethylene glycol 3350 17 Gram(s) Oral daily  sodium chloride 0.9% lock flush 3 milliLiter(s) IV Push every 8 hours  sodium chloride 0.9%. 1000 milliLiter(s) IV Continuous <Continuous>                    Physical Therapy Rec:   Home  [  ]   Home w/ PT  [  ]  Rehab  [  ]  Discussed with Cardiothoracic Team at AM rounds.

## 2018-08-13 NOTE — PROGRESS NOTE ADULT - ASSESSMENT
61 y/o F with PMH HTN, DM, fam hx CAD who presented with CP found to have multi-vessel disease, s/p CABG on 8/8 with LIMA to LAD and RSVG to RCA      - seems to have tolerated procedure well and hemodynamically stable  - Junctional rhythm on telemetry seems to have resolved.  She has been started on metoprolol 25 BID and seems to be tolerating this.  - mild-moderate MR noted on intra-op MILES.  - carotid disease will need intervention as an outpt. Vascular surgery input appreciated.  - there is no evidence for meaningful volume overload. Chest tubes have bene discontinued  - c/w ASA  - statin on hold for transaminitis.  Continue to follow to resolution and resume when normalized  - GI follow up for nausea and vomiting.  - BP acceptable  - Follow platelet count.  - monitor electrolytes. Keep K>4, Mg >2  - Ambulate and IS  - will follow with you.

## 2018-08-13 NOTE — DIETITIAN INITIAL EVALUATION ADULT. - PERTINENT MEDS FT
NaCl @ 10 ml/hr, Humalog SS + 7 units pre-meal coverage, Lantus 10 units at bedtime, Colace, Miralax

## 2018-08-13 NOTE — PROGRESS NOTE ADULT - SUBJECTIVE AND OBJECTIVE BOX
AGAPITO AKSILVINA:[id number],   62yFemale followed for:  No Known Allergies    PAST MEDICAL & SURGICAL HISTORY:  Retinopathy  Neuropathy  DM (diabetes mellitus)  HTN (hypertension)  History of cholecystectomy    FAMILY HISTORY:  Coronary artery disease (Sibling): pre mature CAD    MEDICATIONS  (STANDING):  aspirin enteric coated 81 milliGRAM(s) Oral daily  clopidogrel Tablet 75 milliGRAM(s) Oral daily  dextrose 5%. 1000 milliLiter(s) (50 mL/Hr) IV Continuous <Continuous>  dextrose 50% Injectable 12.5 Gram(s) IV Push once  dextrose 50% Injectable 25 Gram(s) IV Push once  dextrose 50% Injectable 25 Gram(s) IV Push once  dextrose 50% Injectable 50 milliLiter(s) IV Push every 15 minutes  dextrose 50% Injectable 25 milliLiter(s) IV Push every 15 minutes  docusate sodium 100 milliGRAM(s) Oral three times a day  famotidine    Tablet 20 milliGRAM(s) Oral daily  insulin glargine Injectable (LANTUS) 10 Unit(s) SubCutaneous at bedtime  insulin Infusion 4 Unit(s)/Hr (4 mL/Hr) IV Continuous <Continuous>  insulin lispro (HumaLOG) corrective regimen sliding scale   SubCutaneous three times a day before meals  insulin lispro (HumaLOG) corrective regimen sliding scale   SubCutaneous at bedtime  insulin lispro Injectable (HumaLOG) 5 Unit(s) SubCutaneous three times a day before meals  metoprolol tartrate 25 milliGRAM(s) Oral every 12 hours  polyethylene glycol 3350 17 Gram(s) Oral daily  sodium chloride 0.9% lock flush 3 milliLiter(s) IV Push every 8 hours  sodium chloride 0.9%. 1000 milliLiter(s) (10 mL/Hr) IV Continuous <Continuous>    MEDICATIONS  (PRN):  dextrose 40% Gel 15 Gram(s) Oral once PRN Blood Glucose LESS THAN 70 milliGRAM(s)/deciliter  glucagon  Injectable 1 milliGRAM(s) IntraMuscular once PRN Glucose LESS THAN 70 milligrams/deciliter  oxyCODONE    IR 5 milliGRAM(s) Oral every 6 hours PRN Moderate Pain (4 - 6)      Vital Signs Last 24 Hrs  T(C): 37 (13 Aug 2018 04:17), Max: 37.4 (12 Aug 2018 13:30)  T(F): 98.6 (13 Aug 2018 04:17), Max: 99.3 (12 Aug 2018 13:30)  HR: 78 (13 Aug 2018 04:17) (78 - 86)  BP: 152/73 (13 Aug 2018 04:17) (148/75 - 153/81)  BP(mean): --  RR: 18 (13 Aug 2018 04:17) (18 - 18)  SpO2: 93% (13 Aug 2018 04:17) (92% - 93%)  nc/at  s1s2  cta  soft, nt, nd no guarding or rebound  no c/c/e    CBC Full  -  ( 13 Aug 2018 04:47 )  WBC Count : 7.7 K/uL  Hemoglobin : 11.4 g/dL  Hematocrit : 34.8 %  Platelet Count - Automated : 122 K/uL  Mean Cell Volume : 88.1 fl  Mean Cell Hemoglobin : 28.8 pg  Mean Cell Hemoglobin Concentration : 32.7 gm/dL  Auto Neutrophil # : x  Auto Lymphocyte # : x  Auto Monocyte # : x  Auto Eosinophil # : x  Auto Basophil # : x  Auto Neutrophil % : x  Auto Lymphocyte % : x  Auto Monocyte % : x  Auto Eosinophil % : x  Auto Basophil % : x    08-13    133<L>  |  96  |  17  ----------------------------<  220<H>  4.5   |  25  |  0.94    Ca    9.2      13 Aug 2018 04:47    TPro  7.1  /  Alb  4.0  /  TBili  1.2  /  DBili  x   /  AST  106<H>  /  ALT  731<H>  /  AlkPhos  80  08-13    PT/INR - ( 12 Aug 2018 06:55 )   PT: 13.9 sec;   INR: 1.28 ratio

## 2018-08-13 NOTE — DIETITIAN INITIAL EVALUATION ADULT. - OTHER INFO
Pt seen for consult: post-op hyperglycemia. Pt seen for consult: post-op hyperglycemia. Pt currently reports fair po intake/appetite, states her appetite decreased s/p CABG and she has also been experiencing some N+V post-op. Pt currently reports no vomiting today, nausea greatly improved. Denies chewing/swallowing difficulty, denies diarrhea or constipation. States she sips on Glucerna or will have fruits as tolerated at this time. Pt is a vegan, therefore does not accept egg or dairy products. Is aware to supplement meals as needed with container of Glucerna to help meet post-op nutrition needs. In terms of DM control, pt reports adhering to Metformin PTA, owns glucometer and would check her fingersticks daily with fasting results often 75-80. Pt exhibits general understanding of foods that contain carbohydrate, admits her diet consists mostly of rice, beans and roti. Pt cooks mostly of her meals herself and denies adding much salt. Is amenable to discuss additional heart healthy diet and T2DM diet/management recommendations at this time.

## 2018-08-14 ENCOUNTER — TRANSCRIPTION ENCOUNTER (OUTPATIENT)
Age: 63
End: 2018-08-14

## 2018-08-14 LAB
ALBUMIN SERPL ELPH-MCNC: 3.8 G/DL — SIGNIFICANT CHANGE UP (ref 3.3–5)
ALP SERPL-CCNC: 72 U/L — SIGNIFICANT CHANGE UP (ref 40–120)
ALT FLD-CCNC: 427 U/L — HIGH (ref 10–45)
ANION GAP SERPL CALC-SCNC: 13 MMOL/L — SIGNIFICANT CHANGE UP (ref 5–17)
AST SERPL-CCNC: 45 U/L — HIGH (ref 10–40)
BILIRUB SERPL-MCNC: 0.9 MG/DL — SIGNIFICANT CHANGE UP (ref 0.2–1.2)
BUN SERPL-MCNC: 18 MG/DL — SIGNIFICANT CHANGE UP (ref 7–23)
CALCIUM SERPL-MCNC: 9.2 MG/DL — SIGNIFICANT CHANGE UP (ref 8.4–10.5)
CHLORIDE SERPL-SCNC: 97 MMOL/L — SIGNIFICANT CHANGE UP (ref 96–108)
CO2 SERPL-SCNC: 25 MMOL/L — SIGNIFICANT CHANGE UP (ref 22–31)
CREAT SERPL-MCNC: 0.94 MG/DL — SIGNIFICANT CHANGE UP (ref 0.5–1.3)
GLUCOSE BLDC GLUCOMTR-MCNC: 148 MG/DL — HIGH (ref 70–99)
GLUCOSE BLDC GLUCOMTR-MCNC: 178 MG/DL — HIGH (ref 70–99)
GLUCOSE BLDC GLUCOMTR-MCNC: 212 MG/DL — HIGH (ref 70–99)
GLUCOSE BLDC GLUCOMTR-MCNC: 99 MG/DL — SIGNIFICANT CHANGE UP (ref 70–99)
GLUCOSE SERPL-MCNC: 157 MG/DL — HIGH (ref 70–99)
HCT VFR BLD CALC: 34.1 % — LOW (ref 34.5–45)
HGB BLD-MCNC: 11.2 G/DL — LOW (ref 11.5–15.5)
MCHC RBC-ENTMCNC: 28.8 PG — SIGNIFICANT CHANGE UP (ref 27–34)
MCHC RBC-ENTMCNC: 32.8 GM/DL — SIGNIFICANT CHANGE UP (ref 32–36)
MCV RBC AUTO: 87.8 FL — SIGNIFICANT CHANGE UP (ref 80–100)
MITOCHONDRIA AB SER-ACNC: SIGNIFICANT CHANGE UP
PLATELET # BLD AUTO: 170 K/UL — SIGNIFICANT CHANGE UP (ref 150–400)
POTASSIUM SERPL-MCNC: 4.6 MMOL/L — SIGNIFICANT CHANGE UP (ref 3.5–5.3)
POTASSIUM SERPL-SCNC: 4.6 MMOL/L — SIGNIFICANT CHANGE UP (ref 3.5–5.3)
PROT SERPL-MCNC: 6.7 G/DL — SIGNIFICANT CHANGE UP (ref 6–8.3)
RBC # BLD: 3.88 M/UL — SIGNIFICANT CHANGE UP (ref 3.8–5.2)
RBC # FLD: 14.6 % — HIGH (ref 10.3–14.5)
SMOOTH MUSCLE AB SER-ACNC: SIGNIFICANT CHANGE UP
SODIUM SERPL-SCNC: 135 MMOL/L — SIGNIFICANT CHANGE UP (ref 135–145)
WBC # BLD: 8.5 K/UL — SIGNIFICANT CHANGE UP (ref 3.8–10.5)
WBC # FLD AUTO: 8.5 K/UL — SIGNIFICANT CHANGE UP (ref 3.8–10.5)

## 2018-08-14 PROCEDURE — 99232 SBSQ HOSP IP/OBS MODERATE 35: CPT

## 2018-08-14 RX ORDER — METOPROLOL TARTRATE 50 MG
50 TABLET ORAL
Qty: 0 | Refills: 0 | Status: DISCONTINUED | OUTPATIENT
Start: 2018-08-14 | End: 2018-08-15

## 2018-08-14 RX ORDER — INSULIN GLARGINE 100 [IU]/ML
12 INJECTION, SOLUTION SUBCUTANEOUS AT BEDTIME
Qty: 0 | Refills: 0 | Status: DISCONTINUED | OUTPATIENT
Start: 2018-08-14 | End: 2018-08-15

## 2018-08-14 RX ORDER — INSULIN LISPRO 100/ML
8 VIAL (ML) SUBCUTANEOUS
Qty: 0 | Refills: 0 | Status: DISCONTINUED | OUTPATIENT
Start: 2018-08-14 | End: 2018-08-15

## 2018-08-14 RX ADMIN — SODIUM CHLORIDE 3 MILLILITER(S): 9 INJECTION INTRAMUSCULAR; INTRAVENOUS; SUBCUTANEOUS at 22:04

## 2018-08-14 RX ADMIN — Medication 100 MILLIGRAM(S): at 22:01

## 2018-08-14 RX ADMIN — Medication 7 UNIT(S): at 07:55

## 2018-08-14 RX ADMIN — INSULIN GLARGINE 12 UNIT(S): 100 INJECTION, SOLUTION SUBCUTANEOUS at 21:59

## 2018-08-14 RX ADMIN — CLOPIDOGREL BISULFATE 75 MILLIGRAM(S): 75 TABLET, FILM COATED ORAL at 12:08

## 2018-08-14 RX ADMIN — Medication 2: at 07:55

## 2018-08-14 RX ADMIN — Medication 50 MILLIGRAM(S): at 17:20

## 2018-08-14 RX ADMIN — SODIUM CHLORIDE 3 MILLILITER(S): 9 INJECTION INTRAMUSCULAR; INTRAVENOUS; SUBCUTANEOUS at 16:26

## 2018-08-14 RX ADMIN — FAMOTIDINE 20 MILLIGRAM(S): 10 INJECTION INTRAVENOUS at 12:08

## 2018-08-14 RX ADMIN — Medication 100 MILLIGRAM(S): at 06:16

## 2018-08-14 RX ADMIN — Medication 7 UNIT(S): at 12:07

## 2018-08-14 RX ADMIN — Medication 81 MILLIGRAM(S): at 12:09

## 2018-08-14 RX ADMIN — Medication 8 UNIT(S): at 17:19

## 2018-08-14 RX ADMIN — Medication 1: at 12:07

## 2018-08-14 RX ADMIN — SODIUM CHLORIDE 3 MILLILITER(S): 9 INJECTION INTRAMUSCULAR; INTRAVENOUS; SUBCUTANEOUS at 06:15

## 2018-08-14 RX ADMIN — Medication 100 MILLIGRAM(S): at 12:08

## 2018-08-14 RX ADMIN — Medication 25 MILLIGRAM(S): at 07:55

## 2018-08-14 RX ADMIN — POLYETHYLENE GLYCOL 3350 17 GRAM(S): 17 POWDER, FOR SOLUTION ORAL at 12:06

## 2018-08-14 NOTE — DISCHARGE NOTE ADULT - NS AS ACTIVITY OBS
Stairs allowed/Sex allowed/Walking-Outdoors allowed/Showering allowed/Walking-Indoors allowed/No Heavy lifting/straining

## 2018-08-14 NOTE — PROGRESS NOTE ADULT - PROBLEM SELECTOR PLAN 1
IN HOSPITAL:  -test BG AC/HS  -Increase Lantus dose to 12 units QHS  -Increase Humalog to 8 units w/meals. HOLD IF NOT EATING  -c/w Humalog low correction scale AC and Low HS scale  -Teach pt to prepare and inject insulin with insulin syringes> spoke to RN   Discharge plan:  Metformin contraindicated while LFT remains elevated.   Recommending 70/30 Humulin mix insulin 15 units ac breakfast and 10 units ac dinner. Pt instructed of the importance of eating 3 meals/day while on mix insulin and testing BG ac and hs. She verbalized understanding.  -If pt unable to do insulin would consider Glucotrol XL 10 mg daily> also pt must eat 3 meals/yaritza.  -If going home on insulin needs home care services >free visits> due to new insulin therapy.  Please write Rxs for: Humulin 70/30 mix insulin 2 vials plus 1/2cc insulin syringes # 1 box/glucose meter/strips/lancets  -Please make f/u apt with medicine clinic for f/u care  -Plan discussed with pt/team and RN.  Contact info: [phone] (24/7). pager [phone]

## 2018-08-14 NOTE — DISCHARGE NOTE ADULT - PLAN OF CARE
reacovery Diabetic Regular no added salt diet  Shower daily  Daily weights, call for a gain > 3 lbs recovery

## 2018-08-14 NOTE — DISCHARGE NOTE ADULT - ADDITIONAL INSTRUCTIONS
YOU WILL NEED TO SEE DR NESBITT NEXT WEEK ON 8/20 at 11:30 am at her office [address]    YOU [facility] APPOINTMENT [facility] [address] 9 AM ON 8/24, FRIDAY .  [phone]  BRING IDENTIFICATION AND ANY PROOF OF INCOME FOR SLIDING SCALE COVERAGE    PLEASE FOLLOW UP WITH DR QUAN FOR YOUR CAROTID ARTERY DISEASE.  [phone] YOU WILL NEED TO SEE DR NESBITT NEXT WEEK ON 8/20 at 11:30 am at her office [address] [place] [address]    YOU HAVE AN APPOINTMENT AT THE [facility]  [address] AT 9 AM ON 8/24, FRIDAY .  [phone]  BRING IDENTIFICATION AND ANY PROOF OF INCOME FOR SLIDING SCALE COVERAGE    PLEASE FOLLOW UP WITH DR QUAN  OR THE VASCULAR SURGERY CLINIC AT [address] [place] [address], [address] [place] [address] FOR YOUR CAROTID ARTERY DISEASE.  [phone]  MONDAY 9/17 AT 8:30 AM YOU WILL NEED TO SEE DR NESBITT NEXT WEEK ON 8/20 at 11:30 am at her office [address] [place] [address]    YOU HAVE AN APPOINTMENT AT THE [facility]  [address] AT 9 AM ON 8/24, FRIDAY .  [phone]  BRING IDENTIFICATION, PROOF OF ADDRESS, AND ANY PROOF OF INCOME FOR SLIDING SCALE COVERAGE    YOU HAVE AN APPOINTMENT AT THE  OPTHAMOLOGY CLINIC, [address] [place] [address], [address] ,  ON 8/28 AT 9:30 AM, [phone]    PLEASE FOLLOW UP WITH DR QUAN  OR THE VASCULAR SURGERY CLINIC AT [address] [place] [address], [address] [place] [address] FOR YOUR CAROTID ARTERY DISEASE.  [phone]  MONDAY 9/17 AT 8:30 AM

## 2018-08-14 NOTE — PROGRESS NOTE ADULT - SUBJECTIVE AND OBJECTIVE BOX
Diabetes Follow up note: Saw pt earlier today  Interval Hx: 61 y/o F w/uncontrolled T2DM c/b retinopathy, neuropathy, here w/chest pain found to have TVD s/p CABG x 2 (8/8). Reports improved PO intake >denies N/V. Glycemic control variable between 100s to 200s while on present insulin regimen requiring 8 to 9 total units of Humalog insulin with meals. Also remains with fasting hyperglycemia even though pt denies eating/drinking anything overnight.  No hypoglycemia.     Review of Systems:  General: "I'm better today"  CV: No CP/SOB  GI: Tolerating POs without any N/V/D/ABD PAIN.   ENDO: No S&Sx of hypoglycemia      MEDS:  insulin glargine Injectable (LANTUS) 10 Unit(s) SubCutaneous at bedtime  insulin Infusion 4 Unit(s)/Hr IV Continuous <Continuous>  insulin lispro (HumaLOG) corrective regimen sliding scale   SubCutaneous three times a day before meals  insulin lispro (HumaLOG) corrective regimen sliding scale   SubCutaneous at bedtime  insulin lispro Injectable (HumaLOG) 7 Unit(s) SubCutaneous three times a day before meals    Allergies    No Known Allergies      PE:  General: Female sitting in chair in NAD.   Vital Signs Last 24 Hrs  T(C): 36.8 (08-14-18 @ 14:06), Max: 37.2 (08-13-18 @ 19:36)  T(F): 98.2 (08-14-18 @ 14:06), Max: 99 (08-13-18 @ 19:36)  HR: 81 (08-14-18 @ 14:06) (80 - 92)  BP: 163/85 (08-14-18 @ 14:06) (133/75 - 167/74)  BP(mean): 105 (08-13-18 @ 19:36) (105 - 105)  RR: 18 (08-14-18 @ 14:06) (18 - 18)  SpO2: 96% (08-14-18 @ 14:06) (94% - 97%)  Chest: Midsternal incision D&I  Abd: Soft, NT, ND,   Extremities: Warm. no edema noted in all 4 exts.   Neuro: A&O X3    LABS:  POCT Blood Glucose.: 178 mg/dL (08-14-18 @ 11:39)  POCT Blood Glucose.: 212 mg/dL (08-14-18 @ 07:49)  POCT Blood Glucose.: 122 mg/dL (08-13-18 @ 21:36)  POCT Blood Glucose.: 200 mg/dL (08-13-18 @ 16:33)  POCT Blood Glucose.: 284 mg/dL (08-13-18 @ 11:57)  POCT Blood Glucose.: 203 mg/dL (08-13-18 @ 07:44)  POCT Blood Glucose.: 191 mg/dL (08-12-18 @ 21:35)  POCT Blood Glucose.: 220 mg/dL (08-12-18 @ 16:50)                          11.2   8.5   )-----------( 170      ( 14 Aug 2018 06:08 )             34.1     08-14    135  |  97  |  18  ----------------------------<  157<H>  4.6   |  25  |  0.94    Ca    9.2      14 Aug 2018 06:08    TPro  6.7  /  Alb  3.8  /  TBili  0.9  /  DBili  x   /  AST  45<H>  /  ALT  427<H>  /  AlkPhos  72  08-14  TPro  7.1  /  Alb  4.0  /  TBili  1.2  /  DBili  x   /  AST  106<H>  /  ALT  731<H>  /  AlkPhos  80  08-13  TPro  6.0  /  Alb  3.7  /  TBili  0.8  /  DBili  0.3<H>  /  AST  950<H>  /  ALT  1376<H>  /  AlkPhos  78  08-11      Thyroid Function Tests:  08-04 @ 14:18 TSH 4.04 FreeT4 -- T3 81 Anti TPO -- Anti Thyroglobulin Ab -- TSI --      Hemoglobin A1C, Whole Blood: 7.2 % <H> [4.0 - 5.6] (08-04-18 @ 14:18)

## 2018-08-14 NOTE — PROGRESS NOTE ADULT - ASSESSMENT
61 y/o  female visiting from [facility], s/p cardiac cath  with LAD 90 %, Cx 100% and % disease  H diabetes 2 , diabetic retinopathy with "eye bleed" per patient 2012.   8/5 P2Y12 163, Vascular Surgery consulted for right ICA stenosis - no intervention at this time; f/u as an outpatient after OHS  preop workup in progress  8/6 VSS; pt denies cp/sob; obtain vein mapping today by PA's; opth consulted called for bilateral retinal bleeding s/p laser sx 2013; continue asa/statin/bb  8/7 Pt. transferred to Dr. Anastasia Rojas's service - for CABG in am - 2nd case- will obtain head CT non con d/t carotid dz- OR Wednesday - 2nd case  s/p 8/8 CABGx2 LIMA  Post-op Course:   -Multiple blood products for acute post-op anemia 2/2 surgery  -Extubated POD 1, weaned off pressor support  -Thrombocytopenia, DVT prophylaxis and plavix on hold  -Transaminitis, now off statin.    8/10 VSS, transferred to floor. Plts 62, plavix and sq lovenox on hold. AST/ALT trending down to 750/602. No statin/tylenol.  8/11: Lfts in the thousands. Total jeromy remains WNL. ABD us ordered spoke to tech numerous times, escalated to Dr. Rojas. Echo done-heart function appears appropriate no pericardial effusion. amylase and lipase repeat lfts and hep pannel.  8/12: LFTs improved today. Prelim results of US noted today. Dr. House's service called for Consult. Start Plavix today as plt count also improving. Likely discharge Monday and work up as out patient  8/13 Pt w n/v this am, GI f/u today.  LFT's trending down  8/14 Uncontrolled glucose and with elevated lfts cannot take metformin,  Prandin is a costly medication and glucotrol may not provide adequate glucose control as d/w endo.  Insulin 70/30 is an alternative.  The patient is being taught insulin administration as d/w the patients daughter and the patient.  They agree with taking insulin.   She does not have her glasses and is unable to see at this time.  Will continue to educate when the daughter is present and provides her glasses.  d/c planning for wednesday.  D/w GI , no further w/u at this time, ct unremarkable

## 2018-08-14 NOTE — DISCHARGE NOTE ADULT - MEDICATION SUMMARY - MEDICATIONS TO STOP TAKING
I will STOP taking the medications listed below when I get home from the hospital:    amLODIPine 10 mg oral tablet  -- 1 tab(s) by mouth once a day  home & hosp    metFORMIN 500 mg oral tablet  -- 1 tab(s) by mouth 2 times a day  home & hosp

## 2018-08-14 NOTE — DISCHARGE NOTE ADULT - [id number]_GEN_ALL_CORE
rehabilitation services/medication teaching and assessment/observation and assessment/teaching and training/other:

## 2018-08-14 NOTE — DISCHARGE NOTE ADULT - HOSPITAL COURSE
61 y/o  female visiting from [facility], s/p cardiac cath  with LAD 90 %, Cx 100% and % disease  H diabetes 2 , diabetic retinopathy with "eye bleed" per patient 2012.   8/5 P2Y12 163, Vascular Surgery consulted for right ICA stenosis - no intervention at this time; f/u as an outpatient after OHS  preop workup in progress  8/6 VSS; pt denies cp/sob; obtain vein mapping today by PA's; opth consulted called for bilateral retinal bleeding s/p laser sx 2013; continue asa/statin/bb  8/7 Pt. transferred to Dr. Anastasia Rojas's service - for CABG in am - 2nd case- will obtain head CT non con d/t carotid dz- OR Wednesday - 2nd case  s/p 8/8 CABGx2 LIMA  Post-op Course:   -Multiple blood products for acute post-op anemia 2/2 surgery  -Extubated POD 1, weaned off pressor support  -Thrombocytopenia, DVT prophylaxis and plavix on hold  -Transaminitis, now off statin.    8/10 VSS, transferred to floor. Plts 62, plavix and sq lovenox on hold. AST/ALT trending down to 750/602. No statin/tylenol.  8/11: Lfts in the thousands. Total jeromy remains WNL. ABD us ordered spoke to tech numerous times, escalated to Dr. Rojas. Echo done-heart function appears appropriate no pericardial effusion. amylase and lipase repeat lfts and hep pannel.  8/12: LFTs improved today. Prelim results of US noted today. Dr. House's service called for Consult. Start Plavix today as plt count also improving. Likely discharge Monday and work up as out patient  8/13 Pt w n/v this am, GI f/u today.  LFT's trending down  8/14 Uncontrolled glucose and with elevated lfts cannot take metformin,  Prandin is a costly medication and glucotrol may not provide adequate glucose control as d/w endo.  Insulin 70/30 is an alternative.  The patient is being taught insulin administration as d/w the patients daughter and the patient.  They agree with taking insulin.   She does not have her glasses and is unable to see at this time.  Will continue to educate when the daughter is present and provides her glasses.  d/c planning for wednesday.  D/w GI , no further w/u at this time, ct unremarkable 61 y/o  female visiting from [facility], s/p cardiac cath  with LAD 90 %, Cx 100% and % disease  H diabetes 2 , diabetic retinopathy with "eye bleed" per patient 2012.   8/5 P2Y12 163, Vascular Surgery consulted for right ICA stenosis - no intervention at this time; f/u as an outpatient after OHS  preop workup in progress  8/6 VSS; pt denies cp/sob; obtain vein mapping today by PA's; opth consulted called for bilateral retinal bleeding s/p laser sx 2013; continue asa/statin/bb  8/7 Pt. transferred to Dr. Anastasia Rojas's service - for CABG in am - 2nd case- will obtain head CT non con d/t carotid dz- OR Wednesday - 2nd case  s/p 8/8 CABGx2 LIMA  Post-op Course:   -Multiple blood products for acute post-op anemia 2/2 surgery  -Extubated POD 1, weaned off pressor support  -Thrombocytopenia, DVT prophylaxis and plavix on hold  -Transaminitis, now off statin.    8/10 VSS, transferred to floor. Plts 62, plavix and sq lovenox on hold. AST/ALT trending down to 750/602. No statin/tylenol.  8/11: Lfts in the thousands. Total jeromy remains WNL. ABD us ordered spoke to tech numerous times, escalated to Dr. Rojas. Echo done-heart function appears appropriate no pericardial effusion. amylase and lipase repeat lfts and hep pannel.  8/12: LFTs improved today. Prelim results of US noted today. Dr. House's service called for Consult. Start Plavix today as plt count also improving. Likely discharge Monday and work up as out patient  8/13 Pt w n/v this am, GI f/u today.  LFT's trending down  8/14 Uncontrolled glucose and with elevated lfts cannot take metformin,  Prandin is a costly medication and glucotrol may not provide adequate glucose control as d/w endo.  Insulin 70/30 is an alternative.  The patient is being taught insulin administration as d/w the patients daughter and the patient.  They agree with taking insulin.   She does not have her glasses and is unable to see at this time.  Will continue to educate when the daughter is present and provides her glasses.  d/c planning for wednesday.  D/w GI , no further w/u at this time, ct unremarkable  8/15 "Pt can start Glimiperide 2mg daily in AM (start 8/16)* pt must eat 3 meals/day  Hold off on Metformin until LFTs followed up outpt. Can restart when normalized."

## 2018-08-14 NOTE — PHYSICAL THERAPY INITIAL EVALUATION ADULT - PLANNED THERAPY INTERVENTIONS, PT EVAL
bed mobility training/transfer training/gait training/Stair Training: Goal: Improve to 6 steps I with single rail, step to pattern by 4 weeks.

## 2018-08-14 NOTE — PROGRESS NOTE ADULT - ASSESSMENT
63 y/o female with uncontrolled T2DM c/b proliferative retinopathy, neuropathy. Here with TVD >s/p CABG. Improving PO intake and resolved GI symptoms. However, glycemic control remains above goal with BG >200s most of the time including fasting readings. No hypoglycemia. Per primary team pt might be discharged today or tomorrow. Discussed with tp and team benefits of insulin therapy after open heart surgery and due to persistent hyperglycemia even while on insulin. LFT remains elevated but improving   Pt willing to learn insulin injections.  Spoke to RN to teach pt how to prepare and inject insulin with syringes.

## 2018-08-14 NOTE — PROGRESS NOTE ADULT - SUBJECTIVE AND OBJECTIVE BOX
[facility] - Keith Torrez, James, Fernandez, Nohelia, Loraine Riley  Office Number:  [phone]    Patient resting comfortably in bed in NAD.  Laying flat with no respiratory distress.  No complaints of chest pain, dyspnea, palpitations, PND, or orthopnea.    F/U for:  CAD    Telemetry:  Normal sinus rhythm    MEDICATIONS  (STANDING):  aspirin enteric coated 81 milliGRAM(s) Oral daily  clopidogrel Tablet 75 milliGRAM(s) Oral daily  dextrose 5%. 1000 milliLiter(s) (50 mL/Hr) IV Continuous <Continuous>  dextrose 50% Injectable 12.5 Gram(s) IV Push once  dextrose 50% Injectable 25 Gram(s) IV Push once  dextrose 50% Injectable 25 Gram(s) IV Push once  dextrose 50% Injectable 50 milliLiter(s) IV Push every 15 minutes  dextrose 50% Injectable 25 milliLiter(s) IV Push every 15 minutes  docusate sodium 100 milliGRAM(s) Oral three times a day  famotidine    Tablet 20 milliGRAM(s) Oral daily  insulin glargine Injectable (LANTUS) 10 Unit(s) SubCutaneous at bedtime  insulin Infusion 4 Unit(s)/Hr (4 mL/Hr) IV Continuous <Continuous>  insulin lispro (HumaLOG) corrective regimen sliding scale   SubCutaneous three times a day before meals  insulin lispro (HumaLOG) corrective regimen sliding scale   SubCutaneous at bedtime  insulin lispro Injectable (HumaLOG) 7 Unit(s) SubCutaneous three times a day before meals  metoprolol tartrate 25 milliGRAM(s) Oral every 12 hours  polyethylene glycol 3350 17 Gram(s) Oral daily  sodium chloride 0.9% lock flush 3 milliLiter(s) IV Push every 8 hours  sodium chloride 0.9%. 1000 milliLiter(s) (10 mL/Hr) IV Continuous <Continuous>    MEDICATIONS  (PRN):  dextrose 40% Gel 15 Gram(s) Oral once PRN Blood Glucose LESS THAN 70 milliGRAM(s)/deciliter  glucagon  Injectable 1 milliGRAM(s) IntraMuscular once PRN Glucose LESS THAN 70 milligrams/deciliter  oxyCODONE    IR 5 milliGRAM(s) Oral every 6 hours PRN Moderate Pain (4 - 6)      Allergies    No Known Allergies    Intolerances        Vital Signs Last 24 Hrs  T(C): 36.9 (14 Aug 2018 05:00), Max: 37.2 (13 Aug 2018 19:36)  T(F): 98.5 (14 Aug 2018 05:00), Max: 99 (13 Aug 2018 19:36)  HR: 89 (14 Aug 2018 05:00) (80 - 89)  BP: 157/74 (14 Aug 2018 05:00) (133/70 - 167/74)  BP(mean): 105 (13 Aug 2018 19:36) (105 - 105)  RR: 18 (14 Aug 2018 05:00) (18 - 18)  SpO2: 97% (14 Aug 2018 05:00) (94% - 97%)    I&O's Summary    13 Aug 2018 07:01  -  14 Aug 2018 07:00  --------------------------------------------------------  IN: 996 mL / OUT: 2950 mL / NET: -1954 mL        ON EXAM:  General: NAD, awake and alert, oriented x 3  HEENT: Mucous membranes are moist, anicteric  Lungs: Non-labored, breath sounds are clear bilaterally, No wheezing, rales or rhonchi  Cardiovascular: Regular, S1 and S2, 1/6 systolic murmur  Gastrointestinal: Bowel Sounds present, soft, nontender.   Lymph: No peripheral edema. No lymphadenopathy.  Skin: No rashes or ulcers; vertical sternal wound healing well.  Psych:  Mood & affect appropriate    LABS: All Labs Reviewed:                        11.2   8.5   )-----------( 170      ( 14 Aug 2018 06:08 )             34.1                         11.4   7.7   )-----------( 122      ( 13 Aug 2018 04:47 )             34.8                         10.3   8.1   )-----------( 88       ( 12 Aug 2018 06:55 )             30.9     14 Aug 2018 06:08    135    |  97     |  18     ----------------------------<  157    4.6     |  25     |  0.94   13 Aug 2018 04:47    133    |  96     |  17     ----------------------------<  220    4.5     |  25     |  0.94   12 Aug 2018 06:55    138    |  103    |  17     ----------------------------<  146    4.3     |  24     |  1.01     Ca    9.2        14 Aug 2018 06:08  Ca    9.2        13 Aug 2018 04:47  Ca    8.8        12 Aug 2018 06:55    TPro  6.7    /  Alb  3.8    /  TBili  0.9    /  DBili  x      /  AST  45     /  ALT  427    /  AlkPhos  72     14 Aug 2018 06:08  TPro  7.1    /  Alb  4.0    /  TBili  1.2    /  DBili  x      /  AST  106    /  ALT  731    /  AlkPhos  80     13 Aug 2018 04:47  TPro  6.1    /  Alb  3.8    /  TBili  1.1    /  DBili  0.3    /  AST  402    /  ALT  1012   /  AlkPhos  65     12 Aug 2018 06:55

## 2018-08-14 NOTE — DISCHARGE NOTE ADULT - PATIENT PORTAL LINK FT
You can access the [facility], offered by [facility], by registering with the following website: [email or web address]

## 2018-08-14 NOTE — PROGRESS NOTE ADULT - ASSESSMENT
63 y/o F with PMH HTN, DM, fam hx CAD who presented with CP found to have multi-vessel disease, s/p CABG on 8/8 with LIMA to LAD and RSVG to RCA      - seems to have tolerated procedure well and hemodynamically stable  - Junctional rhythm on telemetry seems to have resolved.  She has been started on metoprolol 25 BID and seems to be tolerating this.  - mild-moderate MR noted on intra-op MILES.  - carotid disease will need intervention as an outpt. Vascular surgery input appreciated.  - there is no evidence for meaningful volume overload.    - c/w ASA  - statin on hold for transaminitis.  Continue to follow to resolution and resume when normalized  - GI follow up for nausea and vomiting.  - BP acceptable  - Follow platelet count.  - monitor electrolytes. Keep K>4, Mg >2  - Ambulate and IS  - will follow with you.

## 2018-08-14 NOTE — DISCHARGE NOTE ADULT - INSTRUCTIONS
Diabetic diet Diabetic diet    PLEASE CHECK YOUR GLUCOSE 4 TIMES /DAY.  BEFORE EACH MEAL AND AT BEDTIME AND WRITE IT DOWN.  BRING THE INFORMATION TO EACH OF YOUR DOCTOR APPOINTMENTS.  CALL CARDIOTHORACIC SURGERY OR ENDOCRINOLOGY  [phone] WITH GLUCOSE LEVELS ABOVE 170 OR BELOW 75

## 2018-08-14 NOTE — DISCHARGE NOTE ADULT - COMMUNITY RESOURCES
Please contact the [facility] ([phone]) to make a sliding scale appointment for primary care.

## 2018-08-14 NOTE — PROGRESS NOTE ADULT - SUBJECTIVE AND OBJECTIVE BOX
INTERVAL HPI/OVERNIGHT EVENTS: doing well, discussed LFTs trending down, CT negative for liver lesions, d/c planning    MEDICATIONS  (STANDING):  aspirin enteric coated 81 milliGRAM(s) Oral daily  clopidogrel Tablet 75 milliGRAM(s) Oral daily  dextrose 5%. 1000 milliLiter(s) (50 mL/Hr) IV Continuous <Continuous>  dextrose 50% Injectable 12.5 Gram(s) IV Push once  dextrose 50% Injectable 25 Gram(s) IV Push once  dextrose 50% Injectable 25 Gram(s) IV Push once  dextrose 50% Injectable 50 milliLiter(s) IV Push every 15 minutes  dextrose 50% Injectable 25 milliLiter(s) IV Push every 15 minutes  docusate sodium 100 milliGRAM(s) Oral three times a day  famotidine    Tablet 20 milliGRAM(s) Oral daily  insulin glargine Injectable (LANTUS) 10 Unit(s) SubCutaneous at bedtime  insulin Infusion 4 Unit(s)/Hr (4 mL/Hr) IV Continuous <Continuous>  insulin lispro (HumaLOG) corrective regimen sliding scale   SubCutaneous three times a day before meals  insulin lispro (HumaLOG) corrective regimen sliding scale   SubCutaneous at bedtime  insulin lispro Injectable (HumaLOG) 7 Unit(s) SubCutaneous three times a day before meals  metoprolol tartrate 25 milliGRAM(s) Oral every 12 hours  polyethylene glycol 3350 17 Gram(s) Oral daily  sodium chloride 0.9% lock flush 3 milliLiter(s) IV Push every 8 hours  sodium chloride 0.9%. 1000 milliLiter(s) (10 mL/Hr) IV Continuous <Continuous>    MEDICATIONS  (PRN):  dextrose 40% Gel 15 Gram(s) Oral once PRN Blood Glucose LESS THAN 70 milliGRAM(s)/deciliter  glucagon  Injectable 1 milliGRAM(s) IntraMuscular once PRN Glucose LESS THAN 70 milligrams/deciliter  oxyCODONE    IR 5 milliGRAM(s) Oral every 6 hours PRN Moderate Pain (4 - 6)      Allergies    No Known Allergies    Intolerances            PHYSICAL EXAM:   Vital Signs:  Vital Signs Last 24 Hrs  T(C): 36.9 (14 Aug 2018 05:00), Max: 37.2 (13 Aug 2018 19:36)  T(F): 98.5 (14 Aug 2018 05:00), Max: 99 (13 Aug 2018 19:36)  HR: 89 (14 Aug 2018 05:00) (80 - 89)  BP: 157/74 (14 Aug 2018 05:00) (133/70 - 167/74)  BP(mean): 105 (13 Aug 2018 19:36) (105 - 105)  RR: 18 (14 Aug 2018 05:00) (18 - 18)  SpO2: 97% (14 Aug 2018 05:00) (94% - 97%)  Daily     Daily Weight in k.4 (14 Aug 2018 08:00)    GENERAL:  no distress  HEENT:  NC/AT,  anicteric  CHEST:   no increased effort, breath sounds clear  HEART:  Regular rhythm  ABDOMEN:  Soft, post op tenderness epigastric area normoactive bowel sounds,  no masses ,no hepato-splenomegaly, no signs of chronic liver disease  EXTEREMITIES:  no cyanosis      LABS:                        11.2   8.5   )-----------( 170      ( 14 Aug 2018 06:08 )             34.1     0814    135  |  97  |  18  ----------------------------<  157<H>  4.6   |  25  |  0.94    Ca    9.2      14 Aug 2018 06:08    TPro  6.7  /  Alb  3.8  /  TBili  0.9  /  DBili  x   /  AST  45<H>  /  ALT  427<H>  /  AlkPhos  72  -14          RADIOLOGY & ADDITIONAL TESTS:

## 2018-08-14 NOTE — PHYSICAL THERAPY INITIAL EVALUATION ADULT - PERTINENT HX OF CURRENT PROBLEM, REHAB EVAL
Pt is a 63 y/o F with PMH HTN, DM, fam hx CAD who presented with CP found to have multi-vessel disease, s/p CABG on 8/8 with LIMA to LAD and RSVG to RCA

## 2018-08-14 NOTE — DISCHARGE NOTE ADULT - CARE PLAN
Principal Discharge DX:	S/P CABG x 2  Goal:	reacovery  Assessment and plan of treatment:	Diabetic Regular no added salt diet  Shower daily  Daily weights, call for a gain > 3 lbs Principal Discharge DX:	S/P CABG x 2  Goal:	recovery  Assessment and plan of treatment:	Diabetic Regular no added salt diet  Shower daily  Daily weights, call for a gain > 3 lbs

## 2018-08-14 NOTE — DISCHARGE NOTE ADULT - CARE PROVIDERS DIRECT ADDRESSES
[email or web address]

## 2018-08-14 NOTE — PROGRESS NOTE ADULT - SUBJECTIVE AND OBJECTIVE BOX
hello     VITAL SIGNS    Telemetry:  nsr 70    Vital Signs Last 24 Hrs  T(C): 36.9 (18 @ 05:00), Max: 37.2 (18 @ 19:36)  T(F): 98.5 (18 @ 05:00), Max: 99 (18 @ 19:36)  HR: 92 (18 @ 07:15) (80 - 92)  BP: 133/75 (18 @ 07:15) (133/70 - 167/74)  RR: 18 (18 @ 05:00) (18 - 18)  SpO2: 97% (18 @ 05:00) (94% - 97%)                    @ 07:01  -   @ 07:00  --------------------------------------------------------  IN: 996 mL / OUT: 2950 mL / NET: -1954 mL     @ 07:01  -   @ 13:21  --------------------------------------------------------  IN: 360 mL / OUT: 250 mL / NET: 110 mL          Daily     Daily Weight in k.4 (14 Aug 2018 08:00)        CAPILLARY BLOOD GLUCOSE      POCT Blood Glucose.: 178 mg/dL (14 Aug 2018 11:39)  POCT Blood Glucose.: 212 mg/dL (14 Aug 2018 07:49)  POCT Blood Glucose.: 122 mg/dL (13 Aug 2018 21:36)  POCT Blood Glucose.: 200 mg/dL (13 Aug 2018 16:33)                Coumadin    [ ] YES          [ x ]      NO                                   PHYSICAL EXAM        Neurology: alert and oriented x 3, nonfocal, no gross deficits  CV : .S1S2 RRR  Sternal Wound :  CDI , Stable  Pacing Wires        [  ]   Settings:                                  Isolated  [ x ]  Lungs: bibasilar crackles   Abdomen: soft, nontender, nondistended, positive bowel sounds, last bowel movement +  :         voiding    Extremities:   - edema - calve tenderness           aspirin enteric coated 81 milliGRAM(s) Oral daily  clopidogrel Tablet 75 milliGRAM(s) Oral daily  dextrose 40% Gel 15 Gram(s) Oral once PRN  dextrose 5%. 1000 milliLiter(s) IV Continuous <Continuous>  dextrose 50% Injectable 12.5 Gram(s) IV Push once  dextrose 50% Injectable 25 Gram(s) IV Push once  dextrose 50% Injectable 25 Gram(s) IV Push once  dextrose 50% Injectable 50 milliLiter(s) IV Push every 15 minutes  dextrose 50% Injectable 25 milliLiter(s) IV Push every 15 minutes  docusate sodium 100 milliGRAM(s) Oral three times a day  famotidine    Tablet 20 milliGRAM(s) Oral daily  glucagon  Injectable 1 milliGRAM(s) IntraMuscular once PRN  insulin glargine Injectable (LANTUS) 10 Unit(s) SubCutaneous at bedtime  insulin Infusion 4 Unit(s)/Hr IV Continuous <Continuous>  insulin lispro (HumaLOG) corrective regimen sliding scale   SubCutaneous three times a day before meals  insulin lispro (HumaLOG) corrective regimen sliding scale   SubCutaneous at bedtime  insulin lispro Injectable (HumaLOG) 7 Unit(s) SubCutaneous three times a day before meals  metoprolol tartrate 25 milliGRAM(s) Oral every 12 hours  oxyCODONE    IR 5 milliGRAM(s) Oral every 6 hours PRN  polyethylene glycol 3350 17 Gram(s) Oral daily  sodium chloride 0.9% lock flush 3 milliLiter(s) IV Push every 8 hours  sodium chloride 0.9%. 1000 milliLiter(s) IV Continuous <Continuous>                    Physical Therapy Rec:   Home  [  ]   Home w/ PT  [  ]  Rehab  [  ]  Discussed with Cardiothoracic Team at AM rounds.

## 2018-08-14 NOTE — DISCHARGE NOTE ADULT - MEDICATION SUMMARY - MEDICATIONS TO TAKE
I will START or STAY ON the medications listed below when I get home from the hospital:    Aspirin Enteric Coated 81 mg oral delayed release tablet  -- 1 tab(s) by mouth once a day  hospital   -- Indication: For antiplatelet    oxyCODONE 5 mg oral tablet  -- 1 tab(s) by mouth every 6 hours, As needed, Moderate Pain (4 - 6) MDD:4  -- Indication: For pain    glimepiride 2 mg oral tablet  -- 1 tab(s) by mouth once a day   -- Avoid prolonged or excessive exposure to direct and/or artificial sunlight while taking this medication.  Do not drink alcoholic beverages when taking this medication.  It is very important that you take or use this exactly as directed.  Do not skip doses or discontinue unless directed by your doctor.    -- Indication: For glucose control    atorvastatin 40 mg oral tablet  -- 1 tab(s) by mouth once a day  hosp  -- Indication: For Cholesterol    clopidogrel 75 mg oral tablet  -- 1 tab(s) by mouth once a day  -- Indication: For antiplatelet    metoprolol tartrate 50 mg oral tablet  -- 1 tab(s) by mouth 2 times a day  -- Indication: For Heart rate control    famotidine 20 mg oral tablet  -- 1 tab(s) by mouth once a day  -- Indication: For gastrointestinal    docusate sodium 100 mg oral capsule  -- 1 cap(s) by mouth 3 times a day  -- Indication: For laxative

## 2018-08-14 NOTE — DISCHARGE NOTE ADULT - CARE PROVIDER_API CALL
Shania Rojas), Thoracic and Cardiac Surgery  [address] [place] [address]  Phone: [phone]  Fax: [phone]    Ngoc Kaplan), Surgery; Surgical Critical Care; Vascular Surgery  [address] [facility]  [address] [address]  Phone: [phone]  Fax: [phone]

## 2018-08-14 NOTE — PHYSICAL THERAPY INITIAL EVALUATION ADULT - ADDITIONAL COMMENTS
PTA pt lived in pvt home with children and family, 6 steps to enter +HR, reports someone always home to assist as needed, ambulated independently without AD.

## 2018-08-15 VITALS
DIASTOLIC BLOOD PRESSURE: 62 MMHG | RESPIRATION RATE: 18 BRPM | TEMPERATURE: 98 F | OXYGEN SATURATION: 97 % | SYSTOLIC BLOOD PRESSURE: 114 MMHG | HEART RATE: 87 BPM

## 2018-08-15 LAB
ALBUMIN SERPL ELPH-MCNC: 3.5 G/DL — SIGNIFICANT CHANGE UP (ref 3.3–5)
ALP SERPL-CCNC: 69 U/L — SIGNIFICANT CHANGE UP (ref 40–120)
ALT FLD-CCNC: 255 U/L — HIGH (ref 10–45)
ANION GAP SERPL CALC-SCNC: 12 MMOL/L — SIGNIFICANT CHANGE UP (ref 5–17)
AST SERPL-CCNC: 23 U/L — SIGNIFICANT CHANGE UP (ref 10–40)
BILIRUB SERPL-MCNC: 0.9 MG/DL — SIGNIFICANT CHANGE UP (ref 0.2–1.2)
BUN SERPL-MCNC: 20 MG/DL — SIGNIFICANT CHANGE UP (ref 7–23)
CALCIUM SERPL-MCNC: 8.8 MG/DL — SIGNIFICANT CHANGE UP (ref 8.4–10.5)
CHLORIDE SERPL-SCNC: 97 MMOL/L — SIGNIFICANT CHANGE UP (ref 96–108)
CO2 SERPL-SCNC: 25 MMOL/L — SIGNIFICANT CHANGE UP (ref 22–31)
CREAT SERPL-MCNC: 0.99 MG/DL — SIGNIFICANT CHANGE UP (ref 0.5–1.3)
GLUCOSE BLDC GLUCOMTR-MCNC: 150 MG/DL — HIGH (ref 70–99)
GLUCOSE BLDC GLUCOMTR-MCNC: 202 MG/DL — HIGH (ref 70–99)
GLUCOSE SERPL-MCNC: 209 MG/DL — HIGH (ref 70–99)
HCT VFR BLD CALC: 31.6 % — LOW (ref 34.5–45)
HGB BLD-MCNC: 10.5 G/DL — LOW (ref 11.5–15.5)
LDH SERPL L TO P-CCNC: 215 U/L — SIGNIFICANT CHANGE UP (ref 50–242)
MCHC RBC-ENTMCNC: 29 PG — SIGNIFICANT CHANGE UP (ref 27–34)
MCHC RBC-ENTMCNC: 33.2 GM/DL — SIGNIFICANT CHANGE UP (ref 32–36)
MCV RBC AUTO: 87.3 FL — SIGNIFICANT CHANGE UP (ref 80–100)
PLATELET # BLD AUTO: 226 K/UL — SIGNIFICANT CHANGE UP (ref 150–400)
POTASSIUM SERPL-MCNC: 4.4 MMOL/L — SIGNIFICANT CHANGE UP (ref 3.5–5.3)
POTASSIUM SERPL-SCNC: 4.4 MMOL/L — SIGNIFICANT CHANGE UP (ref 3.5–5.3)
PROT SERPL-MCNC: 6.3 G/DL — SIGNIFICANT CHANGE UP (ref 6–8.3)
RBC # BLD: 3.62 M/UL — LOW (ref 3.8–5.2)
RBC # FLD: 14.4 % — SIGNIFICANT CHANGE UP (ref 10.3–14.5)
SODIUM SERPL-SCNC: 134 MMOL/L — LOW (ref 135–145)
WBC # BLD: 8.8 K/UL — SIGNIFICANT CHANGE UP (ref 3.8–10.5)
WBC # FLD AUTO: 8.8 K/UL — SIGNIFICANT CHANGE UP (ref 3.8–10.5)

## 2018-08-15 PROCEDURE — 82947 ASSAY GLUCOSE BLOOD QUANT: CPT

## 2018-08-15 PROCEDURE — 82553 CREATINE MB FRACTION: CPT

## 2018-08-15 PROCEDURE — C1894: CPT

## 2018-08-15 PROCEDURE — 83036 HEMOGLOBIN GLYCOSYLATED A1C: CPT

## 2018-08-15 PROCEDURE — 99232 SBSQ HOSP IP/OBS MODERATE 35: CPT

## 2018-08-15 PROCEDURE — 85014 HEMATOCRIT: CPT

## 2018-08-15 PROCEDURE — 83615 LACTATE (LD) (LDH) ENZYME: CPT

## 2018-08-15 PROCEDURE — 82330 ASSAY OF CALCIUM: CPT

## 2018-08-15 PROCEDURE — 80061 LIPID PANEL: CPT

## 2018-08-15 PROCEDURE — 83735 ASSAY OF MAGNESIUM: CPT

## 2018-08-15 PROCEDURE — 86901 BLOOD TYPING SEROLOGIC RH(D): CPT

## 2018-08-15 PROCEDURE — 76700 US EXAM ABDOM COMPLETE: CPT

## 2018-08-15 PROCEDURE — 99152 MOD SED SAME PHYS/QHP 5/>YRS: CPT

## 2018-08-15 PROCEDURE — 70450 CT HEAD/BRAIN W/O DYE: CPT

## 2018-08-15 PROCEDURE — 83690 ASSAY OF LIPASE: CPT

## 2018-08-15 PROCEDURE — 93458 L HRT ARTERY/VENTRICLE ANGIO: CPT

## 2018-08-15 PROCEDURE — 93005 ELECTROCARDIOGRAM TRACING: CPT

## 2018-08-15 PROCEDURE — 71045 X-RAY EXAM CHEST 1 VIEW: CPT | Mod: 26

## 2018-08-15 PROCEDURE — C1889: CPT

## 2018-08-15 PROCEDURE — P9047: CPT

## 2018-08-15 PROCEDURE — 84132 ASSAY OF SERUM POTASSIUM: CPT

## 2018-08-15 PROCEDURE — 85610 PROTHROMBIN TIME: CPT

## 2018-08-15 PROCEDURE — 80053 COMPREHEN METABOLIC PANEL: CPT

## 2018-08-15 PROCEDURE — 87641 MR-STAPH DNA AMP PROBE: CPT

## 2018-08-15 PROCEDURE — 86038 ANTINUCLEAR ANTIBODIES: CPT

## 2018-08-15 PROCEDURE — 82962 GLUCOSE BLOOD TEST: CPT

## 2018-08-15 PROCEDURE — 85384 FIBRINOGEN ACTIVITY: CPT

## 2018-08-15 PROCEDURE — 85576 BLOOD PLATELET AGGREGATION: CPT

## 2018-08-15 PROCEDURE — 82565 ASSAY OF CREATININE: CPT

## 2018-08-15 PROCEDURE — 86255 FLUORESCENT ANTIBODY SCREEN: CPT

## 2018-08-15 PROCEDURE — 85730 THROMBOPLASTIN TIME PARTIAL: CPT

## 2018-08-15 PROCEDURE — 86900 BLOOD TYPING SEROLOGIC ABO: CPT

## 2018-08-15 PROCEDURE — 87640 STAPH A DNA AMP PROBE: CPT

## 2018-08-15 PROCEDURE — 84484 ASSAY OF TROPONIN QUANT: CPT

## 2018-08-15 PROCEDURE — P9017: CPT

## 2018-08-15 PROCEDURE — 82435 ASSAY OF BLOOD CHLORIDE: CPT

## 2018-08-15 PROCEDURE — 82150 ASSAY OF AMYLASE: CPT

## 2018-08-15 PROCEDURE — 84480 ASSAY TRIIODOTHYRONINE (T3): CPT

## 2018-08-15 PROCEDURE — 86891 AUTOLOGOUS BLOOD OP SALVAGE: CPT

## 2018-08-15 PROCEDURE — 84436 ASSAY OF TOTAL THYROXINE: CPT

## 2018-08-15 PROCEDURE — P9059: CPT

## 2018-08-15 PROCEDURE — 86923 COMPATIBILITY TEST ELECTRIC: CPT

## 2018-08-15 PROCEDURE — 86850 RBC ANTIBODY SCREEN: CPT

## 2018-08-15 PROCEDURE — 31720 CLEARANCE OF AIRWAYS: CPT

## 2018-08-15 PROCEDURE — 84100 ASSAY OF PHOSPHORUS: CPT

## 2018-08-15 PROCEDURE — 85027 COMPLETE CBC AUTOMATED: CPT

## 2018-08-15 PROCEDURE — 94002 VENT MGMT INPAT INIT DAY: CPT

## 2018-08-15 PROCEDURE — 94010 BREATHING CAPACITY TEST: CPT

## 2018-08-15 PROCEDURE — 80048 BASIC METABOLIC PNL TOTAL CA: CPT

## 2018-08-15 PROCEDURE — 93306 TTE W/DOPPLER COMPLETE: CPT

## 2018-08-15 PROCEDURE — P9045: CPT

## 2018-08-15 PROCEDURE — 94003 VENT MGMT INPAT SUBQ DAY: CPT

## 2018-08-15 PROCEDURE — 93880 EXTRACRANIAL BILAT STUDY: CPT

## 2018-08-15 PROCEDURE — 82803 BLOOD GASES ANY COMBINATION: CPT

## 2018-08-15 PROCEDURE — 93975 VASCULAR STUDY: CPT

## 2018-08-15 PROCEDURE — C1887: CPT

## 2018-08-15 PROCEDURE — 74177 CT ABD & PELVIS W/CONTRAST: CPT

## 2018-08-15 PROCEDURE — C1751: CPT

## 2018-08-15 PROCEDURE — 80076 HEPATIC FUNCTION PANEL: CPT

## 2018-08-15 PROCEDURE — P9037: CPT

## 2018-08-15 PROCEDURE — 71045 X-RAY EXAM CHEST 1 VIEW: CPT

## 2018-08-15 PROCEDURE — 82550 ASSAY OF CK (CPK): CPT

## 2018-08-15 PROCEDURE — 83880 ASSAY OF NATRIURETIC PEPTIDE: CPT

## 2018-08-15 PROCEDURE — 84443 ASSAY THYROID STIM HORMONE: CPT

## 2018-08-15 PROCEDURE — 80074 ACUTE HEPATITIS PANEL: CPT

## 2018-08-15 PROCEDURE — C1769: CPT

## 2018-08-15 PROCEDURE — P9016: CPT

## 2018-08-15 PROCEDURE — 97161 PT EVAL LOW COMPLEX 20 MIN: CPT

## 2018-08-15 PROCEDURE — 84295 ASSAY OF SERUM SODIUM: CPT

## 2018-08-15 PROCEDURE — 86381 MITOCHONDRIAL ANTIBODY EACH: CPT

## 2018-08-15 PROCEDURE — P9041: CPT

## 2018-08-15 PROCEDURE — 83605 ASSAY OF LACTIC ACID: CPT

## 2018-08-15 PROCEDURE — 36430 TRANSFUSION BLD/BLD COMPNT: CPT

## 2018-08-15 RX ORDER — FAMOTIDINE 10 MG/ML
1 INJECTION INTRAVENOUS
Qty: 30 | Refills: 0 | OUTPATIENT
Start: 2018-08-15 | End: 2018-09-13

## 2018-08-15 RX ORDER — AMLODIPINE BESYLATE 2.5 MG/1
1 TABLET ORAL
Qty: 0 | Refills: 0 | COMMUNITY

## 2018-08-15 RX ORDER — METOPROLOL TARTRATE 50 MG
1 TABLET ORAL
Qty: 60 | Refills: 0 | OUTPATIENT
Start: 2018-08-15 | End: 2018-09-13

## 2018-08-15 RX ORDER — ASPIRIN/CALCIUM CARB/MAGNESIUM 324 MG
1 TABLET ORAL
Qty: 30 | Refills: 0 | OUTPATIENT
Start: 2018-08-15 | End: 2018-09-13

## 2018-08-15 RX ORDER — ATORVASTATIN CALCIUM 80 MG/1
1 TABLET, FILM COATED ORAL
Qty: 0 | Refills: 0 | COMMUNITY

## 2018-08-15 RX ORDER — METFORMIN HYDROCHLORIDE 850 MG/1
1 TABLET ORAL
Qty: 0 | Refills: 0 | COMMUNITY

## 2018-08-15 RX ORDER — ASPIRIN/CALCIUM CARB/MAGNESIUM 324 MG
1 TABLET ORAL
Qty: 0 | Refills: 0 | COMMUNITY

## 2018-08-15 RX ORDER — OXYCODONE HYDROCHLORIDE 5 MG/1
1 TABLET ORAL
Qty: 20 | Refills: 0 | OUTPATIENT
Start: 2018-08-15 | End: 2018-08-19

## 2018-08-15 RX ORDER — ATORVASTATIN CALCIUM 80 MG/1
1 TABLET, FILM COATED ORAL
Qty: 30 | Refills: 0 | OUTPATIENT
Start: 2018-08-15 | End: 2018-09-13

## 2018-08-15 RX ORDER — CLOPIDOGREL BISULFATE 75 MG/1
1 TABLET, FILM COATED ORAL
Qty: 30 | Refills: 0 | OUTPATIENT
Start: 2018-08-15 | End: 2018-09-13

## 2018-08-15 RX ORDER — DOCUSATE SODIUM 100 MG
1 CAPSULE ORAL
Qty: 90 | Refills: 0 | OUTPATIENT
Start: 2018-08-15 | End: 2018-09-13

## 2018-08-15 RX ADMIN — CLOPIDOGREL BISULFATE 75 MILLIGRAM(S): 75 TABLET, FILM COATED ORAL at 12:38

## 2018-08-15 RX ADMIN — Medication 81 MILLIGRAM(S): at 12:37

## 2018-08-15 RX ADMIN — Medication 2: at 08:00

## 2018-08-15 RX ADMIN — Medication 50 MILLIGRAM(S): at 05:35

## 2018-08-15 RX ADMIN — FAMOTIDINE 20 MILLIGRAM(S): 10 INJECTION INTRAVENOUS at 12:38

## 2018-08-15 RX ADMIN — Medication 100 MILLIGRAM(S): at 05:35

## 2018-08-15 RX ADMIN — SODIUM CHLORIDE 3 MILLILITER(S): 9 INJECTION INTRAMUSCULAR; INTRAVENOUS; SUBCUTANEOUS at 05:37

## 2018-08-15 RX ADMIN — Medication 8 UNIT(S): at 12:37

## 2018-08-15 RX ADMIN — Medication 8 UNIT(S): at 07:45

## 2018-08-15 NOTE — PROGRESS NOTE ADULT - PROBLEM SELECTOR PLAN 4
House Endocrine consulted, A1c 7.2, on metformin only pre-op  Continue humalog insulin sliding scale  Check FS AC/HS and diabetic diet

## 2018-08-15 NOTE — PROGRESS NOTE ADULT - PROBLEM SELECTOR PLAN 1
Discharge plan:  Pt can start Glimiperide 2mg daily in AM (start 8/16)* pt must eat 3 meals/day  Hold off on Metformin until LFTs followed up outpt. Can restart when normalized.   If family willing to pay out of pocket: Can do basal/bolus. Lantus 15 units QHS and Humalog 6 units w/meals.  -Pt has f/u at medicine clinic next Friday per cardiac surgery NP. Please bring BG results to appointment for provider to review.  -Pt needs new RX for test strips and lancets. (told to test BG 2-3 x/daily)  -discussed plan w/pt and team  pager: [phone]

## 2018-08-15 NOTE — PROGRESS NOTE ADULT - PROBLEM SELECTOR PROBLEM 1
Coronary artery disease
S/P CABG x 3
Coronary artery disease
S/P CABG x 3
Coronary artery disease
S/P CABG x 3
Type 2 diabetes mellitus with hyperglycemia, without long-term current use of insulin
S/P CABG x 3

## 2018-08-15 NOTE — PROGRESS NOTE ADULT - SUBJECTIVE AND OBJECTIVE BOX
im ok    VITAL SIGNS    Telemetry:  nsr 70    Vital Signs Last 24 Hrs  T(C): 36.9 (08-15-18 @ 05:16), Max: 37.2 (08-14-18 @ 20:00)  T(F): 98.4 (08-15-18 @ 05:16), Max: 99 (08-14-18 @ 20:00)  HR: 89 (08-15-18 @ 05:16) (81 - 90)  BP: 137/79 (08-15-18 @ 05:16) (132/80 - 163/85)  RR: 18 (08-15-18 @ 05:16) (18 - 18)  SpO2: 97% (08-15-18 @ 05:16) (96% - 97%)                   08-14 @ 07:01  -  08-15 @ 07:00  --------------------------------------------------------  IN: 1040 mL / OUT: 1550 mL / NET: -510 mL          Daily     Daily         CAPILLARY BLOOD GLUCOSE      POCT Blood Glucose.: 202 mg/dL (15 Aug 2018 07:57)  POCT Blood Glucose.: 99 mg/dL (14 Aug 2018 21:21)  POCT Blood Glucose.: 148 mg/dL (14 Aug 2018 16:21)  POCT Blood Glucose.: 178 mg/dL (14 Aug 2018 11:39)                Coumadin    [ ] YES          [ x ]      NO                                   PHYSICAL EXAM        Neurology: alert and oriented x 3, nonfocal, no gross deficits  CV : .S1S2 RRR  Sternal Wound :  CDI , Stable  Lungs: bibasilar crackles   Abdomen: soft, nontender, nondistended, positive bowel sounds, last bowel movement  +  :         voiding    Extremities:     - edema - calve tenderness          aspirin enteric coated 81 milliGRAM(s) Oral daily  clopidogrel Tablet 75 milliGRAM(s) Oral daily  dextrose 40% Gel 15 Gram(s) Oral once PRN  dextrose 5%. 1000 milliLiter(s) IV Continuous <Continuous>  dextrose 50% Injectable 12.5 Gram(s) IV Push once  dextrose 50% Injectable 25 Gram(s) IV Push once  dextrose 50% Injectable 25 Gram(s) IV Push once  dextrose 50% Injectable 50 milliLiter(s) IV Push every 15 minutes  dextrose 50% Injectable 25 milliLiter(s) IV Push every 15 minutes  docusate sodium 100 milliGRAM(s) Oral three times a day  famotidine    Tablet 20 milliGRAM(s) Oral daily  glucagon  Injectable 1 milliGRAM(s) IntraMuscular once PRN  insulin glargine Injectable (LANTUS) 12 Unit(s) SubCutaneous at bedtime  insulin Infusion 4 Unit(s)/Hr IV Continuous <Continuous>  insulin lispro (HumaLOG) corrective regimen sliding scale   SubCutaneous three times a day before meals  insulin lispro (HumaLOG) corrective regimen sliding scale   SubCutaneous at bedtime  insulin lispro Injectable (HumaLOG) 8 Unit(s) SubCutaneous three times a day before meals  metoprolol tartrate 50 milliGRAM(s) Oral two times a day  oxyCODONE    IR 5 milliGRAM(s) Oral every 6 hours PRN  polyethylene glycol 3350 17 Gram(s) Oral daily  sodium chloride 0.9% lock flush 3 milliLiter(s) IV Push every 8 hours  sodium chloride 0.9%. 1000 milliLiter(s) IV Continuous <Continuous>                    Physical Therapy Rec:   Home  [  ]   Home w/ PT  [  ]  Rehab  [  ]  Discussed with Cardiothoracic Team at AM rounds.

## 2018-08-15 NOTE — PROGRESS NOTE ADULT - PROBLEM SELECTOR PLAN 2
Per Dr. Kaplan, "Patient has asymptomatic high grade  right ICA stenosis. Plan  staged  intervention First CABG followed by Neck CTA  and  if  it confirms  more than 80 % stenosis,  then only  perform  right  CEA"  - plan for outpatient follow up with vascular surgery following recovery from cardiac surgery
Per Dr. Kaplan, "Patient has asymptomatic high grade  right ICA stenosis. Plan  staged  intervention First CABG followed by Neck CTA  and  if  it confirms  more than 80 % stenosis,  then only  perform  right  CEA"  - plan for outpatient follow up with vascular surgery following recovery from cardiac surgery
intervention as outpt. as per Dr. Lyn robins. consult appreciated  head ct non con per Dr. NESBITT d/t history above
Per Dr. Kaplan, "Patient has asymptomatic high grade  right ICA stenosis. Plan  staged  intervention First CABG followed by Neck CTA  and  if  it confirms  more than 80 % stenosis,  then only  perform  right  CEA"  - plan for outpatient follow up with vascular surgery following recovery from cardiac surgery

## 2018-08-15 NOTE — PROGRESS NOTE ADULT - PROBLEM SELECTOR PROBLEM 2
Carotid artery disease

## 2018-08-15 NOTE — PROGRESS NOTE ADULT - ASSESSMENT
63 y/o  female visiting from [facility], s/p cardiac cath  with LAD 90 %, Cx 100% and % disease  H diabetes 2 , diabetic retinopathy with "eye bleed" per patient 2012.   8/5 P2Y12 163, Vascular Surgery consulted for right ICA stenosis - no intervention at this time; f/u as an outpatient after OHS  preop workup in progress  8/6 VSS; pt denies cp/sob; obtain vein mapping today by PA's; opth consulted called for bilateral retinal bleeding s/p laser sx 2013; continue asa/statin/bb  8/7 Pt. transferred to Dr. Anastasia Rojas's service - for CABG in am - 2nd case- will obtain head CT non con d/t carotid dz- OR Wednesday - 2nd case  s/p 8/8 CABGx2 LIMA  Post-op Course:   -Multiple blood products for acute post-op anemia 2/2 surgery  -Extubated POD 1, weaned off pressor support  -Thrombocytopenia, DVT prophylaxis and plavix on hold  -Transaminitis, now off statin.    8/10 VSS, transferred to floor. Plts 62, plavix and sq lovenox on hold. AST/ALT trending down to 750/602. No statin/tylenol.  8/11: Lfts in the thousands. Total jeromy remains WNL. ABD us ordered spoke to tech numerous times, escalated to Dr. Rojas. Echo done-heart function appears appropriate no pericardial effusion. amylase and lipase repeat lfts and hep pannel.  8/12: LFTs improved today. Prelim results of US noted today. Dr. House's service called for Consult. Start Plavix today as plt count also improving. Likely discharge Monday and work up as out patient  8/13 Pt w n/v this am, GI f/u today.  LFT's trending down  8/14 Uncontrolled glucose and with elevated lfts cannot take metformin,  Prandin is a costly medication and glucotrol may not provide adequate glucose control as d/w endo.  Insulin 70/30 is an alternative.  The patient is being taught insulin administration as d/w the patients daughter and the patient.  They agree with taking insulin.   She does not have her glasses and is unable to see at this time.  Will continue to educate when the daughter is present and provides her glasses.  d/c planning for wednesday.  D/w GI , no further w/u at this time, ct unremarkable  8/15 pt's glucose improving, to finalize insulin regimen with endo, d/c planning

## 2018-08-15 NOTE — PROGRESS NOTE ADULT - PROBLEM SELECTOR PLAN 3
Patient should follow up his/her ophthalmologist or in the [facility] within 1 week of discharge, sooner if symptoms worsen or change.  [address] [facility].  [place] [id number] [phone]  Please call the ophthalmologist to confirm an appointment time and date prior to discharge.
Patient should follow up his/her ophthalmologist or in the [facility] within 1 week of discharge, sooner if symptoms worsen or change.  [address] [facility]  [place] [id number] [phone]  Please call the ophthalmologist to confirm an appointment time and date prior to discharge.
Patient should follow up his/her ophthalmologist or in the [facility] within 1 week of discharge, sooner if symptoms worsen or change.  [address] [facility].  [place] [id number] [phone]  Please call the ophthalmologist to confirm an appointment time and date prior to discharge.
Patient should follow up his/her ophthalmologist or in the [facility] within 1 week of discharge, sooner if symptoms worsen or change.  [address] [facility].  [place] [id number] [phone]  Please call the ophthalmologist to confirm an appointment time and date prior to discharge.
Patient should follow up his/her ophthalmologist or in the [facility] within 1 week of discharge, sooner if symptoms worsen or change.  [address] [facility].  [place] [id number] [phone]  Please call the ophthalmologist to confirm an appointment time and date prior to discharge.
Patient should follow up his/her ophthalmologist or in the [facility] within 1 week of discharge, sooner if symptoms worsen or change.  [address] [facility].  [place] [id number] [phone]  Please call the ophthalmologist to confirm an appointment time and date prior to discharge.

## 2018-08-15 NOTE — PROGRESS NOTE ADULT - PROVIDER SPECIALTY LIST ADULT
CT Surgery
CTU
Cardiology
Critical Care
Endocrinology
Gastroenterology
Vascular Surgery
Cardiology
CT Surgery

## 2018-08-15 NOTE — PROGRESS NOTE ADULT - ASSESSMENT
63 y/o female with uncontrolled T2DM c/b proliferative retinopathy, neuropathy. Here with TVD >s/p CABG. Improving PO intake w/elevated fasting glucose values. LFTs downtrending, ALT remains elevated but decreasing daily. Pt previously fairly controlled on oral hypoglycemics. Given poor health literacy, may be safer to continue oral DM meds at home w/careful observation of BG values. Pt w/out insurance coverage leaving 70/30 as only likely viable option w/out paying out of pocket. 70/30 will pose high hypoglycemia risk for this patient. BG goal (100-180mg/dl).

## 2018-08-15 NOTE — PROGRESS NOTE ADULT - SUBJECTIVE AND OBJECTIVE BOX
[facility] -- Keith Torrez, James, Fernandez, Osvaldo Pozo Savella  Office # [phone]      Follow Up:  CAD    Subjective/Observations: Patient seen and examined. Events noted. Resting comfortably in bed. No complaints of  dyspnea, or palpitations reported. No signs of orthopnea or PND. Having some incisional chest pain.       REVIEW OF SYSTEMS: All other review of systems is negative unless indicated above    PAST MEDICAL & SURGICAL HISTORY:  Retinopathy  Neuropathy  DM (diabetes mellitus)  HTN (hypertension)  History of cholecystectomy      MEDICATIONS  (STANDING):  aspirin enteric coated 81 milliGRAM(s) Oral daily  clopidogrel Tablet 75 milliGRAM(s) Oral daily  dextrose 5%. 1000 milliLiter(s) (50 mL/Hr) IV Continuous <Continuous>  dextrose 50% Injectable 12.5 Gram(s) IV Push once  dextrose 50% Injectable 25 Gram(s) IV Push once  dextrose 50% Injectable 25 Gram(s) IV Push once  dextrose 50% Injectable 50 milliLiter(s) IV Push every 15 minutes  dextrose 50% Injectable 25 milliLiter(s) IV Push every 15 minutes  docusate sodium 100 milliGRAM(s) Oral three times a day  famotidine    Tablet 20 milliGRAM(s) Oral daily  insulin glargine Injectable (LANTUS) 12 Unit(s) SubCutaneous at bedtime  insulin Infusion 4 Unit(s)/Hr (4 mL/Hr) IV Continuous <Continuous>  insulin lispro (HumaLOG) corrective regimen sliding scale   SubCutaneous three times a day before meals  insulin lispro (HumaLOG) corrective regimen sliding scale   SubCutaneous at bedtime  insulin lispro Injectable (HumaLOG) 8 Unit(s) SubCutaneous three times a day before meals  metoprolol tartrate 50 milliGRAM(s) Oral two times a day  polyethylene glycol 3350 17 Gram(s) Oral daily  sodium chloride 0.9% lock flush 3 milliLiter(s) IV Push every 8 hours  sodium chloride 0.9%. 1000 milliLiter(s) (10 mL/Hr) IV Continuous <Continuous>    MEDICATIONS  (PRN):  dextrose 40% Gel 15 Gram(s) Oral once PRN Blood Glucose LESS THAN 70 milliGRAM(s)/deciliter  glucagon  Injectable 1 milliGRAM(s) IntraMuscular once PRN Glucose LESS THAN 70 milligrams/deciliter  oxyCODONE    IR 5 milliGRAM(s) Oral every 6 hours PRN Moderate Pain (4 - 6)      Allergies    No Known Allergies    Intolerances            Vital Signs Last 24 Hrs  T(C): 36.9 (15 Aug 2018 05:16), Max: 37.2 (14 Aug 2018 20:00)  T(F): 98.4 (15 Aug 2018 05:16), Max: 99 (14 Aug 2018 20:00)  HR: 89 (15 Aug 2018 05:16) (81 - 90)  BP: 137/79 (15 Aug 2018 05:16) (132/80 - 163/85)  BP(mean): --  RR: 18 (15 Aug 2018 05:16) (18 - 18)  SpO2: 97% (15 Aug 2018 05:16) (96% - 97%)    I&O's Summary    14 Aug 2018 07:01  -  15 Aug 2018 07:00  --------------------------------------------------------  IN: 1040 mL / OUT: 1550 mL / NET: -510 mL          PHYSICAL EXAM:  TELE:   Constitutional: NAD, awake and alert, well-developed  HEENT: Moist Mucous Membranes, Anicteric  Pulmonary: Non-labored, breath sounds are clear bilaterally, No wheezing, rales or rhonchi  Cardiovascular: Regular, S1 and S2, No murmurs, rubs, gallops or clicks  Gastrointestinal: Bowel Sounds present, soft, nontender.   Lymph: No peripheral edema. No lymphadenopathy.  Skin: No visible rashes or ulcers.  Psych:  Mood & affect appropriate for situation    LABS: All Labs Reviewed:                        10.5   8.8   )-----------( 226      ( 15 Aug 2018 07:17 )             31.6                         11.2   8.5   )-----------( 170      ( 14 Aug 2018 06:08 )             34.1                         11.4   7.7   )-----------( 122      ( 13 Aug 2018 04:47 )             34.8     15 Aug 2018 07:17    134    |  97     |  20     ----------------------------<  209    4.4     |  25     |  0.99   14 Aug 2018 06:08    135    |  97     |  18     ----------------------------<  157    4.6     |  25     |  0.94   13 Aug 2018 04:47    133    |  96     |  17     ----------------------------<  220    4.5     |  25     |  0.94     Ca    8.8        15 Aug 2018 07:17  Ca    9.2        14 Aug 2018 06:08  Ca    9.2        13 Aug 2018 04:47    TPro  6.3    /  Alb  3.5    /  TBili  0.9    /  DBili  x      /  AST  23     /  ALT  255    /  AlkPhos  69     15 Aug 2018 07:17  TPro  6.7    /  Alb  3.8    /  TBili  0.9    /  DBili  x      /  AST  45     /  ALT  427    /  AlkPhos  72     14 Aug 2018 06:08  TPro  7.1    /  Alb  4.0    /  TBili  1.2    /  DBili  x      /  AST  106    /  ALT  731    /  AlkPhos  80     13 Aug 2018 04:47

## 2018-08-15 NOTE — PROGRESS NOTE ADULT - SUBJECTIVE AND OBJECTIVE BOX
Diabetes Follow up note:  Interval Hx:  63 y/o F w/uncontrolled T2DM c/b retinopathy, neuropathy, here w/chest pain found to have TVD s/p CABG x 2 (8/8). Pt readying for discharge home today. LFTs downtrending over past few days. Fasting glucose elevated last three days. Pt seen at bedside. Reports good appetite. Daughter to assist with care upon discharge home.     Review of Systems:  General: "I feel ok"   GI: Tolerating POs without any N/V/D/ABD PAIN.  CV: No CP/SOB  ENDO: No S&Sx of hypoglycemia  MEDS:    insulin glargine Injectable (LANTUS) 12 Unit(s) SubCutaneous at bedtime  insulin Infusion 4 Unit(s)/Hr IV Continuous <Continuous>  insulin lispro (HumaLOG) corrective regimen sliding scale   SubCutaneous three times a day before meals  insulin lispro (HumaLOG) corrective regimen sliding scale   SubCutaneous at bedtime  insulin lispro Injectable (HumaLOG) 8 Unit(s) SubCutaneous three times a day before meals      Allergies    No Known Allergies        PE:  General: Female sitting in chair. NAD.   Vital Signs Last 24 Hrs  T(C): 36.9 (15 Aug 2018 05:16), Max: 37.2 (14 Aug 2018 20:00)  T(F): 98.4 (15 Aug 2018 05:16), Max: 99 (14 Aug 2018 20:00)  HR: 89 (15 Aug 2018 05:16) (81 - 90)  BP: 137/79 (15 Aug 2018 05:16) (132/80 - 163/85)  BP(mean): --  RR: 18 (15 Aug 2018 05:16) (18 - 18)  SpO2: 97% (15 Aug 2018 05:16) (96% - 97%)  Midsternal: c/d/i  Abd: Soft, NT,ND,   Extremities: Warm. no edema x 4 ext.   Neuro: A&O X3    LABS:    POCT Blood Glucose.: 202 mg/dL (08-15-18 @ 07:57)  POCT Blood Glucose.: 99 mg/dL (08-14-18 @ 21:21)  POCT Blood Glucose.: 148 mg/dL (08-14-18 @ 16:21)  POCT Blood Glucose.: 178 mg/dL (08-14-18 @ 11:39)  POCT Blood Glucose.: 212 mg/dL (08-14-18 @ 07:49)  POCT Blood Glucose.: 122 mg/dL (08-13-18 @ 21:36)  POCT Blood Glucose.: 200 mg/dL (08-13-18 @ 16:33)  POCT Blood Glucose.: 284 mg/dL (08-13-18 @ 11:57)  POCT Blood Glucose.: 203 mg/dL (08-13-18 @ 07:44)  POCT Blood Glucose.: 191 mg/dL (08-12-18 @ 21:35)  POCT Blood Glucose.: 220 mg/dL (08-12-18 @ 16:50)  POCT Blood Glucose.: 286 mg/dL (08-12-18 @ 12:00)  POCT Blood Glucose.: 307 mg/dL (08-12-18 @ 11:57)                            10.5   8.8   )-----------( 226      ( 15 Aug 2018 07:17 )             31.6       08-15    134<L>  |  97  |  20  ----------------------------<  209<H>  4.4   |  25  |  0.99    Ca    8.8      15 Aug 2018 07:17    TPro  6.3  /  Alb  3.5  /  TBili  0.9  /  DBili  x   /  AST  23  /  ALT  255<H>  /  AlkPhos  69  08-15      Thyroid Function Tests:  08-04 @ 14:18 TSH 4.04 FreeT4 -- T3 81 Anti TPO -- Anti Thyroglobulin Ab -- TSI --      Hemoglobin A1C, Whole Blood: 7.2 % <H> [4.0 - 5.6] (08-04-18 @ 14:18)            Contact number: [place] [phone] or [phone]

## 2018-08-15 NOTE — PROGRESS NOTE ADULT - ASSESSMENT
61 y/o F with PMH HTN, DM, fam hx CAD who presented with CP found to have multi-vessel disease, s/p CABG on 8/8 with LIMA to LAD and RSVG to RCA      - seems to have tolerated procedure well and hemodynamically stable  - Junctional rhythm on telemetry seems to have resolved.    - Cont Lopressor 50mg q12.   - mild-moderate MR noted on intra-op MLIES.  - carotid disease will need intervention as an outpt. Vascular surgery input appreciated.  - there is no evidence for meaningful volume overload.    - c/w ASA  - statin on hold for transaminitis. Now downtrending.  Continue to follow to resolution and resume when normalized  - GI follow up for nausea and vomiting.  - BP acceptable  - Follow platelet count.  - monitor electrolytes. Keep K>4, Mg >2  - Ambulate and IS  - will follow with you.    DC planning per primary team.

## 2018-08-16 RX ORDER — GLIMEPIRIDE 1 MG
1 TABLET ORAL
Qty: 30 | Refills: 0 | OUTPATIENT
Start: 2018-08-16 | End: 2018-09-14

## 2018-08-20 ENCOUNTER — APPOINTMENT (OUTPATIENT)
Dept: CARDIOTHORACIC SURGERY | Facility: CLINIC | Age: 63
End: 2018-08-20
Payer: MEDICAID

## 2018-08-20 LAB — ANA TITR SER: NEGATIVE — SIGNIFICANT CHANGE UP

## 2018-08-21 ENCOUNTER — APPOINTMENT (OUTPATIENT)
Dept: CARDIOTHORACIC SURGERY | Facility: CLINIC | Age: 63
End: 2018-08-21
Payer: MEDICAID

## 2018-08-21 VITALS
HEIGHT: 57 IN | RESPIRATION RATE: 15 BRPM | SYSTOLIC BLOOD PRESSURE: 143 MMHG | WEIGHT: 116 LBS | BODY MASS INDEX: 25.03 KG/M2 | HEART RATE: 70 BPM | DIASTOLIC BLOOD PRESSURE: 81 MMHG | OXYGEN SATURATION: 98 % | TEMPERATURE: 98.7 F

## 2018-08-21 DIAGNOSIS — Z83.3 FAMILY HISTORY OF DIABETES MELLITUS: ICD-10-CM

## 2018-08-21 DIAGNOSIS — Z63.4 DISAPPEARANCE AND DEATH OF FAMILY MEMBER: ICD-10-CM

## 2018-08-21 DIAGNOSIS — Z86.79 PERSONAL HISTORY OF OTHER DISEASES OF THE CIRCULATORY SYSTEM: ICD-10-CM

## 2018-08-21 PROCEDURE — 99024 POSTOP FOLLOW-UP VISIT: CPT

## 2018-08-21 SDOH — SOCIAL STABILITY - SOCIAL INSECURITY: DISSAPEARANCE AND DEATH OF FAMILY MEMBER: Z63.4

## 2018-08-31 ENCOUNTER — APPOINTMENT (OUTPATIENT)
Dept: OPHTHALMOLOGY | Facility: CLINIC | Age: 63
End: 2018-08-31

## 2018-09-05 ENCOUNTER — APPOINTMENT (OUTPATIENT)
Dept: OPHTHALMOLOGY | Facility: CLINIC | Age: 63
End: 2018-09-05

## 2018-09-05 ENCOUNTER — INPATIENT (INPATIENT)
Facility: HOSPITAL | Age: 63
LOS: 4 days | Discharge: HOME | DRG: 233 | End: 2018-09-10
Attending: HOSPITALIST | Admitting: INTERNAL MEDICINE
Payer: MEDICAID

## 2018-09-05 VITALS
TEMPERATURE: 99 F | RESPIRATION RATE: 20 BRPM | SYSTOLIC BLOOD PRESSURE: 164 MMHG | WEIGHT: 113.1 LBS | OXYGEN SATURATION: 99 % | DIASTOLIC BLOOD PRESSURE: 74 MMHG | HEART RATE: 68 BPM | HEIGHT: 57 IN

## 2018-09-05 DIAGNOSIS — Z95.1 PRESENCE OF AORTOCORONARY BYPASS GRAFT: Chronic | ICD-10-CM

## 2018-09-05 DIAGNOSIS — R07.9 CHEST PAIN, UNSPECIFIED: ICD-10-CM

## 2018-09-05 DIAGNOSIS — Z90.49 ACQUIRED ABSENCE OF OTHER SPECIFIED PARTS OF DIGESTIVE TRACT: Chronic | ICD-10-CM

## 2018-09-05 LAB
ALBUMIN SERPL ELPH-MCNC: 3.7 G/DL — SIGNIFICANT CHANGE UP (ref 3.3–5.2)
ALP SERPL-CCNC: 102 U/L — SIGNIFICANT CHANGE UP (ref 40–120)
ALT FLD-CCNC: 7 U/L — SIGNIFICANT CHANGE UP
ANION GAP SERPL CALC-SCNC: 16 MMOL/L — SIGNIFICANT CHANGE UP (ref 5–17)
APTT BLD: 33 SEC — SIGNIFICANT CHANGE UP (ref 27.5–37.4)
AST SERPL-CCNC: 14 U/L — SIGNIFICANT CHANGE UP
BASOPHILS # BLD AUTO: 0.1 K/UL — SIGNIFICANT CHANGE UP (ref 0–0.2)
BASOPHILS NFR BLD AUTO: 1 % — SIGNIFICANT CHANGE UP (ref 0–2)
BILIRUB SERPL-MCNC: 0.5 MG/DL — SIGNIFICANT CHANGE UP (ref 0.4–2)
BUN SERPL-MCNC: 14 MG/DL — SIGNIFICANT CHANGE UP (ref 8–20)
CALCIUM SERPL-MCNC: 9.4 MG/DL — SIGNIFICANT CHANGE UP (ref 8.6–10.2)
CHLORIDE SERPL-SCNC: 101 MMOL/L — SIGNIFICANT CHANGE UP (ref 98–107)
CK SERPL-CCNC: 24 U/L — LOW (ref 25–170)
CO2 SERPL-SCNC: 18 MMOL/L — LOW (ref 22–29)
CREAT SERPL-MCNC: 1.1 MG/DL — SIGNIFICANT CHANGE UP (ref 0.5–1.3)
EOSINOPHIL # BLD AUTO: 0.3 K/UL — SIGNIFICANT CHANGE UP (ref 0–0.5)
EOSINOPHIL NFR BLD AUTO: 4 % — SIGNIFICANT CHANGE UP (ref 0–6)
GLUCOSE BLDC GLUCOMTR-MCNC: 106 MG/DL — HIGH (ref 70–99)
GLUCOSE SERPL-MCNC: 148 MG/DL — HIGH (ref 70–115)
HBA1C BLD-MCNC: 6.3 % — HIGH (ref 4–5.6)
HCT VFR BLD CALC: 38.9 % — SIGNIFICANT CHANGE UP (ref 37–47)
HGB BLD-MCNC: 11.9 G/DL — LOW (ref 12–16)
INR BLD: 1.1 RATIO — SIGNIFICANT CHANGE UP (ref 0.88–1.16)
LYMPHOCYTES # BLD AUTO: 2.2 K/UL — SIGNIFICANT CHANGE UP (ref 1–4.8)
LYMPHOCYTES # BLD AUTO: 28 % — SIGNIFICANT CHANGE UP (ref 20–55)
MAGNESIUM SERPL-MCNC: 1.8 MG/DL — SIGNIFICANT CHANGE UP (ref 1.6–2.6)
MCHC RBC-ENTMCNC: 25.7 PG — LOW (ref 27–31)
MCHC RBC-ENTMCNC: 30.6 G/DL — LOW (ref 32–36)
MCV RBC AUTO: 84 FL — SIGNIFICANT CHANGE UP (ref 81–99)
MONOCYTES # BLD AUTO: 0.6 K/UL — SIGNIFICANT CHANGE UP (ref 0–0.8)
MONOCYTES NFR BLD AUTO: 7.8 % — SIGNIFICANT CHANGE UP (ref 3–10)
NEUTROPHILS # BLD AUTO: 4.5 K/UL — SIGNIFICANT CHANGE UP (ref 1.8–8)
NEUTROPHILS NFR BLD AUTO: 58.9 % — SIGNIFICANT CHANGE UP (ref 37–73)
NT-PROBNP SERPL-SCNC: 2820 PG/ML — HIGH (ref 0–300)
PLATELET # BLD AUTO: 275 K/UL — SIGNIFICANT CHANGE UP (ref 150–400)
POTASSIUM SERPL-MCNC: 4.2 MMOL/L — SIGNIFICANT CHANGE UP (ref 3.5–5.3)
POTASSIUM SERPL-SCNC: 4.2 MMOL/L — SIGNIFICANT CHANGE UP (ref 3.5–5.3)
PROT SERPL-MCNC: 6.9 G/DL — SIGNIFICANT CHANGE UP (ref 6.6–8.7)
PROTHROM AB SERPL-ACNC: 12.1 SEC — SIGNIFICANT CHANGE UP (ref 9.8–12.7)
RBC # BLD: 4.63 M/UL — SIGNIFICANT CHANGE UP (ref 4.4–5.2)
RBC # FLD: 14.8 % — SIGNIFICANT CHANGE UP (ref 11–15.6)
SODIUM SERPL-SCNC: 135 MMOL/L — SIGNIFICANT CHANGE UP (ref 135–145)
TROPONIN T SERPL-MCNC: <0.01 NG/ML — SIGNIFICANT CHANGE UP (ref 0–0.06)
TROPONIN T SERPL-MCNC: <0.01 NG/ML — SIGNIFICANT CHANGE UP (ref 0–0.06)
WBC # BLD: 7.7 K/UL — SIGNIFICANT CHANGE UP (ref 4.8–10.8)
WBC # FLD AUTO: 7.7 K/UL — SIGNIFICANT CHANGE UP (ref 4.8–10.8)

## 2018-09-05 PROCEDURE — 99223 1ST HOSP IP/OBS HIGH 75: CPT

## 2018-09-05 PROCEDURE — 99285 EMERGENCY DEPT VISIT HI MDM: CPT

## 2018-09-05 PROCEDURE — 93010 ELECTROCARDIOGRAM REPORT: CPT

## 2018-09-05 RX ORDER — GLUCAGON INJECTION, SOLUTION 0.5 MG/.1ML
1 INJECTION, SOLUTION SUBCUTANEOUS ONCE
Qty: 0 | Refills: 0 | Status: DISCONTINUED | OUTPATIENT
Start: 2018-09-05 | End: 2018-09-10

## 2018-09-05 RX ORDER — INSULIN LISPRO 100/ML
VIAL (ML) SUBCUTANEOUS
Qty: 0 | Refills: 0 | Status: DISCONTINUED | OUTPATIENT
Start: 2018-09-05 | End: 2018-09-10

## 2018-09-05 RX ORDER — ASPIRIN/CALCIUM CARB/MAGNESIUM 324 MG
81 TABLET ORAL DAILY
Qty: 0 | Refills: 0 | Status: DISCONTINUED | OUTPATIENT
Start: 2018-09-05 | End: 2018-09-07

## 2018-09-05 RX ORDER — INSULIN LISPRO 100/ML
VIAL (ML) SUBCUTANEOUS AT BEDTIME
Qty: 0 | Refills: 0 | Status: DISCONTINUED | OUTPATIENT
Start: 2018-09-05 | End: 2018-09-10

## 2018-09-05 RX ORDER — DOCUSATE SODIUM 100 MG
100 CAPSULE ORAL
Qty: 0 | Refills: 0 | Status: DISCONTINUED | OUTPATIENT
Start: 2018-09-05 | End: 2018-09-10

## 2018-09-05 RX ORDER — DEXTROSE 50 % IN WATER 50 %
25 SYRINGE (ML) INTRAVENOUS ONCE
Qty: 0 | Refills: 0 | Status: DISCONTINUED | OUTPATIENT
Start: 2018-09-05 | End: 2018-09-10

## 2018-09-05 RX ORDER — METOPROLOL TARTRATE 50 MG
50 TABLET ORAL
Qty: 0 | Refills: 0 | Status: DISCONTINUED | OUTPATIENT
Start: 2018-09-05 | End: 2018-09-10

## 2018-09-05 RX ORDER — DEXTROSE 50 % IN WATER 50 %
15 SYRINGE (ML) INTRAVENOUS ONCE
Qty: 0 | Refills: 0 | Status: DISCONTINUED | OUTPATIENT
Start: 2018-09-05 | End: 2018-09-10

## 2018-09-05 RX ORDER — NITROGLYCERIN 6.5 MG
0.4 CAPSULE, EXTENDED RELEASE ORAL
Qty: 0 | Refills: 0 | Status: DISCONTINUED | OUTPATIENT
Start: 2018-09-05 | End: 2018-09-05

## 2018-09-05 RX ORDER — CLOPIDOGREL BISULFATE 75 MG/1
75 TABLET, FILM COATED ORAL DAILY
Qty: 0 | Refills: 0 | Status: DISCONTINUED | OUTPATIENT
Start: 2018-09-05 | End: 2018-09-07

## 2018-09-05 RX ORDER — ATORVASTATIN CALCIUM 80 MG/1
40 TABLET, FILM COATED ORAL AT BEDTIME
Qty: 0 | Refills: 0 | Status: DISCONTINUED | OUTPATIENT
Start: 2018-09-05 | End: 2018-09-10

## 2018-09-05 RX ORDER — OXYCODONE HYDROCHLORIDE 5 MG/1
5 TABLET ORAL EVERY 6 HOURS
Qty: 0 | Refills: 0 | Status: DISCONTINUED | OUTPATIENT
Start: 2018-09-05 | End: 2018-09-10

## 2018-09-05 RX ORDER — FAMOTIDINE 10 MG/ML
20 INJECTION INTRAVENOUS DAILY
Qty: 0 | Refills: 0 | Status: DISCONTINUED | OUTPATIENT
Start: 2018-09-05 | End: 2018-09-10

## 2018-09-05 RX ORDER — SODIUM CHLORIDE 9 MG/ML
1000 INJECTION, SOLUTION INTRAVENOUS
Qty: 0 | Refills: 0 | Status: DISCONTINUED | OUTPATIENT
Start: 2018-09-05 | End: 2018-09-10

## 2018-09-05 RX ORDER — NITROGLYCERIN 6.5 MG
1 CAPSULE, EXTENDED RELEASE ORAL ONCE
Qty: 0 | Refills: 0 | Status: COMPLETED | OUTPATIENT
Start: 2018-09-05 | End: 2018-09-05

## 2018-09-05 RX ORDER — DEXTROSE 50 % IN WATER 50 %
12.5 SYRINGE (ML) INTRAVENOUS ONCE
Qty: 0 | Refills: 0 | Status: DISCONTINUED | OUTPATIENT
Start: 2018-09-05 | End: 2018-09-10

## 2018-09-05 RX ORDER — HEPARIN SODIUM 5000 [USP'U]/ML
5000 INJECTION INTRAVENOUS; SUBCUTANEOUS EVERY 8 HOURS
Qty: 0 | Refills: 0 | Status: DISCONTINUED | OUTPATIENT
Start: 2018-09-05 | End: 2018-09-07

## 2018-09-05 RX ADMIN — Medication 1 INCH(S): at 09:35

## 2018-09-05 RX ADMIN — Medication 50 MILLIGRAM(S): at 18:27

## 2018-09-05 RX ADMIN — HEPARIN SODIUM 5000 UNIT(S): 5000 INJECTION INTRAVENOUS; SUBCUTANEOUS at 20:52

## 2018-09-05 RX ADMIN — ATORVASTATIN CALCIUM 40 MILLIGRAM(S): 80 TABLET, FILM COATED ORAL at 20:52

## 2018-09-05 NOTE — H&P ADULT - ASSESSMENT
A/P: 61 y/o F PMHx of DM-2, HTN, CAD s/p CABG x 2 (LIMA TO LAD, RSVG to RCA) 08/08/18,  Right ICA stenosis (>70%), HTN, DMII with neuropathy and retinopathy who presents to the ED with chest pain. Trop neg x 1. CK normal. CXR clear. EKG with no acute changes.     >Chest Pain  - Complicated presentation due to recent CABG and pain.   - Admit to tele   - Trop neg x 1, CK normal.   - trend cardiac enzymes, would check BNP  - nuclear pharmacologic stress tomorrow.   - NPO after midnight  - Continue current cardiac medications.       >midsternal post cabg incision:   - CT Surgery called to evaluate mediastinal CT site    >Right ICA stenosis  - Continue aspirin, statin, plavix.  - Per [facility], patient to be evaluated for endartectomy as an outpatient.     >HTN, essential:   -c/w lisinopril, bb  -monitor     >hlp  -c/w statins     >DM-2  -check a1c   -ssi

## 2018-09-05 NOTE — ED PROVIDER NOTE - MEDICAL DECISION MAKING DETAILS
cbc, ekg, cxr, trop, cmp; nitroglycerin sublingual given bp; currently cp free; s/p aspirin 325 prior to arrival ; cardiology consult - [place] [facility]; will reassess patient once above complete

## 2018-09-05 NOTE — ED ADULT NURSE NOTE - NSIMPLEMENTINTERV_GEN_ALL_ED
Implemented All Universal Safety Interventions:  [place] to call system. Call bell, personal items and telephone within reach. Instruct patient to call for assistance. Room bathroom lighting operational. Non-slip footwear when patient is off stretcher. Physically safe environment: no spills, clutter or unnecessary equipment. Stretcher in lowest position, wheels locked, appropriate side rails in place.

## 2018-09-05 NOTE — CONSULT NOTE ADULT - ASSESSMENT
A/P: 63 y/o F PMHx of CAD s/p CABG x 2 (LIMA TO LAD, RSVG to RCA) 08/08/18,  Right ICA stenosis (>70%), HTN, DMII with neuropathy and retinopathy who presents to the ED with chest pain. Trop neg x 1. CK normal. CXR clear. A/P: 63 y/o F PMHx of CAD s/p CABG x 2 (LIMA TO LAD, RSVG to RCA) 08/08/18,  Right ICA stenosis (>70%), HTN, DMII with neuropathy and retinopathy who presents to the ED with chest pain. Trop neg x 1. CK normal. CXR clear. EKG with no acute changes.     1. Chest Pain  - Complicated presentation due to A/P: 63 y/o F PMHx of CAD s/p CABG x 2 (LIMA TO LAD, RSVG to RCA) 08/08/18,  Right ICA stenosis (>70%), HTN, DMII with neuropathy and retinopathy who presents to the ED with chest pain. Trop neg x 1. CK normal. CXR clear. EKG with no acute changes.     1. Chest Pain  - Complicated presentation due to recent CABG and pain.   - Admit to hospitalist service  - Trop neg x 1, CK normal.   - Cont to trend cardiac enzymes, would check BNP  - Ordered for nuclear pharmacologic stress tomorrow.   - NPO after midnight  - Continue current cardiac medications.   - CT Surgery to evaluate mediastinal CT site A/P: 63 y/o F PMHx of CAD s/p CABG x 2 (LIMA TO LAD, RSVG to RCA) 08/08/18,  Right ICA stenosis (>70%), HTN, DMII with neuropathy and retinopathy who presents to the ED with chest pain. Trop neg x 1. CK normal. CXR clear. EKG with no acute changes.     1. Chest Pain  - Complicated presentation due to recent CABG and pain.   - Admit to hospitalist service  - Trop neg x 1, CK normal.   - Cont to trend cardiac enzymes, would check BNP  - Ordered for nuclear pharmacologic stress tomorrow.   - NPO after midnight  - Continue current cardiac medications.   - CT Surgery to evaluate mediastinal CT site    2. Right ICA stenosis  - Continue aspirin, statin, plavix.  - Per [facility], patient to be evaluated for endartectomy as an outpatient.

## 2018-09-05 NOTE — ED PROVIDER NOTE - ATTENDING CONTRIBUTION TO CARE
I personally saw the patient with the resident, and completed the key components of the history and physical exam. I then discussed the management plan with the resident.  62 year old male with PMH CAd s/p cabg presents with chest pain since yesterday. intermittent left pressure, feels liekt he Sx that necessitated her cabg last month. Currently chest pain free  Gen: NAD, well appearing  CV: RRR  Pul: CTA b/l  Abd: Soft, non-distended, non-tender  Neuro: no focal deficits  Seen by cardio, to be admitted for nuclear stress

## 2018-09-05 NOTE — ED PROVIDER NOTE - OBJECTIVE STATEMENT
62 year old female with pmh of htn, diabetes, cad s/p cabg (8/8/2018) at [facility] coming to  ED with complaints of chest pain starting yesterday. patient stating assc with sob. states pain located on left side of chest and radiates to upper left arm. states similar complaints in early august when had cabg as well. took sublingual nitro prior to arrival on ambulance. states took 5 baby aspirin prior to arrival as well (1 normal dose and 4 per recommendations of ems). per outside records patient was advised to come in to ER yesterday however patient did not. assc with sob on exertion and orthopnea. no pain at rest. pain increased with any movement. currently cp free

## 2018-09-05 NOTE — H&P ADULT - NSHPPHYSICALEXAM_GEN_ALL_CORE
Vital Signs Last 24 Hrs  T(C): 36.8 (05 Sep 2018 11:46), Max: 37.3 (05 Sep 2018 08:53)  T(F): 98.2 (05 Sep 2018 11:46), Max: 99.2 (05 Sep 2018 08:53)  HR: 67 (05 Sep 2018 11:46) (67 - 68)  BP: 159/72 (05 Sep 2018 11:46) (159/72 - 164/74)  BP(mean): --  RR: 20 (05 Sep 2018 11:46) (20 - 20)  SpO2: 99% (05 Sep 2018 11:46) (99% - 99%)      PHYSICAL EXAM:   · CONSTITUTIONAL: Weak appearing, well nourished, awake, alert, oriented to person, place, time/situation and in no apparent distress.  · ENMT: Airway patent, Nasal mucosa clear. Mouth with normal mucosa. Throat has no vesicles, no oropharyngeal exudates and uvula is midline.  · EYES: Clear bilaterally, pupils equal, round and reactive to light.  · CARDIAC:Normal S1 S2, No JVD, II/VI systolic murmur LSB, Right carotid bruit, No peripheral edema, no sternal click, visible stitch from previous mediastinal CT  · RESPIRATORY: Breath sounds b/l, occasional rhales at bases b/l   · GASTROINTESTINAL: Abdomen soft, non-tender, no guarding.  · MUSCULOSKELETAL: Spine appears normal, range of motion is not limited, no muscle or joint tenderness  · NEUROLOGICAL: Alert and oriented, no focal deficits, no motor or sensory deficits.  . LOWER ext: no edema noted b./l   . Lymph nodes: no lymphadenopathy noted   . Psych: mood appropriate

## 2018-09-05 NOTE — H&P ADULT - NSHPLABSRESULTS_GEN_ALL_CORE
11.9   7.7   )-----------( 275      ( 05 Sep 2018 09:29 )             38.9   09-05    135  |  101  |  14.0  ----------------------------<  148<H>  4.2   |  18.0<L>  |  1.10    Ca    9.4      05 Sep 2018 09:29  Mg     1.8     09-05    TPro  6.9  /  Alb  3.7  /  TBili  0.5  /  DBili  x   /  AST  14  /  ALT  7   /  AlkPhos  102  09-05    Troponin T, Serum: <0.01L (09.05.18 @ 09:29)    RADIOLOGY & ADDITIONAL STUDIES:  < from: Xray Chest 1 View AP/PA. (09.05.18 @ 10:37) >   EXAM:  XR CHEST AP OR PA 1V                          PROCEDURE DATE:  09/05/2018          INTERPRETATION:  TECHNIQUE: Single portable view of the chest.  COMPARISON: None.  CLINICAL HISTORY: Shortness of breath.   FINDINGS:  Single frontalview of the chest demonstrates the lungs to be clear. The   cardiomediastinal silhouette is normal. No acute osseous abnormalities.   Overlying EKG leads and wires are noted. Status post CABG procedure.  IMPRESSION: No acute cardiopulmonary diseaseprocess.

## 2018-09-05 NOTE — ED ADULT NURSE NOTE - CHPI ED NUR SYMPTOMS NEG
no dizziness/no shortness of breath/no chest pain/no congestion/no diaphoresis/no nausea/no chills/no syncope/no vomiting/no back pain/no fever

## 2018-09-05 NOTE — ED ADULT NURSE NOTE - ED STAT RN HANDOFF DETAILS
pt stable for transport on cm with , RN bedside, pt able to ambulate to hospital bed with 1x RN assist with slow gait. Receiving RN peg @ bedside

## 2018-09-05 NOTE — ED ADULT NURSE NOTE - OBJECTIVE STATEMENT
Patient states that she has been having intermittent chest pain since yesterday. Patient states that the pain is sharp and in the left side of her chest. Patient denies any pain at this time, patient denies any SOB. NAD noted.

## 2018-09-05 NOTE — ED ADULT TRIAGE NOTE - CHIEF COMPLAINT QUOTE
Pt BIBA from home, A&Ox3 c/o chest pain since this AM, recent CABG on 8/8, CT surgeon Dr. Johann Rojas. Pt rec'd 1 s/l nitro by ems and took 324mg aspirin at home pta.

## 2018-09-05 NOTE — H&P ADULT - NSHPSOCIALHISTORY_GEN_ALL_CORE
lives in [place] [facility] [place]   visitng daughter and grandsons   denies etoh or tobacco use hx   walks independently

## 2018-09-05 NOTE — H&P ADULT - FAMILY HISTORY
Sibling  Still living? Unknown  Family history of coronary arteriosclerosis, Age at diagnosis: Age Unknown  Family history of diabetes mellitus, Age at diagnosis: Age Unknown

## 2018-09-05 NOTE — H&P ADULT - HISTORY OF PRESENT ILLNESS
63 y/o F PMHx of CAD s/p CABG x 2 (LIMA TO LAD, RSVG to RCA) 08/08/18,  Right ICA stenosis (>70%), HTN, DMII with neuropathy and retinopathy who presents to the ED with chest pain tahts similar to her previous cardiac pain prior to cabg. Patient states that starting at 11 PM last night she began experiencing left sided chest pain while walking to the bathroom,  felt a pressure sensation that radiated to her left armpit and up to her left shoulder, also had some associated palpitations. she took some tylenol, and the pain continued to come and go throughout the night. Then early this morning she began to have sob, woke up with trouble breathing , and then had pain again when walking to the kitchen, this time with some associated SOB, dizziness, and nausea. pt states that pain was somewhat worse with arm movement . Pain was consistently a 7/10, again worse with movement.   Pt called 911, and given NTG in the ambulance which helped her symptoms, but still having pain when moving in bed. Patient denies any recent fevers, chills, V/D, cough, or headache.  deneis any lower ext swelling. states that she has been doing well with home PT till yesterday when her cp started.

## 2018-09-05 NOTE — ED PROVIDER NOTE - PROGRESS NOTE DETAILS
cbc, ekg, cxr, trop, cmp; nitroglycerin sublingual given bp; currently cp free; s/p aspirin 325 prior to arrival ; cardiology consult - [facility]; will reassess patient once above complete

## 2018-09-05 NOTE — CONSULT NOTE ADULT - SUBJECTIVE AND OBJECTIVE BOX
Patient is a 62y old  Female who presents with a chief complaint of chest pain.    HPI: 61 y/o F PMHx of CAD s/p CABG x 2 (LIMA TO LAD, RSVG to RCA) 08/08/18,       PAST MEDICAL & SURGICAL HISTORY:  Coronary artery disease  Hypertension  Diabetes  S/P CABG (coronary artery bypass graft)      PREVIOUS DIAGNOSTIC TESTING:      ECHO  FINDINGS:    STRESS  FINDINGS:    CATHETERIZATION  FINDINGS:      Allergies    No Known Allergies    Intolerances        MEDICATIONS  (STANDING):    MEDICATIONS  (PRN):      FAMILY HISTORY:  No pertinent family history in first degree relatives      SOCIAL HISTORY:    CIGARETTES:    ALCOHOL:    REVIEW OF SYSTEMS:  CONSTITUTIONAL: No fever, weight loss, or fatigue  EYES: No eye pain, visual disturbances, or discharge  ENMT:  No difficulty hearing, tinnitus, vertigo; No sinus or throat pain  NECK: No pain or stiffness  RESPIRATORY: No cough, wheezing, chills or hemoptysis; No Shortness of Breath  CARDIOVASCULAR: No chest pain, palpitations, passing out, dizziness, or leg swelling  GASTROINTESTINAL: No abdominal or epigastric pain. No nausea, vomiting, or hematemesis; No diarrhea or constipation. No melena or hematochezia.  GENITOURINARY: No dysuria, frequency, hematuria, or incontinence  NEUROLOGICAL: No headaches, memory loss, loss of strength, numbness, or tremors  SKIN: No itching, burning, rashes, or lesions   LYMPH Nodes: No enlarged glands  ENDOCRINE: No heat or cold intolerance; No hair loss  MUSCULOSKELETAL: No joint pain or swelling; No muscle, back, or extremity pain  PSYCHIATRIC: No depression, anxiety, mood swings, or difficulty sleeping  HEME/LYMPH: No easy bruising, or bleeding gums  ALLERY AND IMMUNOLOGIC: No hives or eczema	    Vital Signs Last 24 Hrs  T(C): 37.3 (05 Sep 2018 08:53), Max: 37.3 (05 Sep 2018 08:53)  T(F): 99.2 (05 Sep 2018 08:53), Max: 99.2 (05 Sep 2018 08:53)  HR: 68 (05 Sep 2018 08:53) (68 - 68)  BP: 164/74 (05 Sep 2018 08:53) (164/74 - 164/74)  BP(mean): --  RR: 20 (05 Sep 2018 08:53) (20 - 20)  SpO2: 99% (05 Sep 2018 08:53) (99% - 99%)    Daily Height in cm: 144.78 (05 Sep 2018 08:53)    Daily     I&O's Detail      PHYSICAL EXAM:  Appearance: Normal, well nourished	  HEENT:   Normal oral mucosa, PERRL, EOMI, sclera non-icteric	  Lymphatic: No cervical lymphadenopathy  Cardiovascular: Normal S1 S2, No JVD, No cardiac murmurs, No carotid bruits, No peripheral edema  Respiratory: Lungs clear to auscultation	  Psychiatry: A & O x 3, Mood & affect appropriate  Gastrointestinal:  Soft, Non-tender, + BS, no bruits	  Skin: No rashes, No ecchymoses, No cyanosis  Neurologic: Grossly non-focal with full strength in all four extremities  Extremities: Normal range of motion, No clubbing, cyanosis or edema  Vascular: Peripheral pulses palpable 2+ bilaterally      INTERPRETATION OF TELEMETRY:    ECG:    LABS:                        11.9   7.7   )-----------( 275      ( 05 Sep 2018 09:29 )             38.9     09-05    135  |  101  |  14.0  ----------------------------<  148<H>  4.2   |  18.0<L>  |  1.10    Ca    9.4      05 Sep 2018 09:29  Mg     1.8     09-05    TPro  6.9  /  Alb  3.7  /  TBili  0.5  /  DBili  x   /  AST  14  /  ALT  7   /  AlkPhos  102  09-05    CARDIAC MARKERS ( 05 Sep 2018 09:29 )  x     / <0.01 ng/mL / 24 U/L / x     / x          PT/INR - ( 05 Sep 2018 09:29 )   PT: 12.1 sec;   INR: 1.10 ratio         PTT - ( 05 Sep 2018 09:29 )  PTT:33.0 sec    I&O's Summary    BNP    RADIOLOGY & ADDITIONAL STUDIES: Patient is a 62y old  Female who presents with a chief complaint of chest pain.    HPI: 63 y/o F PMHx of CAD s/p CABG x 2 (LIMA TO LAD, RSVG to RCA) 08/08/18,  Right ICA stenosis (>70%), HTN, DMII with neuropathy and retinopathy who presents to the ED with chest pain. Patient states that starting at 11 PM last night she began experiencing left sided chest pain while walking to the bathroom. Patient states she felt a pressure sensation that radiated to her left armpit and up to her left shoulder. Pt noted some associated palpitations. Patient states that the pain was worse with movement, and she felt like her left breast was also swollen. Patient took some tylenol, and the pain continued to come and go throughout the night. Then early this morning she began to have the pain again when walking to the kitchen, this time with some associated SOB. Paient states that the pain made it impossible for her to lift her left arm. Pain was consistently a 7/10, again worse with movement. Pt called 911, and given NTG in the ambulance which helped her symptoms, but still having pain when moving in bed. Patient denies any recent fevers, chills, N/V/D, cough, headache, or dizziness.       PAST MEDICAL & SURGICAL HISTORY:  CAD s/p CABG x 2 (LIMA TO LAD, RSVG to RCA) 08/08/18,   Hypertension  Diabetes  Right ICA stenosis (>70%),   Diabetic neuropathy  Diabetic retinopathy        PREVIOUS DIAGNOSTIC TESTING:      ECHO  FINDINGS:  TTE 08/11/18  Normal LV function, mod MR, mild AI    STRESS  FINDINGS:    CATHETERIZATION  FINDINGS:       Allergies    No Known Allergies    Intolerances        MEDICATIONS  (STANDING):    MEDICATIONS  (PRN):      FAMILY HISTORY:  No pertinent family history in first degree relatives      SOCIAL HISTORY:    CIGARETTES:    ALCOHOL:    REVIEW OF SYSTEMS:  CONSTITUTIONAL: No fever, weight loss, or fatigue  EYES: No eye pain, visual disturbances, or discharge  ENMT:  No difficulty hearing, tinnitus, vertigo; No sinus or throat pain  NECK: No pain or stiffness  RESPIRATORY: No cough, wheezing, chills or hemoptysis; No Shortness of Breath  CARDIOVASCULAR: No chest pain, palpitations, passing out, dizziness, or leg swelling  GASTROINTESTINAL: No abdominal or epigastric pain. No nausea, vomiting, or hematemesis; No diarrhea or constipation. No melena or hematochezia.  GENITOURINARY: No dysuria, frequency, hematuria, or incontinence  NEUROLOGICAL: No headaches, memory loss, loss of strength, numbness, or tremors  SKIN: No itching, burning, rashes, or lesions   LYMPH Nodes: No enlarged glands  ENDOCRINE: No heat or cold intolerance; No hair loss  MUSCULOSKELETAL: No joint pain or swelling; No muscle, back, or extremity pain  PSYCHIATRIC: No depression, anxiety, mood swings, or difficulty sleeping  HEME/LYMPH: No easy bruising, or bleeding gums  ALLERY AND IMMUNOLOGIC: No hives or eczema	    Vital Signs Last 24 Hrs  T(C): 37.3 (05 Sep 2018 08:53), Max: 37.3 (05 Sep 2018 08:53)  T(F): 99.2 (05 Sep 2018 08:53), Max: 99.2 (05 Sep 2018 08:53)  HR: 68 (05 Sep 2018 08:53) (68 - 68)  BP: 164/74 (05 Sep 2018 08:53) (164/74 - 164/74)  BP(mean): --  RR: 20 (05 Sep 2018 08:53) (20 - 20)  SpO2: 99% (05 Sep 2018 08:53) (99% - 99%)    Daily Height in cm: 144.78 (05 Sep 2018 08:53)    Daily     I&O's Detail      PHYSICAL EXAM:  Appearance: Normal, well nourished	  HEENT:   Normal oral mucosa, PERRL, EOMI, sclera non-icteric	  Lymphatic: No cervical lymphadenopathy  Cardiovascular: Normal S1 S2, No JVD, No cardiac murmurs, No carotid bruits, No peripheral edema  Respiratory: Lungs clear to auscultation	  Psychiatry: A & O x 3, Mood & affect appropriate  Gastrointestinal:  Soft, Non-tender, + BS, no bruits	  Skin: No rashes, No ecchymoses, No cyanosis  Neurologic: Grossly non-focal with full strength in all four extremities  Extremities: Normal range of motion, No clubbing, cyanosis or edema  Vascular: Peripheral pulses palpable 2+ bilaterally      INTERPRETATION OF TELEMETRY:    ECG:    LABS:                        11.9   7.7   )-----------( 275      ( 05 Sep 2018 09:29 )             38.9     09-05    135  |  101  |  14.0  ----------------------------<  148<H>  4.2   |  18.0<L>  |  1.10    Ca    9.4      05 Sep 2018 09:29  Mg     1.8     09-05    TPro  6.9  /  Alb  3.7  /  TBili  0.5  /  DBili  x   /  AST  14  /  ALT  7   /  AlkPhos  102  09-05    CARDIAC MARKERS ( 05 Sep 2018 09:29 )  x     / <0.01 ng/mL / 24 U/L / x     / x          PT/INR - ( 05 Sep 2018 09:29 )   PT: 12.1 sec;   INR: 1.10 ratio         PTT - ( 05 Sep 2018 09:29 )  PTT:33.0 sec    I&O's Summary    BNP    RADIOLOGY & ADDITIONAL STUDIES: Patient is a 62y old  Female who presents with a chief complaint of chest pain.    HPI: 63 y/o F PMHx of CAD s/p CABG x 2 (LIMA TO LAD, RSVG to RCA) 08/08/18,  Right ICA stenosis (>70%), HTN, DMII with neuropathy and retinopathy who presents to the ED with chest pain. Patient states that starting at 11 PM last night she began experiencing left sided chest pain while walking to the bathroom. Patient states she felt a pressure sensation that radiated to her left armpit and up to her left shoulder. Pt noted some associated palpitations. Patient states that the pain was worse with movement, and she felt like her left breast was also swollen. Patient took some tylenol, and the pain continued to come and go throughout the night. Then early this morning she began to have the pain again when walking to the kitchen, this time with some associated SOB. Paient states that the pain made it impossible for her to lift her left arm. Pain was consistently a 7/10, again worse with movement. Pt called 911, and given NTG in the ambulance which helped her symptoms, but still having pain when moving in bed. Patient denies any recent fevers, chills, N/V/D, cough, headache, or dizziness.       PAST MEDICAL & SURGICAL HISTORY:  CAD s/p CABG x 2 (LIMA TO LAD, RSVG to RCA) 08/08/18,   Hypertension  Diabetes  Right ICA stenosis (>70%),   Diabetic neuropathy  Diabetic retinopathy        PREVIOUS DIAGNOSTIC TESTING:      ECHO  FINDINGS:  TTE 08/11/18  Normal LV function, mod MR, mild AI    STRESS  FINDINGS:    CATHETERIZATION  FINDINGS: 08/04/18 ProxLAD 80%, Prox Cx 100%, Prox %      Allergies    No Known Allergies    Intolerances        MEDICATIONS  (STANDING):    MEDICATIONS  (PRN):    Home Medications:  aspirin 81 mg oral tablet, chewable: 1 tab(s) orally once a day (05 Sep 2018 09:33)  atorvastatin 40 mg oral tablet: 1 tab(s) orally once a day (at bedtime) (05 Sep 2018 09:33)  clopidogrel 75 mg oral tablet: 1 tab(s) orally once a day (05 Sep 2018 09:33)  Colace 100 mg oral capsule: orally 3 times a day (05 Sep 2018 09:33)  famotidine 20 mg oral tablet: 20 milligram(s) orally once a day (05 Sep 2018 09:33)  glimepiride 2 mg oral tablet: 1 tab(s) orally once a day (05 Sep 2018 09:33)  Metoprolol Tartrate 50 mg oral tablet: 1 tab(s) orally 2 times a day (05 Sep 2018 09:33)  oxyCODONE 5 mg oral tablet: 1 tab(s) orally every 6 hours, As Needed (05 Sep 2018 09:33)      FAMILY HISTORY:  Brother's MI in early 40s      SOCIAL HISTORY:    CIGARETTES: Denies    ALCOHOL: Denies    DRUGS: Denies    REVIEW OF SYSTEMS:  CONSTITUTIONAL: No fever, weight loss, or fatigue  RESPIRATORY: AS PER HPI  CARDIOVASCULAR: AS PER HPI  GASTROINTESTINAL: No abdominal or epigastric pain. No nausea, vomiting, or hematemesis; No diarrhea or constipation. No melena or hematochezia.  GENITOURINARY: No dysuria, frequency, hematuria, or incontinence  NEUROLOGICAL: No headaches, memory loss, loss of strength, numbness, or tremors  SKIN: No itching, burning, rashes, or lesions       Vital Signs Last 24 Hrs  T(C): 37.3 (05 Sep 2018 08:53), Max: 37.3 (05 Sep 2018 08:53)  T(F): 99.2 (05 Sep 2018 08:53), Max: 99.2 (05 Sep 2018 08:53)  HR: 68 (05 Sep 2018 08:53) (68 - 68)  BP: 164/74 (05 Sep 2018 08:53) (164/74 - 164/74)  RR: 20 (05 Sep 2018 08:53) (20 - 20)  SpO2: 99% (05 Sep 2018 08:53) (99% - 99%)    Daily Height in cm: 144.78 (05 Sep 2018 08:53)    Daily     I&O's Detail      PHYSICAL EXAM:  Appearance: Normal, well nourished	  Lymphatic: No cervical lymphadenopathy  Cardiovascular: Normal S1 S2, No JVD, II/VI systolic murmur LSB, No carotid bruits, No peripheral edema, no sternal click   Respiratory: Lungs clear to auscultation	  Psychiatry: A & O x 3, Mood & affect appropriate  Gastrointestinal:  Soft, Non-tender, + BS, no bruits	  Skin: No rashes, No ecchymoses, No cyanosis  Neurologic: Grossly non-focal with full strength in all four extremities  Extremities: Normal range of motion, No clubbing, cyanosis or edema  Vascular: Peripheral pulses palpable 2+ bilaterally      INTERPRETATION OF TELEMETRY:    ECG: NSR, LAFB, diffuse ST depressions II, aVF, V4-V6 with t wave inversion I, II, III, aVF, V4-V6, improving from post-op EKG [facility]     LABS:                        11.9   7.7   )-----------( 275      ( 05 Sep 2018 09:29 )             38.9     09-05    135  |  101  |  14.0  ----------------------------<  148<H>  4.2   |  18.0<L>  |  1.10    Ca    9.4      05 Sep 2018 09:29  Mg     1.8     09-05    TPro  6.9  /  Alb  3.7  /  TBili  0.5  /  DBili  x   /  AST  14  /  ALT  7   /  AlkPhos  102  09-05    CARDIAC MARKERS ( 05 Sep 2018 09:29 )  x     / <0.01 ng/mL / 24 U/L / x     / x          PT/INR - ( 05 Sep 2018 09:29 )   PT: 12.1 sec;   INR: 1.10 ratio         PTT - ( 05 Sep 2018 09:29 )  PTT:33.0 sec    I&O's Summary    BNP    RADIOLOGY & ADDITIONAL STUDIES:  < from: Xray Chest 1 View AP/PA. (09.05.18 @ 10:37) >   EXAM:  XR CHEST AP OR PA 1V                          PROCEDURE DATE:  09/05/2018          INTERPRETATION:  TECHNIQUE: Single portable view of the chest.    COMPARISON: None.    CLINICAL HISTORY: Shortness of breath.     FINDINGS:    Single frontalview of the chest demonstrates the lungs to be clear. The   cardiomediastinal silhouette is normal. No acute osseous abnormalities.   Overlying EKG leads and wires are noted. Status post CABG procedure.    IMPRESSION: No acute cardiopulmonary diseaseprocess.                JADE BRANCH M.D., ATTENDING RADIOLOGIST  This document has been electronically signed. Sep  5 2018 10:45AM              < end of copied text > Patient is a 62y old  Female who presents with a chief complaint of chest pain.    HPI: 63 y/o F PMHx of CAD s/p CABG x 2 (LIMA TO LAD, RSVG to RCA) 08/08/18,  Right ICA stenosis (>70%), HTN, DMII with neuropathy and retinopathy who presents to the ED with chest pain. Patient states that starting at 11 PM last night she began experiencing left sided chest pain while walking to the bathroom. Patient states she felt a pressure sensation that radiated to her left armpit and up to her left shoulder. Pt noted some associated palpitations. Patient states that the pain was worse with movement, and she felt like her left breast was also swollen. Patient took some tylenol, and the pain continued to come and go throughout the night. Then early this morning she began to have the pain again when walking to the kitchen, this time with some associated SOB, dizziness, and nausea. Pt states that the pain made it impossible for her to lift her left arm. Pain was consistently a 7/10, again worse with movement. Pt called 911, and given NTG in the ambulance which helped her symptoms, but still having pain when moving in bed. Patient denies any recent fevers, chills, V/D, cough, or headache.    PAST MEDICAL & SURGICAL HISTORY:  CAD s/p CABG x 2 (LIMA TO LAD, RSVG to RCA) 08/08/18,   Hypertension  Diabetes  Right ICA stenosis (>70%),   Diabetic neuropathy  Diabetic retinopathy        PREVIOUS DIAGNOSTIC TESTING:      ECHO  FINDINGS:  TTE 08/11/18  Normal LV function, mod MR, mild AI    STRESS  FINDINGS:    CATHETERIZATION  FINDINGS: 08/04/18 ProxLAD 80%, Prox Cx 100%, Prox %      Allergies    No Known Allergies    Intolerances        MEDICATIONS  (STANDING):    MEDICATIONS  (PRN):    Home Medications:  aspirin 81 mg oral tablet, chewable: 1 tab(s) orally once a day (05 Sep 2018 09:33)  atorvastatin 40 mg oral tablet: 1 tab(s) orally once a day (at bedtime) (05 Sep 2018 09:33)  clopidogrel 75 mg oral tablet: 1 tab(s) orally once a day (05 Sep 2018 09:33)  Colace 100 mg oral capsule: orally 3 times a day (05 Sep 2018 09:33)  famotidine 20 mg oral tablet: 20 milligram(s) orally once a day (05 Sep 2018 09:33)  glimepiride 2 mg oral tablet: 1 tab(s) orally once a day (05 Sep 2018 09:33)  Metoprolol Tartrate 50 mg oral tablet: 1 tab(s) orally 2 times a day (05 Sep 2018 09:33)  oxyCODONE 5 mg oral tablet: 1 tab(s) orally every 6 hours, As Needed (05 Sep 2018 09:33)      FAMILY HISTORY:  Brother's MI in early 40s      SOCIAL HISTORY:    CIGARETTES: Denies    ALCOHOL: Denies    DRUGS: Denies    REVIEW OF SYSTEMS:  CONSTITUTIONAL: No fever, weight loss, or fatigue  RESPIRATORY: AS PER HPI  CARDIOVASCULAR: AS PER HPI  GASTROINTESTINAL: No abdominal or epigastric pain. No nausea, vomiting, or hematemesis; No diarrhea or constipation. No melena or hematochezia.  GENITOURINARY: No dysuria, frequency, hematuria, or incontinence  NEUROLOGICAL: No headaches, memory loss, loss of strength, numbness, or tremors  SKIN: No itching, burning, rashes, or lesions       Vital Signs Last 24 Hrs  T(C): 37.3 (05 Sep 2018 08:53), Max: 37.3 (05 Sep 2018 08:53)  T(F): 99.2 (05 Sep 2018 08:53), Max: 99.2 (05 Sep 2018 08:53)  HR: 68 (05 Sep 2018 08:53) (68 - 68)  BP: 164/74 (05 Sep 2018 08:53) (164/74 - 164/74)  RR: 20 (05 Sep 2018 08:53) (20 - 20)  SpO2: 99% (05 Sep 2018 08:53) (99% - 99%)    Daily Height in cm: 144.78 (05 Sep 2018 08:53)    Daily     I&O's Detail      PHYSICAL EXAM:  Appearance: Normal, well nourished	  Lymphatic: No cervical lymphadenopathy  Cardiovascular: Normal S1 S2, No JVD, II/VI systolic murmur LSB, Right carotid bruit, No peripheral edema, no sternal click, visible stitch from previous mediastinal CT  Respiratory: Lungs clear to auscultation	  Psychiatry: A & O x 3, Mood & affect appropriate  Gastrointestinal:  Soft, Non-tender, + BS, no bruits	  Skin: No rashes, No ecchymoses, No cyanosis  Neurologic: Grossly non-focal with full strength in all four extremities  Extremities: Normal range of motion, No clubbing, cyanosis or edema  Vascular: Peripheral pulses palpable 2+ bilaterally      INTERPRETATION OF TELEMETRY:    ECG: NSR, LAFB, diffuse ST depressions II, aVF, V4-V6 with t wave inversion I, II, III, aVF, V4-V6, improving from post-op EKG [facility]     LABS:                        11.9   7.7   )-----------( 275      ( 05 Sep 2018 09:29 )             38.9     09-05    135  |  101  |  14.0  ----------------------------<  148<H>  4.2   |  18.0<L>  |  1.10    Ca    9.4      05 Sep 2018 09:29  Mg     1.8     09-05    TPro  6.9  /  Alb  3.7  /  TBili  0.5  /  DBili  x   /  AST  14  /  ALT  7   /  AlkPhos  102  09-05    CARDIAC MARKERS ( 05 Sep 2018 09:29 )  x     / <0.01 ng/mL / 24 U/L / x     / x          PT/INR - ( 05 Sep 2018 09:29 )   PT: 12.1 sec;   INR: 1.10 ratio         PTT - ( 05 Sep 2018 09:29 )  PTT:33.0 sec    I&O's Summary    BNP    RADIOLOGY & ADDITIONAL STUDIES:  < from: Xray Chest 1 View AP/PA. (09.05.18 @ 10:37) >   EXAM:  XR CHEST AP OR PA 1V                          PROCEDURE DATE:  09/05/2018          INTERPRETATION:  TECHNIQUE: Single portable view of the chest.    COMPARISON: None.    CLINICAL HISTORY: Shortness of breath.     FINDINGS:    Single frontalview of the chest demonstrates the lungs to be clear. The   cardiomediastinal silhouette is normal. No acute osseous abnormalities.   Overlying EKG leads and wires are noted. Status post CABG procedure.    IMPRESSION: No acute cardiopulmonary diseaseprocess.                JADE BRANCH M.D., ATTENDING RADIOLOGIST  This document has been electronically signed. Sep  5 2018 10:45AM              < end of copied text > Patient is a 62y old  Female who presents with a chief complaint of chest pain.    HPI: 61 y/o F PMHx of CAD s/p CABG x 2 (LIMA TO LAD, RSVG to RCA) 08/08/18,  Right ICA stenosis (>70%), HTN, DMII with neuropathy and retinopathy who presents to the ED with chest pain. Patient states that starting at 11 PM last night she began experiencing left sided chest pain while walking to the bathroom. Patient states she felt a pressure sensation that radiated to her left armpit and up to her left shoulder. Pt noted some associated palpitations. Patient states that the pain was worse with movement, and she felt like her left breast was also swollen. Patient took some tylenol, and the pain continued to come and go throughout the night. Then early this morning she began to have the pain again when walking to the kitchen, this time with some associated SOB, dizziness, and nausea. Pt states that the pain made it impossible for her to lift her left arm. Pain was consistently a 7/10, again worse with movement. Pt called 911, and given NTG in the ambulance which helped her symptoms, but still having pain when moving in bed. Patient denies any recent fevers, chills, V/D, cough, or headache.    PAST MEDICAL & SURGICAL HISTORY:  CAD s/p CABG x 2 (LIMA TO LAD, RSVG to RCA) 08/08/18,   Hypertension  Diabetes  Right ICA stenosis (>70%),   Diabetic neuropathy  Diabetic retinopathy        PREVIOUS DIAGNOSTIC TESTING:      ECHO  FINDINGS:  TTE 08/11/18  Normal LV function, mod MR, mild AI    STRESS  FINDINGS:    CATHETERIZATION  FINDINGS: 08/04/18 ProxLAD 80%, Prox Cx 100%, Prox %      Allergies  No Known Allergies  Intolerances    MEDICATIONS  (STANDING):    MEDICATIONS  (PRN):    Home Medications:  aspirin 81 mg oral tablet, chewable: 1 tab(s) orally once a day (05 Sep 2018 09:33)  atorvastatin 40 mg oral tablet: 1 tab(s) orally once a day (at bedtime) (05 Sep 2018 09:33)  clopidogrel 75 mg oral tablet: 1 tab(s) orally once a day (05 Sep 2018 09:33)  Colace 100 mg oral capsule: orally 3 times a day (05 Sep 2018 09:33)  famotidine 20 mg oral tablet: 20 milligram(s) orally once a day (05 Sep 2018 09:33)  glimepiride 2 mg oral tablet: 1 tab(s) orally once a day (05 Sep 2018 09:33)  Metoprolol Tartrate 50 mg oral tablet: 1 tab(s) orally 2 times a day (05 Sep 2018 09:33)  oxyCODONE 5 mg oral tablet: 1 tab(s) orally every 6 hours, As Needed (05 Sep 2018 09:33)    FAMILY HISTORY:  Brother's MI in early 40s    SOCIAL HISTORY:    CIGARETTES: Denies    ALCOHOL: Denies    DRUGS: Denies    REVIEW OF SYSTEMS:  CONSTITUTIONAL: No fever, weight loss, or fatigue  RESPIRATORY: AS PER HPI  CARDIOVASCULAR: AS PER HPI  GASTROINTESTINAL: No abdominal or epigastric pain. No nausea, vomiting, or hematemesis; No diarrhea or constipation. No melena or hematochezia.  GENITOURINARY: No dysuria, frequency, hematuria, or incontinence  NEUROLOGICAL: No headaches, memory loss, loss of strength, numbness, or tremors  SKIN: No itching, burning, rashes, or lesions       Vital Signs Last 24 Hrs  T(C): 37.3 (05 Sep 2018 08:53), Max: 37.3 (05 Sep 2018 08:53)  T(F): 99.2 (05 Sep 2018 08:53), Max: 99.2 (05 Sep 2018 08:53)  HR: 68 (05 Sep 2018 08:53) (68 - 68)  BP: 164/74 (05 Sep 2018 08:53) (164/74 - 164/74)  RR: 20 (05 Sep 2018 08:53) (20 - 20)  SpO2: 99% (05 Sep 2018 08:53) (99% - 99%)    Daily Height in cm: 144.78 (05 Sep 2018 08:53)    Daily     I&O's Detail      PHYSICAL EXAM:  Appearance: Normal, well nourished	  Lymphatic: No cervical lymphadenopathy  Cardiovascular: Normal S1 S2, No JVD, II/VI systolic murmur LSB, Right carotid bruit, No peripheral edema, no sternal click, visible stitch from previous mediastinal CT  Respiratory: Lungs clear to auscultation	  Psychiatry: A & O x 3, Mood & affect appropriate  Gastrointestinal:  Soft, Non-tender, + BS, no bruits	  Skin: No rashes, No ecchymoses, No cyanosis  Neurologic: Grossly non-focal with full strength in all four extremities  Extremities: Normal range of motion, No clubbing, cyanosis or edema  Vascular: Peripheral pulses palpable 2+ bilaterally      INTERPRETATION OF TELEMETRY: SR    ECG: NSR, LAFB, diffuse ST depressions II, aVF, V4-V6 with t wave inversion I, II, III, aVF, V4-V6, improving from post-op EKG [facility]     LABS:                        11.9   7.7   )-----------( 275      ( 05 Sep 2018 09:29 )             38.9     09-05    135  |  101  |  14.0  ----------------------------<  148<H>  4.2   |  18.0<L>  |  1.10    Ca    9.4      05 Sep 2018 09:29  Mg     1.8     09-05    TPro  6.9  /  Alb  3.7  /  TBili  0.5  /  DBili  x   /  AST  14  /  ALT  7   /  AlkPhos  102  09-05    CARDIAC MARKERS ( 05 Sep 2018 09:29 )  x     / <0.01 ng/mL / 24 U/L / x     / x          PT/INR - ( 05 Sep 2018 09:29 )   PT: 12.1 sec;   INR: 1.10 ratio      PTT - ( 05 Sep 2018 09:29 )  PTT:33.0 sec    I&O's Summary    BNP    RADIOLOGY & ADDITIONAL STUDIES:  < from: Xray Chest 1 View AP/PA. (09.05.18 @ 10:37) >   EXAM:  XR CHEST AP OR PA 1V                          PROCEDURE DATE:  09/05/2018          INTERPRETATION:  TECHNIQUE: Single portable view of the chest.    COMPARISON: None.    CLINICAL HISTORY: Shortness of breath.     FINDINGS:    Single frontalview of the chest demonstrates the lungs to be clear. The   cardiomediastinal silhouette is normal. No acute osseous abnormalities.   Overlying EKG leads and wires are noted. Status post CABG procedure.    IMPRESSION: No acute cardiopulmonary diseaseprocess.                JADE BRANCH M.D., ATTENDING RADIOLOGIST  This document has been electronically signed. Sep  5 2018 10:45AM              < end of copied text >

## 2018-09-06 LAB
ANION GAP SERPL CALC-SCNC: 12 MMOL/L — SIGNIFICANT CHANGE UP (ref 5–17)
BUN SERPL-MCNC: 14 MG/DL — SIGNIFICANT CHANGE UP (ref 8–20)
CALCIUM SERPL-MCNC: 9.6 MG/DL — SIGNIFICANT CHANGE UP (ref 8.6–10.2)
CHLORIDE SERPL-SCNC: 102 MMOL/L — SIGNIFICANT CHANGE UP (ref 98–107)
CO2 SERPL-SCNC: 25 MMOL/L — SIGNIFICANT CHANGE UP (ref 22–29)
CREAT SERPL-MCNC: 1.16 MG/DL — SIGNIFICANT CHANGE UP (ref 0.5–1.3)
GLUCOSE BLDC GLUCOMTR-MCNC: 105 MG/DL — HIGH (ref 70–99)
GLUCOSE BLDC GLUCOMTR-MCNC: 125 MG/DL — HIGH (ref 70–99)
GLUCOSE BLDC GLUCOMTR-MCNC: 190 MG/DL — HIGH (ref 70–99)
GLUCOSE SERPL-MCNC: 106 MG/DL — SIGNIFICANT CHANGE UP (ref 70–115)
HCT VFR BLD CALC: 41.2 % — SIGNIFICANT CHANGE UP (ref 37–47)
HGB BLD-MCNC: 12.8 G/DL — SIGNIFICANT CHANGE UP (ref 12–16)
MCHC RBC-ENTMCNC: 25.9 PG — LOW (ref 27–31)
MCHC RBC-ENTMCNC: 31.1 G/DL — LOW (ref 32–36)
MCV RBC AUTO: 83.4 FL — SIGNIFICANT CHANGE UP (ref 81–99)
PLATELET # BLD AUTO: 287 K/UL — SIGNIFICANT CHANGE UP (ref 150–400)
POTASSIUM SERPL-MCNC: 4.6 MMOL/L — SIGNIFICANT CHANGE UP (ref 3.5–5.3)
POTASSIUM SERPL-SCNC: 4.6 MMOL/L — SIGNIFICANT CHANGE UP (ref 3.5–5.3)
RBC # BLD: 4.94 M/UL — SIGNIFICANT CHANGE UP (ref 4.4–5.2)
RBC # FLD: 14.9 % — SIGNIFICANT CHANGE UP (ref 11–15.6)
SODIUM SERPL-SCNC: 139 MMOL/L — SIGNIFICANT CHANGE UP (ref 135–145)
TROPONIN T SERPL-MCNC: <0.01 NG/ML — SIGNIFICANT CHANGE UP (ref 0–0.06)
WBC # BLD: 6.6 K/UL — SIGNIFICANT CHANGE UP (ref 4.8–10.8)
WBC # FLD AUTO: 6.6 K/UL — SIGNIFICANT CHANGE UP (ref 4.8–10.8)

## 2018-09-06 PROCEDURE — 93016 CV STRESS TEST SUPVJ ONLY: CPT

## 2018-09-06 PROCEDURE — 99232 SBSQ HOSP IP/OBS MODERATE 35: CPT

## 2018-09-06 PROCEDURE — 93018 CV STRESS TEST I&R ONLY: CPT

## 2018-09-06 PROCEDURE — 99233 SBSQ HOSP IP/OBS HIGH 50: CPT

## 2018-09-06 PROCEDURE — 78452 HT MUSCLE IMAGE SPECT MULT: CPT | Mod: 26

## 2018-09-06 RX ADMIN — HEPARIN SODIUM 5000 UNIT(S): 5000 INJECTION INTRAVENOUS; SUBCUTANEOUS at 05:33

## 2018-09-06 RX ADMIN — ATORVASTATIN CALCIUM 40 MILLIGRAM(S): 80 TABLET, FILM COATED ORAL at 22:27

## 2018-09-06 RX ADMIN — FAMOTIDINE 20 MILLIGRAM(S): 10 INJECTION INTRAVENOUS at 12:32

## 2018-09-06 RX ADMIN — HEPARIN SODIUM 5000 UNIT(S): 5000 INJECTION INTRAVENOUS; SUBCUTANEOUS at 22:27

## 2018-09-06 RX ADMIN — Medication 1 INCH(S): at 00:02

## 2018-09-06 RX ADMIN — CLOPIDOGREL BISULFATE 75 MILLIGRAM(S): 75 TABLET, FILM COATED ORAL at 12:32

## 2018-09-06 RX ADMIN — HEPARIN SODIUM 5000 UNIT(S): 5000 INJECTION INTRAVENOUS; SUBCUTANEOUS at 13:50

## 2018-09-06 RX ADMIN — Medication 50 MILLIGRAM(S): at 05:33

## 2018-09-06 RX ADMIN — Medication 50 MILLIGRAM(S): at 17:48

## 2018-09-06 RX ADMIN — Medication 100 MILLIGRAM(S): at 17:48

## 2018-09-06 RX ADMIN — Medication 81 MILLIGRAM(S): at 12:32

## 2018-09-06 NOTE — PROGRESS NOTE ADULT - SUBJECTIVE AND OBJECTIVE BOX
[facility]                                                        Office: [address] [place] [address]                                                       Telephone: [phone]. Fax:[phone]      CARDIOLOGY PROGRESS NOTE   ([facility])    Subjective: Patient seen and examined.  Symptoms unchanged.  Just had stress test. Lethargic.  Headache from regadenoson.     ROS: No headache,  no SOB, no palpitations, no dizziness, no nausea, no bleeding    Chronic Conditions:  R ICA stenosis - stable, no symptoms, on aspirin/statin/plavix     CURRENT MEDICATIONS  metoprolol tartrate 50 milliGRAM(s) Oral two times a day  oxyCODONE    IR 5 milliGRAM(s) Oral every 6 hours PRN  docusate sodium Oral Tab/Cap - Peds 100 milliGRAM(s) Oral two times a day  famotidine    Tablet 20 milliGRAM(s) Oral daily  atorvastatin 40 milliGRAM(s) Oral at bedtime  dextrose 40% Gel 15 Gram(s) Oral once PRN  dextrose 50% Injectable 12.5 Gram(s) IV Push once  dextrose 50% Injectable 25 Gram(s) IV Push once  dextrose 50% Injectable 25 Gram(s) IV Push once  glucagon  Injectable 1 milliGRAM(s) IntraMuscular once PRN  insulin lispro (HumaLOG) corrective regimen sliding scale   SubCutaneous at bedtime  insulin lispro (HumaLOG) corrective regimen sliding scale   SubCutaneous three times a day before meals  aspirin  chewable 81 milliGRAM(s) Oral daily  clopidogrel Tablet 75 milliGRAM(s) Oral daily  dextrose 5%. 1000 milliLiter(s) IV Continuous <Continuous>  heparin  Injectable 5000 Unit(s) SubCutaneous every 8 hours  	  TELEMETRY: SR  Vitals:  T(C): 36.7 (09-06-18 @ 07:20), Max: 36.8 (09-06-18 @ 00:01)  HR: 74 (09-06-18 @ 07:20) (68 - 82)  BP: 140/60 (09-06-18 @ 07:20) (140/60 - 152/70)  RR: 18 (09-06-18 @ 07:20) (18 - 18)  SpO2: 100% (09-06-18 @ 07:20) (100% - 100%)  Wt(kg): --  I&O's Summary    05 Sep 2018 07:01  -  06 Sep 2018 07:00  --------------------------------------------------------  IN: 0 mL / OUT: 150 mL / NET: -150 mL        Daily T(C): 36.7 (09-06-18 @ 07:20), Max: 36.8 (09-06-18 @ 00:01)  HR: 74 (09-06-18 @ 07:20) (68 - 82)  BP: 140/60 (09-06-18 @ 07:20) (140/60 - 152/70)  RR: 18 (09-06-18 @ 07:20) (18 - 18)  SpO2: 100% (09-06-18 @ 07:20) (100% - 100%)  Wt(kg): --    Daily     PHYSICAL EXAM:  Appearance: NAD	  HEENT:   Normal oral mucosa, PERRL, EOMI	  Lymphatic: No lymphadenopathy  Cardiovascular: Normal S1 S2, No JVD, II/VI systolic murmur, No edema  Respiratory: Lungs clear to auscultation	  Psychiatry: A & O x 3, Mood & affect appropriate  Gastrointestinal:  Soft, Non-tender, + BS	  Skin: No rashes, No ecchymoses, No cyanosis  Neurologic: Non-focal  Extremities: Normal range of motion, No clubbing, cyanosis or edema  Vascular: Peripheral pulses palpable 2+ bilaterally, warm      DIAGNOSTIC TESTING:  Echocardiogram (images reviewed by me):< from: TTE Echo Complete w/Doppler (09.05.18 @ 13:55) >  Summary:   1. Left ventricular ejection fraction, by visual estimation, is 70 to   75%.   2. Normal global leftventricular systolic function.   3. Normal left ventricular internal cavity size.   4. Spectral Doppler shows impaired relaxation pattern of left   ventricular myocardial filling (Grade I diastolic dysfunction).   5. There is mild septal left ventricular hypertrophy.   6. The left atrium is normal in size.   7. The right atrium is normal in size.   8. There is no evidence of pericardial effusion.   9. Mild mitral annular calcification.  10. Mild to moderate mitral valve regurgitation.  11. Mild prolapse of the posterior mitral valve leaflet.  12. Mild tricuspid regurgitation.  13. Mild to moderate aortic regurgitation.  14. Trace pulmonic valve regurgitation.  15. The ascending aorta is dilated measuring 3.9 cm.  16. The main pulmonary arteryis normal in size.    [id number] Leyla Marin MD, Electronically signed on 9/5/2018 at 4:00:37   PM       < end of copied text >    Stress Test:  pending results.                        12.8   6.6   )-----------( 287      ( 06 Sep 2018 07:11 )             41.2     09-06    139  |  102  |  14.0  ----------------------------<  106  4.6   |  25.0  |  1.16    Ca    9.6      06 Sep 2018 07:11  Mg     1.8     09-05    TPro  6.9  /  Alb  3.7  /  TBili  0.5  /  DBili  x   /  AST  14  /  ALT  7   /  AlkPhos  102  09-05

## 2018-09-06 NOTE — PROGRESS NOTE ADULT - ASSESSMENT
A/P: 61 y/o F PMHx of CAD s/p CABG x 2 (LIMA TO LAD, RSVG to RCA) 08/08/18,  Right ICA stenosis (>70%), HTN, DMII with neuropathy and retinopathy who presents to the ED with chest pain. Trop neg x 1. CK normal. CXR clear. EKG with no acute changes.     1. Chest Pain - awaiting results of stress test.  Complicated history, at times consistent with angina, at times suggestive of musculoskeletal chest pain.  If significant ischemia on stress test, may need repeat cath.  Continue aspirin/plavix/metoprolol/statin  2. Right ICA stenosis - stable, no symptoms.  Continue aspirin/statin/plavix.  If stress test negative for ischemia, will send for CTA of the neck.    3. DVT prophylaxis - on LMWH    Will follow.    Thank you for allowing me to participate in your patient's care.

## 2018-09-06 NOTE — PROGRESS NOTE ADULT - ASSESSMENT
A/P: 61 y/o F PMHx of DM-2, HTN, CAD s/p CABG x 2 (LIMA TO LAD, RSVG to RCA) 08/08/18,  Right ICA stenosis (>70%), HTN, DMII with neuropathy and retinopathy who presents to the ED with chest pain. Trop neg x 1. CK normal. CXR clear. EKG with no acute changes.     >Chest Pain  - Complicated presentation due to recent CABG and pain.   -s/p stress test this am. awaiting results   -cardiology consult appreciated   - c/w telemetry   - Trop neg .   - Continue current cardiac medications.     >midsternal post cabg incision:   - CT Surgery consutl appreciated   -residual stich removed   - no drainage noted     >Right ICA stenosis  - Continue aspirin, statin, plavix.  - Per [facility], patient to be evaluated for endartectomy as an outpatient.     >HTN, essential:   -c/w lisinopril, bb  -monitor     >hlp  -c/w statins     >DM-2  -6.3   -ssi   - to resume oral hypoglycemics on dc

## 2018-09-06 NOTE — PROGRESS NOTE ADULT - SUBJECTIVE AND OBJECTIVE BOX
cc; cp     interval hx: s/p stress test this am. feels very weak. still feels achiness in her chest. frail. denies sob, dizziness , n/v/ palpitations       Vital Signs Last 24 Hrs  T(C): 36.7 (06 Sep 2018 07:20), Max: 36.8 (06 Sep 2018 00:01)  T(F): 98 (06 Sep 2018 07:20), Max: 98.3 (06 Sep 2018 00:01)  HR: 84 (06 Sep 2018 12:21) (68 - 84)  BP: 140/60 (06 Sep 2018 07:20) (140/60 - 152/70)  BP(mean): --  RR: 18 (06 Sep 2018 12:21) (18 - 18)  SpO2: 98% (06 Sep 2018 12:21) (98% - 100%)    PHYSICAL EXAM:   CONSTITUTIONAL: Weak appearing, well nourished, awake, alert, oriented to person, place, time/situation and in no apparent distress.  ENMT: Airway patent, Nasal mucosa clear. Mouth with normal mucosa. Throat has no vesicles, no oropharyngeal exudates and uvula is midline.  EYES: Clear bilaterally, pupils equal, round and reactive to light.  CARDIAC:Normal S1 S2, No JVD, II/VI systolic murmur LSB, Right carotid bruit, No peripheral edema, no sternal click,   RESPIRATORY: Breath sounds b/l, occasional rhales at bases b/l   GASTROINTESTINAL: Abdomen soft, non-tender, no guarding.  MUSCULOSKELETAL: Spine appears normal, range of motion is not limited, no muscle or joint tenderness  NEUROLOGICAL: Alert and oriented, no focal deficits, no motor or sensory deficits.  LOWER ext: no edema noted b./l   Lymph nodes: no lymphadenopathy noted   Psych: mood appropriate                          12.8   6.6   )-----------( 287      ( 06 Sep 2018 07:11 )             41.2   09-06    139  |  102  |  14.0  ----------------------------<  106  4.6   |  25.0  |  1.16    Ca    9.6      06 Sep 2018 07:11  Mg     1.8     09-05    TPro  6.9  /  Alb  3.7  /  TBili  0.5  /  DBili  x   /  AST  14  /  ALT  7   /  AlkPhos  102  09-05

## 2018-09-07 DIAGNOSIS — E11.9 TYPE 2 DIABETES MELLITUS WITHOUT COMPLICATIONS: ICD-10-CM

## 2018-09-07 DIAGNOSIS — G93.40 ENCEPHALOPATHY, UNSPECIFIED: ICD-10-CM

## 2018-09-07 DIAGNOSIS — I63.9 CEREBRAL INFARCTION, UNSPECIFIED: ICD-10-CM

## 2018-09-07 DIAGNOSIS — I25.10 ATHEROSCLEROTIC HEART DISEASE OF NATIVE CORONARY ARTERY WITHOUT ANGINA PECTORIS: ICD-10-CM

## 2018-09-07 LAB
ALBUMIN SERPL ELPH-MCNC: 3.6 G/DL — SIGNIFICANT CHANGE UP (ref 3.3–5.2)
ALP SERPL-CCNC: 103 U/L — SIGNIFICANT CHANGE UP (ref 40–120)
ALT FLD-CCNC: 8 U/L — SIGNIFICANT CHANGE UP
ANION GAP SERPL CALC-SCNC: 14 MMOL/L — SIGNIFICANT CHANGE UP (ref 5–17)
ANION GAP SERPL CALC-SCNC: 18 MMOL/L — HIGH (ref 5–17)
APTT BLD: 33.8 SEC — SIGNIFICANT CHANGE UP (ref 27.5–37.4)
AST SERPL-CCNC: 21 U/L — SIGNIFICANT CHANGE UP
BASOPHILS # BLD AUTO: 0.1 K/UL — SIGNIFICANT CHANGE UP (ref 0–0.2)
BASOPHILS NFR BLD AUTO: 0.9 % — SIGNIFICANT CHANGE UP (ref 0–2)
BILIRUB SERPL-MCNC: 0.6 MG/DL — SIGNIFICANT CHANGE UP (ref 0.4–2)
BUN SERPL-MCNC: 15 MG/DL — SIGNIFICANT CHANGE UP (ref 8–20)
BUN SERPL-MCNC: 16 MG/DL — SIGNIFICANT CHANGE UP (ref 8–20)
CALCIUM SERPL-MCNC: 10 MG/DL — SIGNIFICANT CHANGE UP (ref 8.6–10.2)
CALCIUM SERPL-MCNC: 9.3 MG/DL — SIGNIFICANT CHANGE UP (ref 8.6–10.2)
CHLORIDE SERPL-SCNC: 102 MMOL/L — SIGNIFICANT CHANGE UP (ref 98–107)
CHLORIDE SERPL-SCNC: 99 MMOL/L — SIGNIFICANT CHANGE UP (ref 98–107)
CO2 SERPL-SCNC: 14 MMOL/L — LOW (ref 22–29)
CO2 SERPL-SCNC: 22 MMOL/L — SIGNIFICANT CHANGE UP (ref 22–29)
CREAT SERPL-MCNC: 0.9 MG/DL — SIGNIFICANT CHANGE UP (ref 0.5–1.3)
CREAT SERPL-MCNC: 1.02 MG/DL — SIGNIFICANT CHANGE UP (ref 0.5–1.3)
EOSINOPHIL # BLD AUTO: 0.1 K/UL — SIGNIFICANT CHANGE UP (ref 0–0.5)
EOSINOPHIL NFR BLD AUTO: 1.8 % — SIGNIFICANT CHANGE UP (ref 0–6)
GLUCOSE BLDC GLUCOMTR-MCNC: 138 MG/DL — HIGH (ref 70–99)
GLUCOSE BLDC GLUCOMTR-MCNC: 146 MG/DL — HIGH (ref 70–99)
GLUCOSE BLDC GLUCOMTR-MCNC: 227 MG/DL — HIGH (ref 70–99)
GLUCOSE SERPL-MCNC: 151 MG/DL — HIGH (ref 70–115)
GLUCOSE SERPL-MCNC: 158 MG/DL — HIGH (ref 70–115)
HCT VFR BLD CALC: 42.2 % — SIGNIFICANT CHANGE UP (ref 37–47)
HCT VFR BLD CALC: 45.9 % — SIGNIFICANT CHANGE UP (ref 37–47)
HGB BLD-MCNC: 13.2 G/DL — SIGNIFICANT CHANGE UP (ref 12–16)
HGB BLD-MCNC: 14.2 G/DL — SIGNIFICANT CHANGE UP (ref 12–16)
INR BLD: 1.09 RATIO — SIGNIFICANT CHANGE UP (ref 0.88–1.16)
LYMPHOCYTES # BLD AUTO: 2.4 K/UL — SIGNIFICANT CHANGE UP (ref 1–4.8)
LYMPHOCYTES # BLD AUTO: 31.4 % — SIGNIFICANT CHANGE UP (ref 20–55)
MCHC RBC-ENTMCNC: 26.1 PG — LOW (ref 27–31)
MCHC RBC-ENTMCNC: 26.5 PG — LOW (ref 27–31)
MCHC RBC-ENTMCNC: 30.9 G/DL — LOW (ref 32–36)
MCHC RBC-ENTMCNC: 31.3 G/DL — LOW (ref 32–36)
MCV RBC AUTO: 83.4 FL — SIGNIFICANT CHANGE UP (ref 81–99)
MCV RBC AUTO: 85.6 FL — SIGNIFICANT CHANGE UP (ref 81–99)
MONOCYTES # BLD AUTO: 0.5 K/UL — SIGNIFICANT CHANGE UP (ref 0–0.8)
MONOCYTES NFR BLD AUTO: 6.8 % — SIGNIFICANT CHANGE UP (ref 3–10)
NEUTROPHILS # BLD AUTO: 4.6 K/UL — SIGNIFICANT CHANGE UP (ref 1.8–8)
NEUTROPHILS NFR BLD AUTO: 59 % — SIGNIFICANT CHANGE UP (ref 37–73)
PLATELET # BLD AUTO: 270 K/UL — SIGNIFICANT CHANGE UP (ref 150–400)
PLATELET # BLD AUTO: 292 K/UL — SIGNIFICANT CHANGE UP (ref 150–400)
POTASSIUM SERPL-MCNC: 4.3 MMOL/L — SIGNIFICANT CHANGE UP (ref 3.5–5.3)
POTASSIUM SERPL-MCNC: 4.5 MMOL/L — SIGNIFICANT CHANGE UP (ref 3.5–5.3)
POTASSIUM SERPL-SCNC: 4.3 MMOL/L — SIGNIFICANT CHANGE UP (ref 3.5–5.3)
POTASSIUM SERPL-SCNC: 4.5 MMOL/L — SIGNIFICANT CHANGE UP (ref 3.5–5.3)
PROT SERPL-MCNC: 7.2 G/DL — SIGNIFICANT CHANGE UP (ref 6.6–8.7)
PROTHROM AB SERPL-ACNC: 12 SEC — SIGNIFICANT CHANGE UP (ref 9.8–12.7)
RBC # BLD: 5.06 M/UL — SIGNIFICANT CHANGE UP (ref 4.4–5.2)
RBC # BLD: 5.36 M/UL — HIGH (ref 4.4–5.2)
RBC # FLD: 15 % — SIGNIFICANT CHANGE UP (ref 11–15.6)
RBC # FLD: 15.1 % — SIGNIFICANT CHANGE UP (ref 11–15.6)
SODIUM SERPL-SCNC: 131 MMOL/L — LOW (ref 135–145)
SODIUM SERPL-SCNC: 138 MMOL/L — SIGNIFICANT CHANGE UP (ref 135–145)
TROPONIN T SERPL-MCNC: <0.01 NG/ML — SIGNIFICANT CHANGE UP (ref 0–0.06)
WBC # BLD: 6.7 K/UL — SIGNIFICANT CHANGE UP (ref 4.8–10.8)
WBC # BLD: 7.8 K/UL — SIGNIFICANT CHANGE UP (ref 4.8–10.8)
WBC # FLD AUTO: 6.7 K/UL — SIGNIFICANT CHANGE UP (ref 4.8–10.8)
WBC # FLD AUTO: 7.8 K/UL — SIGNIFICANT CHANGE UP (ref 4.8–10.8)

## 2018-09-07 PROCEDURE — 99233 SBSQ HOSP IP/OBS HIGH 50: CPT

## 2018-09-07 PROCEDURE — 99223 1ST HOSP IP/OBS HIGH 75: CPT

## 2018-09-07 PROCEDURE — 93010 ELECTROCARDIOGRAM REPORT: CPT

## 2018-09-07 PROCEDURE — 99291 CRITICAL CARE FIRST HOUR: CPT

## 2018-09-07 PROCEDURE — 70450 CT HEAD/BRAIN W/O DYE: CPT | Mod: 26

## 2018-09-07 RX ORDER — LABETALOL HCL 100 MG
10 TABLET ORAL
Qty: 0 | Refills: 0 | Status: DISCONTINUED | OUTPATIENT
Start: 2018-09-07 | End: 2018-09-10

## 2018-09-07 RX ORDER — ONDANSETRON 8 MG/1
4 TABLET, FILM COATED ORAL ONCE
Qty: 0 | Refills: 0 | Status: COMPLETED | OUTPATIENT
Start: 2018-09-07 | End: 2018-09-07

## 2018-09-07 RX ORDER — DEXMEDETOMIDINE HYDROCHLORIDE IN 0.9% SODIUM CHLORIDE 4 UG/ML
0.3 INJECTION INTRAVENOUS
Qty: 200 | Refills: 0 | Status: DISCONTINUED | OUTPATIENT
Start: 2018-09-07 | End: 2018-09-08

## 2018-09-07 RX ORDER — FLUMAZENIL 0.1 MG/ML
0.5 VIAL (ML) INTRAVENOUS ONCE
Qty: 0 | Refills: 0 | Status: COMPLETED | OUTPATIENT
Start: 2018-09-07 | End: 2018-09-07

## 2018-09-07 RX ORDER — ALTEPLASE 100 MG
4.6 KIT INTRAVENOUS ONCE
Qty: 0 | Refills: 0 | Status: COMPLETED | OUTPATIENT
Start: 2018-09-07 | End: 2018-09-07

## 2018-09-07 RX ORDER — ALTEPLASE 100 MG
42 KIT INTRAVENOUS ONCE
Qty: 0 | Refills: 0 | Status: COMPLETED | OUTPATIENT
Start: 2018-09-07 | End: 2018-09-07

## 2018-09-07 RX ADMIN — CLOPIDOGREL BISULFATE 75 MILLIGRAM(S): 75 TABLET, FILM COATED ORAL at 11:53

## 2018-09-07 RX ADMIN — Medication 81 MILLIGRAM(S): at 11:53

## 2018-09-07 RX ADMIN — Medication 100 MILLIGRAM(S): at 06:15

## 2018-09-07 RX ADMIN — ALTEPLASE 276 MILLIGRAM(S): KIT at 15:18

## 2018-09-07 RX ADMIN — Medication 50 MILLIGRAM(S): at 19:49

## 2018-09-07 RX ADMIN — ONDANSETRON 4 MILLIGRAM(S): 8 TABLET, FILM COATED ORAL at 18:02

## 2018-09-07 RX ADMIN — ALTEPLASE 42 MILLIGRAM(S): KIT at 15:20

## 2018-09-07 RX ADMIN — DEXMEDETOMIDINE HYDROCHLORIDE IN 0.9% SODIUM CHLORIDE 3.85 MICROGRAM(S)/KG/HR: 4 INJECTION INTRAVENOUS at 19:49

## 2018-09-07 RX ADMIN — HEPARIN SODIUM 5000 UNIT(S): 5000 INJECTION INTRAVENOUS; SUBCUTANEOUS at 06:15

## 2018-09-07 RX ADMIN — FAMOTIDINE 20 MILLIGRAM(S): 10 INJECTION INTRAVENOUS at 11:53

## 2018-09-07 RX ADMIN — Medication 0.5 MILLIGRAM(S): at 14:10

## 2018-09-07 RX ADMIN — DEXMEDETOMIDINE HYDROCHLORIDE IN 0.9% SODIUM CHLORIDE 3.85 MICROGRAM(S)/KG/HR: 4 INJECTION INTRAVENOUS at 18:03

## 2018-09-07 RX ADMIN — Medication 50 MILLIGRAM(S): at 06:15

## 2018-09-07 NOTE — PROGRESS NOTE ADULT - ASSESSMENT
A/P: 61 y/o F PMHx of DM-2, HTN, CAD s/p CABG x 2 (LIMA TO LAD, RSVG to RCA) 08/08/18,  Right ICA stenosis (>70%), HTN, DMII with neuropathy and retinopathy who presents to the ED with chest pain. Trop neg x 1. CK normal. CXR clear. EKG with no acute changes.     >Chest Pain, likley cardiac origin . positive stress test    -cardiology consult appreciated   - c/w telemetry   - Continue current cardiac medications  -plan for cath later today     >midsternal post cabg incision:   - CT Surgery consutl appreciated   -residual stich removed   - no drainage noted     >Right ICA stenosis  - Continue aspirin, statin, plavix.  - Per [facility], patient to be evaluated for endartectomy as an outpatient.     >HTN, essential:   -c/w lisinopril, bb  -monitor     >hlp  -c/w statins     >DM-2  -AIC 6.3   -c/w ssi   - to resume oral hypoglycemics on dc

## 2018-09-07 NOTE — PROGRESS NOTE ADULT - ASSESSMENT
A/P: 63 y/o F PMHx of CAD s/p CABG x 2 (LIMA TO LAD, RSVG to RCA) 08/08/18,  Right ICA stenosis (>70%), HTN, DMII with neuropathy and retinopathy who presents to the ED with chest pain. Trop neg x 1. CK normal. CXR clear. EKG with no acute changes.     1. Chest Pain - lateral ischemia on stress test, also with TID (suggestive of multivessel ischemia).  Will send for cardiac catheterization today.    Continue aspirin/plavix/metoprolol/statin  2. Right ICA stenosis - stable, no symptoms.  Continue aspirin/statin/plavix.    3. DVT prophylaxis - on LMWH    Will follow.    Thank you for allowing me to participate in your patient's care.  D/W Dr. Bruce

## 2018-09-07 NOTE — CONSULT NOTE ADULT - ATTENDING COMMENTS
61 y/o female with CAD s/p CABG, right ICA stenosis presents with atypical chest pain.  Changing story.  Ischemic evaluation - stress test tomorrow.  Will plan for CTA neck this admission if ischemia evaluation negative.  Wound check by CT surgery.
Placed an NGT to prevent aspiration, even in the setting of recent iv tpa as risk of ngt<risk of aspiration  At high risk for decompensation.  Updated daughter, two grandsons at bedside  discussed in detail with Roverto Ha

## 2018-09-07 NOTE — PROGRESS NOTE ADULT - SUBJECTIVE AND OBJECTIVE BOX
Pre cath for chest pain  2 vessel CABG 8/8/18 Dr Rojas  Abnormal stress test inviolving basal to mid inferolateral wall, basal anteriorolateral, and basal to distal inferior wall EF 63%  ASA 2  Mallampati 2

## 2018-09-07 NOTE — PROGRESS NOTE ADULT - SUBJECTIVE AND OBJECTIVE BOX
61 y/o F PMHx of CAD s/p CABG x 2 (LIMA TO LAD, RSVG to RCA) 08/08/18,  Right ICA stenosis (>70%), HTN, DMII with neuropathy and retinopathy who presents to the ED with chest pain tahts similar to her previous cardiac pain prior to cabg. Patient states that starting at 11 PM last night she began experiencing left sided chest pain while walking to the bathroom,  felt a pressure sensation that radiated to her left armpit and up to her left shoulder, also had some associated palpitations. she took some tylenol, and the pain continued to come and go throughout the night. Then early this morning she began to have sob, woke up with trouble breathing , and then had pain again when walking to the kitchen, this time with some associated SOB, dizziness, and nausea. pt states that pain was somewhat worse with arm movement . Pain was consistently a 7/10, again worse with movement.   Pt called 911, and given NTG in the ambulance which helped her symptoms, but still having pain when moving in bed. Patient denies any recent fevers, chills, V/D, cough, or headache.  deneis any lower ext swelling. states that she has been doing well with home PT till yesterday when her cp started.     Patient had Stress test < from: Nuclear Stress Test-Pharmacologic (09.06.18 @ 09:07) >  NUCLEAR FINDINGS:  There is mild LV cavity dilation with stress imaging.  TID  1.29.  There is a medium sized area of moderately reduced  photon uptake involving the basal to mid inferolateral,  basal anterolateral, and   basal to distal inferior walls  on stress imaging.  Rest imaging showed near normalization  of the basal to mid inferolateral, basal anterolateral  segments.  Gated SPECT imaging demonstrated mild basal  lateral hypokinesis.  LVEF = 63%.  ------------------------------------------------------------------------    IMPRESSIONS:  Positive study.    ECG with ST elevation on AVR, ST depression II, V4-V6.  Myocardial perfusion showed lateral ischemia. Findings  suggestive of multivessel ischemia (TID).  LVEF = 63%.  Cath to r/o further CAD  Cath showed patent grafts  Received patient post procedure in RR  Patient stated, " I feel nauseous".    Patient then became unresponsive  Code stroke called  (See code stroke sheet)  Fingerstick 146  / 87   ST depression noted anterior and inferolateral  hernández Dr Iqbal aware  CT scan Seen by Dr Esparza (neurology)  Decision made to tx with TPA.  Admit to MICU boarder in CICU  Dr ANGELO Cline given report by Dr Iqbal  Right leg immobilizer in place  Sheath unable to d/c at this time secondary to TPA  24 hours before sheath can be removed.  Discussed with Dr Iqbal and Dr Esparza.  Will continue to monitor  Patient more awake at this time but does not follow simple command   Unable to formulate speach at this time  Will monitor.

## 2018-09-07 NOTE — PROGRESS NOTE ADULT - SUBJECTIVE AND OBJECTIVE BOX
Patient had LHC and graft angiopgrahy   Patent LIMA to LAD, SVG to RPDA ( small vessel diffusely diseased) . LAD mid 80% , % , % .  After the procedure around 2:15 pm, nurse notified that patient is not responding appropriately. Earlier patient said that she feels sick   On exam : Patient is not following commands even though awake, moaning mostly but no comprehensible words  Moves his right LE when applying pressure to the groin and was able to squeeze her right fist but no consistent motor power in Left UE and LE.  Flumazenil and narcaine given ( to reverse Midazolam 1 mg and Fentanyl 50 mcg given during the procedure) with no change in mental status.   Stroke code called at 2:25 pm   CT showed no intracranial bleeding  Neurology evaluated that patient  and decision was to give systemic TPA for acute CVA.  Called daughter on the phone (Merary) to explain the diagnosis of acute stroke and the decision to give TPA.   Spoke to son in Law in the holding area with Neurologist Dr. Guzman   Patient will be admitted to MICU service ( Spoke with Dr. Cline)

## 2018-09-07 NOTE — PROGRESS NOTE ADULT - SUBJECTIVE AND OBJECTIVE BOX
[facility]                                                        Office: [address] [place] [address]                                                       Telephone: [phone]. Fax:[phone]      CARDIOLOGY PROGRESS NOTE   ([facility])    Subjective: Patient seen and examined.  Continues with fatigue.  Stress test yesterday positive for ischemia.     ROS: No headache, no chest pain, no SOB, no palpitations, no dizziness, no nausea, no bleeding    Chronic Conditions:  R ICA stenosis - stable, no symptoms, on aspirin/statin/plavix    CURRENT MEDICATIONS  metoprolol tartrate 50 milliGRAM(s) Oral two times a day  oxyCODONE    IR 5 milliGRAM(s) Oral every 6 hours PRN  docusate sodium Oral Tab/Cap - Peds 100 milliGRAM(s) Oral two times a day  famotidine    Tablet 20 milliGRAM(s) Oral daily  atorvastatin 40 milliGRAM(s) Oral at bedtime  dextrose 40% Gel 15 Gram(s) Oral once PRN  dextrose 50% Injectable 12.5 Gram(s) IV Push once  dextrose 50% Injectable 25 Gram(s) IV Push once  dextrose 50% Injectable 25 Gram(s) IV Push once  glucagon  Injectable 1 milliGRAM(s) IntraMuscular once PRN  insulin lispro (HumaLOG) corrective regimen sliding scale   SubCutaneous at bedtime  insulin lispro (HumaLOG) corrective regimen sliding scale   SubCutaneous three times a day before meals  aspirin  chewable 81 milliGRAM(s) Oral daily  clopidogrel Tablet 75 milliGRAM(s) Oral daily  dextrose 5%. 1000 milliLiter(s) IV Continuous <Continuous>  heparin  Injectable 5000 Unit(s) SubCutaneous every 8 hours    	  TELEMETRY:   Vitals:  T(C): 36.6 (09-07-18 @ 06:11), Max: 36.9 (09-06-18 @ 22:26)  HR: 78 (09-07-18 @ 06:11) (78 - 84)  BP: 118/58 (09-07-18 @ 06:11) (118/58 - 143/74)  RR: 18 (09-07-18 @ 06:11) (18 - 18)  SpO2: 98% (09-07-18 @ 06:11) (98% - 100%)  Wt(kg): --  I&O's Summary    06 Sep 2018 07:01  -  07 Sep 2018 07:00  --------------------------------------------------------  IN: 240 mL / OUT: 400 mL / NET: -160 mL    PHYSICAL EXAM:  Appearance: NAD, frail in appearance	  HEENT:   Normal oral mucosa, PERRL, EOMI	  Lymphatic: No lymphadenopathy  Cardiovascular: Normal S1 S2, No JVD, No murmurs, No edema  Respiratory: Lungs clear to auscultation	  Psychiatry: A & O x 3, Mood & affect appropriate  Gastrointestinal:  Soft, Non-tender, + BS	  Skin: No rashes, No ecchymoses, No cyanosis  Neurologic: Non-focal  Extremities: Normal range of motion, No clubbing, cyanosis or edema  Vascular: Peripheral pulses palpable 2+ bilaterally, warm      ECG (tracing reviewed by me):  	    DIAGNOSTIC TESTING:  Echocardiogram (images reviewed by me): < from: TTE Echo Complete w/Doppler (09.05.18 @ 13:55) >  Summary:   1. Left ventricular ejection fraction, by visual estimation, is 70 to   75%.   2. Normal global leftventricular systolic function.   3. Normal left ventricular internal cavity size.   4. Spectral Doppler shows impaired relaxation pattern of left   ventricular myocardial filling (Grade I diastolic dysfunction).   5. There is mild septal left ventricular hypertrophy.   6. The left atrium is normal in size.   7. The right atrium is normal in size.   8. There is no evidence of pericardial effusion.   9. Mild mitral annular calcification.  10. Mild to moderate mitral valve regurgitation.  11. Mild prolapse of the posterior mitral valve leaflet.  12. Mild tricuspid regurgitation.  13. Mild to moderate aortic regurgitation.  14. Trace pulmonic valve regurgitation.  15. The ascending aorta is dilated measuring 3.9 cm.  16. The main pulmonary arteryis normal in size.    [id number] Leyla Marin MD, Electronically signed on 9/5/2018 at 4:00:37   PM       < end of copied text >    Catheterization:  Stress Test:   < from: Nuclear Stress Test-Pharmacologic (09.06.18 @ 09:07) >  NUCLEAR FINDINGS:  There is mild LV cavity dilation with stress imaging.  TID  1.29.  There is a medium sized area of moderately reduced  photon uptake involving the basal to mid inferolateral,  basal anterolateral, and   basal to distal inferior walls  on stress imaging.  Rest imaging showed near normalization  of the basal to mid inferolateral, basal anterolateral  segments.  Gated SPECT imaging demonstrated mild basal  lateral hypokinesis.  LVEF = 63%.  ------------------------------------------------------------------------    IMPRESSIONS:  Positive study.    ECG with ST elevation on AVR, ST depression II, V4-V6.  Myocardial perfusion showed lateral ischemia. Findings  suggestive of multivessel ischemia (TID).  LVEF = 63%.    < end of copied text >                          14.2   6.7   )-----------( 292      ( 07 Sep 2018 05:34 )             45.9     09-07    138  |  102  |  16.0  ----------------------------<  151<H>  4.5   |  22.0  |  1.02    Ca    10.0      07 Sep 2018 05:34  Mg     1.8     09-05    TPro  6.9  /  Alb  3.7  /  TBili  0.5  /  DBili  x   /  AST  14  /  ALT  7   /  AlkPhos  102  09-05

## 2018-09-07 NOTE — CONSULT NOTE ADULT - ASSESSMENT
The patient is a 62y Female who is status post cardiac cath.   She developed what appears to be global aphasia.     Aphasia.   No exclusions to t-PA.   Bolus given at 3:20 pm. Infusion started .     Will need transfer to ICU bed.   Follow up CT tomorrow.   Eventual MRI brain.   Hold antiplatelet for 24 hrs from infusion completion.   If no improvement will need speech therapy.     Hypertension   Control blood pressure per post t-PA protocol.     Cardiac disease  Plan per medicine/Cardiology.     Case discussed with Dr Bruce and with cardiac team.

## 2018-09-07 NOTE — PROGRESS NOTE ADULT - SUBJECTIVE AND OBJECTIVE BOX
cc; cp     interval hx: feels very weak. still has mild chest pain/ achiness . s/p positive stress test yesterday    frail. denies sob, dizziness , n/v/ palpitations     Vital Signs Last 24 Hrs  T(C): 36.6 (07 Sep 2018 06:11), Max: 36.9 (06 Sep 2018 22:26)  T(F): 97.8 (07 Sep 2018 06:11), Max: 98.5 (06 Sep 2018 22:26)  HR: 66 (07 Sep 2018 11:25) (66 - 84)  BP: 132/66 (07 Sep 2018 11:25) (118/58 - 143/74)  BP(mean): --  RR: 16 (07 Sep 2018 11:25) (16 - 18)  SpO2: 100% (07 Sep 2018 11:25) (98% - 100%)      PHYSICAL EXAM:   CONSTITUTIONAL: Weak appearing, well nourished, awake, alert, oriented to person, place, time/situation and in no apparent distress.  ENMT: Airway patent, Nasal mucosa clear. Mouth with normal mucosa. Throat has no vesicles, no oropharyngeal exudates and uvula is midline.  EYES: Clear bilaterally, pupils equal, round and reactive to light.  CARDIAC:Normal S1 S2, No JVD, II/VI systolic murmur LSB, Right carotid bruit, No peripheral edema, no sternal click,   RESPIRATORY: Breath sounds b/l, occasional rhales at bases b/l   GASTROINTESTINAL: Abdomen soft, non-tender, no guarding.  MUSCULOSKELETAL: Spine appears normal, range of motion is not limited, no muscle or joint tenderness  NEUROLOGICAL: Alert and oriented, no focal deficits, no motor or sensory deficits.  LOWER ext: no edema noted b./l   Lymph nodes: no lymphadenopathy noted   Psych: mood appropriate                                     14.2   6.7   )-----------( 292      ( 07 Sep 2018 05:34 )             45.9   09-07    138  |  102  |  16.0  ----------------------------<  151<H>  4.5   |  22.0  |  1.02    Ca    10.0      07 Sep 2018 05:34    DIAGNOSTIC TESTING:  Echocardiogram (images reviewed by me): < from: TTE Echo Complete w/Doppler (09.05.18 @ 13:55) >  Summary:   1. Left ventricular ejection fraction, by visual estimation, is 70 to   75%.   2. Normal global leftventricular systolic function.   3. Normal left ventricular internal cavity size.   4. Spectral Doppler shows impaired relaxation pattern of left   ventricular myocardial filling (Grade I diastolic dysfunction).   5. There is mild septal left ventricular hypertrophy.   6. The left atrium is normal in size.   7. The right atrium is normal in size.   8. There is no evidence of pericardial effusion.   9. Mild mitral annular calcification.  10. Mild to moderate mitral valve regurgitation.  11. Mild prolapse of the posterior mitral valve leaflet.  12. Mild tricuspid regurgitation.  13. Mild to moderate aortic regurgitation.  14. Trace pulmonic valve regurgitation.  15. The ascending aorta is dilated measuring 3.9 cm.  16. The main pulmonary arteryis normal in size.      Stress Test:   < from: Nuclear Stress Test-Pharmacologic (09.06.18 @ 09:07) >  NUCLEAR FINDINGS:  There is mild LV cavity dilation with stress imaging.  TID  1.29.  There is a medium sized area of moderately reduced  photon uptake involving the basal to mid inferolateral,  basal anterolateral, and   basal to distal inferior walls  on stress imaging.  Rest imaging showed near normalization  of the basal to mid inferolateral, basal anterolateral  segments.  Gated SPECT imaging demonstrated mild basal  lateral hypokinesis.  LVEF = 63%.  IMPRESSIONS:  Positive study.    ECG with ST elevation on AVR, ST depression II, V4-V6.  Myocardial perfusion showed lateral ischemia. Findings  suggestive of multivessel ischemia (TID).  LVEF = 63%.

## 2018-09-07 NOTE — CONSULT NOTE ADULT - SUBJECTIVE AND OBJECTIVE BOX
[facility]                                        [facility]                                  [facility]                                      [address]                                           [place], [place], [address]                                                [phone]        CC: aphasia.     HISTORY:  The patient is a 62y Female who was admitted 9/5/18 with chest pain.   She underwent cardiac catheterization today.   She came back from the procedure at ~1:55 pm. She was answering questions and following instructions according to the nurses. Shortly afterward she stated that she felt sick. She then became non verbal and has not followed any instructions.   This has persisted. The stroke code was called and she was taken to CT emergently.     At present she remains non verbal.     PAST MEDICAL & SURGICAL HISTORY:  Coronary artery disease  Hypertension  Diabetes  Retinopathy  Neuropathy  DM (diabetes mellitus)  HTN (hypertension)  S/P CABG (coronary artery bypass graft)  History of cholecystectomy      MEDICATION PRIOR TO ADMISSION:  · 	clopidogrel 75 mg oral tablet  · 	Colace 100 mg oral capsule  · 	famotidine 20 mg oral tablet   · 	Metoprolol Tartrate 50 mg oral tablet  · 	oxyCODONE 5 mg oral tablet  · 	aspirin 81 mg oral tablet, chewable   · 	atorvastatin 40 mg oral tablet  · 	glimepiride 2 mg oral tablet    MEDICATIONS  (STANDING):  alteplase    Bolus 4.6 milliGRAM(s) IV Bolus once  alteplase    IVPB 42 milliGRAM(s) IV Intermittent once  aspirin  chewable 81 milliGRAM(s) Oral daily  atorvastatin 40 milliGRAM(s) Oral at bedtime  clopidogrel Tablet 75 milliGRAM(s) Oral daily  dextrose 5%. 1000 milliLiter(s) (50 mL/Hr) IV Continuous <Continuous>  dextrose 50% Injectable 12.5 Gram(s) IV Push once  dextrose 50% Injectable 25 Gram(s) IV Push once  dextrose 50% Injectable 25 Gram(s) IV Push once  docusate sodium Oral Tab/Cap - Peds 100 milliGRAM(s) Oral two times a day  famotidine    Tablet 20 milliGRAM(s) Oral daily  flumazenil Injectable 0.5 milliGRAM(s) IV Push once  heparin  Injectable 5000 Unit(s) SubCutaneous every 8 hours  insulin lispro (HumaLOG) corrective regimen sliding scale   SubCutaneous at bedtime  insulin lispro (HumaLOG) corrective regimen sliding scale   SubCutaneous three times a day before meals  metoprolol tartrate 50 milliGRAM(s) Oral two times a day    MEDICATIONS  (PRN):  dextrose 40% Gel 15 Gram(s) Oral once PRN Blood Glucose LESS THAN 70 milliGRAM(s)/deciliter  glucagon  Injectable 1 milliGRAM(s) IntraMuscular once PRN Glucose LESS THAN 70 milligrams/deciliter  oxyCODONE    IR 5 milliGRAM(s) Oral every 6 hours PRN Severe Pain (7 - 10)      Allergies  No Known Allergies    SOCIAL HISTORY:  Non smoker.     FAMILY HISTORY:  Family history of diabetes mellitus (Sibling)  Family history of coronary arteriosclerosis (Sibling): both brothers had cabg  Coronary artery disease (Sibling): pre mature CAD      ROS:  Constitutional: Unobtainable due to patient's condition.   Neuro: Unobtainable due to patient's condition.   Eyes: Unobtainable due to patient's condition.   Ears/nose/throat: Unobtainable due to patient's condition.   Cardiac: Unobtainable due to patient's condition.   Respiratory: Unobtainable due to patient's condition.   GI: Unobtainable due to patient's condition.   : Unobtainable due to patient's condition..  Integumentary: Unobtainable due to patient's condition.  Psych: Unobtainable due to patient's condition.  Heme: Unobtainable due to patient's condition.     Exam:  Vital Signs Last 24 Hrs  T(F): 97.5 (07 Sep 2018 13:57), Max: 98.5 (06 Sep 2018 22:26)  HR: 101 (07 Sep 2018 14:12) (66 - 101)  BP: 153/83 (07 Sep 2018 14:12) (118/58 - 153/83)  RR: 18 (07 Sep 2018 14:12) (16 - 18)  SpO2: 100% (07 Sep 2018 14:12) (98% - 100%)  General: NAD.   Carotid bruits absent.     Mental status: The patient is awake but non verbal and not following instructions.     Cranial nerves: There is no papilledema. Pupils react symmetrically to light. She blinks to threat. She tracks with gaze. Facial sensation is intact. Facial musculature is symmetric. Palate elevates symmetrically. Tongue is midline.    Motor/Sensory: There is normal bulk and tone.  Moving all extremities to stimuli.     Reflexes: 2+ throughout and plantar responses are flexor.    Cerebellar: Unable to assess.     LABS:                         14.2   6.7   )-----------( 292      ( 07 Sep 2018 05:34 )             45.9       09-07    138  |  102  |  16.0  ----------------------------<  151<H>  4.5   |  22.0  |  1.02    Ca    10.0      07 Sep 2018 05:34      RADIOLOGY   CT head images reviewed (and concur with report): There is no acute pathology.

## 2018-09-07 NOTE — CHART NOTE - NSCHARTNOTEFT_GEN_A_CORE
**STROKE CODE  NOTE**    Last known well time: 14:00; Time of onset of symptoms:14:22    HPI: 61 y/o F PMHx of CAD s/p CABG x 2 (LIMA TO LAD, RSVG to RCA) 08/08/18,  Right ICA stenosis (>70%), HTN, DMII with neuropathy and retinopathy  underwent cardiac cath procedure today.     PAST MEDICAL & SURGICAL HISTORY:  Coronary artery disease  Hypertension  Diabetes  Retinopathy  Neuropathy  DM (diabetes mellitus)  HTN (hypertension)  S/P CABG (coronary artery bypass graft)  History of cholecystectomy      FAMILY HISTORY:  Family history of diabetes mellitus (Sibling)  Family history of coronary arteriosclerosis (Sibling): both brothers had cabg  Coronary artery disease (Sibling): pre mature CAD      SOCIAL HISTORY:  Smoking Cesation: Discussed    ROS:  Constitutional: No fever, weight loss or fatigue  Eyes: No eye pain, visual disturbances, or discharge  ENMT:  No difficulty hearing, tinnitus, vertigo; No sinus or throat pain  Neck: No pain or stiffness  Respiratory: No cough, wheezing, chills or hemoptysis  Cardiovascular: No chest pain, palpitations, shortness of breath, dizziness or leg swelling  Gastrointestinal: No abdominal pain. No nausea, vomiting or hematemesis; No diarrhea or constipation. Nohematochezia.  Genitourinary: No dysuria, frequency, hematuria or incontinence  Neurological: As per HPI  Skin: No itching, burning, rashes or lesions   Endocrine: No heat or cold intolerance; No hair loss  Musculoskeletal: No joint pain or swelling; No muscle, back or extremity pain  Psychiatric: No depression, anxiety, mood swings or difficulty sleeping  Heme/Lymph: No easy bruising or bleeding gums    MEDICATIONS  (STANDING):  aspirin  chewable 81 milliGRAM(s) Oral daily  atorvastatin 40 milliGRAM(s) Oral at bedtime  clopidogrel Tablet 75 milliGRAM(s) Oral daily  dextrose 5%. 1000 milliLiter(s) (50 mL/Hr) IV Continuous <Continuous>  dextrose 50% Injectable 12.5 Gram(s) IV Push once  dextrose 50% Injectable 25 Gram(s) IV Push once  dextrose 50% Injectable 25 Gram(s) IV Push once  docusate sodium Oral Tab/Cap - Peds 100 milliGRAM(s) Oral two times a day  famotidine    Tablet 20 milliGRAM(s) Oral daily  flumazenil Injectable 0.5 milliGRAM(s) IV Push once  heparin  Injectable 5000 Unit(s) SubCutaneous every 8 hours  insulin lispro (HumaLOG) corrective regimen sliding scale   SubCutaneous at bedtime  insulin lispro (HumaLOG) corrective regimen sliding scale   SubCutaneous three times a day before meals  metoprolol tartrate 50 milliGRAM(s) Oral two times a day    MEDICATIONS  (PRN):  dextrose 40% Gel 15 Gram(s) Oral once PRN Blood Glucose LESS THAN 70 milliGRAM(s)/deciliter  glucagon  Injectable 1 milliGRAM(s) IntraMuscular once PRN Glucose LESS THAN 70 milligrams/deciliter  oxyCODONE    IR 5 milliGRAM(s) Oral every 6 hours PRN Severe Pain (7 - 10)      Allergies    No Known Allergies    Intolerances        Vital Signs Last 24 Hrs  T(C): 36.4 (07 Sep 2018 13:57), Max: 36.9 (06 Sep 2018 22:26)  T(F): 97.5 (07 Sep 2018 13:57), Max: 98.5 (06 Sep 2018 22:26)  HR: 101 (07 Sep 2018 14:12) (66 - 101)  BP: 137/87 (07 Sep 2018 15:17) (118/58 - 153/83)  BP(mean): --  RR: 18 (07 Sep 2018 14:12) (16 - 18)  SpO2: 100% (07 Sep 2018 14:12) (98% - 100%)    PHYSICAL EXAM:  Constitutional: WDWN; NAD  Cardiovascular: RRR, no appreciable murmurs; no carotid bruits  Neurologic:  Mental status: Awake, alert and oriented x3.  Recent and remote memory intact.  Naming, repetition and comprehension intact.  Attention/concentration intact.  No dysarthria, no aphasia.  Fund of knowledge appropriate.    Cranial nerves: Fundoscopic exam demonstrated no abnormalities, pupils equally round and reactive to light, visual fields full, no nystagmus, extraocular muscles intact, V1 through V3 intact bilaterally and symmetric, face symmetric, hearing intact to finger rub, palate elevation symmetric, tongue was midline, sternocleidomastoid/shoulder shrug strength bilaterally 5/5.    Motor:  Normal bulk and tone, strength 5/5 in bilateral upper and lower extremities.   strength 5/5.  Rapid alternating movements intact and symmetric.   Sensation: Intact to light touch, proprioception, and pinprick.  No neglect.   Coordination: No dysmetria on finger-to-nose and heel-to-shin.  No clumsiness.  Reflexes: 2+ in upper and lower extremities, downgoing toes bilaterally  Gait: Narrow and steady. No ataxia.  Romberg negative    NIHSS:    Fingerstick Blood Glucose: CAPILLARY BLOOD GLUCOSE  146 (07 Sep 2018 15:10)      POCT Blood Glucose.: 146 mg/dL (07 Sep 2018 14:21)       LABS:                        14.2   6.7   )-----------( 292      ( 07 Sep 2018 05:34 )             45.9     09-07    138  |  102  |  16.0  ----------------------------<  151<H>  4.5   |  22.0  |  1.02    Ca    10.0      07 Sep 2018 05:34      PT/INR - ( 07 Sep 2018 15:02 )   PT: 12.0 sec;   INR: 1.09 ratio         PTT - ( 07 Sep 2018 15:02 )  PTT:33.8 sec  CARDIAC MARKERS ( 06 Sep 2018 23:03 )  x     / <0.01 ng/mL / x     / x     / x      CARDIAC MARKERS ( 05 Sep 2018 22:39 )  x     / <0.01 ng/mL / x     / x     / x              RADIOLOGY & ADDITIONAL STUDIES:    IV-tPA (Y/N):                                   Bolus time:  Reason IV-tPA not given:    ASSESSMENT/PLAN: **STROKE CODE  NOTE**    Last known well time: 14:00; Time of onset of symptoms:14:22    HPI: 61 y/o F PMHx of CAD s/p CABG x 2 (LIMA TO LAD, RSVG to RCA) 08/08/18,  Right ICA stenosis (>70%), HTN, DMII with neuropathy and retinopathy  underwent cardiac cath procedure today. Patient was reported to be alert and responsive after cardiac cath procedure. 20 mins after the patient stated she wasn't feeling good and then was not responding to verbal command. She was withdrawing to painful stimuli. Her eyes were open, was moaning and patient was moving her limbs. Patient was transported to radiology for CT head and evaluated by Dr Esparza.     PAST MEDICAL & SURGICAL HISTORY:  Coronary artery disease  Hypertension  Diabetes  Retinopathy  Neuropathy  DM (diabetes mellitus)  HTN (hypertension)  S/P CABG (coronary artery bypass graft)  History of cholecystectomy      FAMILY HISTORY:  Family history of diabetes mellitus (Sibling)  Family history of coronary arteriosclerosis (Sibling): both brothers had cabg  Coronary artery disease (Sibling): pre mature CAD    ROS: unable to obtain ROS due to unresponsiveness    MEDICATIONS  (STANDING):  aspirin  chewable 81 milliGRAM(s) Oral daily  atorvastatin 40 milliGRAM(s) Oral at bedtime  clopidogrel Tablet 75 milliGRAM(s) Oral daily  dextrose 5%. 1000 milliLiter(s) (50 mL/Hr) IV Continuous <Continuous>  dextrose 50% Injectable 12.5 Gram(s) IV Push once  dextrose 50% Injectable 25 Gram(s) IV Push once  dextrose 50% Injectable 25 Gram(s) IV Push once  docusate sodium Oral Tab/Cap - Peds 100 milliGRAM(s) Oral two times a day  famotidine    Tablet 20 milliGRAM(s) Oral daily  flumazenil Injectable 0.5 milliGRAM(s) IV Push once  heparin  Injectable 5000 Unit(s) SubCutaneous every 8 hours  insulin lispro (HumaLOG) corrective regimen sliding scale   SubCutaneous at bedtime  insulin lispro (HumaLOG) corrective regimen sliding scale   SubCutaneous three times a day before meals  metoprolol tartrate 50 milliGRAM(s) Oral two times a day    MEDICATIONS  (PRN):  dextrose 40% Gel 15 Gram(s) Oral once PRN Blood Glucose LESS THAN 70 milliGRAM(s)/deciliter  glucagon  Injectable 1 milliGRAM(s) IntraMuscular once PRN Glucose LESS THAN 70 milligrams/deciliter  oxyCODONE    IR 5 milliGRAM(s) Oral every 6 hours PRN Severe Pain (7 - 10)      Allergies    No Known Allergies    Intolerances    Vital Signs   T(F): 97.5 (07 Sep 2018 13:57), Max: 98.5 (06 Sep 2018 22:26)  HR: 109  BP: 137/69   RR: 18)  SpO2: 100%     PHYSICAL EXAM:  Constitutional: not responding to verbal stimuli, moaning intermittently in pain  Cardiovascular: RRR, no appreciable murmurs; no carotid bruits  Neurologic:  Mental status: Awake, alert and oriented x0. Attention/concentration intact.  No dysarthria, no aphasia.  Fund of knowledge appropriate.    Cranial nerves: pupils equally round and reactive to light, extraocular muscles intact, unable to perform CNV and VII test  Motor:  Normal bulk and tone.  Not responding to  test.   Sensation: unable to assess light touch, proprioception, and pinprick.    Coordination: unable to assess  GI: BS normal, nontender to palpation  Extremities: Femoral sheath in right groin, no bruising or hematoma palpated, tender to palpation over insertion site    NIHSS:    Fingerstick Blood Glucose: CAPILLARY BLOOD GLUCOSE  146 (07 Sep 2018 15:10)      POCT Blood Glucose.: 146 mg/dL (07 Sep 2018 14:21)       LABS:                        14.2   6.7   )-----------( 292      ( 07 Sep 2018 05:34 )             45.9     09-07    138  |  102  |  16.0  ----------------------------<  151<H>  4.5   |  22.0  |  1.02    Ca    10.0      07 Sep 2018 05:34      PT/INR - ( 07 Sep 2018 15:02 )   PT: 12.0 sec;   INR: 1.09 ratio         PTT - ( 07 Sep 2018 15:02 )  PTT:33.8 sec  CARDIAC MARKERS ( 06 Sep 2018 23:03 )  x     / <0.01 ng/mL / x     / x     / x      CARDIAC MARKERS ( 05 Sep 2018 22:39 )  x     / <0.01 ng/mL / x     / x     / x              RADIOLOGY & ADDITIONAL STUDIES:    IV-tPA (Y/N):       YES                            Bolus time: 15:18  Reason IV-tPA not given:    Pre-tPA vitals:  /87 O2 99% on RA  Post-tPA vitals (10 mins):  /69 O2 100% on RA    ASSESSMENT/PLAN: Code stroke called for patient s/p cardiac catheterization  -Altered mental status, unresponsive to verbal stimuli, responds to pain and noxious stimuli  -NIHSS: 21 (unable to accurately perform due to unresponsiveness of patient)  -CT Head: negative for acute infarct or intracranial bleed, read by Dr Shoemaker  -Dr Esparza at bedside, tPA give at 15:18  -Maintain BP <160/80  -Dr Iqbal at bedside  -ICU contacted, transfer of level of care to ICU  -q15 neuro checks  -Daughter Merary called by Dr Iqbal and alerted of current status and management  -Debriefed with nursing staff  -Discussed with SROC Dr Lance  -Dr Bruce at bedside and agrees with plan **STROKE CODE  NOTE**    Last known well time: 14:00; Time of onset of symptoms:14:22    HPI: 63 y/o F PMHx of CAD s/p CABG x 2 (LIMA TO LAD, RSVG to RCA) 08/08/18,  Right ICA stenosis (>70%), HTN, DMII with neuropathy and retinopathy  underwent cardiac cath procedure today. Patient was reported to be alert and responsive after cardiac cath procedure. 20 mins after the patient stated she wasn't feeling good and then was not responding to verbal command. She was withdrawing to painful stimuli. Her eyes were open, was moaning and patient was moving her limbs. Patient was transported to radiology for CT head and vomited green/bilious fluid which was suctioned in CT scan room. Dr Esparza arrived at bedside and evaluated patient.     PAST MEDICAL & SURGICAL HISTORY:  Coronary artery disease  Hypertension  Diabetes  Retinopathy  Neuropathy  DM (diabetes mellitus)  HTN (hypertension)  S/P CABG (coronary artery bypass graft)  History of cholecystectomy      FAMILY HISTORY:  Family history of diabetes mellitus (Sibling)  Family history of coronary arteriosclerosis (Sibling): both brothers had cabg  Coronary artery disease (Sibling): pre mature CAD    ROS: unable to obtain ROS due to unresponsiveness    MEDICATIONS  (STANDING):  aspirin  chewable 81 milliGRAM(s) Oral daily  atorvastatin 40 milliGRAM(s) Oral at bedtime  clopidogrel Tablet 75 milliGRAM(s) Oral daily  dextrose 5%. 1000 milliLiter(s) (50 mL/Hr) IV Continuous <Continuous>  dextrose 50% Injectable 12.5 Gram(s) IV Push once  dextrose 50% Injectable 25 Gram(s) IV Push once  dextrose 50% Injectable 25 Gram(s) IV Push once  docusate sodium Oral Tab/Cap - Peds 100 milliGRAM(s) Oral two times a day  famotidine    Tablet 20 milliGRAM(s) Oral daily  flumazenil Injectable 0.5 milliGRAM(s) IV Push once  heparin  Injectable 5000 Unit(s) SubCutaneous every 8 hours  insulin lispro (HumaLOG) corrective regimen sliding scale   SubCutaneous at bedtime  insulin lispro (HumaLOG) corrective regimen sliding scale   SubCutaneous three times a day before meals  metoprolol tartrate 50 milliGRAM(s) Oral two times a day    MEDICATIONS  (PRN):  dextrose 40% Gel 15 Gram(s) Oral once PRN Blood Glucose LESS THAN 70 milliGRAM(s)/deciliter  glucagon  Injectable 1 milliGRAM(s) IntraMuscular once PRN Glucose LESS THAN 70 milligrams/deciliter  oxyCODONE    IR 5 milliGRAM(s) Oral every 6 hours PRN Severe Pain (7 - 10)      Allergies    No Known Allergies    Intolerances    Vital Signs   T(F): 97.5 (07 Sep 2018 13:57), Max: 98.5 (06 Sep 2018 22:26)  HR: 109  BP: 137/69   RR: 18)  SpO2: 100%     PHYSICAL EXAM:  Constitutional: not responding to verbal stimuli, moaning intermittently in pain  Cardiovascular: RRR, no appreciable murmurs; no carotid bruits  Neurologic:  Mental status: Awake, alert and oriented x0. Attention/concentration intact.  No dysarthria, no aphasia.  Fund of knowledge appropriate.    Cranial nerves: pupils equally round and reactive to light, extraocular muscles intact, unable to perform CNV and VII test  Motor:  Normal bulk and tone.  Not responding to  test.   Sensation: unable to assess light touch, proprioception, and pinprick.    Coordination: unable to assess  GI: BS normal, nontender to palpation  Extremities: Femoral sheath in right groin, no bruising or hematoma palpated, tender to palpation over insertion site    NIHSS:    Fingerstick Blood Glucose: CAPILLARY BLOOD GLUCOSE  146 (07 Sep 2018 15:10)      POCT Blood Glucose.: 146 mg/dL (07 Sep 2018 14:21)       LABS:                        14.2   6.7   )-----------( 292      ( 07 Sep 2018 05:34 )             45.9     09-07    138  |  102  |  16.0  ----------------------------<  151<H>  4.5   |  22.0  |  1.02    Ca    10.0      07 Sep 2018 05:34      PT/INR - ( 07 Sep 2018 15:02 )   PT: 12.0 sec;   INR: 1.09 ratio         PTT - ( 07 Sep 2018 15:02 )  PTT:33.8 sec  CARDIAC MARKERS ( 06 Sep 2018 23:03 )  x     / <0.01 ng/mL / x     / x     / x      CARDIAC MARKERS ( 05 Sep 2018 22:39 )  x     / <0.01 ng/mL / x     / x     / x              RADIOLOGY & ADDITIONAL STUDIES:    IV-tPA (Y/N):       YES                            Bolus time: 15:18  Reason IV-tPA not given:    Pre-tPA vitals:  /87 O2 99% on RA  Post-tPA vitals (10 mins):  /69 O2 100% on RA    ASSESSMENT/PLAN: Code stroke called for patient s/p cardiac catheterization  -Altered mental status, unresponsive to verbal stimuli, responds to pain and noxious stimuli  -NIHSS: 21 (unable to accurately perform due to unresponsiveness of patient)  -CT Head: negative for acute infarct or intracranial bleed, read by Dr Shoemaker  -Dr Esparza at bedside, tPA give at 15:18  -Maintain BP <160/80  -Do not withdraw femoral sheath for at least 24 hours  -Dr Iqbal at bedside  -ICU contacted, transfer of level of care to ICU  -q15 neuro checks  -Daughter Merary called by Dr Iqbal and alerted of current status and management  -Debriefed with nursing staff  -Discussed with SROC Dr Lance  -Dr Bruce at bedside and agrees with plan **STROKE CODE  NOTE**    Last known well time: 14:00; Time of onset of symptoms:14:22    HPI: 61 y/o F PMHx of CAD s/p CABG x 2 (LIMA TO LAD, RSVG to RCA) 08/08/18,  Right ICA stenosis (>70%), HTN, DMII with neuropathy and retinopathy  underwent cardiac cath procedure today. Patient was reported to be alert and responsive after cardiac cath procedure. 20 mins after the patient stated she wasn't feeling good and then was not responding to verbal command. She was withdrawing to painful stimuli. Her eyes were open, was moaning and patient was moving her limbs. Patient was transported to radiology for CT head and vomited green/bilious fluid which was suctioned in CT scan room. Dr Esparza arrived at bedside and evaluated patient.     PAST MEDICAL & SURGICAL HISTORY:  Coronary artery disease  Hypertension  Diabetes  Retinopathy  Neuropathy  DM (diabetes mellitus)  HTN (hypertension)  S/P CABG (coronary artery bypass graft)  History of cholecystectomy      FAMILY HISTORY:  Family history of diabetes mellitus (Sibling)  Family history of coronary arteriosclerosis (Sibling): both brothers had cabg  Coronary artery disease (Sibling): pre mature CAD    ROS: unable to obtain ROS due to unresponsiveness    MEDICATIONS  (STANDING):  aspirin  chewable 81 milliGRAM(s) Oral daily  atorvastatin 40 milliGRAM(s) Oral at bedtime  clopidogrel Tablet 75 milliGRAM(s) Oral daily  dextrose 5%. 1000 milliLiter(s) (50 mL/Hr) IV Continuous <Continuous>  dextrose 50% Injectable 12.5 Gram(s) IV Push once  dextrose 50% Injectable 25 Gram(s) IV Push once  dextrose 50% Injectable 25 Gram(s) IV Push once  docusate sodium Oral Tab/Cap - Peds 100 milliGRAM(s) Oral two times a day  famotidine    Tablet 20 milliGRAM(s) Oral daily  flumazenil Injectable 0.5 milliGRAM(s) IV Push once  heparin  Injectable 5000 Unit(s) SubCutaneous every 8 hours  insulin lispro (HumaLOG) corrective regimen sliding scale   SubCutaneous at bedtime  insulin lispro (HumaLOG) corrective regimen sliding scale   SubCutaneous three times a day before meals  metoprolol tartrate 50 milliGRAM(s) Oral two times a day    MEDICATIONS  (PRN):  dextrose 40% Gel 15 Gram(s) Oral once PRN Blood Glucose LESS THAN 70 milliGRAM(s)/deciliter  glucagon  Injectable 1 milliGRAM(s) IntraMuscular once PRN Glucose LESS THAN 70 milligrams/deciliter  oxyCODONE    IR 5 milliGRAM(s) Oral every 6 hours PRN Severe Pain (7 - 10)      Allergies    No Known Allergies    Intolerances    Vital Signs   T(F): 97.5 (07 Sep 2018 13:57), Max: 98.5 (06 Sep 2018 22:26)  HR: 109  BP: 170s /69   RR: 18)  SpO2: 100%     PHYSICAL EXAM:  Constitutional: not responding to verbal stimuli, moaning intermittently in pain  Cardiovascular: RRR, no appreciable murmurs; no carotid bruits  Neurologic:  Mental status: Awake, alert and oriented x0. Attention/concentration intact.  No dysarthria, no aphasia.  Fund of knowledge appropriate.    Cranial nerves: pupils equally round and reactive to light, extraocular muscles intact, unable to perform CNV and VII test  Motor:  Normal bulk and tone.  Not responding to  test.   Sensation: unable to assess light touch, proprioception, and pinprick.    Coordination: unable to assess  GI: BS normal, nontender to palpation  Extremities: Femoral sheath in right groin, no bruising or hematoma palpated, tender to palpation over insertion site    NIHSS: initial 21    Fingerstick Blood Glucose: CAPILLARY BLOOD GLUCOSE  146 (07 Sep 2018 15:10)      POCT Blood Glucose.: 146 mg/dL (07 Sep 2018 14:21)       LABS:                        14.2   6.7   )-----------( 292      ( 07 Sep 2018 05:34 )             45.9     09-07    138  |  102  |  16.0  ----------------------------<  151<H>  4.5   |  22.0  |  1.02    Ca    10.0      07 Sep 2018 05:34      PT/INR - ( 07 Sep 2018 15:02 )   PT: 12.0 sec;   INR: 1.09 ratio         PTT - ( 07 Sep 2018 15:02 )  PTT:33.8 sec  CARDIAC MARKERS ( 06 Sep 2018 23:03 )  x     / <0.01 ng/mL / x     / x     / x      CARDIAC MARKERS ( 05 Sep 2018 22:39 )  x     / <0.01 ng/mL / x     / x     / x              RADIOLOGY & ADDITIONAL STUDIES:    IV-tPA (Y/N):       YES                            Bolus time: 15:18  Reason IV-tPA not given:    Pre-tPA vitals:  /87 O2 99% on RA  Post-tPA vitals (10 mins):  /69 O2 100% on RA    ASSESSMENT/PLAN: Code stroke called for patient s/p cardiac catheterization  -Altered mental status, unresponsive to verbal stimuli, responds to pain and noxious stimuli  -NIHSS: 21 (unable to accurately perform due to unresponsiveness of patient)  -CT Head: negative for acute infarct or intracranial bleed, read by Dr Shoemaker  -Dr Esparza at bedside, tPA given at 15:18  -Maintain BP <160/80  -Do not withdraw femoral sheath for at least 24 hours  -Dr Iqbal at bedside  -ICU contacted, transfer of level of care to ICU  -q15 neuro checks  -Daughter Merary called by Dr Iqbal and alerted of current status and management  -Debriefed with nursing staff  -Discussed with SROC Dr Lance  -Dr Bruce at bedside and agrees with plan      Addenddum/attending note:   Patient seen and evaluated at bedside. Patient was reported to be alert and responsive after cardiac cath procedure. 20 mins after the patient stated she wasn't feeling good and then was not responding to verbal command. She was withdrawing to painful stimuli. Code stroke was called. NIHSS 21 on initial eval.   In CT head and vomited green/bilious fluid which was suctioned in CT scan room. seen and evaluated by Dr Esparza.  s/p TPA initiation.   goal sbo <160   now opens eyes to name but not following commands.   transfer to micu.  daughter has been called by dr. iqbal.

## 2018-09-07 NOTE — PROGRESS NOTE ADULT - SUBJECTIVE AND OBJECTIVE BOX
[facility]                                        Neurology at [place]                                 [facility]                                  [address]                                        [place], [place], [address]                                             [phone]        Addendum.     Patient remains alert but non verbal.   Not following instructions.   Moving all 4 extremities to stimuli.     Infusion of t-PA in progress.     Awaiting transfer to ICU bed.

## 2018-09-07 NOTE — PHYSICAL THERAPY INITIAL EVALUATION ADULT - ADDITIONAL COMMENTS
Pt. reports living in a house with 6 COLLIN/HR and 6 STI/HR with daughter, son in law and grandson. Family is able to assist pt. if necessary. Pt was independent with ADLs and ambulation. Pt. owns RW.

## 2018-09-07 NOTE — CONSULT NOTE ADULT - PROBLEM SELECTOR RECOMMENDATION 4
concern for airway compromise.  Watch closely  Start low dose dexmedetomidine for safety and comfort -- hold if any neuro change

## 2018-09-07 NOTE — CONSULT NOTE ADULT - PROBLEM SELECTOR RECOMMENDATION 9
s/p IV tPA  With expressive aphasia  remains with expressive aphasia  Risk reduction  Appreciate Dr Esparza's input  CTH at 24h; earlier if change  MRI  BP < 180/105 with prn labatelol

## 2018-09-07 NOTE — CONSULT NOTE ADULT - SUBJECTIVE AND OBJECTIVE BOX
Patient is a 62y old  Female who presents with a chief complaint of cp (07 Sep 2018 16:02)      BRIEF HOSPITAL COURSE:  Ms Alberto is a 62 year old woman with severe CAD s/p CABG presented 9/5 with chest pain; worked up for further ischemia, stress test, and went for cardiac angiogram which did not reveal any acute issues, more chronic issues, and subsequently had expressive aphasia, moaning, apparently nonfocal.  CODE STROKE was called, tPA was administered after CTH did not reveal hemorrhage.  Of note, she was vomiting several times, and becoming hypertensive.    PAST MEDICAL & SURGICAL HISTORY:  Coronary artery disease  Hypertension  Diabetes  Retinopathy  Neuropathy  DM (diabetes mellitus)  HTN (hypertension)  S/P CABG (coronary artery bypass graft)  History of cholecystectomy    Allergies    No Known Allergies    Intolerances      FAMILY HISTORY:  Family history of diabetes mellitus (Sibling)  Family history of coronary arteriosclerosis (Sibling): both brothers had cabg  Coronary artery disease (Sibling): pre mature CAD      Family history otherwise noncontributory.    Social History: unable, nonverbal  Review of Systems:    unable, nonverbal  Medications:    labetalol Injectable 10 milliGRAM(s) IV Push every 1 hour PRN  metoprolol tartrate 50 milliGRAM(s) Oral two times a day      dexmedetomidine Infusion 0.3 MICROgram(s)/kG/Hr IV Continuous <Continuous>  oxyCODONE    IR 5 milliGRAM(s) Oral every 6 hours PRN        docusate sodium Oral Tab/Cap - Peds 100 milliGRAM(s) Oral two times a day  famotidine    Tablet 20 milliGRAM(s) Oral daily      atorvastatin 40 milliGRAM(s) Oral at bedtime  dextrose 40% Gel 15 Gram(s) Oral once PRN  dextrose 50% Injectable 12.5 Gram(s) IV Push once  dextrose 50% Injectable 25 Gram(s) IV Push once  dextrose 50% Injectable 25 Gram(s) IV Push once  glucagon  Injectable 1 milliGRAM(s) IntraMuscular once PRN  insulin lispro (HumaLOG) corrective regimen sliding scale   SubCutaneous at bedtime  insulin lispro (HumaLOG) corrective regimen sliding scale   SubCutaneous three times a day before meals    dextrose 5%. 1000 milliLiter(s) IV Continuous <Continuous>                ICU Vital Signs Last 24 Hrs  T(C): 36.7 (07 Sep 2018 18:00), Max: 36.9 (06 Sep 2018 22:26)  T(F): 98.1 (07 Sep 2018 18:00), Max: 98.5 (06 Sep 2018 22:26)  HR: 113 (07 Sep 2018 18:15) (66 - 142)  BP: 146/90 (07 Sep 2018 18:15) (118/58 - 170/132)  BP(mean): 117 (07 Sep 2018 18:15) (90 - 143)  ABP: --  ABP(mean): --  RR: 30 (07 Sep 2018 18:15) (16 - 36)  SpO2: 100% (07 Sep 2018 18:15) (97% - 100%)    Vital Signs Last 24 Hrs  T(C): 36.7 (07 Sep 2018 18:00), Max: 36.9 (06 Sep 2018 22:26)  T(F): 98.1 (07 Sep 2018 18:00), Max: 98.5 (06 Sep 2018 22:26)  HR: 113 (07 Sep 2018 18:15) (66 - 142)  BP: 146/90 (07 Sep 2018 18:15) (118/58 - 170/132)  BP(mean): 117 (07 Sep 2018 18:15) (90 - 143)  RR: 30 (07 Sep 2018 18:15) (16 - 36)  SpO2: 100% (07 Sep 2018 18:15) (97% - 100%)        I&O's Detail    06 Sep 2018 07:01  -  07 Sep 2018 07:00  --------------------------------------------------------  IN:    Oral Fluid: 240 mL  Total IN: 240 mL    OUT:    Voided: 400 mL  Total OUT: 400 mL    Total NET: -160 mL      07 Sep 2018 07:01  -  07 Sep 2018 18:38  --------------------------------------------------------  IN:    dexmedetomidine Infusion: 8 mL  Total IN: 8 mL    OUT:    Nasoenteral Tube: 100 mL  Total OUT: 100 mL    Total NET: -92 mL            LABS:                        13.2   7.8   )-----------( 270      ( 07 Sep 2018 15:02 )             42.2     09-07    131<L>  |  99  |  15.0  ----------------------------<  158<H>  4.3   |  14.0<L>  |  0.90    Ca    9.3      07 Sep 2018 15:02    TPro  7.2  /  Alb  3.6  /  TBili  0.6  /  DBili  x   /  AST  21  /  ALT  8   /  AlkPhos  103  09-07      CARDIAC MARKERS ( 07 Sep 2018 15:02 )  x     / <0.01 ng/mL / x     / x     / x      CARDIAC MARKERS ( 06 Sep 2018 23:03 )  x     / <0.01 ng/mL / x     / x     / x      CARDIAC MARKERS ( 05 Sep 2018 22:39 )  x     / <0.01 ng/mL / x     / x     / x          CAPILLARY BLOOD GLUCOSE  146 (07 Sep 2018 15:10)      POCT Blood Glucose.: 227 mg/dL (07 Sep 2018 18:10)    PT/INR - ( 07 Sep 2018 15:02 )   PT: 12.0 sec;   INR: 1.09 ratio         PTT - ( 07 Sep 2018 15:02 )  PTT:33.8 sec    CULTURES:      Physical Examination:    GENERAL: No acute distress.      EYES: Pupils equal, reactive to light.  Symmetric.    EARS, NOSE, THROAT: Normal; supple neck, no lymphadenopathy; trachea midline    PULM: Clear to auscultation bilaterally, no significant sputum production    CVS: Regular rate and rhythm, no murmurs, rubs, or gallops    GI: Soft, nondistended, nontender, normoactive bowel sounds, no masses, no guarding    EXTREMITIES: No edema.  venous sheath in place R groin    SKIN: Warm and well perfused, no rashes noted.    NEURO: eyes open, moving all extremities, groaning, not following commands    CRITICAL CARE TIME SPENT: 35

## 2018-09-08 LAB
GLUCOSE BLDC GLUCOMTR-MCNC: 148 MG/DL — HIGH (ref 70–99)
GLUCOSE BLDC GLUCOMTR-MCNC: 213 MG/DL — HIGH (ref 70–99)
GLUCOSE BLDC GLUCOMTR-MCNC: 257 MG/DL — HIGH (ref 70–99)

## 2018-09-08 PROCEDURE — 93880 EXTRACRANIAL BILAT STUDY: CPT | Mod: 26

## 2018-09-08 PROCEDURE — 99233 SBSQ HOSP IP/OBS HIGH 50: CPT

## 2018-09-08 PROCEDURE — 70450 CT HEAD/BRAIN W/O DYE: CPT | Mod: 26

## 2018-09-08 PROCEDURE — 99232 SBSQ HOSP IP/OBS MODERATE 35: CPT

## 2018-09-08 RX ORDER — ASPIRIN/CALCIUM CARB/MAGNESIUM 324 MG
81 TABLET ORAL DAILY
Qty: 0 | Refills: 0 | Status: DISCONTINUED | OUTPATIENT
Start: 2018-09-08 | End: 2018-09-10

## 2018-09-08 RX ORDER — DEXMEDETOMIDINE HYDROCHLORIDE IN 0.9% SODIUM CHLORIDE 4 UG/ML
0.3 INJECTION INTRAVENOUS
Qty: 200 | Refills: 0 | Status: DISCONTINUED | OUTPATIENT
Start: 2018-09-08 | End: 2018-09-08

## 2018-09-08 RX ADMIN — Medication 6: at 08:20

## 2018-09-08 RX ADMIN — Medication 50 MILLIGRAM(S): at 17:37

## 2018-09-08 RX ADMIN — Medication 50 MILLIGRAM(S): at 07:05

## 2018-09-08 RX ADMIN — Medication 100 MILLIGRAM(S): at 17:38

## 2018-09-08 RX ADMIN — Medication 10 MILLIGRAM(S): at 07:05

## 2018-09-08 RX ADMIN — ATORVASTATIN CALCIUM 40 MILLIGRAM(S): 80 TABLET, FILM COATED ORAL at 00:09

## 2018-09-08 RX ADMIN — Medication 4: at 12:54

## 2018-09-08 RX ADMIN — ATORVASTATIN CALCIUM 40 MILLIGRAM(S): 80 TABLET, FILM COATED ORAL at 22:17

## 2018-09-08 RX ADMIN — FAMOTIDINE 20 MILLIGRAM(S): 10 INJECTION INTRAVENOUS at 12:54

## 2018-09-08 NOTE — CONSULT NOTE ADULT - SUBJECTIVE AND OBJECTIVE BOX
[facility]                                        Neurology at [place]                                  Mir Cabrera & Ned                                      [address]                                           [place], [place], [address]                                                [phone]        CC: aphasia.   HISTORY:  The patient is a 62y Female who was admitted 9/5/18 with chest pain.   She underwent cardiac catheterization today.   She came back from the procedure at ~1:55 pm. She was answering questions and following instructions according to the nurses. Shortly afterward she stated that she felt sick. She then became non verbal and has not followed any instructions. This has persisted. The stroke code was called and she was taken to CT emergently.  At present she remains non verbal. (9/7)    Interval history:  She has episodes where she moves 4 ext and moans.  Not communicating    ROS neurology: Aphasic    MEDICATIONS  (STANDING):  atorvastatin 40 milliGRAM(s) Oral at bedtime  dexmedetomidine Infusion 0.3 MICROgram(s)/kG/Hr (3.847 mL/Hr) IV Continuous <Continuous>  dextrose 5%. 1000 milliLiter(s) (50 mL/Hr) IV Continuous <Continuous>  dextrose 50% Injectable 12.5 Gram(s) IV Push once  dextrose 50% Injectable 25 Gram(s) IV Push once  dextrose 50% Injectable 25 Gram(s) IV Push once  docusate sodium Oral Tab/Cap - Peds 100 milliGRAM(s) Oral two times a day  famotidine    Tablet 20 milliGRAM(s) Oral daily  insulin lispro (HumaLOG) corrective regimen sliding scale   SubCutaneous at bedtime  insulin lispro (HumaLOG) corrective regimen sliding scale   SubCutaneous three times a day before meals  metoprolol tartrate 50 milliGRAM(s) Oral two times a day    MEDICATIONS  (PRN):  dextrose 40% Gel 15 Gram(s) Oral once PRN Blood Glucose LESS THAN 70 milliGRAM(s)/deciliter  glucagon  Injectable 1 milliGRAM(s) IntraMuscular once PRN Glucose LESS THAN 70 milligrams/deciliter  labetalol Injectable 10 milliGRAM(s) IV Push every 1 hour PRN SBP>180 if HR>70  oxyCODONE    IR 5 milliGRAM(s) Oral every 6 hours PRN Severe Pain (7 - 10)      Vital Signs Last 24 Hrs  T(C): 36.3 (08 Sep 2018 07:30), Max: 36.7 (07 Sep 2018 18:00)  T(F): 97.3 (08 Sep 2018 07:30), Max: 98.1 (07 Sep 2018 18:00)  HR: 97 (08 Sep 2018 12:00) (87 - 142)  BP: 117/57 (08 Sep 2018 12:00) (103/55 - 189/80)  BP(mean): 82 (08 Sep 2018 12:00) (76 - 143)  RR: 15 (08 Sep 2018 12:00) (15 - 48)  SpO2: 97% (08 Sep 2018 12:00) (97% - 100%)    Detailed neuro exam:    Mental status: The patient is awake but non verbal and not following instructions.     Cranial nerves: Pupils react symmetrically to light. She blinks to threat. She tracks with gaze. Facial sensation is intact. Facial musculature is symmetric. Palate elevates symmetrically. Tongue is midline.    Motor/Sensory: There is normal bulk and tone.  Moving all extremities to stimuli. grimaces and reacts to pinch on 4 ext    Reflexes: 1+ throughout and plantar responses are flexor.    Cerebellar: Unable to assess.     LABS:                                    13.2   7.8   )-----------( 270      ( 07 Sep 2018 15:02 )             42.2     09-07    131<L>  |  99  |  15.0  ----------------------------<  158<H>  4.3   |  14.0<L>  |  0.90    Ca    9.3      07 Sep 2018 15:02    TPro  7.2  /  Alb  3.6  /  TBili  0.6  /  DBili  x   /  AST  21  /  ALT  8   /  AlkPhos  103  09-07    LIVER FUNCTIONS - ( 07 Sep 2018 15:02 )  Alb: 3.6 g/dL / Pro: 7.2 g/dL / ALK PHOS: 103 U/L / ALT: 8 U/L / AST: 21 U/L / GGT: x           PT/INR - ( 07 Sep 2018 15:02 )   PT: 12.0 sec;   INR: 1.09 ratio         PTT - ( 07 Sep 2018 15:02 )  PTT:33.8 sec    Lipid Profile (08.04.18 @ 14:18)    Total Cholesterol/HDL Ratio Measurement: 5.1 RATIO    Cholesterol, Serum: 179 mg/dL    Triglycerides, Serum: 221 mg/dL    HDL Cholesterol, Serum: 35 mg/dL    Direct LDL: 100: LDL Cholesterol --- Interpretive Comment (for adults 18 and over)  Optimal LDL Level may vary based on clinical situation  Below 70                  Ideal for people at very high risk of heart  disease  Below 100                Ideal for people at risk of heart disease  100 - 129                   [place]  130 - 159                   Borderline high  160 - 189                   High  190 and Above          Very high mg/dL      RADIOLOGY   repeat CT head done today for AMS- no acute CVA, mass or bleed (+) SVID and atrophy This is a progress note, accidentally entered as a consult                                     [facility]                                        Neurology at [place]                                  Mir Cabrera, & Ned                                      [address]                                           [place], [place], [address]                                                [phone]        CC: aphasia.   HISTORY:  The patient is a 62y Female who was admitted 9/5/18 with chest pain.   She underwent cardiac catheterization today.   She came back from the procedure at ~1:55 pm. She was answering questions and following instructions according to the nurses. Shortly afterward she stated that she felt sick. She then became non verbal and has not followed any instructions. This has persisted. The stroke code was called and she was taken to CT emergently.  At present she remains non verbal. (9/7)    Interval history:  She has episodes where she moves 4 ext and moans.  Not communicating    ROS neurology: Aphasic    MEDICATIONS  (STANDING):  atorvastatin 40 milliGRAM(s) Oral at bedtime  dexmedetomidine Infusion 0.3 MICROgram(s)/kG/Hr (3.847 mL/Hr) IV Continuous <Continuous>  dextrose 5%. 1000 milliLiter(s) (50 mL/Hr) IV Continuous <Continuous>  dextrose 50% Injectable 12.5 Gram(s) IV Push once  dextrose 50% Injectable 25 Gram(s) IV Push once  dextrose 50% Injectable 25 Gram(s) IV Push once  docusate sodium Oral Tab/Cap - Peds 100 milliGRAM(s) Oral two times a day  famotidine    Tablet 20 milliGRAM(s) Oral daily  insulin lispro (HumaLOG) corrective regimen sliding scale   SubCutaneous at bedtime  insulin lispro (HumaLOG) corrective regimen sliding scale   SubCutaneous three times a day before meals  metoprolol tartrate 50 milliGRAM(s) Oral two times a day    MEDICATIONS  (PRN):  dextrose 40% Gel 15 Gram(s) Oral once PRN Blood Glucose LESS THAN 70 milliGRAM(s)/deciliter  glucagon  Injectable 1 milliGRAM(s) IntraMuscular once PRN Glucose LESS THAN 70 milligrams/deciliter  labetalol Injectable 10 milliGRAM(s) IV Push every 1 hour PRN SBP>180 if HR>70  oxyCODONE    IR 5 milliGRAM(s) Oral every 6 hours PRN Severe Pain (7 - 10)      Vital Signs Last 24 Hrs  T(C): 36.3 (08 Sep 2018 07:30), Max: 36.7 (07 Sep 2018 18:00)  T(F): 97.3 (08 Sep 2018 07:30), Max: 98.1 (07 Sep 2018 18:00)  HR: 97 (08 Sep 2018 12:00) (87 - 142)  BP: 117/57 (08 Sep 2018 12:00) (103/55 - 189/80)  BP(mean): 82 (08 Sep 2018 12:00) (76 - 143)  RR: 15 (08 Sep 2018 12:00) (15 - 48)  SpO2: 97% (08 Sep 2018 12:00) (97% - 100%)    Detailed neuro exam:    Mental status: The patient is awake but non verbal and not following instructions.     Cranial nerves: Pupils react symmetrically to light. She blinks to threat. She tracks with gaze. Facial sensation is intact. Facial musculature is symmetric. Palate elevates symmetrically. Tongue is midline.    Motor/Sensory: There is normal bulk and tone.  Moving all extremities to stimuli. grimaces and reacts to pinch on 4 ext    Reflexes: 1+ throughout and plantar responses are flexor.    Cerebellar: Unable to assess.     LABS:                                    13.2   7.8   )-----------( 270      ( 07 Sep 2018 15:02 )             42.2     09-07    131<L>  |  99  |  15.0  ----------------------------<  158<H>  4.3   |  14.0<L>  |  0.90    Ca    9.3      07 Sep 2018 15:02    TPro  7.2  /  Alb  3.6  /  TBili  0.6  /  DBili  x   /  AST  21  /  ALT  8   /  AlkPhos  103  09-07    LIVER FUNCTIONS - ( 07 Sep 2018 15:02 )  Alb: 3.6 g/dL / Pro: 7.2 g/dL / ALK PHOS: 103 U/L / ALT: 8 U/L / AST: 21 U/L / GGT: x           PT/INR - ( 07 Sep 2018 15:02 )   PT: 12.0 sec;   INR: 1.09 ratio         PTT - ( 07 Sep 2018 15:02 )  PTT:33.8 sec    Lipid Profile (08.04.18 @ 14:18)    Total Cholesterol/HDL Ratio Measurement: 5.1 RATIO    Cholesterol, Serum: 179 mg/dL    Triglycerides, Serum: 221 mg/dL    HDL Cholesterol, Serum: 35 mg/dL    Direct LDL: 100: LDL Cholesterol --- Interpretive Comment (for adults 18 and over)  Optimal LDL Level may vary based on clinical situation  Below 70                  Ideal for people at very high risk of heart  disease  Below 100                Ideal for people at risk of heart disease  100 - 129                   [place]  130 - 159                   Borderline high  160 - 189                   High  190 and Above          Very high mg/dL      RADIOLOGY   repeat CT head done today for AMS- no acute CVA, mass or bleed (+) SVID and atrophy

## 2018-09-08 NOTE — PROGRESS NOTE ADULT - SUBJECTIVE AND OBJECTIVE BOX
CARDIOLOGY PROGRESS NOTE   ([facility])                                                                                                        Subjective:     Vitals:  T(C): 36.3 (09-08-18 @ 07:30), Max: 36.7 (09-07-18 @ 18:00)  HR: 94 (09-08-18 @ 13:00) (87 - 142)  BP: 140/60 (09-08-18 @ 13:00) (103/55 - 189/80)  RR: 21 (09-08-18 @ 13:00) (15 - 48)  SpO2: 98% (09-08-18 @ 13:00) (97% - 100%)  Wt(kg): --  I&O's Summary    07 Sep 2018 07:01  -  08 Sep 2018 07:00  --------------------------------------------------------  IN: 8 mL / OUT: 100 mL / NET: -92 mL          PHYSICAL EXAM:  Appearance: Normal	  HEENT:   Atraumatic  Cardiovascular: Normal S1 S2, No JVD, No murmurs, No edema  Respiratory: Lungs clear to auscultation	  Gastrointestinal:  Soft, Non-tender, + BS	  Skin: No rashes, No ecchymoses, No cyanosis  Neurologic: Alert and awake, able to move extremities  Extremities: No edema    TELEMETRY: 	    ECG:  	      DIAGNOSTIC TESTING:  [ ] Echocardiogram:  [ ]  Catheterization:  [ ] Stress Test:    OTHER: 	      CURRENT MEDICATIONS:  labetalol Injectable 10 milliGRAM(s) IV Push every 1 hour PRN  metoprolol tartrate 50 milliGRAM(s) Oral two times a day        dexmedetomidine Infusion 0.3 MICROgram(s)/kG/Hr IV Continuous <Continuous>  oxyCODONE    IR 5 milliGRAM(s) Oral every 6 hours PRN    docusate sodium Oral Tab/Cap - Peds 100 milliGRAM(s) Oral two times a day  famotidine    Tablet 20 milliGRAM(s) Oral daily    atorvastatin 40 milliGRAM(s) Oral at bedtime  dextrose 40% Gel 15 Gram(s) Oral once PRN  dextrose 50% Injectable 12.5 Gram(s) IV Push once  dextrose 50% Injectable 25 Gram(s) IV Push once  dextrose 50% Injectable 25 Gram(s) IV Push once  glucagon  Injectable 1 milliGRAM(s) IntraMuscular once PRN  insulin lispro (HumaLOG) corrective regimen sliding scale   SubCutaneous at bedtime  insulin lispro (HumaLOG) corrective regimen sliding scale   SubCutaneous three times a day before meals    dextrose 5%. 1000 milliLiter(s) IV Continuous <Continuous>      LABS:	 	    CARDIAC MARKERS:  Troponin I:   CPK total =  CK MB=   CKMB index=                           13.2   7.8   )-----------( 270      ( 07 Sep 2018 15:02 )             42.2     09-07    131<L>  |  99  |  15.0  ----------------------------<  158<H>  4.3   |  14.0<L>  |  0.90    Ca    9.3      07 Sep 2018 15:02    TPro  7.2  /  Alb  3.6  /  TBili  0.6  /  DBili  x   /  AST  21  /  ALT  8   /  AlkPhos  103  09-07    proBNP: Serum Pro-Brain Natriuretic Peptide: 2820 pg/mL (09-05 @ 11:53)    Lipid Profile:   HgA1c:   TSH: CARDIOLOGY PROGRESS NOTE   ([facility])                                                                                                        Subjective:     62y Female who had a [facility] no intervention, complicated post cath with Code stroke, TPA administered. Patient awake and alert , but aphasic.   She has episodes where she moves 4 ext and moans.  Not communicating.  NAD.  Comfortable.    Vitals:  T(C): 36.3 (09-08-18 @ 07:30), Max: 36.7 (09-07-18 @ 18:00)  HR: 94 (09-08-18 @ 13:00) (87 - 142)  BP: 140/60 (09-08-18 @ 13:00) (103/55 - 189/80)  RR: 21 (09-08-18 @ 13:00) (15 - 48)  SpO2: 98% (09-08-18 @ 13:00) (97% - 100%)  Wt(kg): --  I&O's Summary    07 Sep 2018 07:01  -  08 Sep 2018 07:00  --------------------------------------------------------  IN: 8 mL / OUT: 100 mL / NET: -92 mL          PHYSICAL EXAM:  Appearance: Normal	  HEENT:   Atraumatic  Cardiovascular: Normal S1 S2, No JVD, No murmurs, No edema  Respiratory: Lungs clear to auscultation	  Gastrointestinal:  Soft, Non-tender, + BS	  Skin: No rashes, No ecchymoses, No cyanosis  Neurologic: She has episodes where she moves 4 ext and moans.  Not communicating  Extremities: No edema    TELEMETRY: 	    	      CURRENT MEDICATIONS:  labetalol Injectable 10 milliGRAM(s) IV Push every 1 hour PRN  metoprolol tartrate 50 milliGRAM(s) Oral two times a day        dexmedetomidine Infusion 0.3 MICROgram(s)/kG/Hr IV Continuous <Continuous>  oxyCODONE    IR 5 milliGRAM(s) Oral every 6 hours PRN    docusate sodium Oral Tab/Cap - Peds 100 milliGRAM(s) Oral two times a day  famotidine    Tablet 20 milliGRAM(s) Oral daily    atorvastatin 40 milliGRAM(s) Oral at bedtime  dextrose 40% Gel 15 Gram(s) Oral once PRN  dextrose 50% Injectable 12.5 Gram(s) IV Push once  dextrose 50% Injectable 25 Gram(s) IV Push once  dextrose 50% Injectable 25 Gram(s) IV Push once  glucagon  Injectable 1 milliGRAM(s) IntraMuscular once PRN  insulin lispro (HumaLOG) corrective regimen sliding scale   SubCutaneous at bedtime  insulin lispro (HumaLOG) corrective regimen sliding scale   SubCutaneous three times a day before meals    dextrose 5%. 1000 milliLiter(s) IV Continuous <Continuous>      LABS:	 	                             13.2   7.8   )-----------( 270      ( 07 Sep 2018 15:02 )             42.2     09-07    131<L>  |  99  |  15.0  ----------------------------<  158<H>  4.3   |  14.0<L>  |  0.90    Ca    9.3      07 Sep 2018 15:02    TPro  7.2  /  Alb  3.6  /  TBili  0.6  /  DBili  x   /  AST  21  /  ALT  8   /  AlkPhos  103  09-07    proBNP: Serum Pro-Brain Natriuretic Peptide: 2820 pg/mL (09-05 @ 11:53)    Lipid Profile:   HgA1c:   TSH:

## 2018-09-08 NOTE — PROGRESS NOTE ADULT - PROBLEM SELECTOR PLAN 2
per Dr Huffman
8/8/18 CABG  Cath 9/7/18 patent grafts  Post cath sheath to be removed 9/8/18 post tpa stroke protocol

## 2018-09-08 NOTE — PROGRESS NOTE ADULT - PROBLEM SELECTOR PLAN 4
Patient was more and more conversational overnight with her daughter -- when the daughter told her she had a stroke, she started moaning again and was less interactive.  She feels that there may be an anxiety component to her symptom profile.

## 2018-09-08 NOTE — DIETITIAN INITIAL EVALUATION ADULT. - OTHER INFO
Pt with expressive aphasia, unable to interview at this time, NG in place, NPO pending swallow evaluation noted.

## 2018-09-08 NOTE — PROGRESS NOTE ADULT - SUBJECTIVE AND OBJECTIVE BOX
AGAPITO REYNOSO  ----------------------------------------  The patient was seen and evaluated for stroke.  The patient is in no acute distress.  Reports mild chest pain and throat pain with some blood tinged sputum    HPI:  62F with a history of coronary artery disease and bypass surgery the month prior who originally presented with chest pain. EKG noted T-wave inversion and the patient was seen by Cardiology in consultation. A nuclear stress test noted evidence of lateral ischemia and the patient underwent cardiac catheterization without intervention. After the procedure, the patient was not responding appropriately and physical examination was suggestive of left sided weakness. The patient was seen by Neurology in consultation for stroke and TPA was administered. The patient was transferred to the intensive care unit for further management. A nasogastric tube was placed to manage secretions. The patient was noted to be following commands the following day with improvement in her symptoms.    Vital Signs Last 24 Hrs  T(C): 36.9 (08 Sep 2018 16:00), Max: 36.9 (08 Sep 2018 16:00)  T(F): 98.5 (08 Sep 2018 16:00), Max: 98.5 (08 Sep 2018 16:00)  HR: 79 (08 Sep 2018 19:00) (79 - 123)  BP: 143/56 (08 Sep 2018 19:00) (103/55 - 189/80)  BP(mean): 80 (08 Sep 2018 19:00) (76 - 136)  RR: 18 (08 Sep 2018 19:00) (15 - 48)  SpO2: 97% (08 Sep 2018 19:00) (97% - 100%)    CAPILLARY BLOOD GLUCOSE  POCT Blood Glucose.: 148 mg/dL (08 Sep 2018 17:32)  POCT Blood Glucose.: 257 mg/dL (08 Sep 2018 08:18)    PHYSICAL EXAMINATION:  ----------------------------------------  General appearance: No acute distress, Awake, Alert  HEENT: Normocephalic, Atraumatic, Conjunctiva clear, EOMI  Neck: Supple, No JVD, No tenderness  Lungs: Clear to auscultation, Breath sound equal bilaterally, No wheezes, No rales  Cardiovascular: S1S2, Regular rhythm, Sternal incision healed with mild erythema  Abdomen: Soft, Nontender, Nondistended, No guarding/rebound, Positive bowel sounds  Extremities: No clubbing, No cyanosis, No edema, No calf tenderness  Neuro: Strength equal bilaterally, No tremors  Psychiatric: Appropriate mood, Normal affect    LABORATORY STUDIES:  ----------------------------------------             13.2   7.8   )-----------( 270      ( 07 Sep 2018 15:02 )             42.2     09-07    131<L>  |  99  |  15.0  ----------------------------<  158<H>  4.3   |  14.0<L>  |  0.90    Ca    9.3      07 Sep 2018 15:02    TPro  7.2  /  Alb  3.6  /  TBili  0.6  /  DBili  x   /  AST  21  /  ALT  8   /  AlkPhos  103  09-07    LIVER FUNCTIONS - ( 07 Sep 2018 15:02 )  Alb: 3.6 g/dL / Pro: 7.2 g/dL / ALK PHOS: 103 U/L / ALT: 8 U/L / AST: 21 U/L / GGT: x           PT/INR - ( 07 Sep 2018 15:02 )   PT: 12.0 sec;   INR: 1.09 ratio    PTT - ( 07 Sep 2018 15:02 )  PTT:33.8 sec    CARDIAC MARKERS ( 07 Sep 2018 15:02 )  x     / <0.01 ng/mL / x     / x     / x      CARDIAC MARKERS ( 06 Sep 2018 23:03 )  x     / <0.01 ng/mL / x     / x     / x        MEDICATIONS  (STANDING):  aspirin  chewable 81 milliGRAM(s) Oral daily  atorvastatin 40 milliGRAM(s) Oral at bedtime  dextrose 5%. 1000 milliLiter(s) (50 mL/Hr) IV Continuous <Continuous>  dextrose 50% Injectable 12.5 Gram(s) IV Push once  dextrose 50% Injectable 25 Gram(s) IV Push once  dextrose 50% Injectable 25 Gram(s) IV Push once  docusate sodium Oral Tab/Cap - Peds 100 milliGRAM(s) Oral two times a day  famotidine    Tablet 20 milliGRAM(s) Oral daily  insulin lispro (HumaLOG) corrective regimen sliding scale   SubCutaneous at bedtime  insulin lispro (HumaLOG) corrective regimen sliding scale   SubCutaneous three times a day before meals  metoprolol tartrate 50 milliGRAM(s) Oral two times a day    MEDICATIONS  (PRN):  dextrose 40% Gel 15 Gram(s) Oral once PRN Blood Glucose LESS THAN 70 milliGRAM(s)/deciliter  glucagon  Injectable 1 milliGRAM(s) IntraMuscular once PRN Glucose LESS THAN 70 milligrams/deciliter  labetalol Injectable 10 milliGRAM(s) IV Push every 1 hour PRN SBP>180 if HR>70  oxyCODONE    IR 5 milliGRAM(s) Oral every 6 hours PRN Severe Pain (7 - 10)      ASSESSMENT / PLAN:  ----------------------------------------  CVA - Status post administration of TPA. Neurological symptoms appear to be resolving. Neurology follow up noted.  Awaiting speech and swallow evaluation.    CAD - Repeat CT of the head was without acute change and aspirin had been restarted. Also on atorvastatin and metoprolol. Prior echocardiogram noted preserved left ventricular function.    Carotid stenosis - Carotid doppler pending. On antiplatelet and statin therapy.    Diabetes - Insulin coverage, close monitoring of blood glucose levels.    Discussed with the patient and her family at the bedside.

## 2018-09-08 NOTE — PROGRESS NOTE ADULT - ASSESSMENT
62y Female who had a [facility] no intervention, complicated post cath with Code stroke, TPA administered. Patient awake and alert , but aphasic.   She has episodes where she moves 4 ext and moans.  Not communicating        Aphasia.   s/p t-PA.   Bolus given at 3:20 pm on 9/7/18.   Neurology input noted:   "repeat CT head done too early, was done b/c AMS  will need CT head at around 24 hours post tPA (Ordered)  Once done, if (-) ICH can start on ASA CO if needed"    CVA  will need Speech/ swallow  PT/OT eval    HTN   permissive HTN, but keep SBP  under 200

## 2018-09-08 NOTE — PROGRESS NOTE ADULT - SUBJECTIVE AND OBJECTIVE BOX
Patient is a 62y old  Female who presents with a chief complaint of cp (07 Sep 2018 16:02)      BRIEF HOSPITAL COURSE:  Ms Alberto is a 62 year old woman with severe CAD s/p CABG presented 9/5 with chest pain; worked up for further ischemia, stress test, and went for cardiac angiogram which did not reveal any acute issues, more chronic issues, and subsequently had expressive aphasia, moaning, apparently nonfocal.  CODE STROKE was called, tPA was administered after CTH did not reveal hemorrhage.  Of note, she was vomiting several times, and becoming hypertensive.  Overnight, remained encephalopathic but per family starting to follow commands.    PAST MEDICAL & SURGICAL HISTORY:  Coronary artery disease  Hypertension  Diabetes  Retinopathy  Neuropathy  DM (diabetes mellitus)  HTN (hypertension)  S/P CABG (coronary artery bypass graft)  History of cholecystectomy    Allergies    No Known Allergies    Intolerances      FAMILY HISTORY:  Family history of diabetes mellitus (Sibling)  Family history of coronary arteriosclerosis (Sibling): both brothers had cabg  Coronary artery disease (Sibling): pre mature CAD      Family history otherwise noncontributory.    Social History: unable, nonverbal  Review of Systems:    unable, nonverbal  Medications:  MEDICATIONS  (STANDING):  atorvastatin 40 milliGRAM(s) Oral at bedtime  dexmedetomidine Infusion 0.3 MICROgram(s)/kG/Hr (3.847 mL/Hr) IV Continuous <Continuous>  dextrose 5%. 1000 milliLiter(s) (50 mL/Hr) IV Continuous <Continuous>  dextrose 50% Injectable 12.5 Gram(s) IV Push once  dextrose 50% Injectable 25 Gram(s) IV Push once  dextrose 50% Injectable 25 Gram(s) IV Push once  docusate sodium Oral Tab/Cap - Peds 100 milliGRAM(s) Oral two times a day  famotidine    Tablet 20 milliGRAM(s) Oral daily  insulin lispro (HumaLOG) corrective regimen sliding scale   SubCutaneous at bedtime  insulin lispro (HumaLOG) corrective regimen sliding scale   SubCutaneous three times a day before meals  metoprolol tartrate 50 milliGRAM(s) Oral two times a day    MEDICATIONS  (PRN):  dextrose 40% Gel 15 Gram(s) Oral once PRN Blood Glucose LESS THAN 70 milliGRAM(s)/deciliter  glucagon  Injectable 1 milliGRAM(s) IntraMuscular once PRN Glucose LESS THAN 70 milligrams/deciliter  labetalol Injectable 10 milliGRAM(s) IV Push every 1 hour PRN SBP>180 if HR>70  oxyCODONE    IR 5 milliGRAM(s) Oral every 6 hours PRN Severe Pain (7 - 10)      ICU Vital Signs Last 24 Hrs  T(C): 36.3 (08 Sep 2018 07:30), Max: 36.7 (07 Sep 2018 18:00)  T(F): 97.3 (08 Sep 2018 07:30), Max: 98.1 (07 Sep 2018 18:00)  HR: 104 (08 Sep 2018 10:00) (66 - 142)  BP: 140/65 (08 Sep 2018 10:00) (103/55 - 189/80)  BP(mean): 93 (08 Sep 2018 10:00) (76 - 143)  ABP: --  ABP(mean): --  RR: 21 (08 Sep 2018 10:00) (16 - 48)  SpO2: 97% (08 Sep 2018 10:00) (97% - 100%)        I&O's Summary    07 Sep 2018 07:01  -  08 Sep 2018 07:00  --------------------------------------------------------  IN: 8 mL / OUT: 100 mL / NET: -92 mL              LABS:             09-07    131<L>  |  99  |  15.0  ----------------------------<  158<H>  4.3   |  14.0<L>  |  0.90    Ca    9.3      07 Sep 2018 15:02    TPro  7.2  /  Alb  3.6  /  TBili  0.6  /  DBili  x   /  AST  21  /  ALT  8   /  AlkPhos  103  09-07       PTT - ( 07 Sep 2018 15:02 )  PTT:33.8 sec    CULTURES:      Physical Examination:    GENERAL: No acute distress.      EYES: Pupils equal, reactive to light.  Symmetric.    EARS, NOSE, THROAT: Normal; supple neck, no lymphadenopathy; trachea midline    PULM: Clear to auscultation bilaterally, no significant sputum production    CVS: Regular rate and rhythm, no murmurs, rubs, or gallops    GI: Soft, nondistended, nontender, normoactive bowel sounds, no masses, no guarding    EXTREMITIES: No edema.  venous sheath in place R groin    SKIN: Warm and well perfused, no rashes noted.    NEURO: eyes open, moving all extremities, groaning, not following commands for me (following daughter's commands)

## 2018-09-08 NOTE — CONSULT NOTE ADULT - ASSESSMENT
The patient is a 62y Female who is status post cardiac cath.   She developed what appears to be global aphasia.     Aphasia.   s/p t-PA.   Bolus given at 3:20 pm on 9/7/18.   repeat CT head done too early, was done b/c AMS  will need CT head at around 24 hours post tPA (I ordered one)  Once done, if (-) ICH can start on ASA ME if needed    Stroke  will need Speech/ swallow  PT/OT eval  post tPA as above    Lipids   fasting lipid panel in August showed   goal LDL is under 70  continue high dose statin (lipitor 40 mg daily)    HTN   permissive HTN, but keep SBP  under 200    DM  keep normoglycemic  continue sliding scale    Cardiac disease  Plan per medicine/Cardiology.     Case discussed with Dr Cline    will follow with you    Tan Hester MD PhD   [id number]

## 2018-09-08 NOTE — PROGRESS NOTE ADULT - PROBLEM SELECTOR PLAN 1
Dr Hester seeing today.  Secondary risk reduction  Repeat head CT - read p but no bleed as far as I could see.
TPA  Admit MICU

## 2018-09-08 NOTE — DIETITIAN INITIAL EVALUATION ADULT. - PERTINENT LABORATORY DATA
09-07 Na131 mmol/L<L> Glu 158 mg/dL<H> K+ 4.3 mmol/L Cr  0.90 mg/dL BUN 15.0 mg/dL Phos n/a   Alb 3.6 g/dL PAB n/a

## 2018-09-08 NOTE — PROGRESS NOTE ADULT - SUBJECTIVE AND OBJECTIVE BOX
62y Female who had a [facility] no intervention, complicated post cath with Code stroke, TPA administered. Patient awake and alert , but aphasic. RFA #5fr sheath removed without incident.       Neuro: Aphasic  Lungs: CTA B/L  CV: S1, S2, no murmur, RRR  Abd: Soft  Right Groin: Soft, no bleeding, no hematoma, no bruit  Extremity: + distal pulses      A/P: 62y Female s/p [facility] no intervention  1. Groin management discussed with family a bedside  2. Continue current meds  3. Bedrest x 2 hours then up an hour later  4. Vitals as per protocol, and groin checks

## 2018-09-09 ENCOUNTER — TRANSCRIPTION ENCOUNTER (OUTPATIENT)
Age: 63
End: 2018-09-09

## 2018-09-09 LAB
CHOLEST SERPL-MCNC: 105 MG/DL — LOW (ref 110–199)
GLUCOSE BLDC GLUCOMTR-MCNC: 160 MG/DL — HIGH (ref 70–99)
GLUCOSE BLDC GLUCOMTR-MCNC: 180 MG/DL — HIGH (ref 70–99)
GLUCOSE BLDC GLUCOMTR-MCNC: 199 MG/DL — HIGH (ref 70–99)
GLUCOSE BLDC GLUCOMTR-MCNC: 349 MG/DL — HIGH (ref 70–99)
HDLC SERPL-MCNC: 41 MG/DL — LOW
LIPID PNL WITH DIRECT LDL SERPL: 33 MG/DL — SIGNIFICANT CHANGE UP
TOTAL CHOLESTEROL/HDL RATIO MEASUREMENT: 3 RATIO — LOW (ref 3.3–7.1)
TRIGL SERPL-MCNC: 157 MG/DL — SIGNIFICANT CHANGE UP (ref 10–200)

## 2018-09-09 PROCEDURE — 99232 SBSQ HOSP IP/OBS MODERATE 35: CPT

## 2018-09-09 PROCEDURE — 99233 SBSQ HOSP IP/OBS HIGH 50: CPT

## 2018-09-09 RX ORDER — BENZOCAINE AND MENTHOL 5; 1 G/100ML; G/100ML
1 LIQUID ORAL EVERY 6 HOURS
Qty: 0 | Refills: 0 | Status: DISCONTINUED | OUTPATIENT
Start: 2018-09-09 | End: 2018-09-10

## 2018-09-09 RX ORDER — LISINOPRIL 2.5 MG/1
2.5 TABLET ORAL DAILY
Qty: 0 | Refills: 0 | Status: DISCONTINUED | OUTPATIENT
Start: 2018-09-09 | End: 2018-09-10

## 2018-09-09 RX ORDER — ENOXAPARIN SODIUM 100 MG/ML
40 INJECTION SUBCUTANEOUS DAILY
Qty: 0 | Refills: 0 | Status: DISCONTINUED | OUTPATIENT
Start: 2018-09-09 | End: 2018-09-10

## 2018-09-09 RX ORDER — ACETAMINOPHEN 500 MG
650 TABLET ORAL ONCE
Qty: 0 | Refills: 0 | Status: COMPLETED | OUTPATIENT
Start: 2018-09-09 | End: 2018-09-09

## 2018-09-09 RX ORDER — POLYETHYLENE GLYCOL 3350 17 G/17G
17 POWDER, FOR SOLUTION ORAL DAILY
Qty: 0 | Refills: 0 | Status: DISCONTINUED | OUTPATIENT
Start: 2018-09-09 | End: 2018-09-10

## 2018-09-09 RX ADMIN — ENOXAPARIN SODIUM 40 MILLIGRAM(S): 100 INJECTION SUBCUTANEOUS at 20:11

## 2018-09-09 RX ADMIN — Medication 2: at 08:53

## 2018-09-09 RX ADMIN — Medication 100 MILLIGRAM(S): at 05:05

## 2018-09-09 RX ADMIN — FAMOTIDINE 20 MILLIGRAM(S): 10 INJECTION INTRAVENOUS at 11:19

## 2018-09-09 RX ADMIN — Medication 8: at 11:18

## 2018-09-09 RX ADMIN — Medication 2: at 16:00

## 2018-09-09 RX ADMIN — ATORVASTATIN CALCIUM 40 MILLIGRAM(S): 80 TABLET, FILM COATED ORAL at 20:11

## 2018-09-09 RX ADMIN — Medication 50 MILLIGRAM(S): at 05:05

## 2018-09-09 RX ADMIN — LISINOPRIL 2.5 MILLIGRAM(S): 2.5 TABLET ORAL at 11:45

## 2018-09-09 RX ADMIN — Medication 81 MILLIGRAM(S): at 11:19

## 2018-09-09 RX ADMIN — Medication 100 MILLIGRAM(S): at 15:58

## 2018-09-09 RX ADMIN — Medication 650 MILLIGRAM(S): at 23:17

## 2018-09-09 RX ADMIN — Medication 50 MILLIGRAM(S): at 15:59

## 2018-09-09 RX ADMIN — POLYETHYLENE GLYCOL 3350 17 GRAM(S): 17 POWDER, FOR SOLUTION ORAL at 06:57

## 2018-09-09 NOTE — PROGRESS NOTE ADULT - SUBJECTIVE AND OBJECTIVE BOX
AGAPITO REYNOSO    [id number]    62y      Female    INTERVAL HPI/OVERNIGHT EVENTS:    patient being seen for tia s/p tpa and med management. patient seen at bedside with family. Patient denies any complaints.     REVIEW OF SYSTEMS:    CONSTITUTIONAL: No fever, weight loss, or fatigue  RESPIRATORY: No cough, wheezing, hemoptysis; No shortness of breath  CARDIOVASCULAR: No chest pain, palpitations  GASTROINTESTINAL: No abdominal or epigastric pain. No nausea, vomiting  NEUROLOGICAL: No headaches, memory loss, loss of strength.  MISCELLANEOUS:      Vital Signs Last 24 Hrs  T(C): 37.1 (09 Sep 2018 11:49), Max: 37.2 (08 Sep 2018 20:00)  T(F): 98.8 (09 Sep 2018 11:49), Max: 98.9 (08 Sep 2018 20:00)  HR: 97 (09 Sep 2018 12:00) (79 - 97)  BP: 148/70 (09 Sep 2018 12:00) (108/55 - 148/70)  BP(mean): 84 (09 Sep 2018 05:00) (79 - 93)  RR: 16 (09 Sep 2018 12:00) (15 - 24)  SpO2: 100% (09 Sep 2018 12:00) (97% - 100%)    PHYSICAL EXAM:    General appearance: No acute distress,   HEENT: Normocephalic, Atraumatic, Conjunctiva clear, EOMI  Neck: Supple, No JVD, No tenderness  Lungs: Clear to auscultation, Breath sound equal bilaterally, No wheezes, No rales  Cardiovascular: S1S2, Regular rhythm, Sternal incision healed with mild erythema  Abdomen: Soft, Nontender, Nondistended,  Extremities: No clubbing,  Neuro: Strength equal bilaterally, No tremors  Psychiatric: Appropriate mood,    LABS:                        13.2   7.8   )-----------( 270      ( 07 Sep 2018 15:02 )             42.2     09-07    131<L>  |  99  |  15.0  ----------------------------<  158<H>  4.3   |  14.0<L>  |  0.90    Ca    9.3      07 Sep 2018 15:02    TPro  7.2  /  Alb  3.6  /  TBili  0.6  /  DBili  x   /  AST  21  /  ALT  8   /  AlkPhos  103  09-07    PT/INR - ( 07 Sep 2018 15:02 )   PT: 12.0 sec;   INR: 1.09 ratio         PTT - ( 07 Sep 2018 15:02 )  PTT:33.8 sec        MEDICATIONS  (STANDING):  aspirin  chewable 81 milliGRAM(s) Oral daily  atorvastatin 40 milliGRAM(s) Oral at bedtime  dextrose 5%. 1000 milliLiter(s) (50 mL/Hr) IV Continuous <Continuous>  dextrose 50% Injectable 12.5 Gram(s) IV Push once  dextrose 50% Injectable 25 Gram(s) IV Push once  dextrose 50% Injectable 25 Gram(s) IV Push once  docusate sodium Oral Tab/Cap - Peds 100 milliGRAM(s) Oral two times a day  famotidine    Tablet 20 milliGRAM(s) Oral daily  insulin lispro (HumaLOG) corrective regimen sliding scale   SubCutaneous at bedtime  insulin lispro (HumaLOG) corrective regimen sliding scale   SubCutaneous three times a day before meals  lisinopril 2.5 milliGRAM(s) Oral daily  metoprolol tartrate 50 milliGRAM(s) Oral two times a day    MEDICATIONS  (PRN):  dextrose 40% Gel 15 Gram(s) Oral once PRN Blood Glucose LESS THAN 70 milliGRAM(s)/deciliter  glucagon  Injectable 1 milliGRAM(s) IntraMuscular once PRN Glucose LESS THAN 70 milligrams/deciliter  labetalol Injectable 10 milliGRAM(s) IV Push every 1 hour PRN SBP>180 if HR>70  oxyCODONE    IR 5 milliGRAM(s) Oral every 6 hours PRN Severe Pain (7 - 10)  polyethylene glycol 3350 17 Gram(s) Oral daily PRN Constipation      RADIOLOGY & ADDITIONAL TESTS:

## 2018-09-09 NOTE — PROGRESS NOTE ADULT - ASSESSMENT
1) TIA/CVA - Status post administration of TPA.   --> neuro following  --> asa and statin  --> speech following  --> lipid panel pending     2) CAD - s/p clean cath  --> cardio following  --> c.w meds    3) Carotid stenosis - Carotid doppler shows 50-69% on bilateral IC    4) Diabetes - Insulin coverage, close monitoring of blood glucose levels.    Discussed with the patient and her family at the bedside.    likely dc tomorrow

## 2018-09-09 NOTE — DISCHARGE NOTE ADULT - MEDICATION SUMMARY - MEDICATIONS TO TAKE
I will START or STAY ON the medications listed below when I get home from the hospital:    aspirin 81 mg oral tablet, chewable  -- 1 tab(s) by mouth once a day  -- Indication: For tia    lisinopril 2.5 mg oral tablet  -- 1 tab(s) by mouth once a day  -- Indication: For Htn    metFORMIN 500 mg oral tablet  -- 1 tab(s) by mouth 2 times a day   -- Check with your doctor before becoming pregnant.  Do not drink alcoholic beverages when taking this medication.  It is very important that you take or use this exactly as directed.  Do not skip doses or discontinue unless directed by your doctor.  Obtain medical advice before taking any non-prescription drugs as some may affect the action of this medication.  Take with food or milk.    -- Indication: For Dm2    atorvastatin 40 mg oral tablet  -- 1 tab(s) by mouth once a day (at bedtime)  -- Indication: For tia    clopidogrel 75 mg oral tablet  -- 1 tab(s) by mouth once a day  -- Indication: For Cad    metoprolol tartrate 50 mg oral tablet  -- 1 tab(s) by mouth 2 times a day  -- Indication: For Htn    famotidine 20 mg oral tablet  -- 1 tab(s) by mouth once a day  -- Indication: For gerd    docusate sodium 100 mg oral capsule  -- 1 cap(s) by mouth 3 times a day  -- Indication: For Constiapation I will START or STAY ON the medications listed below when I get home from the hospital:    aspirin 81 mg oral tablet, chewable  -- 1 tab(s) by mouth once a day  -- Indication: For tia    lisinopril 2.5 mg oral tablet  -- 1 tab(s) by mouth once a day  -- Indication: For Htn    metFORMIN 500 mg oral tablet  -- 1 tab(s) by mouth 2 times a day   -- Check with your doctor before becoming pregnant.  Do not drink alcoholic beverages when taking this medication.  It is very important that you take or use this exactly as directed.  Do not skip doses or discontinue unless directed by your doctor.  Obtain medical advice before taking any non-prescription drugs as some may affect the action of this medication.  Take with food or milk.    -- Indication: For Dm2    atorvastatin 40 mg oral tablet  -- 1 tab(s) by mouth once a day (at bedtime)  -- Indication: For tia    clopidogrel 75 mg oral tablet  -- 1 tab(s) by mouth once a day  -- Indication: For Cad    metoprolol tartrate 75 mg oral tablet  -- 1 tab(s) by mouth 2 times a day  -- Indication: For HTN (hypertension)    famotidine 20 mg oral tablet  -- 1 tab(s) by mouth once a day  -- Indication: For gerd    docusate sodium 100 mg oral capsule  -- 1 cap(s) by mouth 3 times a day  -- Indication: For Constiapation

## 2018-09-09 NOTE — DISCHARGE NOTE ADULT - CARE PROVIDER_API CALL
Dhruv Esparza), Neurology; Vascular Neurology  [address] [place] [address]  Phone: [phone]  Fax: [phone]    yo,   cardio in [facility]  Phone: (   )    -  Fax: (   )    -

## 2018-09-09 NOTE — PROGRESS NOTE ADULT - SUBJECTIVE AND OBJECTIVE BOX
[facility]                                        Neurology at [place]                                  Mir [facility]rera [facility] Ned                                      [address]                                           [place], [place], [address]                                                [phone]        CC: aphasia.   HISTORY:  The patient is a 62y Female who was admitted 9/5/18 with chest pain.   She underwent cardiac catheterization today.   She came back from the procedure at ~1:55 pm. She was answering questions and following instructions according to the nurses. Shortly afterward she stated that she felt sick. She then became non verbal and has not followed any instructions. This has persisted. The stroke code was called and she was taken to CT emergently.  At present she remains non verbal. (9/7)    Interval history:  She is doping much better today.  In chair eating lunch.  Aphasia has resolved    ROS neurology: Denies headache or dizziness. Denies weakness/numbness.  Denies speech/language deficits. Denies diplopia/blurred vision.  Denies confusion    MEDICATIONS  (STANDING):  aspirin  chewable 81 milliGRAM(s) Oral daily  atorvastatin 40 milliGRAM(s) Oral at bedtime  dextrose 5%. 1000 milliLiter(s) (50 mL/Hr) IV Continuous <Continuous>  dextrose 50% Injectable 12.5 Gram(s) IV Push once  dextrose 50% Injectable 25 Gram(s) IV Push once  dextrose 50% Injectable 25 Gram(s) IV Push once  docusate sodium Oral Tab/Cap - Peds 100 milliGRAM(s) Oral two times a day  famotidine    Tablet 20 milliGRAM(s) Oral daily  insulin lispro (HumaLOG) corrective regimen sliding scale   SubCutaneous at bedtime  insulin lispro (HumaLOG) corrective regimen sliding scale   SubCutaneous three times a day before meals  lisinopril 2.5 milliGRAM(s) Oral daily  metoprolol tartrate 50 milliGRAM(s) Oral two times a day    MEDICATIONS  (PRN):  dextrose 40% Gel 15 Gram(s) Oral once PRN Blood Glucose LESS THAN 70 milliGRAM(s)/deciliter  glucagon  Injectable 1 milliGRAM(s) IntraMuscular once PRN Glucose LESS THAN 70 milligrams/deciliter  labetalol Injectable 10 milliGRAM(s) IV Push every 1 hour PRN SBP>180 if HR>70  oxyCODONE    IR 5 milliGRAM(s) Oral every 6 hours PRN Severe Pain (7 - 10)  polyethylene glycol 3350 17 Gram(s) Oral daily PRN Constipation      Vital Signs Last 24 Hrs  T(C): 37.1 (09 Sep 2018 11:49), Max: 37.2 (08 Sep 2018 20:00)  T(F): 98.8 (09 Sep 2018 11:49), Max: 98.9 (08 Sep 2018 20:00)  HR: 83 (09 Sep 2018 08:00) (79 - 97)  BP: 147/69 (09 Sep 2018 08:00) (108/55 - 147/69)  BP(mean): 84 (09 Sep 2018 05:00) (79 - 93)  RR: 17 (09 Sep 2018 08:00) (15 - 24)  SpO2: 100% (09 Sep 2018 08:00) (97% - 100%)    Detailed neuro exam:    Mental status: The patient is awake but non verbal and not following instructions.     Cranial nerves: Pupils react symmetrically to light. There is no visual field cut to confrontation. EOM full no nystagmus.  Facial sensation is intact. Facial musculature is symmetric. Palate elevates symmetrically. Tongue is midline.    Motor: There is normal bulk and tone.    5/5 in right arm and leg  5/5 in left arm and leg    Sensory: Intact pin and fine touch in 4 extremities    Reflexes: 1+ throughout and plantar responses are flexor.    Cerebellar:  no dysmetria on FTN b/l     LABS:                                             13.2   7.8   )-----------( 270      ( 07 Sep 2018 15:02 )             42.2     09-07    131<L>  |  99  |  15.0  ----------------------------<  158<H>  4.3   |  14.0<L>  |  0.90    Ca    9.3      07 Sep 2018 15:02    TPro  7.2  /  Alb  3.6  /  TBili  0.6  /  DBili  x   /  AST  21  /  ALT  8   /  AlkPhos  103  09-07    LIVER FUNCTIONS - ( 07 Sep 2018 15:02 )  Alb: 3.6 g/dL / Pro: 7.2 g/dL / ALK PHOS: 103 U/L / ALT: 8 U/L / AST: 21 U/L / GGT: x           PT/INR - ( 07 Sep 2018 15:02 )   PT: 12.0 sec;   INR: 1.09 ratio         PTT - ( 07 Sep 2018 15:02 )  PTT:33.8 sec    Lipid Profile (08.04.18 @ 14:18)    Total Cholesterol/HDL Ratio Measurement: 5.1 RATIO    Cholesterol, Serum: 179 mg/dL    Triglycerides, Serum: 221 mg/dL    HDL Cholesterol, Serum: 35 mg/dL    Direct LDL: 100:      RADIOLOGY   repeat CT head (9/8) for AMS- no acute CVA, mass or bleed (+) SVID and atrophy [facility]                                        Neurology at [place]                                  Mir Cabrera [facility] Ned                                      [address]                                           [place], [place], [address]                                                [phone]        CC: aphasia.   HISTORY:  The patient is a 62y Female who was admitted 9/5/18 with chest pain.   She underwent cardiac catheterization today.   She came back from the procedure at ~1:55 pm. She was answering questions and following instructions according to the nurses. Shortly afterward she stated that she felt sick. She then became non verbal and has not followed any instructions. This has persisted. The stroke code was called and she was taken to CT emergently.  At present she remains non verbal. (9/7)    Interval history:  She is doping much better today.  In chair eating lunch.  Aphasia has resolved    ROS neurology: Denies headache or dizziness. Denies weakness/numbness.  Denies speech/language deficits. Denies diplopia/blurred vision.  Denies confusion    MEDICATIONS  (STANDING):  aspirin  chewable 81 milliGRAM(s) Oral daily  atorvastatin 40 milliGRAM(s) Oral at bedtime  dextrose 5%. 1000 milliLiter(s) (50 mL/Hr) IV Continuous <Continuous>  dextrose 50% Injectable 12.5 Gram(s) IV Push once  dextrose 50% Injectable 25 Gram(s) IV Push once  dextrose 50% Injectable 25 Gram(s) IV Push once  docusate sodium Oral Tab/Cap - Peds 100 milliGRAM(s) Oral two times a day  famotidine    Tablet 20 milliGRAM(s) Oral daily  insulin lispro (HumaLOG) corrective regimen sliding scale   SubCutaneous at bedtime  insulin lispro (HumaLOG) corrective regimen sliding scale   SubCutaneous three times a day before meals  lisinopril 2.5 milliGRAM(s) Oral daily  metoprolol tartrate 50 milliGRAM(s) Oral two times a day    MEDICATIONS  (PRN):  dextrose 40% Gel 15 Gram(s) Oral once PRN Blood Glucose LESS THAN 70 milliGRAM(s)/deciliter  glucagon  Injectable 1 milliGRAM(s) IntraMuscular once PRN Glucose LESS THAN 70 milligrams/deciliter  labetalol Injectable 10 milliGRAM(s) IV Push every 1 hour PRN SBP>180 if HR>70  oxyCODONE    IR 5 milliGRAM(s) Oral every 6 hours PRN Severe Pain (7 - 10)  polyethylene glycol 3350 17 Gram(s) Oral daily PRN Constipation      Vital Signs Last 24 Hrs  T(C): 37.1 (09 Sep 2018 11:49), Max: 37.2 (08 Sep 2018 20:00)  T(F): 98.8 (09 Sep 2018 11:49), Max: 98.9 (08 Sep 2018 20:00)  HR: 83 (09 Sep 2018 08:00) (79 - 97)  BP: 147/69 (09 Sep 2018 08:00) (108/55 - 147/69)  BP(mean): 84 (09 Sep 2018 05:00) (79 - 93)  RR: 17 (09 Sep 2018 08:00) (15 - 24)  SpO2: 100% (09 Sep 2018 08:00) (97% - 100%)    Detailed neuro exam:    Mental status: The patient is awake but non verbal and not following instructions.     Cranial nerves: Pupils react symmetrically to light. There is no visual field cut to confrontation. EOM full no nystagmus.  Facial sensation is intact. Facial musculature is symmetric. Palate elevates symmetrically. Tongue is midline.    Motor: There is normal bulk and tone.    5/5 in right arm and leg  5/5 in left arm and leg    Sensory: Intact pin and fine touch in 4 extremities    Reflexes: 1+ throughout and plantar responses are flexor.    Cerebellar:  no dysmetria on FTN b/l     LABS:                                             13.2   7.8   )-----------( 270      ( 07 Sep 2018 15:02 )             42.2     09-07    131<L>  |  99  |  15.0  ----------------------------<  158<H>  4.3   |  14.0<L>  |  0.90    Ca    9.3      07 Sep 2018 15:02    TPro  7.2  /  Alb  3.6  /  TBili  0.6  /  DBili  x   /  AST  21  /  ALT  8   /  AlkPhos  103  09-07    LIVER FUNCTIONS - ( 07 Sep 2018 15:02 )  Alb: 3.6 g/dL / Pro: 7.2 g/dL / ALK PHOS: 103 U/L / ALT: 8 U/L / AST: 21 U/L / GGT: x           PT/INR - ( 07 Sep 2018 15:02 )   PT: 12.0 sec;   INR: 1.09 ratio         PTT - ( 07 Sep 2018 15:02 )  PTT:33.8 sec    Lipid Profile (08.04.18 @ 14:18)    Total Cholesterol/HDL Ratio Measurement: 5.1 RATIO    Cholesterol, Serum: 179 mg/dL    Triglycerides, Serum: 221 mg/dL    HDL Cholesterol, Serum: 35 mg/dL    Direct LDL: 100:      RADIOLOGY   < from: TTE Echo Complete w/Doppler (09.07.18 @ 18:43) >  Summary:   1. Left ventricular ejection fraction, by visual estimation, is 50 to   55%.   2. Technically difficult study.   3. Basal inferolateral segment is abnormal as described above.   4. Mildly increased LV wall thickness.   5. Normal left ventricular internal cavity size.   6. There is mild septal left ventricular hypertrophy.        from: US Duplex Carotid Arteries Complete, Bilateral (09.08.18 @ 20:56)    IMPRESSION:  1.  Right carotid artery: Approximately 50-69% stenosis in the right   internal carotid artery.   2.  Left carotid artery: Approximately 50-69% stenosis in the left   internal carotid artery.  3.  Vertebral arteries: Antegrade flow.      repeat CT head (9/8) for AMS- no acute CVA, mass or bleed (+) SVID and atrophy

## 2018-09-09 NOTE — DISCHARGE NOTE ADULT - MEDICATION SUMMARY - MEDICATIONS TO STOP TAKING
I will STOP taking the medications listed below when I get home from the hospital:    oxyCODONE 5 mg oral tablet  -- 1 tab(s) by mouth every 6 hours, As Needed    glimepiride 2 mg oral tablet  -- 1 tab(s) by mouth once a day

## 2018-09-09 NOTE — DISCHARGE NOTE ADULT - MEDICATION SUMMARY - MEDICATIONS TO CHANGE
Patient stated she was not aware she needed further testing.  Also, she is requesting mammogram results.  Patient is requesting a call back, [phone].  
screws
I will SWITCH the dose or number of times a day I take the medications listed below when I get home from the hospital:  None

## 2018-09-09 NOTE — DISCHARGE NOTE ADULT - PATIENT PORTAL LINK FT
You can access the [facility] Patient Portal, offered by [facility], by registering with the following website: [email or web address]

## 2018-09-09 NOTE — DISCHARGE NOTE ADULT - PROVIDER TOKENS
TOKEN:'[id number]',FREE:[LAST:[yo],PHONE:[(   )    -],FAX:[(   )    -],ADDRESS:[cardio in [facility] [place]]]

## 2018-09-09 NOTE — DISCHARGE NOTE ADULT - SECONDARY DIAGNOSIS.
Diabetes Encephalopathy, unspecified S/P CABG (coronary artery bypass graft) HTN (hypertension) Coronary artery disease

## 2018-09-09 NOTE — DISCHARGE NOTE ADULT - PLAN OF CARE
s/p tpa monitored in icu and cleared follow up with pcp and neuro  advised better diet and exercise metformin resolved follow up [facility] cardio home meds

## 2018-09-09 NOTE — DISCHARGE NOTE ADULT - HOSPITAL COURSE
62F with a history of coronary artery disease and bypass surgery the month prior who originally presented with chest pain. EKG noted T-wave inversion and the patient was seen by Cardiology in consultation. A nuclear stress test noted evidence of lateral ischemia and the patient underwent cardiac catheterization without intervention. After the procedure, the patient was not responding appropriately and physical examination was suggestive of left sided weakness. The patient was seen by Neurology in consultation for stroke and TPA was administered. Patient had a NIH of 21 at the time.  The patient was transferred to the intensive care unit for further management. A nasogastric tube was placed to manage secretions. The patient was noted to be following commands the following day with improvement in her symptoms.    patient downgraded to medicine and monitored in [address] [place] [address]. Patients neurologic symptoms resolved and is cleared for dc to home    time spent on dc 34 minutes

## 2018-09-09 NOTE — PROGRESS NOTE ADULT - ASSESSMENT
The patient is a 62y Female who is status post cardiac cath.   She developed what appears to be global aphasia.     Aphasia.   Resolved.  s/p t-PA.  Bolus given at 3:20 pm on 9/7/18.   on ASA/statin    Stroke  will need Speech/ swallow  PT/OT eval  post tPA as above  continue ASA/statin    Lipids  fasting lipid panel in August showed   goal LDL is under 70  continue high dose statin (lipitor 40 mg daily)    HTN   permissive HTN, but keep SBP  under 200    DM  keep normoglycemic  continue sliding scale    Cardiac disease  Plan per medicine/Cardiology.       will follow with you    Tan Hester MD PhD   [id number] The patient is a 62y Female who is status post cardiac cath.   She developed what appears to be global aphasia.     Aphasia.   Resolved.  s/p t-PA.  Bolus given at 3:20 pm on 9/7/18.   on ASA/statin    Stroke  will need Speech/ swallow  PT/OT eval  post tPA as above  continue ASA/statin  echo/carotid as above    Lipids  fasting lipid panel in August showed   goal LDL is under 70  continue high dose statin (lipitor 40 mg daily)    HTN   permissive HTN, but keep SBP  under 200    DM  keep normoglycemic  continue sliding scale    Cardiac disease  Plan per medicine/Cardiology.       will follow with you    Tan Hester MD PhD   [id number]

## 2018-09-10 VITALS
TEMPERATURE: 98 F | HEART RATE: 84 BPM | SYSTOLIC BLOOD PRESSURE: 156 MMHG | DIASTOLIC BLOOD PRESSURE: 75 MMHG | RESPIRATION RATE: 20 BRPM

## 2018-09-10 LAB
ANION GAP SERPL CALC-SCNC: 11 MMOL/L — SIGNIFICANT CHANGE UP (ref 5–17)
BUN SERPL-MCNC: 20 MG/DL — SIGNIFICANT CHANGE UP (ref 8–20)
CALCIUM SERPL-MCNC: 9.3 MG/DL — SIGNIFICANT CHANGE UP (ref 8.6–10.2)
CHLORIDE SERPL-SCNC: 98 MMOL/L — SIGNIFICANT CHANGE UP (ref 98–107)
CO2 SERPL-SCNC: 24 MMOL/L — SIGNIFICANT CHANGE UP (ref 22–29)
CREAT SERPL-MCNC: 1.05 MG/DL — SIGNIFICANT CHANGE UP (ref 0.5–1.3)
GLUCOSE BLDC GLUCOMTR-MCNC: 169 MG/DL — HIGH (ref 70–99)
GLUCOSE BLDC GLUCOMTR-MCNC: 258 MG/DL — HIGH (ref 70–99)
GLUCOSE BLDC GLUCOMTR-MCNC: 298 MG/DL — HIGH (ref 70–99)
GLUCOSE SERPL-MCNC: 230 MG/DL — HIGH (ref 70–115)
HCT VFR BLD CALC: 40.2 % — SIGNIFICANT CHANGE UP (ref 37–47)
HGB BLD-MCNC: 12.4 G/DL — SIGNIFICANT CHANGE UP (ref 12–16)
MAGNESIUM SERPL-MCNC: 2.1 MG/DL — SIGNIFICANT CHANGE UP (ref 1.6–2.6)
MCHC RBC-ENTMCNC: 25.6 PG — LOW (ref 27–31)
MCHC RBC-ENTMCNC: 30.8 G/DL — LOW (ref 32–36)
MCV RBC AUTO: 82.9 FL — SIGNIFICANT CHANGE UP (ref 81–99)
PLATELET # BLD AUTO: 250 K/UL — SIGNIFICANT CHANGE UP (ref 150–400)
POTASSIUM SERPL-MCNC: 5.2 MMOL/L — SIGNIFICANT CHANGE UP (ref 3.5–5.3)
POTASSIUM SERPL-SCNC: 5.2 MMOL/L — SIGNIFICANT CHANGE UP (ref 3.5–5.3)
RBC # BLD: 4.85 M/UL — SIGNIFICANT CHANGE UP (ref 4.4–5.2)
RBC # FLD: 15 % — SIGNIFICANT CHANGE UP (ref 11–15.6)
SODIUM SERPL-SCNC: 133 MMOL/L — LOW (ref 135–145)
TSH SERPL-MCNC: 0.92 UIU/ML — SIGNIFICANT CHANGE UP (ref 0.27–4.2)
WBC # BLD: 7.5 K/UL — SIGNIFICANT CHANGE UP (ref 4.8–10.8)
WBC # FLD AUTO: 7.5 K/UL — SIGNIFICANT CHANGE UP (ref 4.8–10.8)

## 2018-09-10 PROCEDURE — 82962 GLUCOSE BLOOD TEST: CPT

## 2018-09-10 PROCEDURE — 82550 ASSAY OF CK (CPK): CPT

## 2018-09-10 PROCEDURE — 93005 ELECTROCARDIOGRAM TRACING: CPT

## 2018-09-10 PROCEDURE — 97530 THERAPEUTIC ACTIVITIES: CPT

## 2018-09-10 PROCEDURE — 80053 COMPREHEN METABOLIC PANEL: CPT

## 2018-09-10 PROCEDURE — 99285 EMERGENCY DEPT VISIT HI MDM: CPT

## 2018-09-10 PROCEDURE — 80061 LIPID PANEL: CPT

## 2018-09-10 PROCEDURE — 70450 CT HEAD/BRAIN W/O DYE: CPT

## 2018-09-10 PROCEDURE — 93306 TTE W/DOPPLER COMPLETE: CPT

## 2018-09-10 PROCEDURE — 80048 BASIC METABOLIC PNL TOTAL CA: CPT

## 2018-09-10 PROCEDURE — 85027 COMPLETE CBC AUTOMATED: CPT

## 2018-09-10 PROCEDURE — A9500: CPT

## 2018-09-10 PROCEDURE — 99232 SBSQ HOSP IP/OBS MODERATE 35: CPT

## 2018-09-10 PROCEDURE — 93880 EXTRACRANIAL BILAT STUDY: CPT

## 2018-09-10 PROCEDURE — 97163 PT EVAL HIGH COMPLEX 45 MIN: CPT

## 2018-09-10 PROCEDURE — 83735 ASSAY OF MAGNESIUM: CPT

## 2018-09-10 PROCEDURE — 83880 ASSAY OF NATRIURETIC PEPTIDE: CPT

## 2018-09-10 PROCEDURE — 78452 HT MUSCLE IMAGE SPECT MULT: CPT

## 2018-09-10 PROCEDURE — 85730 THROMBOPLASTIN TIME PARTIAL: CPT

## 2018-09-10 PROCEDURE — 92610 EVALUATE SWALLOWING FUNCTION: CPT

## 2018-09-10 PROCEDURE — 85610 PROTHROMBIN TIME: CPT

## 2018-09-10 PROCEDURE — 99239 HOSP IP/OBS DSCHRG MGMT >30: CPT

## 2018-09-10 PROCEDURE — 84484 ASSAY OF TROPONIN QUANT: CPT

## 2018-09-10 PROCEDURE — 36415 COLL VENOUS BLD VENIPUNCTURE: CPT

## 2018-09-10 PROCEDURE — 71045 X-RAY EXAM CHEST 1 VIEW: CPT

## 2018-09-10 PROCEDURE — 83036 HEMOGLOBIN GLYCOSYLATED A1C: CPT

## 2018-09-10 PROCEDURE — 84443 ASSAY THYROID STIM HORMONE: CPT

## 2018-09-10 PROCEDURE — 93017 CV STRESS TEST TRACING ONLY: CPT

## 2018-09-10 PROCEDURE — 97116 GAIT TRAINING THERAPY: CPT

## 2018-09-10 RX ORDER — ASPIRIN/CALCIUM CARB/MAGNESIUM 324 MG
1 TABLET ORAL
Qty: 0 | Refills: 0 | COMMUNITY

## 2018-09-10 RX ORDER — LISINOPRIL 2.5 MG/1
1 TABLET ORAL
Qty: 30 | Refills: 0 | OUTPATIENT
Start: 2018-09-10 | End: 2018-10-09

## 2018-09-10 RX ORDER — METOPROLOL TARTRATE 50 MG
1 TABLET ORAL
Qty: 60 | Refills: 0 | OUTPATIENT
Start: 2018-09-10 | End: 2018-10-09

## 2018-09-10 RX ORDER — OXYCODONE HYDROCHLORIDE 5 MG/1
1 TABLET ORAL
Qty: 0 | Refills: 0 | COMMUNITY

## 2018-09-10 RX ORDER — ATORVASTATIN CALCIUM 80 MG/1
1 TABLET, FILM COATED ORAL
Qty: 0 | Refills: 0 | COMMUNITY

## 2018-09-10 RX ORDER — FAMOTIDINE 10 MG/ML
20 INJECTION INTRAVENOUS
Qty: 0 | Refills: 0 | COMMUNITY

## 2018-09-10 RX ORDER — METOPROLOL TARTRATE 50 MG
75 TABLET ORAL
Qty: 0 | Refills: 0 | Status: DISCONTINUED | OUTPATIENT
Start: 2018-09-10 | End: 2018-09-10

## 2018-09-10 RX ORDER — METOPROLOL TARTRATE 50 MG
1 TABLET ORAL
Qty: 0 | Refills: 0 | COMMUNITY

## 2018-09-10 RX ORDER — METFORMIN HYDROCHLORIDE 850 MG/1
1 TABLET ORAL
Qty: 60 | Refills: 0 | OUTPATIENT
Start: 2018-09-10 | End: 2018-10-09

## 2018-09-10 RX ORDER — CLOPIDOGREL BISULFATE 75 MG/1
75 TABLET, FILM COATED ORAL DAILY
Qty: 0 | Refills: 0 | Status: DISCONTINUED | OUTPATIENT
Start: 2018-09-10 | End: 2018-09-10

## 2018-09-10 RX ORDER — DOCUSATE SODIUM 100 MG
0 CAPSULE ORAL
Qty: 0 | Refills: 0 | COMMUNITY

## 2018-09-10 RX ORDER — GLIMEPIRIDE 1 MG
1 TABLET ORAL
Qty: 0 | Refills: 0 | COMMUNITY

## 2018-09-10 RX ORDER — ATORVASTATIN CALCIUM 80 MG/1
1 TABLET, FILM COATED ORAL
Qty: 30 | Refills: 0 | OUTPATIENT
Start: 2018-09-10 | End: 2018-10-09

## 2018-09-10 RX ORDER — FAMOTIDINE 10 MG/ML
1 INJECTION INTRAVENOUS
Qty: 0 | Refills: 0 | COMMUNITY
Start: 2018-09-10

## 2018-09-10 RX ORDER — ASPIRIN/CALCIUM CARB/MAGNESIUM 324 MG
1 TABLET ORAL
Qty: 0 | Refills: 0 | COMMUNITY
Start: 2018-09-10

## 2018-09-10 RX ADMIN — Medication 100 MILLIGRAM(S): at 17:08

## 2018-09-10 RX ADMIN — Medication 100 MILLIGRAM(S): at 05:49

## 2018-09-10 RX ADMIN — Medication 650 MILLIGRAM(S): at 00:20

## 2018-09-10 RX ADMIN — Medication 6: at 17:08

## 2018-09-10 RX ADMIN — LISINOPRIL 2.5 MILLIGRAM(S): 2.5 TABLET ORAL at 05:49

## 2018-09-10 RX ADMIN — ENOXAPARIN SODIUM 40 MILLIGRAM(S): 100 INJECTION SUBCUTANEOUS at 12:02

## 2018-09-10 RX ADMIN — Medication 50 MILLIGRAM(S): at 05:49

## 2018-09-10 RX ADMIN — Medication 6: at 12:02

## 2018-09-10 RX ADMIN — CLOPIDOGREL BISULFATE 75 MILLIGRAM(S): 75 TABLET, FILM COATED ORAL at 12:03

## 2018-09-10 RX ADMIN — Medication 75 MILLIGRAM(S): at 17:08

## 2018-09-10 RX ADMIN — Medication 81 MILLIGRAM(S): at 12:04

## 2018-09-10 RX ADMIN — Medication 2: at 08:08

## 2018-09-10 RX ADMIN — FAMOTIDINE 20 MILLIGRAM(S): 10 INJECTION INTRAVENOUS at 12:02

## 2018-09-10 RX ADMIN — ATORVASTATIN CALCIUM 40 MILLIGRAM(S): 80 TABLET, FILM COATED ORAL at 19:39

## 2018-09-10 NOTE — PROGRESS NOTE ADULT - SUBJECTIVE AND OBJECTIVE BOX
[facility]                                        Neurology at [place]                                 Mir Cabrera & Ned                                  [address]                                        [place], [place], [address]                                             [phone]        CC: aphasia.   HISTORY:  The patient is a 62y Female who was admitted 9/5/18 with chest pain.   She underwent cardiac catheterization today.   She came back from the procedure at ~1:55 pm. She was answering questions and following instructions according to the nurses. Shortly afterward she stated that she felt sick. She then became non verbal and has not followed any instructions. This has persisted. The stroke code was called and she was taken to CT emergently.  At time of my initial evaluation she remained non verbal.   She was given t-PA and transferred to ICU.   She was stable and follow up CT was without hemorrhage and she was transferred to the Step down unit and ultimately downgraded to regular medical bed.     Interval history:    Aphasia has resolved. Remains stable from neurologic standpoint.     ROS:   Denies headache or dizziness.  Denies chest pain.  Denies shortness of breath.    MEDICATIONS  (STANDING):  aspirin  chewable 81 milliGRAM(s) Oral daily  atorvastatin 40 milliGRAM(s) Oral at bedtime  clopidogrel Tablet 75 milliGRAM(s) Oral daily  dextrose 5%. 1000 milliLiter(s) (50 mL/Hr) IV Continuous <Continuous>  docusate sodium Oral Tab/Cap - Peds 100 milliGRAM(s) Oral two times a day  enoxaparin Injectable 40 milliGRAM(s) SubCutaneous daily  famotidine    Tablet 20 milliGRAM(s) Oral daily  insulin lispro (HumaLOG) corrective regimen sliding scale   SubCutaneous at bedtime  insulin lispro (HumaLOG) corrective regimen sliding scale   SubCutaneous three times a day before meals  lisinopril 2.5 milliGRAM(s) Oral daily  metoprolol tartrate 75 milliGRAM(s) Oral two times a day      Vital Signs Last 24 Hrs  T(F): 98 (10 Sep 2018 05:27), Max: 98.9 (09 Sep 2018 16:00)  HR: 78 (10 Sep 2018 05:27) (78 - 97)  BP: 151/92 (10 Sep 2018 05:27) (126/83 - 153/73)  BP(mean): 90 (09 Sep 2018 22:28) (90 - 93)  RR: 19 (10 Sep 2018 05:27) (16 - 22)  SpO2: 99% (10 Sep 2018 05:27) (98% - 100%)    Detailed Neurologic Exam:    Mental status: The patient is awake but non verbal and not following instructions.     Cranial nerves: Pupils react symmetrically to light. There is no visual field cut to confrontation. EOM full no nystagmus.  Facial sensation is intact. Facial musculature is symmetric. Palate elevates symmetrically. Tongue is midline.    Motor: There is normal bulk and tone.    5/5 in right arm and leg  5/5 in left arm and leg    Sensory: Intact pin and fine touch in 4 extremities    Reflexes: 1+ throughout and plantar responses are flexor.    Cerebellar: No dysmetria on finger nose testing.    Labs:     09-10    133<L>  |  98  |  20.0  ----------------------------<  230<H>  5.2   |  24.0  |  1.05    Ca    9.3      10 Sep 2018 09:02  Mg     2.1     09-10                              12.4   7.5   )-----------( 250      ( 10 Sep 2018 09:02 )             40.2

## 2018-09-10 NOTE — SWALLOW BEDSIDE ASSESSMENT ADULT - SWALLOW EVAL: RECOMMENDED FEEDING/EATING TECHNIQUES
crush medication (when feasible)/position upright (90 degrees)/oral hygiene/maintain upright posture during/after eating for 30 mins/small sips/bites

## 2018-09-10 NOTE — SWALLOW BEDSIDE ASSESSMENT ADULT - SLP PERTINENT HISTORY OF CURRENT PROBLEM
As per h&p: Chest Pain, Complicated presentation due to recent CABG and pain. She underwent cardiac catheterization today. She came back from the procedure at ~1:55 pm. She was answering questions and following instructions according to the nurses. Shortly afterward she stated that she felt sick. She then became non verbal and has not followed any instructions. This has persisted. The stroke code was called. Pt s/p tPA

## 2018-09-10 NOTE — PROGRESS NOTE ADULT - ASSESSMENT
62y Female who is status post cardiac cath.   She developed what appears to be global aphasia.     Aphasia/ Stroke  Resolved.  She is status post t-PA.  Bolus given at 3:20 pm on 9/7/18.   Continue antiplatelet and statin.   Ok for discharge from neurologic standpoint.     Lipids  Fasting lipid panel in August showed   Goal LDL is under 70  Continue high dose statin (lipitor 40 mg daily).    HTN   Continue blood pressure control.     DM  Keep normoglycemic.    Cardiac disease  Plan per medicine/Cardiology.

## 2018-09-10 NOTE — PROGRESS NOTE ADULT - SUBJECTIVE AND OBJECTIVE BOX
[facility]                                                        Office: [address] [address]                                                       Telephone: [phone]. Fax:[phone]      CARDIOLOGY PROGRESS NOTE   ([facility])    Subjective: Patient seen and examined.  Sitting up in bed.  "I feel good". Complains of bilateral foot pain. This is the most alert that I've seen her.  Chart reviewed.  Discussed with staff. Back on aspirin/plavix.     ROS: No headache, no chest pain, no SOB, no palpitations, no dizziness, no nausea, no bleeding    Chronic Conditions:  carotid stenosis - repeat dopplers performed, 50-60% bilaterally     CURRENT MEDICATIONS  labetalol Injectable 10 milliGRAM(s) IV Push every 1 hour PRN  lisinopril 2.5 milliGRAM(s) Oral daily  metoprolol tartrate 75 milliGRAM(s) Oral two times a day  oxyCODONE    IR 5 milliGRAM(s) Oral every 6 hours PRN  docusate sodium Oral Tab/Cap - Peds 100 milliGRAM(s) Oral two times a day  famotidine    Tablet 20 milliGRAM(s) Oral daily  polyethylene glycol 3350 17 Gram(s) Oral daily PRN  atorvastatin 40 milliGRAM(s) Oral at bedtime  dextrose 40% Gel 15 Gram(s) Oral once PRN  dextrose 50% Injectable 12.5 Gram(s) IV Push once  dextrose 50% Injectable 25 Gram(s) IV Push once  dextrose 50% Injectable 25 Gram(s) IV Push once  glucagon  Injectable 1 milliGRAM(s) IntraMuscular once PRN  insulin lispro (HumaLOG) corrective regimen sliding scale   SubCutaneous at bedtime  insulin lispro (HumaLOG) corrective regimen sliding scale   SubCutaneous three times a day before meals  aspirin  chewable 81 milliGRAM(s) Oral daily  benzocaine 15 mG/menthol 3.6 mG Lozenge 1 Lozenge Oral every 6 hours PRN  clopidogrel Tablet 75 milliGRAM(s) Oral daily  dextrose 5%. 1000 milliLiter(s) IV Continuous <Continuous>  enoxaparin Injectable 40 milliGRAM(s) SubCutaneous daily  	  TELEMETRY: SR, 3 beat NSVT  Vitals:  T(C): 36.7 (09-10-18 @ 05:27), Max: 37.2 (09-09-18 @ 16:00)  HR: 78 (09-10-18 @ 05:27) (78 - 97)  BP: 151/92 (09-10-18 @ 05:27) (126/83 - 153/73)  RR: 19 (09-10-18 @ 05:27) (16 - 22)  SpO2: 99% (09-10-18 @ 05:27) (98% - 100%)  Wt(kg): --  I&O's Summary    09 Sep 2018 07:01  -  10 Sep 2018 07:00  --------------------------------------------------------  IN: 960 mL / OUT: 200 mL / NET: 760 mL    PHYSICAL EXAM:  Appearance: NAD  HEENT:   Normal oral mucosa, PERRL, EOMI	  Lymphatic: No lymphadenopathy  Cardiovascular: Normal S1 S2, No JVD, No murmurs, No edema  Respiratory: Lungs clear to auscultation	  Psychiatry: A & O x 3, Mood & affect appropriate  Gastrointestinal:  Soft, Non-tender, + BS	  Skin: No rashes, No ecchymoses, No cyanosis  Neurologic: Non-focal  Extremities: Normal range of motion, No clubbing, cyanosis or edema  Vascular: Peripheral pulses palpable 2+ bilaterally, warm    DIAGNOSTIC TESTING:  Echocardiogram (images reviewed by me): < from: TTE Echo Complete w/Doppler (09.07.18 @ 18:43) >  Summary:   1. Left ventricular ejection fraction, by visual estimation, is 50 to   55%.   2. Technically difficult study.   3. Basal inferolateral segment is abnormal as described above.   4. Mildly increased LV wall thickness.   5. Normal left ventricular internal cavity size.   6. There is mild septal left ventricular hypertrophy.    [id number] Carter Jaimes, Electronically signed on 9/8/2018 at 3:04:00 PM    < end of copied text >    Catheterization: cath 9/7/18 - discussed with Dr. Iqbal - patent grafts to LAD, RPDA.   of Lcx.                         12.4   7.5   )-----------( 250      ( 10 Sep 2018 09:02 )             40.2     09-10    133<L>  |  98  |  20.0  ----------------------------<  230<H>  5.2   |  24.0  |  1.05    Ca    9.3      10 Sep 2018 09:02  Mg     2.1     09-10  < from: CT Head No Cont (09.08.18 @ 10:59) >  IMPRESSION:    No acute intracranial hemorrhage, mass effect, or midline shift. No   interval change since head CT of 8/7/18.    Chronic right inferior cerebellar lacunar infarct.    Unchanged right lentiform nucleus/subbasal ganglia prominent perivascular   space vs chronic lacunar infarct.    Chronic minimal-mild bilateral cerebral white matter microvascular   changes.    < end of copied text >

## 2018-09-10 NOTE — PROGRESS NOTE ADULT - PROVIDER SPECIALTY LIST ADULT
Cardiology
Critical Care
Hospitalist
Hospitalist
Internal Medicine
Neurology
Hospitalist

## 2018-09-10 NOTE — PROGRESS NOTE ADULT - ASSESSMENT
A/P: 61 y/o F PMHx of CAD s/p CABG x 2 (LIMA TO LAD, RSVG to RCA) 08/08/18,  Right ICA stenosis (>70%), HTN, DMII with neuropathy and retinopathy who presents to the ED with chest pain. Trop neg x 1. CK normal. CXR clear. EKG with no acute changes.   Cath demonstrated patent grafts,  Lcx.   Post cath became aphasic, minimally responsive, given TPA  CT head negative, restarted on aspirin/plavix    # ?acute ischemic CVA post cath - s/p TPA.  Neurologically back to baseline.  No longer aphasic.  Back on aspirin/plavix.   #  multivessel CAD - repeat cath demonstrated patent grafts.   of Lcx will medically treat for now, no good targets for  intervention.  Dr. Iqbal to review with other colleagues.   Continue aspirin/plavix/metoprolol/statin.  Uptitrate metoprolol.    # Right ICA stenosis - stable, no symptoms.  repeat dopplers showed moderate stenosis.   Continue aspirin/statin/plavix.      Thank you for allowing me to participate in your patient's care.  D/W Dr. Cristian Aranda for discharge from a cardiology standpoint.  Outpatient follow up in 2 weeks.

## 2018-09-17 ENCOUNTER — APPOINTMENT (OUTPATIENT)
Dept: CARDIOTHORACIC SURGERY | Facility: CLINIC | Age: 63
End: 2018-09-17
Payer: MEDICAID

## 2018-09-17 ENCOUNTER — APPOINTMENT (OUTPATIENT)
Dept: VASCULAR SURGERY | Facility: HOSPITAL | Age: 63
End: 2018-09-17

## 2018-09-17 VITALS
DIASTOLIC BLOOD PRESSURE: 76 MMHG | BODY MASS INDEX: 24.81 KG/M2 | HEIGHT: 57 IN | HEART RATE: 90 BPM | SYSTOLIC BLOOD PRESSURE: 150 MMHG | RESPIRATION RATE: 16 BRPM | WEIGHT: 115 LBS | OXYGEN SATURATION: 100 %

## 2018-09-17 VITALS — TEMPERATURE: 98.1 F

## 2018-09-17 PROBLEM — I25.10 ATHEROSCLEROTIC HEART DISEASE OF NATIVE CORONARY ARTERY WITHOUT ANGINA PECTORIS: Chronic | Status: ACTIVE | Noted: 2018-09-05

## 2018-09-17 PROCEDURE — 99024 POSTOP FOLLOW-UP VISIT: CPT

## 2018-09-17 RX ORDER — OXYCODONE HYDROCHLORIDE 5 MG/1
5 CAPSULE ORAL EVERY 6 HOURS
Refills: 0 | Status: COMPLETED | COMMUNITY
End: 2018-09-17

## 2018-09-17 RX ORDER — DOCUSATE SODIUM 100 MG/1
100 CAPSULE, LIQUID FILLED ORAL
Qty: 90 | Refills: 3 | Status: DISCONTINUED | COMMUNITY
End: 2018-09-17

## 2018-09-17 RX ORDER — FAMOTIDINE 20 MG/1
20 TABLET, FILM COATED ORAL DAILY
Refills: 0 | Status: DISCONTINUED | COMMUNITY
Start: 2018-09-17 | End: 2018-09-17

## 2018-09-18 ENCOUNTER — APPOINTMENT (OUTPATIENT)
Dept: INTERNAL MEDICINE | Facility: CLINIC | Age: 63
End: 2018-09-18

## 2018-09-19 ENCOUNTER — OUTPATIENT (OUTPATIENT)
Dept: OUTPATIENT SERVICES | Facility: HOSPITAL | Age: 63
LOS: 1 days | End: 2018-09-19
Payer: SELF-PAY

## 2018-09-19 ENCOUNTER — APPOINTMENT (OUTPATIENT)
Dept: CARDIOLOGY | Facility: HOSPITAL | Age: 63
End: 2018-09-19

## 2018-09-19 ENCOUNTER — NON-APPOINTMENT (OUTPATIENT)
Age: 63
End: 2018-09-19

## 2018-09-19 VITALS
SYSTOLIC BLOOD PRESSURE: 124 MMHG | DIASTOLIC BLOOD PRESSURE: 77 MMHG | HEART RATE: 71 BPM | BODY MASS INDEX: 24.45 KG/M2 | OXYGEN SATURATION: 100 % | WEIGHT: 113 LBS

## 2018-09-19 DIAGNOSIS — Z95.1 PRESENCE OF AORTOCORONARY BYPASS GRAFT: Chronic | ICD-10-CM

## 2018-09-19 DIAGNOSIS — Z90.49 ACQUIRED ABSENCE OF OTHER SPECIFIED PARTS OF DIGESTIVE TRACT: Chronic | ICD-10-CM

## 2018-09-19 DIAGNOSIS — I25.10 ATHEROSCLEROTIC HEART DISEASE OF NATIVE CORONARY ARTERY WITHOUT ANGINA PECTORIS: ICD-10-CM

## 2018-09-19 PROCEDURE — G0463: CPT

## 2018-09-19 PROCEDURE — 93005 ELECTROCARDIOGRAM TRACING: CPT

## 2018-09-20 DIAGNOSIS — E11.9 TYPE 2 DIABETES MELLITUS WITHOUT COMPLICATIONS: ICD-10-CM

## 2018-09-20 DIAGNOSIS — I10 ESSENTIAL (PRIMARY) HYPERTENSION: ICD-10-CM

## 2018-09-20 DIAGNOSIS — I73.9 PERIPHERAL VASCULAR DISEASE, UNSPECIFIED: ICD-10-CM

## 2018-09-21 ENCOUNTER — APPOINTMENT (OUTPATIENT)
Dept: CARDIOTHORACIC SURGERY | Facility: CLINIC | Age: 63
End: 2018-09-21
Payer: MEDICAID

## 2018-09-21 VITALS
HEART RATE: 68 BPM | WEIGHT: 110 LBS | SYSTOLIC BLOOD PRESSURE: 141 MMHG | OXYGEN SATURATION: 100 % | RESPIRATION RATE: 14 BRPM | DIASTOLIC BLOOD PRESSURE: 83 MMHG | BODY MASS INDEX: 23.73 KG/M2 | HEIGHT: 57 IN | TEMPERATURE: 98.1 F

## 2018-09-21 PROCEDURE — 99024 POSTOP FOLLOW-UP VISIT: CPT

## 2018-10-01 ENCOUNTER — APPOINTMENT (OUTPATIENT)
Dept: CARDIOTHORACIC SURGERY | Facility: CLINIC | Age: 63
End: 2018-10-01
Payer: MEDICAID

## 2018-10-01 VITALS
DIASTOLIC BLOOD PRESSURE: 82 MMHG | BODY MASS INDEX: 23.73 KG/M2 | RESPIRATION RATE: 14 BRPM | SYSTOLIC BLOOD PRESSURE: 137 MMHG | HEIGHT: 57 IN | WEIGHT: 110 LBS | TEMPERATURE: 97.9 F | OXYGEN SATURATION: 99 % | HEART RATE: 73 BPM

## 2018-10-01 LAB
APPEARANCE: CLEAR
BACTERIA: NEGATIVE
BILIRUBIN URINE: NEGATIVE
BLOOD URINE: NEGATIVE
COLOR: YELLOW
GLUCOSE QUALITATIVE U: NEGATIVE MG/DL
HYALINE CASTS: 2 /LPF
KETONES URINE: NEGATIVE
LEUKOCYTE ESTERASE URINE: NEGATIVE
MICROSCOPIC-UA: NORMAL
NITRITE URINE: NEGATIVE
PH URINE: 5.5
PROTEIN URINE: NEGATIVE MG/DL
RED BLOOD CELLS URINE: 2 /HPF
SPECIFIC GRAVITY URINE: 1.01
SQUAMOUS EPITHELIAL CELLS: 3 /HPF
UROBILINOGEN URINE: NEGATIVE MG/DL
WHITE BLOOD CELLS URINE: 3 /HPF

## 2018-10-01 PROCEDURE — 99024 POSTOP FOLLOW-UP VISIT: CPT

## 2018-10-02 LAB — BACTERIA UR CULT: NORMAL

## 2018-10-08 ENCOUNTER — OUTPATIENT (OUTPATIENT)
Dept: OUTPATIENT SERVICES | Facility: HOSPITAL | Age: 63
LOS: 1 days | End: 2018-10-08
Payer: SELF-PAY

## 2018-10-08 ENCOUNTER — APPOINTMENT (OUTPATIENT)
Dept: VASCULAR SURGERY | Facility: HOSPITAL | Age: 63
End: 2018-10-08

## 2018-10-08 DIAGNOSIS — Z90.49 ACQUIRED ABSENCE OF OTHER SPECIFIED PARTS OF DIGESTIVE TRACT: Chronic | ICD-10-CM

## 2018-10-08 DIAGNOSIS — I73.9 PERIPHERAL VASCULAR DISEASE, UNSPECIFIED: ICD-10-CM

## 2018-10-08 DIAGNOSIS — Z95.1 PRESENCE OF AORTOCORONARY BYPASS GRAFT: Chronic | ICD-10-CM

## 2018-10-08 PROCEDURE — G0463: CPT

## 2018-10-08 RX ORDER — DOXYCYCLINE HYCLATE 100 MG/1
100 CAPSULE ORAL
Qty: 14 | Refills: 0 | Status: DISCONTINUED | COMMUNITY
Start: 2018-09-20 | End: 2018-10-08

## 2018-10-09 ENCOUNTER — APPOINTMENT (OUTPATIENT)
Dept: CT IMAGING | Facility: CLINIC | Age: 63
End: 2018-10-09
Payer: MEDICAID

## 2018-10-09 ENCOUNTER — OUTPATIENT (OUTPATIENT)
Dept: OUTPATIENT SERVICES | Facility: HOSPITAL | Age: 63
LOS: 1 days | End: 2018-10-09
Payer: SELF-PAY

## 2018-10-09 DIAGNOSIS — Z90.49 ACQUIRED ABSENCE OF OTHER SPECIFIED PARTS OF DIGESTIVE TRACT: Chronic | ICD-10-CM

## 2018-10-09 DIAGNOSIS — Z00.8 ENCOUNTER FOR OTHER GENERAL EXAMINATION: ICD-10-CM

## 2018-10-09 DIAGNOSIS — Z95.1 PRESENCE OF AORTOCORONARY BYPASS GRAFT: Chronic | ICD-10-CM

## 2018-10-09 PROCEDURE — 70498 CT ANGIOGRAPHY NECK: CPT | Mod: 26

## 2018-10-09 PROCEDURE — 70498 CT ANGIOGRAPHY NECK: CPT

## 2018-10-15 ENCOUNTER — APPOINTMENT (OUTPATIENT)
Dept: VASCULAR SURGERY | Facility: HOSPITAL | Age: 63
End: 2018-10-15
Payer: MEDICAID

## 2018-10-15 ENCOUNTER — OUTPATIENT (OUTPATIENT)
Dept: OUTPATIENT SERVICES | Facility: HOSPITAL | Age: 63
LOS: 1 days | End: 2018-10-15
Payer: SELF-PAY

## 2018-10-15 VITALS
HEART RATE: 72 BPM | HEIGHT: 57 IN | SYSTOLIC BLOOD PRESSURE: 154 MMHG | DIASTOLIC BLOOD PRESSURE: 84 MMHG | BODY MASS INDEX: 23.73 KG/M2 | WEIGHT: 110 LBS

## 2018-10-15 DIAGNOSIS — Z90.49 ACQUIRED ABSENCE OF OTHER SPECIFIED PARTS OF DIGESTIVE TRACT: Chronic | ICD-10-CM

## 2018-10-15 DIAGNOSIS — Z95.1 PRESENCE OF AORTOCORONARY BYPASS GRAFT: Chronic | ICD-10-CM

## 2018-10-15 DIAGNOSIS — I73.9 PERIPHERAL VASCULAR DISEASE, UNSPECIFIED: ICD-10-CM

## 2018-10-15 PROCEDURE — G0463: CPT

## 2018-10-15 PROCEDURE — 99202 OFFICE O/P NEW SF 15 MIN: CPT

## 2018-10-23 DIAGNOSIS — I65.29 OCCLUSION AND STENOSIS OF UNSPECIFIED CAROTID ARTERY: ICD-10-CM

## 2018-10-31 ENCOUNTER — EMERGENCY (EMERGENCY)
Facility: HOSPITAL | Age: 63
LOS: 1 days | Discharge: HOME | End: 2018-10-31
Attending: EMERGENCY MEDICINE
Payer: MEDICAID

## 2018-10-31 VITALS — WEIGHT: 108.03 LBS | HEIGHT: 58 IN

## 2018-10-31 DIAGNOSIS — Z95.1 PRESENCE OF AORTOCORONARY BYPASS GRAFT: Chronic | ICD-10-CM

## 2018-10-31 DIAGNOSIS — Z90.49 ACQUIRED ABSENCE OF OTHER SPECIFIED PARTS OF DIGESTIVE TRACT: Chronic | ICD-10-CM

## 2018-10-31 LAB
ALBUMIN SERPL ELPH-MCNC: 3.9 G/DL — SIGNIFICANT CHANGE UP (ref 3.3–5.2)
ALP SERPL-CCNC: 76 U/L — SIGNIFICANT CHANGE UP (ref 40–120)
ALT FLD-CCNC: 8 U/L — SIGNIFICANT CHANGE UP
ANION GAP SERPL CALC-SCNC: 14 MMOL/L — SIGNIFICANT CHANGE UP (ref 5–17)
AST SERPL-CCNC: 13 U/L — SIGNIFICANT CHANGE UP
BILIRUB SERPL-MCNC: 0.3 MG/DL — LOW (ref 0.4–2)
BUN SERPL-MCNC: 17 MG/DL — SIGNIFICANT CHANGE UP (ref 8–20)
CALCIUM SERPL-MCNC: 10 MG/DL — SIGNIFICANT CHANGE UP (ref 8.6–10.2)
CHLORIDE SERPL-SCNC: 103 MMOL/L — SIGNIFICANT CHANGE UP (ref 98–107)
CO2 SERPL-SCNC: 24 MMOL/L — SIGNIFICANT CHANGE UP (ref 22–29)
CREAT SERPL-MCNC: 1.15 MG/DL — SIGNIFICANT CHANGE UP (ref 0.5–1.3)
GLUCOSE SERPL-MCNC: 183 MG/DL — HIGH (ref 70–115)
HCT VFR BLD CALC: 44.2 % — SIGNIFICANT CHANGE UP (ref 37–47)
HGB BLD-MCNC: 14.5 G/DL — SIGNIFICANT CHANGE UP (ref 12–16)
MCHC RBC-ENTMCNC: 26.4 PG — LOW (ref 27–31)
MCHC RBC-ENTMCNC: 32.8 G/DL — SIGNIFICANT CHANGE UP (ref 32–36)
MCV RBC AUTO: 80.5 FL — LOW (ref 81–99)
PLATELET # BLD AUTO: 180 K/UL — SIGNIFICANT CHANGE UP (ref 150–400)
POTASSIUM SERPL-MCNC: 3.6 MMOL/L — SIGNIFICANT CHANGE UP (ref 3.5–5.3)
POTASSIUM SERPL-SCNC: 3.6 MMOL/L — SIGNIFICANT CHANGE UP (ref 3.5–5.3)
PROT SERPL-MCNC: 7.1 G/DL — SIGNIFICANT CHANGE UP (ref 6.6–8.7)
RBC # BLD: 5.49 M/UL — HIGH (ref 4.4–5.2)
RBC # FLD: 16.7 % — HIGH (ref 11–15.6)
SODIUM SERPL-SCNC: 141 MMOL/L — SIGNIFICANT CHANGE UP (ref 135–145)
TROPONIN T SERPL-MCNC: <0.01 NG/ML — SIGNIFICANT CHANGE UP (ref 0–0.06)
WBC # BLD: 6.2 K/UL — SIGNIFICANT CHANGE UP (ref 4.8–10.8)
WBC # FLD AUTO: 6.2 K/UL — SIGNIFICANT CHANGE UP (ref 4.8–10.8)

## 2018-10-31 PROCEDURE — 99285 EMERGENCY DEPT VISIT HI MDM: CPT

## 2018-10-31 PROCEDURE — 93010 ELECTROCARDIOGRAM REPORT: CPT

## 2018-10-31 PROCEDURE — 71046 X-RAY EXAM CHEST 2 VIEWS: CPT | Mod: 26

## 2018-10-31 PROCEDURE — 71110 X-RAY EXAM RIBS BIL 3 VIEWS: CPT | Mod: 26

## 2018-10-31 RX ORDER — MORPHINE SULFATE 50 MG/1
2 CAPSULE, EXTENDED RELEASE ORAL ONCE
Qty: 0 | Refills: 0 | Status: DISCONTINUED | OUTPATIENT
Start: 2018-10-31 | End: 2018-10-31

## 2018-10-31 RX ADMIN — MORPHINE SULFATE 2 MILLIGRAM(S): 50 CAPSULE, EXTENDED RELEASE ORAL at 23:11

## 2018-10-31 RX ADMIN — MORPHINE SULFATE 2 MILLIGRAM(S): 50 CAPSULE, EXTENDED RELEASE ORAL at 22:54

## 2018-10-31 RX ADMIN — MORPHINE SULFATE 2 MILLIGRAM(S): 50 CAPSULE, EXTENDED RELEASE ORAL at 22:25

## 2018-10-31 RX ADMIN — MORPHINE SULFATE 2 MILLIGRAM(S): 50 CAPSULE, EXTENDED RELEASE ORAL at 22:53

## 2018-10-31 NOTE — ED PROVIDER NOTE - OBJECTIVE STATEMENT
64 y/o F with PMHx of CAD, DM, neuropathy, and TIA and PSHx of CABG presents to ED with multiple medical complaints onset tonight. Patient notes that yesterday she slipped while walking down the stairs, missing the last step at the bottom, landing on the ground. Immediately after the fall, had mild knee pain, but today developed chest pain, abdominal pain, headache and back pain. Daughter at bedside notes the patient had chest pain in August, which led to a CABG and then similar chest pain in September and was at [facility] for a TIA where she received TPA. Denies recent fevers/chills or illness, dizziness, syncope or LOC at the time of the fall, difficulty breathing, vision changes or nausea/vomiting. No further acute complaints at this time.

## 2018-10-31 NOTE — ED PROVIDER NOTE - PLAN OF CARE
1. Return to ED for worsening, progressive or any other concerning symptoms   2. Follow up with your primary care doctor in 2-3days   3. Follow up with your cardiologist Dr. Encarnacion   4. Follow up with GI Dr. Cole  5. Take 20mg of pepcid twice a day   6. Take 500mg of ranexa twice a day   7. Take 30mg of Imdur once a day

## 2018-10-31 NOTE — ED PROVIDER NOTE - CHPI ED SYMPTOMS NEG
no dizziness/no fever/no nausea/LOC, vision changes/no shortness of breath/no vomiting/no syncope/no chills

## 2018-10-31 NOTE — ED STATDOCS - PROGRESS NOTE DETAILS
64 y/o F pt with PMHx CAD, HTN, DM, CABG (aug 8th 2018), and cardiac catheterization s/p TIA (sept 2018) presents to the ED c/o worsening CP yesterday.  Pt has been feeling CP since her CABG surgery, but states she fell down yesterday and her pain worsened.  Pt slipped down one stair and landed on her face and L knee.  She describes her CP as burning, and currently rated 7/10.  Her CP today began while she was getting dressed after taking a shower.  Pt states she only feels CP when palpating her chest.  Pt take ASA daily, last took this morning.  Denies LOC, fever, chills, N/V.  No further acute complaints at this time.  Cardiologist: Dr. Rojas ([place])  Exam: midline sternotomy scar with tenderness of scar, heart and lungs normal.  EKG shows NSR, HR 79 with anterior lateral ischemia, unchanged from Sept 7th 2018.  Pt will be sent to the Main ED for further treatment. Initial orders placed.

## 2018-10-31 NOTE — ED PROVIDER NOTE - PROGRESS NOTE DETAILS
Pt is well apeparing. Abd is soft and non-surgical. She tolerated PO, lactate nromal, seen by vascular and cleared for the JEAN chronic occlusion. Pending repeat trop and cardi consult in am Dr Sarah echo wnl no acute chaanges. stable to D/C -Slowey DO

## 2018-10-31 NOTE — ED ADULT TRIAGE NOTE - CHIEF COMPLAINT QUOTE
patient's family states that she fell down 1 step yesterday today has CP/ back pain/ SOB has recent HX of open heart 8/18

## 2018-10-31 NOTE — ED STATDOCS - PMH
Coronary artery disease    Diabetes    DM (diabetes mellitus)    HTN (hypertension)    Hypertension    Neuropathy    Retinopathy

## 2018-10-31 NOTE — ED PROVIDER NOTE - CARE PLAN
Principal Discharge DX:	Chest pain, unspecified type  Secondary Diagnosis:	Epigastric abdominal pain Principal Discharge DX:	Chest pain, unspecified type  Assessment and plan of treatment:	1. Return to ED for worsening, progressive or any other concerning symptoms   2. Follow up with your primary care doctor in 2-3days   3. Follow up with your cardiologist Dr. Encarnacion   4. Follow up with GI Dr. Cole  5. Take 20mg of pepcid twice a day   6. Take 500mg of ranexa twice a day   7. Take 30mg of Imdur once a day  Secondary Diagnosis:	Epigastric abdominal pain

## 2018-11-01 VITALS
OXYGEN SATURATION: 98 % | HEART RATE: 91 BPM | TEMPERATURE: 98 F | SYSTOLIC BLOOD PRESSURE: 133 MMHG | RESPIRATION RATE: 18 BRPM

## 2018-11-01 LAB
APPEARANCE UR: CLEAR — SIGNIFICANT CHANGE UP
BACTERIA # UR AUTO: ABNORMAL
BILIRUB UR-MCNC: NEGATIVE — SIGNIFICANT CHANGE UP
CK SERPL-CCNC: 34 U/L — SIGNIFICANT CHANGE UP (ref 25–170)
COLOR SPEC: YELLOW — SIGNIFICANT CHANGE UP
DIFF PNL FLD: ABNORMAL
EPI CELLS # UR: SIGNIFICANT CHANGE UP
GLUCOSE UR QL: NEGATIVE MG/DL — SIGNIFICANT CHANGE UP
KETONES UR-MCNC: ABNORMAL
LACTATE BLDV-MCNC: 1.9 MMOL/L — SIGNIFICANT CHANGE UP (ref 0.5–2)
LEUKOCYTE ESTERASE UR-ACNC: ABNORMAL
LIDOCAIN IGE QN: 18 U/L — LOW (ref 22–51)
NITRITE UR-MCNC: NEGATIVE — SIGNIFICANT CHANGE UP
PH UR: 7 — SIGNIFICANT CHANGE UP (ref 5–8)
PROT UR-MCNC: 30 MG/DL
RBC CASTS # UR COMP ASSIST: ABNORMAL /HPF (ref 0–4)
SP GR SPEC: 1.01 — SIGNIFICANT CHANGE UP (ref 1.01–1.02)
TROPONIN T SERPL-MCNC: <0.01 NG/ML — SIGNIFICANT CHANGE UP (ref 0–0.06)
UROBILINOGEN FLD QL: NEGATIVE MG/DL — SIGNIFICANT CHANGE UP
WBC UR QL: ABNORMAL

## 2018-11-01 PROCEDURE — 80053 COMPREHEN METABOLIC PANEL: CPT

## 2018-11-01 PROCEDURE — 85027 COMPLETE CBC AUTOMATED: CPT

## 2018-11-01 PROCEDURE — 71275 CT ANGIOGRAPHY CHEST: CPT | Mod: 26

## 2018-11-01 PROCEDURE — 99284 EMERGENCY DEPT VISIT MOD MDM: CPT | Mod: 25

## 2018-11-01 PROCEDURE — 82550 ASSAY OF CK (CPK): CPT

## 2018-11-01 PROCEDURE — 74177 CT ABD & PELVIS W/CONTRAST: CPT

## 2018-11-01 PROCEDURE — 81001 URINALYSIS AUTO W/SCOPE: CPT

## 2018-11-01 PROCEDURE — 93308 TTE F-UP OR LMTD: CPT

## 2018-11-01 PROCEDURE — 83605 ASSAY OF LACTIC ACID: CPT

## 2018-11-01 PROCEDURE — 93005 ELECTROCARDIOGRAM TRACING: CPT

## 2018-11-01 PROCEDURE — 71275 CT ANGIOGRAPHY CHEST: CPT

## 2018-11-01 PROCEDURE — 36415 COLL VENOUS BLD VENIPUNCTURE: CPT

## 2018-11-01 PROCEDURE — 74177 CT ABD & PELVIS W/CONTRAST: CPT | Mod: 26

## 2018-11-01 PROCEDURE — 96375 TX/PRO/DX INJ NEW DRUG ADDON: CPT | Mod: XU

## 2018-11-01 PROCEDURE — 93306 TTE W/DOPPLER COMPLETE: CPT | Mod: 26

## 2018-11-01 PROCEDURE — 71046 X-RAY EXAM CHEST 2 VIEWS: CPT

## 2018-11-01 PROCEDURE — 99285 EMERGENCY DEPT VISIT HI MDM: CPT

## 2018-11-01 PROCEDURE — 83690 ASSAY OF LIPASE: CPT

## 2018-11-01 PROCEDURE — 84484 ASSAY OF TROPONIN QUANT: CPT

## 2018-11-01 PROCEDURE — 71110 X-RAY EXAM RIBS BIL 3 VIEWS: CPT

## 2018-11-01 PROCEDURE — 96374 THER/PROPH/DIAG INJ IV PUSH: CPT | Mod: XU

## 2018-11-01 RX ORDER — CEPHALEXIN 500 MG
500 CAPSULE ORAL ONCE
Qty: 0 | Refills: 0 | Status: COMPLETED | OUTPATIENT
Start: 2018-11-01 | End: 2018-11-01

## 2018-11-01 RX ORDER — ISOSORBIDE MONONITRATE 60 MG/1
30 TABLET, EXTENDED RELEASE ORAL DAILY
Qty: 0 | Refills: 0 | Status: DISCONTINUED | OUTPATIENT
Start: 2018-11-01 | End: 2018-11-05

## 2018-11-01 RX ORDER — METOCLOPRAMIDE HCL 10 MG
10 TABLET ORAL ONCE
Qty: 0 | Refills: 0 | Status: COMPLETED | OUTPATIENT
Start: 2018-11-01 | End: 2018-11-01

## 2018-11-01 RX ORDER — LIDOCAINE 4 G/100G
5 CREAM TOPICAL ONCE
Qty: 0 | Refills: 0 | Status: COMPLETED | OUTPATIENT
Start: 2018-11-01 | End: 2018-11-01

## 2018-11-01 RX ORDER — RANOLAZINE 500 MG/1
1 TABLET, FILM COATED, EXTENDED RELEASE ORAL
Qty: 28 | Refills: 0 | OUTPATIENT
Start: 2018-11-01 | End: 2018-11-14

## 2018-11-01 RX ORDER — ISOSORBIDE MONONITRATE 60 MG/1
1 TABLET, EXTENDED RELEASE ORAL
Qty: 14 | Refills: 0 | OUTPATIENT
Start: 2018-11-01 | End: 2018-11-14

## 2018-11-01 RX ORDER — RANOLAZINE 500 MG/1
500 TABLET, FILM COATED, EXTENDED RELEASE ORAL
Qty: 0 | Refills: 0 | Status: DISCONTINUED | OUTPATIENT
Start: 2018-11-01 | End: 2018-11-05

## 2018-11-01 RX ORDER — FAMOTIDINE 10 MG/ML
20 INJECTION INTRAVENOUS ONCE
Qty: 0 | Refills: 0 | Status: COMPLETED | OUTPATIENT
Start: 2018-11-01 | End: 2018-11-01

## 2018-11-01 RX ADMIN — ISOSORBIDE MONONITRATE 30 MILLIGRAM(S): 60 TABLET, EXTENDED RELEASE ORAL at 14:28

## 2018-11-01 RX ADMIN — Medication 30 MILLILITER(S): at 02:03

## 2018-11-01 RX ADMIN — LIDOCAINE 5 MILLILITER(S): 4 CREAM TOPICAL at 02:02

## 2018-11-01 RX ADMIN — Medication 500 MILLIGRAM(S): at 16:04

## 2018-11-01 RX ADMIN — Medication 10 MILLIGRAM(S): at 02:03

## 2018-11-01 RX ADMIN — FAMOTIDINE 20 MILLIGRAM(S): 10 INJECTION INTRAVENOUS at 02:03

## 2018-11-01 NOTE — CONSULT NOTE ADULT - SUBJECTIVE AND OBJECTIVE BOX
VASCULAR SURGERY CONSULT    Consulting surgical team: Vascular Surgery  Consulting attending: Dr. Null  Patient seen and examined: 11-01-18 @ 03:11    HPI: 64 y/o F h/o CAD, HTN, DM, s/p CABG and cholecystectomy that presents to ED with chest pain. Vascular Surgery consulted for CTA finding of JEAN occlusion. No abdominal pain. Patient with recent history of CABG about 2 months ago, drain removed, incision healing well. Patient states she fell yesterday while walking down a step and landed on her left side. Since then she began having chest pain. Nausea and emesis today. No fever, chills, chest pain, or dyspnea. Passing flatus, having BM. In ED, patient given antiemetic and pain medications and is feeling much better upon evaluation.      PAST MEDICAL HISTORY:  Coronary artery disease  Hypertension  Diabetes  Retinopathy  Neuropathy    PAST SURGICAL HISTORY:  S/P CABG (coronary artery bypass graft)  History of cholecystectomy    ALLERGIES:  No Known Allergies      VITALS & I/Os:  Vital Signs Last 24 Hrs  T(C): 37.1 (31 Oct 2018 22:05), Max: 37.1 (31 Oct 2018 22:05)  T(F): 98.7 (31 Oct 2018 22:05), Max: 98.7 (31 Oct 2018 22:05)  HR: 81 (31 Oct 2018 22:05) (81 - 81)  BP: 173/85 (31 Oct 2018 22:05) (173/85 - 173/85)  BP(mean): --  RR: 18 (31 Oct 2018 22:05) (18 - 18)  SpO2: 100% (31 Oct 2018 22:05) (100% - 100%)  CAPILLARY BLOOD GLUCOSE    General: NAD, AOx3, comfortable on examination  HEENT: PERRLA, EOMI, moist mucous membranes  Neck: supple, nontender  Cardiovascular: heart RRR, no murmurs  Respiratory: no respiratory distress, CTAB, sternotomy incision well healed. Mild anterior chest wall tenderness to palpation.  Abdomen: soft, nontender, nondistended. No guarding or rebound. Normal bowel sounds.  Extremities: no peripheral edema. Normal ROM.  Integumentary: warm, no rash  Neuro: no motor or sensory deficits    LABS:                        14.5   6.2   )-----------( 180      ( 31 Oct 2018 22:52 )             44.2     10-31    141  |  103  |  17.0  ----------------------------<  183<H>  3.6   |  24.0  |  1.15    Ca    10.0      31 Oct 2018 22:52    TPro  7.1  /  Alb  3.9  /  TBili  0.3<L>  /  DBili  x   /  AST  13  /  ALT  8   /  AlkPhos  76  10-31          CARDIAC MARKERS ( 31 Oct 2018 22:52 )  x     / <0.01 ng/mL / x     / x     / x          11-01 @ 02:04  1.9        IMAGING:  < from: CT Abdomen and Pelvis w/ IV Cont (11.01.18 @ 00:30) >   EXAM:  CT ABDOMEN AND PELVIS IC                         EXAM:  CT ANGIO CHEST (W)AW IC                          PROCEDURE DATE:  11/01/2018      INTERPRETATION:  CT CHEST, ABDOMEN, AND PELVIS DATED 11/1/2018.    CLINICAL INFORMATION:  Shortness of breath, chest pain, epigastric pain,   left upper quadrant abdominal pain..    TECHNIQUE:  Axial CT images through the chest are obtained during   pulmonary arterial phase.  Thereafter, delayed venous phase images   through the abdomen and pelvisare acquired. Images are reformatted in   the sagittal and coronal planes. Maximum intensity projection images are   obtained. 94cc of Omnipaque 350 were administered without event.  6cc   were discarded.    The study is correlated with a CT angiogram of the chest from 8/3/2018   and CT the abdomen and pelvis from May 12, 2018.    FINDINGS:    There is adequate pulmonary arterial opacification. Mild motion limits   evaluation of subsegmental branches in the left lower lobe, otherwise,   there is no pulmonary embolism.    The heart is mildly enlarged. There is no pericardial effusion. The   patient is post interval CABG. The thoracic aorta and main pulmonary   artery are normal in caliber. There are atherosclerotic calcifications of   the coronary arteries, thoracic aorta, and proximal great vessels.    There is no focal lung consolidation, suspicious nodule, or mass. There   is mild bibasilar dependent atelectasis. The central airways are patent.   There is no pleural effusion.    There is no significant supraclavicular, axillary, mediastinal, or hilar   lymphadenopathy.    The visualized thyroid gland is unremarkable in appearance.    The liver is unremarkable apart from hepatic steatosis along the   falciform ligament. The gallbladder is absent. There is very mild   intrahepatic and extra hepatic biliary ductal dilatation with the common   bile duct measuring up to 8 mm. The pancreas, spleen, adrenal glands, and   kidneys are unremarkable apart from lobulation of the contour of the   kidneys and mild atrophy of the left kidney in comparison to the right.   There is mild thickening of the urinary bladder walls. The uterus and   adnexa are unremarkable.    There is no bowel obstruction, overt bowel wall thickening, or mesenteric   inflammatory change. A normal appendix is identified. There is no   pneumoperitoneum, ascites, or loculated collection.    There is extensive atherosclerotic disease of the aortoiliac tree and   abdominal aortic branches with occlusion of the origin of the inferior   mesenteric artery in a similar pattern to the prior CTA of the abdomen   from 8/12/2018.    There is no significant abdominal or pelvic lymphadenopathy.    There is a small fat-containing umbilical hernia.    The bones are demineralized. There are mild multilevel degenerative   changes of the spine. The sternal split is well apposed. Median   sternotomy wires are intact.    IMPRESSION:    CTA CHEST:    Adequate pulmonary arterial opacification. Evaluation of subsegmental   branches in the left lower lobe degraded by motion, otherwise, no   pulmonary.    Interval CABG.    No acute parenchymal or pleural lung disease.    CT ABDOMEN/PELVIS:     Very mild intrahepatic and extra hepatic biliary ductal dilatation in   the setting of a cholecystectomy. Correlate with LFTs, as clinically   indicated.    No bowel obstruction or evidence of bowel inflammation.    Mild thickening of the urinary bladder walls raises concern for cystitis.   Correlate with urinalysis.    Extensive atherosclerotic disease of the aortoiliac tree and abdominal   aortic branches including occlusion of the JEAN at its origin in a pattern   similar to the prior examination from 8/12/2018.    CRISTIANA LOPEZ D.O.; ATTENDING RADIOLOGIST  This document has been electronically signed. Nov 1 2018  1:29AM    < end of copied text >

## 2018-11-01 NOTE — ED ADULT NURSE REASSESSMENT NOTE - NS ED NURSE REASSESS COMMENT FT1
pt received awake c/o nausea and increased weakness. pt states he abdomen is tender to touch. breathing even and unlabored , pt denies pain to her chest. iv access 20 gauge to left ac saline locked with no digns of infiltration. will continue to monitor

## 2018-11-01 NOTE — CONSULT NOTE ADULT - ASSESSMENT
54 y/o female CAD, CABG (8/18- LIMA to mLAD, SVG to RCA), s/p LHC 9/18 for recurrent CP and abnormal nuc (severe multivessel disease, patent LIMA to LAD< SVG to RCA, normal LV systolic function, medical tx for CAD, cx likely - if no improvement on medical tx with consider PCI to cx ), complicated post cath with code stroke-TPA administered, HTN, DM, neuropathy, presented to ED s/p fall down one step, states slipped landing on knee and back, was not able to lift herself off floor, was assisted by grandchildren to chair. Denies LOC, denies dizziness, syncope. Pt admits CP and generalized weakness since CABG in August, 2018. Intermittent chest pain, frequent, L sided sternal to L axilla, "numbness", mild, non exertional, denies modifying associated symptoms. Pt admits to multiple medical complaints including, nausea, vomiting, lightheaded, headache ("numb in head").     s/p Fall,  mechanical  -stable, denies syncope, denies LOC    CAD, Chest pain  known CX , interventional cardiology will consider intervention if symptoms continue" will discuss with IC.  - will maximize meds  - EKG- SR- inf infarct, lateral ST-T abnormalities, can not exclude septal infarct- no sig change from previous  - ECHO 9/18- EF 50-55%, +WMA, no sig valve abnl.   - TTE f/u limited- to eval for new WMA  - Troponin neg x 2  - c/w BB, ASA, ACEi, statin,  - Add ranexa, isosorbide    PAD  - CTA showed JEAN occlusion  without clinical  evidence of bowel ischemia, similar finding to CTA 8/2018,   - Vascular evaluated, no intervention planned at this time. Out pt f/u recommended PO challenge recommended.     HTN  - continue meds  - monitor BP    HLD  - continue statin 54 y/o female CAD, CABG (8/18- LIMA to mLAD, SVG to RCA), s/p LHC 9/18 for recurrent CP and abnormal nuc (severe multivessel disease, patent LIMA to LAD< SVG to RCA, normal LV systolic function, medical tx for CAD, cx likely - if no improvement on medical tx with consider PCI to cx ), complicated post cath with code stroke-TPA administered, HTN, DM, neuropathy, presented to ED s/p fall down one step, states slipped landing on knee and back, was not able to lift herself off floor, was assisted by grandchildren to chair. Denies LOC, denies dizziness, syncope. Daily occurring chest pain, unchanged in character frequency intensity stable and unchanged. Pt admits to multiple medical complaints including, nausea, vomiting, lightheaded, headache ("numb in head").     s/p Fall,  mechanical  -stable, denies syncope, denies LOC    CAD, Chest pain  known CX , interventional cardiology will consider intervention if symptoms continue"   - Discussed with Pt unwilling to have card cath at this point will f/u with Dr. Rojas.   - will maximize meds  - EKG- SR- inf infarct, lateral ST-T abnormalities, can not exclude septal infarct- no sig change from previous  - ECHO 9/18- EF 50-55%, +WMA, no sig valve abnl.   - TTE f/u limited- to eval for new WMA  - Troponin neg x 2  - c/w BB, ASA, ACEi, statin,  - Add ranexa, isosorbide    PAD  - CTA showed JEAN occlusion  without clinical  evidence of bowel ischemia, similar finding to CTA 8/2018,   - Vascular evaluated, no intervention planned at this time. Out pt f/u recommended PO challenge recommended.     HTN  - continue meds  - monitor BP    HLD  - continue statin

## 2018-11-01 NOTE — CONSULT NOTE ADULT - ASSESSMENT
62 y/o F h/o CAD, HTN, DM, s/p CABG and cholecystectomy with CTA finding of JEAN occlusion without clinical evidence of bowel ischemia. Similar finding on CTA from 8/12/18. No acute abdomen. Hemodynamically stable, afebrile.    Plan:  -No Vascular Surgery interventions at this time  -Recommend PO challenge  -Patient should follow up with Vascular Surgeon at [facility] for evaluation and management of JEAN occlusion

## 2018-11-01 NOTE — CONSULT NOTE ADULT - SUBJECTIVE AND OBJECTIVE BOX
CARDIOLOGY CONSULTATION NOTE   Consult requested by:  Dr. Hill  Reason for Consultation: Chest pain  History obtained by: Patient, family and medical record   obtained: No    Chief complaint:    Patient is a 63y old  Female who presents with a chief complaint of fall.     (primary cardiologist [facility]- Dr Encarnacion)  (Community cardiologist- Dr. Rojas)    HPI: 52 y/o female CAD, CABG (8/18- LIMA to mLAD, SVG to RCA), s/p LHC for recurrent CP and abnormal nuc 9/18 (severe multivessel disease, patent LIMA to LAD< SVG to RCA, normal LV systolic function, medical tx for CAD, cx likely - if no improvement on medical tx with consider PCI to cx ), complicated post cath with code stroke- TPA administered, HTN, DM, neuropathy, presented to ED s/p fall down one step, states slipped landing on back, had knee pain, was not able to lift herself off floor, was assisted by grandchildren to chair. Denies LOC, denies dizziness, syncope. Pt admits CP and generalized weakness since CABG in August, 2018. Intermittent chest pain, frequent, L sided sternal to L axilla, "numbness", mild, non exertional, denies modifying associated symptoms. Pt admits to multiple medical complaints including, nausea, vomiting, lightheaded, headache ("numb in head"). Denies fever, chills, cough, phlegm production, shortness of breath, dyspnea on exertion, orthopnea, PND, edema, chest pain, pressure, palpitations, irregular, fast heart beat, abd pain, melena, rectal bleed, hematuria, syncope, near syncope.       REVIEW OF SYMPTOMS:   Constitutional: Denied: fever, chills, weight loss or gain  Eyes: Denied: reddened ayes, eye discharge, eye pain  ENMT: Denied: ear pain, nasal discharge, mouth pain, throat pain or swelling  Cardiovascular: Denied: Chest pressure, palpitation, arrhythmia, irregular or fast heart beats, edema  Respiratory: Denied: cough, phlegm production, wheezes, dyspnea, orthopnea, PND,   GI: Denied: Abdominal pain, diarrhea, constipation, melena, rectal bleed  : Denied: hematuria, frequency  Musculoskeletal: Denied: Muscle aches, pain  Hematology/lymp: Denied: Bleeding disorder, anemia, blood clotting  Neuro: Denied: numbness, aphasia, dysarthria, seizure,  syncope, near syncope  Psych: Denied: depression, anxiety  Integumentary/skin: Denied: rash, bruises, ecchymosis, itching  Allergy/Immunology: denied environmental or drug allergies    ALL OTHER REVIEW OF SYSTEMS ARE NEGATIVE.    MEDICATIONS  (STANDING):  metoprolo 75 BID  metformin 500 BID  asa 81  lisinopril  2.5 daily  atorvastatin 40 daily  clopidigrel 75 daily  famotidine 20 daily  docusate 100 TID    MEDICATIONS  (PRN):    PAST MEDICAL & SURGICAL HISTORY:  Coronary artery disease  Hypertension  Diabetes  Retinopathy  Neuropathy  DM (diabetes mellitus)  HTN (hypertension)  S/P CABG (coronary artery bypass graft)  History of cholecystectomy    FAMILY HISTORY:  Family history of diabetes mellitus (Sibling)  Family history of coronary arteriosclerosis (Sibling): both brothers had cabg  Coronary artery disease (Sibling): pre mature CAD    SOCIAL HISTORY:  CIGARETTES:  No  ALCOHOL: No  DRUGS: No    Vital Signs Last 24 Hrs  T(C): 36.7 (01 Nov 2018 04:09), Max: 37.1 (31 Oct 2018 22:05)  T(F): 98 (01 Nov 2018 04:09), Max: 98.7 (31 Oct 2018 22:05)  HR: 92 (01 Nov 2018 08:10) (81 - 95)  BP: 140/60 (01 Nov 2018 08:10) (136/62 - 173/85)  RR: 17 (01 Nov 2018 08:10) (17 - 18)  SpO2: 97% (01 Nov 2018 08:10) (97% - 100%)    PHYSICAL EXAM:    Constitutional: No fever, chills, NAD, Comfortable    Eyes: Not reddened, no discharge    ENMT: No discharge, No pain    Neck: No JVD    Back: No CVA tenderness    Respiratory: Clear to auscultation bilaterally    Cardiovascular: RRR, Normal S1-2, No S3-, No friction rub murmur    Gastrointestinal: Soft, NT/ND. BS+, No organomegaly    Extremities: No edema    Vascular: Distal pulses intact    Neurological: Alert, awake, oriented, able to move extremities, speech is clear    Skin: No ecchymosis, no rash    Musculoskeletal: Non tender, no weakness    Psychiatric: Appropriate mood, no anxiety    INTERPRETATION OF TELEMETRY: SR  ECG: SR- inf infarct, lateral ST-T abnormalities, can not exclude septal infarct- no sig change from previous    LABS:                      14.5   6.2   )-----------( 180      ( 31 Oct 2018 22:52 )             44.2     10-31  141  |  103  |  17.0  ----------------------------<  183<H>  3.6   |  24.0  |  1.15    Ca    10.0      31 Oct 2018 22:52    TPro  7.1  /  Alb  3.9  /  TBili  0.3<L>  /  DBili  x   /  AST  13  /  ALT  8   /  AlkPhos  76  10-31    CARDIAC MARKERS ( 01 Nov 2018 06:01 )  x     / <0.01 ng/mL / 34 U/L / x     / x      CARDIAC MARKERS ( 31 Oct 2018 22:52 )  x     / <0.01 ng/mL / x     / x     / x        RADIOLOGY & ADDITIONAL STUDIES:  X-ray:    PREVIOUS DIAGNOSTIC TESTING:      < from: US Duplex Carotid Arteries Complete, Bilateral (09.08.18 @ 20:56) >  IMPRESSION:    1.  Right carotid artery: Approximately 50-69% stenosis in the right   internal carotid artery.   2.  Left carotid artery: Approximately 50-69% stenosis in the left   internal carotid artery.  3.  Vertebral arteries: Antegrade flow.    < end of copied text >    ECHO  FINDINGS: Date:                : LVEF=          ; RV function:       ; Valvular abnormalities: Mitral valve:           ;  Aortic valve:              ;Tricuspid valve:         ; Pulmonary pressures:        Pericardium:    < from: TTE Echo Complete w/Doppler (09.07.18 @ 18:43) >    Summary:   1. Left ventricular ejection fraction, by visual estimation, is 50 to   55%.   2. Technically difficult study.   3. Basal inferolateral segment is abnormal as described above.   4. Mildly increased LV wall thickness.   5. Normal left ventricular internal cavity size.   6. There is mild septal left ventricular hypertrophy.    [id number] Carter Jaimes, Electronically signed on 9/8/2018 at 3:04:00 PM  < end of copied text >      CATHETERIZATION  FINDINGS:    < from: Cardiac Cath Lab - Adult (09.07.18 @ 12:33) >  VENTRICLES: EF calculated by contrast ventriculography was 65 %.  CORONARY VESSELS: The coronary circulation is right dominant.  LM:   --  LM: The vessel was medium sized and moderately calcified.  Angiography showed mild atherosclerosis with no flow limiting lesions.  LAD:   --  LAD: The vessel was medium sized and moderately calcified.  --  Proximal LAD: There was a diffuse 50 % stenosis.  --  Mid LAD: There was a tubular 70 % stenosis. In a second lesion, there  was a discrete 90 % stenosis.  --  Distal LAD: The artery was supplied by a patent bypass graft.  CX:   --  Proximal circumflex: There was a 100 % stenosis. This lesion is a  chronic total occlusion.  RCA:   --  Ostial RCA: There was a 100 % stenosis. This lesion is a chronic  total occlusion.  --  RPDA:The vessel was small sized, diffusely diseased. Poor runoff for  SVG to RPDA.  GRAFTS:   --  Graft to the mid LAD: The graft was a large sized LIMA. There  was a 40 % stenosis at the distal anastomosis.  --  Graft to the RPDA: The graft was a large sized saphenous vein graft.  Graft angiography showed no evidence of disease.  AORTA: There was mild, extensive, mildly ulcerated atheroma in the  infrarenal abdominal aorta.  LEFT LOWER EXTREMITY VESSELS: Left common iliac: There was a 30 % stenosis.  Left internal iliac: The vessel was patent. Left external iliac: The  vessel was patent. Left common femoral: Not entirely visualized.  RIGHT LOWER EXTREMITY VESSELS: Right common iliac: There was a diffuse 30 %  stenosis. Right internal iliac: Angiography showed moderate  atherosclerosis. Right external iliac: The vessel was patent.  COMPLICATIONS: No complications occurred during the cath lab visit.  DIAGNOSTIC IMPRESSIONS: Severe multivessel native vessel disease  Patent LIMA to LAD and SVG toRPDA ( small diffusely diseased vessel)  Normal LV systolic function  DIAGNOSTIC RECOMMENDATIONS: Medical therapy for CAD ( CX likely ) . if  no improvement on maximal medical therapy will consider PCI of CX   Prepared and signed by  Jitendra Rodriguez    < end of copied text > CARDIOLOGY CONSULTATION NOTE   Consult requested by:  Dr. Hill  Reason for Consultation: Chest pain  History obtained by: Patient, family and medical record   obtained: No    Chief complaint:    Patient is a 63y old  Female who presents with a chief complaint of fall.     (primary cardiologist [facility]- Dr Encarnacion)  (Community cardiologist- Dr. Rojas)    HPI: 54 y/o female CAD, CABG (8/18- LIMA to mLAD, SVG to RCA), s/p LHC for recurrent CP and abnormal nuc 9/18 (severe multivessel disease, patent LIMA to LAD< SVG to RCA, normal LV systolic function, medical tx for CAD, cx likely - if no improvement on medical tx with consider PCI to cx ), complicated post cath with code stroke- TPA administered, HTN, DM, neuropathy, presented to ED s/p fall down one step, states slipped landing on back, had knee pain, was not able to lift herself off floor, was assisted by grandchildren to chair. Denies LOC, denies dizziness, syncope. Pt admits CP and generalized weakness since CABG in August, 2018. Daily occurring chest pain, unchanged in character frequency intensity stable and unchanged. Pt admits to multiple medical complaints including, nausea, vomiting, lightheaded, headache ("numb in head"). Denies fever, chills, cough, phlegm production, shortness of breath, dyspnea on exertion, orthopnea, PND, edema, chest pain, pressure, palpitations, irregular, fast heart beat, abd pain, melena, rectal bleed, hematuria, syncope, near syncope.       REVIEW OF SYMPTOMS:   Constitutional: Denied: fever, chills, weight loss or gain  Eyes: Denied: reddened ayes, eye discharge, eye pain  ENMT: Denied: ear pain, nasal discharge, mouth pain, throat pain or swelling  Cardiovascular: Denied: Chest pressure, palpitation, arrhythmia, irregular or fast heart beats, edema  Respiratory: Denied: cough, phlegm production, wheezes, dyspnea, orthopnea, PND,   GI: Denied: Abdominal pain, diarrhea, constipation, melena, rectal bleed  : Denied: hematuria, frequency  Musculoskeletal: Denied: Muscle aches, pain  Hematology/lymp: Denied: Bleeding disorder, anemia, blood clotting  Neuro: Denied: numbness, aphasia, dysarthria, seizure,  syncope, near syncope  Psych: Denied: depression, anxiety  Integumentary/skin: Denied: rash, bruises, ecchymosis, itching  Allergy/Immunology: denied environmental or drug allergies    ALL OTHER REVIEW OF SYSTEMS ARE NEGATIVE.    MEDICATIONS  (STANDING):  metoprolo 75 BID  metformin 500 BID  asa 81  lisinopril  2.5 daily  atorvastatin 40 daily  clopidigrel 75 daily  famotidine 20 daily  docusate 100 TID    MEDICATIONS  (PRN):    PAST MEDICAL & SURGICAL HISTORY:  Coronary artery disease  Hypertension  Diabetes  Retinopathy  Neuropathy  DM (diabetes mellitus)  HTN (hypertension)  S/P CABG (coronary artery bypass graft)  History of cholecystectomy    FAMILY HISTORY:  Family history of diabetes mellitus (Sibling)  Family history of coronary arteriosclerosis (Sibling): both brothers had cabg  Coronary artery disease (Sibling): pre mature CAD    SOCIAL HISTORY:  CIGARETTES:  No  ALCOHOL: No  DRUGS: No    Vital Signs Last 24 Hrs  T(C): 36.7 (01 Nov 2018 04:09), Max: 37.1 (31 Oct 2018 22:05)  T(F): 98 (01 Nov 2018 04:09), Max: 98.7 (31 Oct 2018 22:05)  HR: 92 (01 Nov 2018 08:10) (81 - 95)  BP: 140/60 (01 Nov 2018 08:10) (136/62 - 173/85)  RR: 17 (01 Nov 2018 08:10) (17 - 18)  SpO2: 97% (01 Nov 2018 08:10) (97% - 100%)    PHYSICAL EXAM:    Constitutional: No fever, chills, NAD, Comfortable    Eyes: Not reddened, no discharge    ENMT: No discharge, No pain    Neck: No JVD    Back: No CVA tenderness    Respiratory: Clear to auscultation bilaterally    Cardiovascular: RRR, Normal S1-2, No S3-, No friction rub murmur    Gastrointestinal: Soft, NT/ND. BS+, No organomegaly    Extremities: No edema    Vascular: Distal pulses intact    Neurological: Alert, awake, oriented, able to move extremities, speech is clear    Skin: No ecchymosis, no rash    Musculoskeletal: Non tender, no weakness    Psychiatric: Appropriate mood, no anxiety    INTERPRETATION OF TELEMETRY: SR  ECG: SR- inf infarct, lateral ST-T abnormalities, can not exclude septal infarct- no sig change from previous    LABS:                      14.5   6.2   )-----------( 180      ( 31 Oct 2018 22:52 )             44.2     10-31  141  |  103  |  17.0  ----------------------------<  183<H>  3.6   |  24.0  |  1.15    Ca    10.0      31 Oct 2018 22:52    TPro  7.1  /  Alb  3.9  /  TBili  0.3<L>  /  DBili  x   /  AST  13  /  ALT  8   /  AlkPhos  76  10-31    CARDIAC MARKERS ( 01 Nov 2018 06:01 )  x     / <0.01 ng/mL / 34 U/L / x     / x      CARDIAC MARKERS ( 31 Oct 2018 22:52 )  x     / <0.01 ng/mL / x     / x     / x        RADIOLOGY & ADDITIONAL STUDIES:  X-ray:    PREVIOUS DIAGNOSTIC TESTING:      < from: US Duplex Carotid Arteries Complete, Bilateral (09.08.18 @ 20:56) >  IMPRESSION:    1.  Right carotid artery: Approximately 50-69% stenosis in the right   internal carotid artery.   2.  Left carotid artery: Approximately 50-69% stenosis in the left   internal carotid artery.  3.  Vertebral arteries: Antegrade flow.    < end of copied text >    ECHO  FINDINGS: Date:                : LVEF=          ; RV function:       ; Valvular abnormalities: Mitral valve:           ;  Aortic valve:              ;Tricuspid valve:         ; Pulmonary pressures:        Pericardium:    < from: TTE Echo Complete w/Doppler (09.07.18 @ 18:43) >    Summary:   1. Left ventricular ejection fraction, by visual estimation, is 50 to   55%.   2. Technically difficult study.   3. Basal inferolateral segment is abnormal as described above.   4. Mildly increased LV wall thickness.   5. Normal left ventricular internal cavity size.   6. There is mild septal left ventricular hypertrophy.    [id number] Carter Jaimes, Electronically signed on 9/8/2018 at 3:04:00 PM  < end of copied text >      CATHETERIZATION  FINDINGS:    < from: Cardiac Cath Lab - Adult (09.07.18 @ 12:33) >  VENTRICLES: EF calculated by contrast ventriculography was 65 %.  CORONARY VESSELS: The coronary circulation is right dominant.  LM:   --  LM: The vessel was medium sized and moderately calcified.  Angiography showed mild atherosclerosis with no flow limiting lesions.  LAD:   --  LAD: The vessel was medium sized and moderately calcified.  --  Proximal LAD: There was a diffuse 50 % stenosis.  --  Mid LAD: There was a tubular 70 % stenosis. In a second lesion, there  was a discrete 90 % stenosis.  --  Distal LAD: The artery was supplied by a patent bypass graft.  CX:   --  Proximal circumflex: There was a 100 % stenosis. This lesion is a  chronic total occlusion.  RCA:   --  Ostial RCA: There was a 100 % stenosis. This lesion is a chronic  total occlusion.  --  RPDA:The vessel was small sized, diffusely diseased. Poor runoff for  SVG to RPDA.  GRAFTS:   --  Graft to the mid LAD: The graft was a large sized LIMA. There  was a 40 % stenosis at the distal anastomosis.  --  Graft to the RPDA: The graft was a large sized saphenous vein graft.  Graft angiography showed no evidence of disease.  AORTA: There was mild, extensive, mildly ulcerated atheroma in the  infrarenal abdominal aorta.  LEFT LOWER EXTREMITY VESSELS: Left common iliac: There was a 30 % stenosis.  Left internal iliac: The vessel was patent. Left external iliac: The  vessel was patent. Left common femoral: Not entirely visualized.  RIGHT LOWER EXTREMITY VESSELS: Right common iliac: There was a diffuse 30 %  stenosis. Right internal iliac: Angiography showed moderate  atherosclerosis. Right external iliac: The vessel was patent.  COMPLICATIONS: No complications occurred during the cath lab visit.  DIAGNOSTIC IMPRESSIONS: Severe multivessel native vessel disease  Patent LIMA to LAD and SVG toRPDA ( small diffusely diseased vessel)  Normal LV systolic function  DIAGNOSTIC RECOMMENDATIONS: Medical therapy for CAD ( CX likely ) . if  no improvement on maximal medical therapy will consider PCI of CX   Prepared and signed by  Jitendra Rodriguez    < end of copied text >

## 2018-11-07 ENCOUNTER — OUTPATIENT (OUTPATIENT)
Dept: OUTPATIENT SERVICES | Facility: HOSPITAL | Age: 63
LOS: 1 days | End: 2018-11-07

## 2018-11-07 ENCOUNTER — NON-APPOINTMENT (OUTPATIENT)
Age: 63
End: 2018-11-07

## 2018-11-07 ENCOUNTER — APPOINTMENT (OUTPATIENT)
Dept: CARDIOLOGY | Facility: HOSPITAL | Age: 63
End: 2018-11-07

## 2018-11-07 VITALS
BODY MASS INDEX: 23.73 KG/M2 | HEIGHT: 57 IN | SYSTOLIC BLOOD PRESSURE: 152 MMHG | HEART RATE: 63 BPM | DIASTOLIC BLOOD PRESSURE: 76 MMHG | OXYGEN SATURATION: 100 % | WEIGHT: 110 LBS

## 2018-11-07 DIAGNOSIS — Z87.898 PERSONAL HISTORY OF OTHER SPECIFIED CONDITIONS: ICD-10-CM

## 2018-11-07 DIAGNOSIS — Z90.49 ACQUIRED ABSENCE OF OTHER SPECIFIED PARTS OF DIGESTIVE TRACT: Chronic | ICD-10-CM

## 2018-11-07 DIAGNOSIS — I65.29 OCCLUSION AND STENOSIS OF UNSPECIFIED CAROTID ARTERY: ICD-10-CM

## 2018-11-07 DIAGNOSIS — I25.10 ATHEROSCLEROTIC HEART DISEASE OF NATIVE CORONARY ARTERY WITHOUT ANGINA PECTORIS: ICD-10-CM

## 2018-11-07 DIAGNOSIS — Z95.1 PRESENCE OF AORTOCORONARY BYPASS GRAFT: Chronic | ICD-10-CM

## 2018-11-07 DIAGNOSIS — Z98.890 OTHER SPECIFIED POSTPROCEDURAL STATES: ICD-10-CM

## 2018-11-07 NOTE — HISTORY OF PRESENT ILLNESS
[FreeTextEntry1] : 62 y/o American F w/ PMHx of CAD (s/p 2vsl CABG 8/2018 LIMA to LAD, SVG to RCA), HTN, DM2 (HbA1c:6.2 from 8/2018), CVA (s/p TPA), TANNA (R ICA >70%) who presents for follow up. \par \par She was treated in Sept for a possible sternal wound infection - she denies any current discharge but still complains of R-sided chest pain - on a rib series there were no bony abnormalities.\par \par The patient went to the [place] [facility] ED on 11/1 for emesis (nonbloody, nonbilious), abd pain, chest pain, pain in her legs, dizziness, numbness in her arms. She was given Pepcid, Ranexa (which she could not afford) and Imdur. Trop T was < 0.01. 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 This pain feels different than the pain she had prior to the CABG. She is still getting pain in her legs when ambulating that improves with rest. She has lost 20lbs since 8/2018. She does not get pain when eating but does report abdominal pain 30 mins after eating (sharp and "moving around").\par \par At her last visit I ordered TERRANCE/PVR and b/l LE arterial Dopplers – these have not been performed - in speaking with the patient and the family they did not call to schedule these tests.\par \par [facility] (8/4/2018): pLAD:80%, pLCx:100%, pRCA:100%\par [facility] (9/7/2018): pLAD:50%, mLAD:70% and 90%, dLAD – supplied by a bypass graft, pLCx:100% - , oRCA:100%, graft from LIMA to LAD 40% stenosis at the distal anastomosis, graft to PDA (SVG), no evidence of disease, mild, extensive, mildly ulcerated atheroma in the infrarenal abdominal aorta\par [facility] (9/7/2018): EF:50-55%, basal inferolateral wall is hypokinetic

## 2018-11-07 NOTE — PHYSICAL EXAM
[General Appearance - Well Developed] : well developed [Normal Appearance] : normal appearance [General Appearance - Well Nourished] : well nourished [Normal Oral Mucosa] : normal oral mucosa [Normal Oropharynx] : normal oropharynx [Respiration, Rhythm And Depth] : normal respiratory rhythm and effort [Exaggerated Use Of Accessory Muscles For Inspiration] : no accessory muscle use [Auscultation Breath Sounds / Voice Sounds] : lungs were clear to auscultation bilaterally [Heart Rate And Rhythm] : heart rate and rhythm were normal [Heart Sounds] : normal S1 and S2 [Murmurs] : no murmurs present [Edema] : no peripheral edema present [Nail Clubbing] : no clubbing of the fingernails [Cyanosis, Localized] : no localized cyanosis [Petechial Hemorrhages (___cm)] : no petechial hemorrhages [Nail Splinter Hemorrhages] : no splinter hemorrhages of the nails [Skin Color & Pigmentation] : normal skin color and pigmentation [Skin Turgor] : normal skin turgor [] : no rash [No Venous Stasis] : no venous stasis [Oriented To Time, Place, And Person] : oriented to person, place, and time [Impaired Insight] : insight and judgment were intact [Affect] : the affect was normal [Mood] : the mood was normal [No Anxiety] : not feeling anxious [FreeTextEntry1] : Min TTP in RUQ, no rebound, guarding or rigidity

## 2018-11-07 NOTE — DISCUSSION/SUMMARY
[FreeTextEntry1] : 62 y/o [place] F w/ PMHx of CAD (s/p 2vsl CABG 8/2018 LIMA to LAD, SVG to RCA), HTN, DM2 (HbA1c:6.2 from 8/2018), CVA (s/p TPA), TANNA (R ICA >70%) who presents for follow up. She continues to have symptoms of LE claudication. In addition she is having weight loss (20lbs in 4m, Abd pain after eating) with JEAN occlusion and aortoiliac disease seen on a CTA.\par \par Plan:\par \par 1. Claudication of LE and Abd\par -check TERRANCE/PVR and b/l LE arterial Doppler - scheduled for 11/14 @ 10am (scheduled for the patient)\par -check Abd US w/ Doppler to further evaluate the abdominal vasculature 11/14 @ 1pm, the pt needs to be NPO for 4 hours (scheduled for the patient)\par -Plan for angiogram by Dr. Loo on 11/20 - I scheduled this for the patient as well\par \par 2. CAD (s/p CABG)\par -c/w ASA 81, Plavix 75, Lipitor 40, Lopressor 75 bid (her EF is preserved), Lisinopril 2.5mg\par -D/C Imdur and Ranexa as the patient is having MSK pain, not angina\par -I explained to the patient not to take NSAIDs for MSK pain 2/2 CAD\par \par RTC after the intervention by Dr. Loo

## 2018-11-07 NOTE — REVIEW OF SYSTEMS
[see HPI] : see HPI [Recent Weight Gain (___ Lbs)] : recent [unfilled] ~Ulb weight gain [Chest Pain] : chest pain [Leg Claudication] : intermittent leg claudication [Abdominal Pain] : abdominal pain [Nausea] : nausea [Vomiting] : vomiting [Dizziness] : dizziness [Fever] : no fever [Chills] : no chills [Blurry Vision] : no blurred vision [Sore Throat] : no sore throat [Shortness Of Breath] : no shortness of breath [Dyspnea on exertion] : not dyspnea during exertion [Chest  Pressure] : no chest pressure [Lower Ext Edema] : no extremity edema [Palpitations] : no palpitations [Cough] : no cough [Change in Appetite] : no change in appetite [Dysuria] : no dysuria [Joint Pain] : no joint pain [Joint Swelling] : no joint swelling [Joint Stiffness] : no joint stiffness [Muscle Cramps] : no muscle cramps [Limb Weakness (Paresis)] : no limb weakness [Skin: A Rash] : no rash: [Skin Lesions] : no skin lesions [Confusion] : no confusion was observed [Anxiety] : no anxiety [Easy Bleeding] : no tendency for easy bleeding [Easy Bruising] : no tendency for easy bruising

## 2018-11-09 DIAGNOSIS — E11.9 TYPE 2 DIABETES MELLITUS WITHOUT COMPLICATIONS: ICD-10-CM

## 2018-11-09 DIAGNOSIS — I10 ESSENTIAL (PRIMARY) HYPERTENSION: ICD-10-CM

## 2018-11-09 DIAGNOSIS — Z95.1 PRESENCE OF AORTOCORONARY BYPASS GRAFT: ICD-10-CM

## 2018-11-09 DIAGNOSIS — I77.1 STRICTURE OF ARTERY: ICD-10-CM

## 2018-11-09 DIAGNOSIS — Z86.79 PERSONAL HISTORY OF OTHER DISEASES OF THE CIRCULATORY SYSTEM: ICD-10-CM

## 2018-11-10 ENCOUNTER — APPOINTMENT (OUTPATIENT)
Dept: ULTRASOUND IMAGING | Facility: CLINIC | Age: 63
End: 2018-11-10

## 2018-11-13 ENCOUNTER — FORM ENCOUNTER (OUTPATIENT)
Age: 63
End: 2018-11-13

## 2018-11-14 ENCOUNTER — APPOINTMENT (OUTPATIENT)
Dept: ULTRASOUND IMAGING | Facility: HOSPITAL | Age: 63
End: 2018-11-14
Payer: MEDICAID

## 2018-11-14 ENCOUNTER — OUTPATIENT (OUTPATIENT)
Dept: OUTPATIENT SERVICES | Facility: HOSPITAL | Age: 63
LOS: 1 days | End: 2018-11-14
Payer: SELF-PAY

## 2018-11-14 DIAGNOSIS — Z95.1 PRESENCE OF AORTOCORONARY BYPASS GRAFT: ICD-10-CM

## 2018-11-14 DIAGNOSIS — Z95.1 PRESENCE OF AORTOCORONARY BYPASS GRAFT: Chronic | ICD-10-CM

## 2018-11-14 DIAGNOSIS — I77.1 STRICTURE OF ARTERY: ICD-10-CM

## 2018-11-14 DIAGNOSIS — E11.9 TYPE 2 DIABETES MELLITUS WITHOUT COMPLICATIONS: ICD-10-CM

## 2018-11-14 DIAGNOSIS — I10 ESSENTIAL (PRIMARY) HYPERTENSION: ICD-10-CM

## 2018-11-14 DIAGNOSIS — Z90.49 ACQUIRED ABSENCE OF OTHER SPECIFIED PARTS OF DIGESTIVE TRACT: Chronic | ICD-10-CM

## 2018-11-14 DIAGNOSIS — Z86.79 PERSONAL HISTORY OF OTHER DISEASES OF THE CIRCULATORY SYSTEM: ICD-10-CM

## 2018-11-14 DIAGNOSIS — I65.29 OCCLUSION AND STENOSIS OF UNSPECIFIED CAROTID ARTERY: ICD-10-CM

## 2018-11-14 PROCEDURE — 93925 LOWER EXTREMITY STUDY: CPT | Mod: 26

## 2018-11-14 PROCEDURE — 93975 VASCULAR STUDY: CPT | Mod: 26

## 2018-11-14 PROCEDURE — 93923 UPR/LXTR ART STDY 3+ LVLS: CPT | Mod: 26

## 2018-11-14 PROCEDURE — 93975 VASCULAR STUDY: CPT

## 2018-11-14 PROCEDURE — 93925 LOWER EXTREMITY STUDY: CPT

## 2018-11-14 PROCEDURE — 93923 UPR/LXTR ART STDY 3+ LVLS: CPT

## 2018-11-20 ENCOUNTER — APPOINTMENT (OUTPATIENT)
Dept: ULTRASOUND IMAGING | Facility: HOSPITAL | Age: 63
End: 2018-11-20

## 2018-11-20 ENCOUNTER — INPATIENT (INPATIENT)
Facility: HOSPITAL | Age: 63
LOS: 0 days | Discharge: HOME | DRG: 358 | End: 2018-11-21
Attending: INTERNAL MEDICINE | Admitting: INTERNAL MEDICINE
Payer: SELF-PAY

## 2018-11-20 ENCOUNTER — TRANSCRIPTION ENCOUNTER (OUTPATIENT)
Age: 63
End: 2018-11-20

## 2018-11-20 VITALS
HEIGHT: 57 IN | SYSTOLIC BLOOD PRESSURE: 186 MMHG | OXYGEN SATURATION: 100 % | HEART RATE: 70 BPM | WEIGHT: 108.03 LBS | RESPIRATION RATE: 20 BRPM | DIASTOLIC BLOOD PRESSURE: 77 MMHG | TEMPERATURE: 99 F

## 2018-11-20 DIAGNOSIS — I73.9 PERIPHERAL VASCULAR DISEASE, UNSPECIFIED: ICD-10-CM

## 2018-11-20 DIAGNOSIS — Z90.49 ACQUIRED ABSENCE OF OTHER SPECIFIED PARTS OF DIGESTIVE TRACT: Chronic | ICD-10-CM

## 2018-11-20 DIAGNOSIS — Z95.1 PRESENCE OF AORTOCORONARY BYPASS GRAFT: Chronic | ICD-10-CM

## 2018-11-20 LAB
ALBUMIN SERPL ELPH-MCNC: 4.4 G/DL — SIGNIFICANT CHANGE UP (ref 3.3–5)
ALP SERPL-CCNC: 89 U/L — SIGNIFICANT CHANGE UP (ref 40–120)
ALT FLD-CCNC: 12 U/L — SIGNIFICANT CHANGE UP (ref 10–45)
ANION GAP SERPL CALC-SCNC: 15 MMOL/L — SIGNIFICANT CHANGE UP (ref 5–17)
AST SERPL-CCNC: 14 U/L — SIGNIFICANT CHANGE UP (ref 10–40)
BILIRUB SERPL-MCNC: 0.4 MG/DL — SIGNIFICANT CHANGE UP (ref 0.2–1.2)
BUN SERPL-MCNC: 21 MG/DL — SIGNIFICANT CHANGE UP (ref 7–23)
CALCIUM SERPL-MCNC: 10.3 MG/DL — SIGNIFICANT CHANGE UP (ref 8.4–10.5)
CHLORIDE SERPL-SCNC: 100 MMOL/L — SIGNIFICANT CHANGE UP (ref 96–108)
CO2 SERPL-SCNC: 23 MMOL/L — SIGNIFICANT CHANGE UP (ref 22–31)
CREAT SERPL-MCNC: 1.05 MG/DL — SIGNIFICANT CHANGE UP (ref 0.5–1.3)
GLUCOSE SERPL-MCNC: 139 MG/DL — HIGH (ref 70–99)
HCT VFR BLD CALC: 51 % — HIGH (ref 34.5–45)
HGB BLD-MCNC: 15.8 G/DL — HIGH (ref 11.5–15.5)
MCHC RBC-ENTMCNC: 24.9 PG — LOW (ref 27–34)
MCHC RBC-ENTMCNC: 31 GM/DL — LOW (ref 32–36)
MCV RBC AUTO: 80.2 FL — SIGNIFICANT CHANGE UP (ref 80–100)
PLATELET # BLD AUTO: 193 K/UL — SIGNIFICANT CHANGE UP (ref 150–400)
POTASSIUM SERPL-MCNC: 4.7 MMOL/L — SIGNIFICANT CHANGE UP (ref 3.5–5.3)
POTASSIUM SERPL-SCNC: 4.7 MMOL/L — SIGNIFICANT CHANGE UP (ref 3.5–5.3)
PROT SERPL-MCNC: 7.8 G/DL — SIGNIFICANT CHANGE UP (ref 6–8.3)
RBC # BLD: 6.36 M/UL — HIGH (ref 3.8–5.2)
RBC # FLD: 16.7 % — HIGH (ref 10.3–14.5)
SODIUM SERPL-SCNC: 138 MMOL/L — SIGNIFICANT CHANGE UP (ref 135–145)
WBC # BLD: 8.5 K/UL — SIGNIFICANT CHANGE UP (ref 3.8–10.5)
WBC # FLD AUTO: 8.5 K/UL — SIGNIFICANT CHANGE UP (ref 3.8–10.5)

## 2018-11-20 PROCEDURE — 37236 OPEN/PERQ PLACE STENT 1ST: CPT

## 2018-11-20 PROCEDURE — 36245 INS CATH ABD/L-EXT ART 1ST: CPT

## 2018-11-20 PROCEDURE — 93010 ELECTROCARDIOGRAM REPORT: CPT

## 2018-11-20 RX ORDER — DEXTROSE 50 % IN WATER 50 %
15 SYRINGE (ML) INTRAVENOUS ONCE
Qty: 0 | Refills: 0 | Status: DISCONTINUED | OUTPATIENT
Start: 2018-11-20 | End: 2018-11-21

## 2018-11-20 RX ORDER — LABETALOL HCL 100 MG
10 TABLET ORAL ONCE
Qty: 0 | Refills: 0 | Status: COMPLETED | OUTPATIENT
Start: 2018-11-20 | End: 2018-11-20

## 2018-11-20 RX ORDER — DEXTROSE 50 % IN WATER 50 %
25 SYRINGE (ML) INTRAVENOUS ONCE
Qty: 0 | Refills: 0 | Status: DISCONTINUED | OUTPATIENT
Start: 2018-11-20 | End: 2018-11-21

## 2018-11-20 RX ORDER — ATORVASTATIN CALCIUM 80 MG/1
40 TABLET, FILM COATED ORAL AT BEDTIME
Qty: 0 | Refills: 0 | Status: DISCONTINUED | OUTPATIENT
Start: 2018-11-20 | End: 2018-11-21

## 2018-11-20 RX ORDER — CLOPIDOGREL BISULFATE 75 MG/1
75 TABLET, FILM COATED ORAL DAILY
Qty: 0 | Refills: 0 | Status: DISCONTINUED | OUTPATIENT
Start: 2018-11-21 | End: 2018-11-21

## 2018-11-20 RX ORDER — METOPROLOL TARTRATE 50 MG
75 TABLET ORAL
Qty: 0 | Refills: 0 | Status: DISCONTINUED | OUTPATIENT
Start: 2018-11-20 | End: 2018-11-21

## 2018-11-20 RX ORDER — ASPIRIN/CALCIUM CARB/MAGNESIUM 324 MG
81 TABLET ORAL DAILY
Qty: 0 | Refills: 0 | Status: DISCONTINUED | OUTPATIENT
Start: 2018-11-20 | End: 2018-11-21

## 2018-11-20 RX ORDER — SODIUM CHLORIDE 9 MG/ML
3 INJECTION INTRAMUSCULAR; INTRAVENOUS; SUBCUTANEOUS EVERY 8 HOURS
Qty: 0 | Refills: 0 | Status: DISCONTINUED | OUTPATIENT
Start: 2018-11-20 | End: 2018-11-21

## 2018-11-20 RX ORDER — SODIUM CHLORIDE 9 MG/ML
1000 INJECTION INTRAMUSCULAR; INTRAVENOUS; SUBCUTANEOUS
Qty: 0 | Refills: 0 | Status: DISCONTINUED | OUTPATIENT
Start: 2018-11-20 | End: 2018-11-21

## 2018-11-20 RX ORDER — LISINOPRIL 2.5 MG/1
2.5 TABLET ORAL DAILY
Qty: 0 | Refills: 0 | Status: DISCONTINUED | OUTPATIENT
Start: 2018-11-21 | End: 2018-11-21

## 2018-11-20 RX ORDER — CLOPIDOGREL BISULFATE 75 MG/1
1 TABLET, FILM COATED ORAL
Qty: 0 | Refills: 0 | COMMUNITY

## 2018-11-20 RX ORDER — GLIMEPIRIDE 1 MG
0.5 TABLET ORAL
Qty: 0 | Refills: 0 | COMMUNITY

## 2018-11-20 RX ORDER — ACETAMINOPHEN 500 MG
650 TABLET ORAL ONCE
Qty: 0 | Refills: 0 | Status: COMPLETED | OUTPATIENT
Start: 2018-11-20 | End: 2018-11-20

## 2018-11-20 RX ORDER — DEXTROSE 50 % IN WATER 50 %
12.5 SYRINGE (ML) INTRAVENOUS ONCE
Qty: 0 | Refills: 0 | Status: DISCONTINUED | OUTPATIENT
Start: 2018-11-20 | End: 2018-11-21

## 2018-11-20 RX ORDER — SODIUM CHLORIDE 9 MG/ML
1000 INJECTION, SOLUTION INTRAVENOUS
Qty: 0 | Refills: 0 | Status: DISCONTINUED | OUTPATIENT
Start: 2018-11-20 | End: 2018-11-21

## 2018-11-20 RX ORDER — INSULIN LISPRO 100/ML
VIAL (ML) SUBCUTANEOUS AT BEDTIME
Qty: 0 | Refills: 0 | Status: DISCONTINUED | OUTPATIENT
Start: 2018-11-20 | End: 2018-11-21

## 2018-11-20 RX ORDER — DOCUSATE SODIUM 100 MG
100 CAPSULE ORAL THREE TIMES A DAY
Qty: 0 | Refills: 0 | Status: DISCONTINUED | OUTPATIENT
Start: 2018-11-20 | End: 2018-11-21

## 2018-11-20 RX ORDER — INSULIN LISPRO 100/ML
VIAL (ML) SUBCUTANEOUS
Qty: 0 | Refills: 0 | Status: DISCONTINUED | OUTPATIENT
Start: 2018-11-20 | End: 2018-11-21

## 2018-11-20 RX ORDER — GLUCAGON INJECTION, SOLUTION 0.5 MG/.1ML
1 INJECTION, SOLUTION SUBCUTANEOUS ONCE
Qty: 0 | Refills: 0 | Status: DISCONTINUED | OUTPATIENT
Start: 2018-11-20 | End: 2018-11-21

## 2018-11-20 RX ADMIN — SODIUM CHLORIDE 3 MILLILITER(S): 9 INJECTION INTRAMUSCULAR; INTRAVENOUS; SUBCUTANEOUS at 14:16

## 2018-11-20 RX ADMIN — Medication 650 MILLIGRAM(S): at 17:00

## 2018-11-20 RX ADMIN — SODIUM CHLORIDE 75 MILLILITER(S): 9 INJECTION INTRAMUSCULAR; INTRAVENOUS; SUBCUTANEOUS at 11:41

## 2018-11-20 RX ADMIN — Medication 650 MILLIGRAM(S): at 16:12

## 2018-11-20 RX ADMIN — Medication 75 MILLIGRAM(S): at 17:09

## 2018-11-20 RX ADMIN — ATORVASTATIN CALCIUM 40 MILLIGRAM(S): 80 TABLET, FILM COATED ORAL at 21:48

## 2018-11-20 RX ADMIN — SODIUM CHLORIDE 3 MILLILITER(S): 9 INJECTION INTRAMUSCULAR; INTRAVENOUS; SUBCUTANEOUS at 21:47

## 2018-11-20 NOTE — H&P CARDIOLOGY - HISTORY OF PRESENT ILLNESS
63 y/o F PMHx of CAD s/p CABG x 2 (LIMA TO LAD, RSVG to RCA) 08/08/18,  Right ICA stenosis (>70%), HTN, DMII with neuropathy and retinopathy who presents to the ED with chest pain tahts similar to her previous cardiac pain prior to cabg. Patient states that starting at 11 PM last night she began experiencing left sided chest pain while walking to the bathroom,  felt a pressure sensation that radiated to her left armpit and up to her left shoulder, also had some associated palpitations. she took some tylenol, and the pain continued to come and go throughout the night. Then early this morning she began to have sob, woke up with trouble breathing , and then had pain again when walking to the kitchen, this time with some associated SOB, dizziness, and nausea. pt states that pain was somewhat worse with arm movement . Pain was consistently a 7/10, again worse with movement.   Pt called 911, and given NTG in the ambulance which helped her symptoms, but still having pain when moving in bed. Patient denies any recent fevers, chills, V/D, cough, or headache.  deneis any lower ext swelling. states that she has been doing well with home PT till yesterday when her cp started. 63 y/o F PMHx of CAD s/p CABG x 2 (LIMA TO LAD, RSVG to RCA) 08/08/18,  Right ICA stenosis (>70%), HTN, DMII with neuropathy and retinopathy who presents with B/l Leg pain worse than the right, diminished pulses & cold LE, TERRANCE reveals right TERRANCE of 0.64 & left of 0.43, the right & Left SFA is occluded in the mid thigh, & a likely occlusion of the peroneal artery in the distal calf, seen & evaluated by Dr Loo & now recommends for peripheral angiogram with possible intervention. 64 y/o F PMHx of CVA, CAD s/p CABG x 2 (LIMA TO LAD, RSVG to RCA) 08/08/18,  Right ICA stenosis (>70%), HTN, DMII with neuropathy and retinopathy who presents with B/l Leg pain worse than the right, diminished pulses & cold LE, TERRANCE reveals right TERRANCE of 0.64 & left of 0.43, the right & Left SFA is occluded in the mid thigh, & a likely occlusion of the peroneal artery in the distal calf, seen & evaluated by Dr Loo & now recommends for peripheral angiogram with possible intervention. 64 y/o F PMHx of CVA, CAD s/p CABG x 2 (LIMA TO LAD, RSVG to RCA) 08/08/18,  Right ICA stenosis (>70%), HTN, DMII with neuropathy and retinopathy who presents with B/l Leg pain worse than the right, diminished pulses & cold LE, TERRANCE reveals right TERRANCE of 0.64 & left of 0.43, the right & Left SFA is occluded in the mid thigh, & a likely occlusion of the peroneal artery in the distal calf, noted to have mesenteric ischemia on CT abd, seen & evaluated by Dr Loo & now recommends for peripheral angiogram with possible intervention.

## 2018-11-20 NOTE — DISCHARGE NOTE ADULT - MEDICATION SUMMARY - MEDICATIONS TO TAKE
I will START or STAY ON the medications listed below when I get home from the hospital:    aspirin 81 mg oral tablet, chewable  -- 1 tab(s) by mouth once a day  -- Indication: For keep stents open    lisinopril 2.5 mg oral tablet  -- 1 tab(s) by mouth once a day  -- Indication: For high blood pressure    glimepiride 2 mg oral tablet  -- 0.5 tab(s) by mouth 2 times a day  -- Indication: For diabetes    atorvastatin 40 mg oral tablet  -- 1 tab(s) by mouth once a day (at bedtime)  -- Indication: For high cholesterol    clopidogrel 75 mg oral tablet  -- 1 tab(s) by mouth once a day  -- Indication: For keep stent open    metoprolol tartrate 75 mg oral tablet  -- 1 tab(s) by mouth 2 times a day  -- Indication: For high blood pressure    famotidine 20 mg oral tablet  -- 1 tab(s) by mouth once a day  -- Indication: For refulx    docusate sodium 100 mg oral capsule  -- 1 cap(s) by mouth 3 times a day  -- Indication: For stool softner

## 2018-11-20 NOTE — DISCHARGE NOTE ADULT - ADDITIONAL INSTRUCTIONS
Follow-up with Dr. Perez in 1 week Follow-up with Dr. Perez in 1 week at the [facility]iology [facility]  Do not stop you Aspirin or Plavix unless instructed to do so by your cardiologist.

## 2018-11-20 NOTE — DISCHARGE NOTE ADULT - PATIENT PORTAL LINK FT
You can access the [facility] Patient Portal, offered by [facility], by registering with the following website: [email or web address]

## 2018-11-20 NOTE — DISCHARGE NOTE ADULT - CARE PLAN
Principal Discharge DX:	PVD (peripheral vascular disease)  Goal:	You will not experience further progression  Assessment and plan of treatment:	Low salt, low fat diet.   Weight management.   Take medications as prescribed.    No smoking.  Follow up appointments with your doctor(s)  as instructed.   No heavy lifting for 2 weeks, no strenuous activity  or uneccesary stair climbing, no driving for x 2 days,  you may shower 24 hours following procedure but no bathing or swimming for x1  week, no bending, no straining while having a Bowel movement, No strenuous sexual activity for x 1 week check groin for bleeding, pain, tightness or ( golf ball size)  swelling daily following procedure , & follow up with your cardiologist in 1-2 week  Secondary Diagnosis:	DM (diabetes mellitus)  Goal:	Your hemoglobin A1C will be at a normal range (normal range is from 6-8)  Assessment and plan of treatment:	Continue to follow with your primary care MD or your endocrinologist. Discuss what the goal hemoglobin A1C level is for you.  Follow a heart healthy diabetic diet. If you check your fingerstick glucose at home, call your MD if it is greater than 250mg/dL on 2 occasions or less than 100mg/dL on 2 occasions. Know signs of low blood sugar, such as: dizziness, shakiness, sweating, confusion, hunger, nervousness- drink 4 ounces apple juice if occurs and call your doctor. Know early signs of high blood sugar, such as: frequent urination, increased thirst, blurry vision, fatigue, headache - call your doctor if this occurs.  Secondary Diagnosis:	Essential hypertension  Goal:	Your blood pressure will be controlled.  Assessment and plan of treatment:	Continue with your blood pressure medications; eat a heart healthy diet with low salt diet; exercise regularly (consult with your physician or cardiologist first); maintain a heart healthy weight; if you smoke - quit (A resource to help you stop smoking is the [facility] – phone number [phone].); include healthy ways to manage stress. Continue to follow with your primary care physician or cardiologist.

## 2018-11-20 NOTE — DISCHARGE NOTE ADULT - DO YOU HAVE DIFFICULTY CLIMBING STAIRS
Patients  called and is a little confused about patients medication  Patient would like to know why does the medication state on it Warfin/Sodium patient want to know what is the Sodium   CB # on file No

## 2018-11-20 NOTE — DISCHARGE NOTE ADULT - CARE PROVIDER_API CALL
Tan Perez (DO), Internal Medicine; Nuclear Cardiology  [address] [place] [address]  Phone: [phone]  Fax: [phone]

## 2018-11-20 NOTE — H&P CARDIOLOGY - PSH
S/P CABG (coronary artery bypass graft) History of cholecystectomy    S/P CABG (coronary artery bypass graft)

## 2018-11-20 NOTE — DISCHARGE NOTE ADULT - HOSPITAL COURSE
64 y/o F PMHx of CVA, CAD s/p CABG x 2 (LIMA TO LAD, RSVG to RCA) 08/08/18,  Right ICA stenosis (>70%), HTN, DMII with neuropathy and retinopathy who presents with B/l Leg pain worse than the right, diminished pulses & cold LE, TERRANCE reveals right TERRANCE of 0.64 & left of 0.43, the right & Left SFA is occluded in the mid thigh, & a likely occlusion of the peroneal artery in the distal calf, noted to have mesenteric ischemia on CT abd, seen & evaluated by Dr Loo & now recommends for peripheral angiogram with possible intervention.   Patient is now s/p PTA and balloon to supramesenteric artery via RFA access.  She tolerated the procedure well.  RFA perclose site benign without presence of bleeding/hematoma, patient without complaints.  Patient remains hemodynamically stable and her hospital course was otherwise uneventful.  Patient evaluated by Dr. Perez this AM and is now medically stable for discharge home today.

## 2018-11-20 NOTE — DISCHARGE NOTE ADULT - PLAN OF CARE
You will not experience further progression Low salt, low fat diet.   Weight management.   Take medications as prescribed.    No smoking.  Follow up appointments with your doctor(s)  as instructed.   No heavy lifting for 2 weeks, no strenuous activity  or uneccesary stair climbing, no driving for x 2 days,  you may shower 24 hours following procedure but no bathing or swimming for x1  week, no bending, no straining while having a Bowel movement, No strenuous sexual activity for x 1 week check groin for bleeding, pain, tightness or ( golf ball size)  swelling daily following procedure , & follow up with your cardiologist in 1-2 week Your hemoglobin A1C will be at a normal range (normal range is from 6-8) Continue to follow with your primary care MD or your endocrinologist. Discuss what the goal hemoglobin A1C level is for you.  Follow a heart healthy diabetic diet. If you check your fingerstick glucose at home, call your MD if it is greater than 250mg/dL on 2 occasions or less than 100mg/dL on 2 occasions. Know signs of low blood sugar, such as: dizziness, shakiness, sweating, confusion, hunger, nervousness- drink 4 ounces apple juice if occurs and call your doctor. Know early signs of high blood sugar, such as: frequent urination, increased thirst, blurry vision, fatigue, headache - call your doctor if this occurs. Your blood pressure will be controlled. Continue with your blood pressure medications; eat a heart healthy diet with low salt diet; exercise regularly (consult with your physician or cardiologist first); maintain a heart healthy weight; if you smoke - quit (A resource to help you stop smoking is the [facility] – phone number [phone].); include healthy ways to manage stress. Continue to follow with your primary care physician or cardiologist.

## 2018-11-20 NOTE — CHART NOTE - NSCHARTNOTEFT_GEN_A_CORE
FEMORAL PERCLOSE ASSESSMENT NOTE    Right groin Perclose in place with oozing from site w/o any bleeding or hematoma  Pulses in the RIGHT lower extremity are palpable, (right femoral and right pedal pulses + 2).  Right groin is soft/slightly tender  Patient denies leg, foot  or chest pain    Complications:  Oozing from groin subsided after injection of 1cc lido/epi 1:100,000    Comments: patient is to remain bed rest for the next 2 hours.

## 2018-11-20 NOTE — H&P CARDIOLOGY - PMH
Coronary artery disease    Diabetes    Hypertension Bilateral carotid artery stenosis    Coronary artery disease    DM (diabetes mellitus)    HTN (hypertension)    Neuropathy    PVD (peripheral vascular disease)    Retinopathy

## 2018-11-21 VITALS — WEIGHT: 119.93 LBS

## 2018-11-21 LAB
ANION GAP SERPL CALC-SCNC: 12 MMOL/L — SIGNIFICANT CHANGE UP (ref 5–17)
BUN SERPL-MCNC: 18 MG/DL — SIGNIFICANT CHANGE UP (ref 7–23)
CALCIUM SERPL-MCNC: 10 MG/DL — SIGNIFICANT CHANGE UP (ref 8.4–10.5)
CHLORIDE SERPL-SCNC: 103 MMOL/L — SIGNIFICANT CHANGE UP (ref 96–108)
CO2 SERPL-SCNC: 26 MMOL/L — SIGNIFICANT CHANGE UP (ref 22–31)
CREAT SERPL-MCNC: 1.01 MG/DL — SIGNIFICANT CHANGE UP (ref 0.5–1.3)
GLUCOSE SERPL-MCNC: 83 MG/DL — SIGNIFICANT CHANGE UP (ref 70–99)
HCT VFR BLD CALC: 51.2 % — HIGH (ref 34.5–45)
HGB BLD-MCNC: 15.9 G/DL — HIGH (ref 11.5–15.5)
MCHC RBC-ENTMCNC: 25.6 PG — LOW (ref 27–34)
MCHC RBC-ENTMCNC: 31 GM/DL — LOW (ref 32–36)
MCV RBC AUTO: 82.4 FL — SIGNIFICANT CHANGE UP (ref 80–100)
PLATELET # BLD AUTO: 179 K/UL — SIGNIFICANT CHANGE UP (ref 150–400)
POTASSIUM SERPL-MCNC: 4.7 MMOL/L — SIGNIFICANT CHANGE UP (ref 3.5–5.3)
POTASSIUM SERPL-SCNC: 4.7 MMOL/L — SIGNIFICANT CHANGE UP (ref 3.5–5.3)
RBC # BLD: 6.21 M/UL — HIGH (ref 3.8–5.2)
RBC # FLD: 18.1 % — HIGH (ref 10.3–14.5)
SODIUM SERPL-SCNC: 141 MMOL/L — SIGNIFICANT CHANGE UP (ref 135–145)
WBC # BLD: 7.8 K/UL — SIGNIFICANT CHANGE UP (ref 3.8–10.5)
WBC # FLD AUTO: 7.8 K/UL — SIGNIFICANT CHANGE UP (ref 3.8–10.5)

## 2018-11-21 PROCEDURE — C1887: CPT

## 2018-11-21 PROCEDURE — C1725: CPT

## 2018-11-21 PROCEDURE — 80048 BASIC METABOLIC PNL TOTAL CA: CPT

## 2018-11-21 PROCEDURE — C1894: CPT

## 2018-11-21 PROCEDURE — 99232 SBSQ HOSP IP/OBS MODERATE 35: CPT

## 2018-11-21 PROCEDURE — 93005 ELECTROCARDIOGRAM TRACING: CPT

## 2018-11-21 PROCEDURE — C1769: CPT

## 2018-11-21 PROCEDURE — 82962 GLUCOSE BLOOD TEST: CPT

## 2018-11-21 PROCEDURE — 36245 INS CATH ABD/L-EXT ART 1ST: CPT

## 2018-11-21 PROCEDURE — 37236 OPEN/PERQ PLACE STENT 1ST: CPT

## 2018-11-21 PROCEDURE — 85027 COMPLETE CBC AUTOMATED: CPT

## 2018-11-21 PROCEDURE — C1760: CPT

## 2018-11-21 PROCEDURE — C1876: CPT

## 2018-11-21 PROCEDURE — 80053 COMPREHEN METABOLIC PANEL: CPT

## 2018-11-21 RX ADMIN — CLOPIDOGREL BISULFATE 75 MILLIGRAM(S): 75 TABLET, FILM COATED ORAL at 04:32

## 2018-11-21 RX ADMIN — LISINOPRIL 2.5 MILLIGRAM(S): 2.5 TABLET ORAL at 04:32

## 2018-11-21 RX ADMIN — Medication 81 MILLIGRAM(S): at 04:32

## 2018-11-21 RX ADMIN — SODIUM CHLORIDE 3 MILLILITER(S): 9 INJECTION INTRAMUSCULAR; INTRAVENOUS; SUBCUTANEOUS at 04:18

## 2018-11-21 RX ADMIN — Medication 2: at 11:43

## 2018-11-21 RX ADMIN — Medication 75 MILLIGRAM(S): at 04:32

## 2018-11-21 NOTE — DIETITIAN INITIAL EVALUATION ADULT. - ADHERENCE
Pt noted with T2DM, checks BG 2x per day which ranges between 120-140mg/dL, take glimepiride at home. Reports she adds only a small amount of sodium to food and has reduced fat in her diet over the last few months. Previous Hba1c: 7.2% (9/2018), current HbA1c: 6.3% suggesting improvement in glycemic control.

## 2018-11-21 NOTE — DIETITIAN INITIAL EVALUATION ADULT. - ORAL INTAKE PTA
Pt reports good PO intake and appetite PTA, typically consumes 3 meals per day + snacks (snacks consisting of fruit, nuts or avocado). Pt reports she has followed a vegan diet for most of her life. Diet recall consists largely of rice, bean and vegetables as well as rice based soups. Pt reports she will also have tofu a few times per week. Consumes mostly water but juice occasionally, does not drink soda. Takes multivitamin, iron supplement and states she occasionally takes B12 supplement. Confirms NKFA. Pt denies chewing/swallowing difficulty, nausea, vomiting, diarrhea, constipation./good

## 2018-11-21 NOTE — DIETITIAN INITIAL EVALUATION ADULT. - OTHER INFO
Pt seen for nutrition consult for "nutrition assessment". pt reports UBW as 120lbs in august which is consistent with previous RD note. Per most recent RD assessment 9/8/18 pt noted with weight of 112lbs suggesting  3.6% weight loss x 2 months. Since admission pt reports good PO intake, completing 100% of meals. Denies any acute GI distress.  Pt amendable to diet education.

## 2018-11-21 NOTE — DIETITIAN INITIAL EVALUATION ADULT. - NS AS NUTRI INTERV ED CONTENT
Purpose of the nutrition education/Provided education on Carbohydrate Consistent diet including sources of carbohydrates, including more whole grains,  watching portion sizes, incorporating more plant based protein sources into meals, limiting sugar sweetened beverages in diet such as juices and the importance of consistent eating pattern to help optimize glycemic control.  Also provided education on Heart-Healthy Nutrition Therapy including sources of protein for vegans, heart healthy fats, limiting high sodium foods and incorporating fruits, non-starchy vegetables and whole grains in the diet. Pt made aware RD remains available as needed. .

## 2018-11-21 NOTE — PROGRESS NOTE ADULT - SUBJECTIVE AND OBJECTIVE BOX
63y old  Female who presents with  Mesenteric Ischemia (2018 19:01) now s/p peripheral angiogram of supra mesenteric artery via right femoral artery access            Allergies    No Known Allergies    Intolerances        Medications:  aspirin enteric coated 81 milliGRAM(s) Oral daily  atorvastatin 40 milliGRAM(s) Oral at bedtime  clopidogrel Tablet 75 milliGRAM(s) Oral daily  dextrose 40% Gel 15 Gram(s) Oral once PRN  dextrose 5%. 1000 milliLiter(s) IV Continuous <Continuous>  dextrose 50% Injectable 12.5 Gram(s) IV Push once  dextrose 50% Injectable 25 Gram(s) IV Push once  dextrose 50% Injectable 25 Gram(s) IV Push once  docusate sodium 100 milliGRAM(s) Oral three times a day PRN  glucagon  Injectable 1 milliGRAM(s) IntraMuscular once PRN  insulin lispro (HumaLOG) corrective regimen sliding scale   SubCutaneous three times a day before meals  insulin lispro (HumaLOG) corrective regimen sliding scale   SubCutaneous at bedtime  lisinopril 2.5 milliGRAM(s) Oral daily  metoprolol tartrate 75 milliGRAM(s) Oral two times a day  sodium chloride 0.9% lock flush 3 milliLiter(s) IV Push every 8 hours  sodium chloride 0.9%. 1000 milliLiter(s) IV Continuous <Continuous>      Vitals:  T(C): 37.2 (18 @ 21:06), Max: 37.2 (18 @ 21:06)  HR: 65 (18 @ 21:06) (65 - 79)  BP: 169/67 (18 @ 21:06) (139/71 - 186/77)  BP(mean): 113 (18 @ 06:39) (113 - 113)  RR: 16 (18 @ 21:06) (15 - 20)  SpO2: 100% (18 @ 21:06) (99% - 100%)  Wt(kg): --  Daily Height in cm: 124.2 (2018 10:00)    Daily Weight in k (2018 10:00)  I&O's Summary    2018 07:01  -  2018 00:58  --------------------------------------------------------  IN: 1170 mL / OUT: 0 mL / NET: 1170 mL          Physical Exam:  Cardiovascular: S1S2, RRR, No M/R/G, no JVD, No Lower extremity edema  Procedural Access Site: No hematoma, Non-tender to palpation, 2+ pulse, No bruit, No Ecchymosis  Respiratory: Clear to auscultation bilaterally    11-    138  |  100  |  21  ----------------------------<  139<H>  4.7   |  23  |  1.05    Ca    10.3      2018 06:53    TPro  7.8  /  Alb  4.4  /  TBili  0.4  /  DBili  x   /  AST  14  /  ALT  12  /  AlkPhos  89  11-20    Interpretation of Telemetry:    ASSESSMENT/PLAN  63y old  Female who presents with  Mesenteric Ischemia (2018 19:01) now s/p peripheral angiogram of supra mesenteric artery via right femoral artery access. Pt tolerated the procedure well. Cardiac cath site benign. Overnight remained uneventful. Pt denies chest pain, SOB or palpitations. Post-procedure discharge instructions discharge instructions discussed and questions addressed.

## 2018-11-21 NOTE — DIETITIAN INITIAL EVALUATION ADULT. - ENERGY NEEDS
Ht: 57 inches per pt report, Wt: 108lbs, BMI: 23.6kg/m2, IBW: 85lbs +/- 10%, %IBW: 127%  Edema: none, Skin: free of pressure injuries per nursing flow sheets   Pertinent Information: 62 y/o F PMHx of CVA, CAD s/p CABG x 2 (LIMA TO LAD, RSVG to RCA) 08/08/18,  Right ICA stenosis (>70%), HTN, DMII with neuropathy and retinopathy who presents with B/l Leg pain worse than the right, diminished pulses & cold LE, TERRANCE reveals right TERRANCE of 0.64 & left of 0.43, the right & Left SFA is occluded in the mid thigh, & a likely occlusion of the peroneal artery in the distal calf, noted to have mesenteric ischemia on CT abd, ) now s/p peripheral angiogram of supra mesenteric artery via right femoral artery access .

## 2018-11-21 NOTE — PROGRESS NOTE ADULT - SUBJECTIVE AND OBJECTIVE BOX
Vascular Cardiology  Progress note     [facility] [id number]              [email or web address]   OFFICE [phone]    CC:  SMA stenosis    INTERVAL HISTORY:     S/p SMA intervention.  No groin pain.  no CP or Sob.  no abdominal pain       Allergies    No Known Allergies    Intolerances    	    MEDICATIONS:  aspirin enteric coated 81 milliGRAM(s) Oral daily  clopidogrel Tablet 75 milliGRAM(s) Oral daily  lisinopril 2.5 milliGRAM(s) Oral daily  metoprolol tartrate 75 milliGRAM(s) Oral two times a day          docusate sodium 100 milliGRAM(s) Oral three times a day PRN    atorvastatin 40 milliGRAM(s) Oral at bedtime  dextrose 40% Gel 15 Gram(s) Oral once PRN  dextrose 50% Injectable 12.5 Gram(s) IV Push once  dextrose 50% Injectable 25 Gram(s) IV Push once  dextrose 50% Injectable 25 Gram(s) IV Push once  glucagon  Injectable 1 milliGRAM(s) IntraMuscular once PRN  insulin lispro (HumaLOG) corrective regimen sliding scale   SubCutaneous three times a day before meals  insulin lispro (HumaLOG) corrective regimen sliding scale   SubCutaneous at bedtime    dextrose 5%. 1000 milliLiter(s) IV Continuous <Continuous>  sodium chloride 0.9% lock flush 3 milliLiter(s) IV Push every 8 hours  sodium chloride 0.9%. 1000 milliLiter(s) IV Continuous <Continuous>      PAST MEDICAL & SURGICAL HISTORY:  Bilateral carotid artery stenosis  PVD (peripheral vascular disease)  Coronary artery disease  Retinopathy  Neuropathy  DM (diabetes mellitus)  HTN (hypertension)  S/P CABG (coronary artery bypass graft)  History of cholecystectomy      FAMILY HISTORY:  Family history of diabetes mellitus (Sibling)  Family history of coronary arteriosclerosis (Sibling): both brothers had cabg  Coronary artery disease (Sibling): pre mature CAD      SOCIAL HISTORY:  unchanged    REVIEW OF SYSTEMS:  CONSTITUTIONAL: No fever, weight loss, or fatigue  EYES: No eye pain, visual disturbances, or discharge  ENMT:  No difficulty hearing, tinnitus, vertigo; No sinus or throat pain  NECK: No pain or stiffness  RESPIRATORY: No cough, wheezing, chills or hemoptysis; No Shortness of Breath  CARDIOVASCULAR: No chest pain, palpitations, passing out, dizziness, or leg swelling  GASTROINTESTINAL: No abdominal or epigastric pain. No nausea, vomiting, or hematemesis; No diarrhea or constipation. No melena or hematochezia.  GENITOURINARY: No dysuria, frequency, hematuria, or incontinence  NEUROLOGICAL: No headaches, memory loss, loss of strength, numbness, or tremors  SKIN: No itching, burning, rashes, or lesions   LYMPH Nodes: No enlarged glands  ENDOCRINE: No heat or cold intolerance; No hair loss  MUSCULOSKELETAL: No joint pain or swelling; No muscle, back, or extremity pain  PSYCHIATRIC: No depression, anxiety, mood swings, or difficulty sleeping  HEME/LYMPH: No easy bruising, or bleeding gums  ALLERY AND IMMUNOLOGIC: No hives or eczema	    [ x] All others negative	  [ ] Unable to obtain    PHYSICAL EXAM:  T(C): 36.7 (11-21-18 @ 04:27), Max: 37.2 (11-20-18 @ 21:06)  HR: 76 (11-21-18 @ 04:27) (65 - 77)  BP: 161/72 (11-21-18 @ 04:27) (161/58 - 169/67)  RR: 16 (11-21-18 @ 04:27) (15 - 16)  SpO2: 100% (11-21-18 @ 04:27) (100% - 100%)  Wt(kg): --  I&O's Summary    20 Nov 2018 07:01  -  21 Nov 2018 07:00  --------------------------------------------------------  IN: 1830 mL / OUT: 0 mL / NET: 1830 mL    21 Nov 2018 07:01  -  21 Nov 2018 17:05  --------------------------------------------------------  IN: 240 mL / OUT: 0 mL / NET: 240 mL        Appearance: Normal	  HEENT:   Normal oral mucosa, PERRL, EOMI	  Carotid:  Right: No bruit    Left:  No bruit  Lymphatic: No lymphadenopathy  Cardiovascular: Normal S1 S2, No JVD, No murmurs, No edema  Respiratory: Lungs clear to auscultation	  Psychiatry: A & O x 3, Mood & affect appropriate  Gastrointestinal:  Soft, Non-tender, + BS	  Skin: No rashes, No ecchymoses, No cyanosis	  Neurologic: Non-focal  Extremities: Normal range of motion, No clubbing, cyanosis.  Groin stable.    LABS:	 	    CBC Full  -  ( 21 Nov 2018 02:38 )  WBC Count : 7.8 K/uL  Hemoglobin : 15.9 g/dL  Hematocrit : 51.2 %  Platelet Count - Automated : 179 K/uL  Mean Cell Volume : 82.4 fl  Mean Cell Hemoglobin : 25.6 pg  Mean Cell Hemoglobin Concentration : 31.0 gm/dL  Auto Neutrophil # : x  Auto Lymphocyte # : x  Auto Monocyte # : x  Auto Eosinophil # : x  Auto Basophil # : x  Auto Neutrophil % : x  Auto Lymphocyte % : x  Auto Monocyte % : x  Auto Eosinophil % : x  Auto Basophil % : x    11-21    141  |  103  |  18  ----------------------------<  83  4.7   |  26  |  1.01  11-20    138  |  100  |  21  ----------------------------<  139<H>  4.7   |  23  |  1.05    Ca    10.0      21 Nov 2018 02:38  Ca    10.3      20 Nov 2018 06:53    TPro  7.8  /  Alb  4.4  /  TBili  0.4  /  DBili  x   /  AST  14  /  ALT  12  /  AlkPhos  89  11-20          Assessment:  1. Mesenteric Ischemia  2.  HTN  3.  HLD      Plan  1.  Doing well post procedure  2.  Continue with dual antiplatelet therapy   3.  Continue with statin therapy and home medications  4.  We discussed groin precuations (e.g. no heavy lifting, baths x 2 weeks).    5.  Plan for dicharge today with followup in 2 weeks      Thanks    Tan Perez [id number]

## 2018-11-27 ENCOUNTER — APPOINTMENT (OUTPATIENT)
Dept: NEUROLOGY | Facility: HOSPITAL | Age: 63
End: 2018-11-27

## 2018-11-27 ENCOUNTER — OUTPATIENT (OUTPATIENT)
Dept: OUTPATIENT SERVICES | Facility: HOSPITAL | Age: 63
LOS: 1 days | End: 2018-11-27
Payer: SELF-PAY

## 2018-11-27 VITALS
BODY MASS INDEX: 23.3 KG/M2 | DIASTOLIC BLOOD PRESSURE: 90 MMHG | RESPIRATION RATE: 16 BRPM | SYSTOLIC BLOOD PRESSURE: 159 MMHG | HEIGHT: 57 IN | WEIGHT: 108 LBS | HEART RATE: 66 BPM

## 2018-11-27 DIAGNOSIS — R40.4 TRANSIENT ALTERATION OF AWARENESS: ICD-10-CM

## 2018-11-27 DIAGNOSIS — G93.49 OTHER ENCEPHALOPATHY: ICD-10-CM

## 2018-11-27 DIAGNOSIS — Z95.1 PRESENCE OF AORTOCORONARY BYPASS GRAFT: Chronic | ICD-10-CM

## 2018-11-27 DIAGNOSIS — Z90.49 ACQUIRED ABSENCE OF OTHER SPECIFIED PARTS OF DIGESTIVE TRACT: Chronic | ICD-10-CM

## 2018-11-27 DIAGNOSIS — R56.9 UNSPECIFIED CONVULSIONS: ICD-10-CM

## 2018-11-27 PROBLEM — I65.23 OCCLUSION AND STENOSIS OF BILATERAL CAROTID ARTERIES: Chronic | Status: ACTIVE | Noted: 2018-11-20

## 2018-11-27 PROBLEM — I73.9 PERIPHERAL VASCULAR DISEASE, UNSPECIFIED: Chronic | Status: ACTIVE | Noted: 2018-11-20

## 2018-11-27 PROCEDURE — G0463: CPT

## 2018-11-30 ENCOUNTER — APPOINTMENT (OUTPATIENT)
Dept: NEUROLOGY | Facility: CLINIC | Age: 63
End: 2018-11-30

## 2018-12-01 ENCOUNTER — INPATIENT (INPATIENT)
Facility: HOSPITAL | Age: 63
LOS: 2 days | Discharge: HOME | DRG: 69 | End: 2018-12-04
Attending: HOSPITALIST | Admitting: HOSPITALIST
Payer: MEDICAID

## 2018-12-01 VITALS
SYSTOLIC BLOOD PRESSURE: 140 MMHG | TEMPERATURE: 98 F | DIASTOLIC BLOOD PRESSURE: 64 MMHG | RESPIRATION RATE: 18 BRPM | OXYGEN SATURATION: 100 % | HEART RATE: 65 BPM

## 2018-12-01 DIAGNOSIS — G45.9 TRANSIENT CEREBRAL ISCHEMIC ATTACK, UNSPECIFIED: ICD-10-CM

## 2018-12-01 DIAGNOSIS — I73.9 PERIPHERAL VASCULAR DISEASE, UNSPECIFIED: ICD-10-CM

## 2018-12-01 DIAGNOSIS — Z95.1 PRESENCE OF AORTOCORONARY BYPASS GRAFT: Chronic | ICD-10-CM

## 2018-12-01 DIAGNOSIS — N39.0 URINARY TRACT INFECTION, SITE NOT SPECIFIED: ICD-10-CM

## 2018-12-01 DIAGNOSIS — E11.59 TYPE 2 DIABETES MELLITUS WITH OTHER CIRCULATORY COMPLICATIONS: ICD-10-CM

## 2018-12-01 DIAGNOSIS — G62.9 POLYNEUROPATHY, UNSPECIFIED: ICD-10-CM

## 2018-12-01 DIAGNOSIS — Z90.49 ACQUIRED ABSENCE OF OTHER SPECIFIED PARTS OF DIGESTIVE TRACT: Chronic | ICD-10-CM

## 2018-12-01 DIAGNOSIS — I65.23 OCCLUSION AND STENOSIS OF BILATERAL CAROTID ARTERIES: ICD-10-CM

## 2018-12-01 DIAGNOSIS — I25.10 ATHEROSCLEROTIC HEART DISEASE OF NATIVE CORONARY ARTERY WITHOUT ANGINA PECTORIS: ICD-10-CM

## 2018-12-01 LAB
ACETONE SERPL-MCNC: ABNORMAL
ALBUMIN SERPL ELPH-MCNC: 4 G/DL — SIGNIFICANT CHANGE UP (ref 3.3–5.2)
ALP SERPL-CCNC: 83 U/L — SIGNIFICANT CHANGE UP (ref 40–120)
ALT FLD-CCNC: 9 U/L — SIGNIFICANT CHANGE UP
ANION GAP SERPL CALC-SCNC: 13 MMOL/L — SIGNIFICANT CHANGE UP (ref 5–17)
APPEARANCE UR: CLEAR — SIGNIFICANT CHANGE UP
APTT BLD: 31.5 SEC — SIGNIFICANT CHANGE UP (ref 27.5–36.3)
AST SERPL-CCNC: 13 U/L — SIGNIFICANT CHANGE UP
BASOPHILS # BLD AUTO: 0 K/UL — SIGNIFICANT CHANGE UP (ref 0–0.2)
BASOPHILS NFR BLD AUTO: 0.6 % — SIGNIFICANT CHANGE UP (ref 0–2)
BILIRUB SERPL-MCNC: <0.2 MG/DL — LOW (ref 0.4–2)
BILIRUB UR-MCNC: NEGATIVE — SIGNIFICANT CHANGE UP
BUN SERPL-MCNC: 26 MG/DL — HIGH (ref 8–20)
CALCIUM SERPL-MCNC: 9.5 MG/DL — SIGNIFICANT CHANGE UP (ref 8.6–10.2)
CHLORIDE SERPL-SCNC: 102 MMOL/L — SIGNIFICANT CHANGE UP (ref 98–107)
CO2 SERPL-SCNC: 25 MMOL/L — SIGNIFICANT CHANGE UP (ref 22–29)
COLOR SPEC: YELLOW — SIGNIFICANT CHANGE UP
CREAT SERPL-MCNC: 1.16 MG/DL — SIGNIFICANT CHANGE UP (ref 0.5–1.3)
DIFF PNL FLD: ABNORMAL
EOSINOPHIL # BLD AUTO: 0.3 K/UL — SIGNIFICANT CHANGE UP (ref 0–0.5)
EOSINOPHIL NFR BLD AUTO: 3.9 % — SIGNIFICANT CHANGE UP (ref 0–6)
GLUCOSE BLDC GLUCOMTR-MCNC: 180 MG/DL — HIGH (ref 70–99)
GLUCOSE SERPL-MCNC: 260 MG/DL — HIGH (ref 70–115)
GLUCOSE UR QL: 100 MG/DL
HCT VFR BLD CALC: 43.8 % — SIGNIFICANT CHANGE UP (ref 37–47)
HGB BLD-MCNC: 14 G/DL — SIGNIFICANT CHANGE UP (ref 12–16)
INR BLD: 0.97 RATIO — SIGNIFICANT CHANGE UP (ref 0.88–1.16)
KETONES UR-MCNC: NEGATIVE — SIGNIFICANT CHANGE UP
LACTATE BLDV-MCNC: 1.1 MMOL/L — SIGNIFICANT CHANGE UP (ref 0.5–2)
LEUKOCYTE ESTERASE UR-ACNC: ABNORMAL
LYMPHOCYTES # BLD AUTO: 1.8 K/UL — SIGNIFICANT CHANGE UP (ref 1–4.8)
LYMPHOCYTES # BLD AUTO: 24.8 % — SIGNIFICANT CHANGE UP (ref 20–55)
MAGNESIUM SERPL-MCNC: 2.1 MG/DL — SIGNIFICANT CHANGE UP (ref 1.6–2.6)
MCHC RBC-ENTMCNC: 26.2 PG — LOW (ref 27–31)
MCHC RBC-ENTMCNC: 32 G/DL — SIGNIFICANT CHANGE UP (ref 32–36)
MCV RBC AUTO: 82 FL — SIGNIFICANT CHANGE UP (ref 81–99)
MONOCYTES # BLD AUTO: 0.6 K/UL — SIGNIFICANT CHANGE UP (ref 0–0.8)
MONOCYTES NFR BLD AUTO: 8.3 % — SIGNIFICANT CHANGE UP (ref 3–10)
NEUTROPHILS # BLD AUTO: 4.5 K/UL — SIGNIFICANT CHANGE UP (ref 1.8–8)
NEUTROPHILS NFR BLD AUTO: 62.3 % — SIGNIFICANT CHANGE UP (ref 37–73)
NITRITE UR-MCNC: NEGATIVE — SIGNIFICANT CHANGE UP
PH UR: 7 — SIGNIFICANT CHANGE UP (ref 5–8)
PHOSPHATE SERPL-MCNC: 3.2 MG/DL — SIGNIFICANT CHANGE UP (ref 2.4–4.7)
PLATELET # BLD AUTO: 245 K/UL — SIGNIFICANT CHANGE UP (ref 150–400)
POTASSIUM SERPL-MCNC: 5.1 MMOL/L — SIGNIFICANT CHANGE UP (ref 3.5–5.3)
POTASSIUM SERPL-SCNC: 5.1 MMOL/L — SIGNIFICANT CHANGE UP (ref 3.5–5.3)
PROT SERPL-MCNC: 7.2 G/DL — SIGNIFICANT CHANGE UP (ref 6.6–8.7)
PROT UR-MCNC: 30 MG/DL
PROTHROM AB SERPL-ACNC: 11.1 SEC — SIGNIFICANT CHANGE UP (ref 10–12.9)
RBC # BLD: 5.34 M/UL — HIGH (ref 4.4–5.2)
RBC # FLD: 17.9 % — HIGH (ref 11–15.6)
SODIUM SERPL-SCNC: 140 MMOL/L — SIGNIFICANT CHANGE UP (ref 135–145)
SP GR SPEC: 1.01 — SIGNIFICANT CHANGE UP (ref 1.01–1.02)
TROPONIN T SERPL-MCNC: <0.01 NG/ML — SIGNIFICANT CHANGE UP (ref 0–0.06)
UROBILINOGEN FLD QL: NEGATIVE MG/DL — SIGNIFICANT CHANGE UP
WBC # BLD: 7.2 K/UL — SIGNIFICANT CHANGE UP (ref 4.8–10.8)
WBC # FLD AUTO: 7.2 K/UL — SIGNIFICANT CHANGE UP (ref 4.8–10.8)

## 2018-12-01 PROCEDURE — 93880 EXTRACRANIAL BILAT STUDY: CPT | Mod: 26

## 2018-12-01 PROCEDURE — 70450 CT HEAD/BRAIN W/O DYE: CPT | Mod: 26

## 2018-12-01 PROCEDURE — 99223 1ST HOSP IP/OBS HIGH 75: CPT

## 2018-12-01 PROCEDURE — 99285 EMERGENCY DEPT VISIT HI MDM: CPT

## 2018-12-01 PROCEDURE — 93010 ELECTROCARDIOGRAM REPORT: CPT

## 2018-12-01 RX ORDER — FERROUS SULFATE 325(65) MG
325 TABLET ORAL DAILY
Qty: 0 | Refills: 0 | Status: DISCONTINUED | OUTPATIENT
Start: 2018-12-01 | End: 2018-12-04

## 2018-12-01 RX ORDER — DEXTROSE 50 % IN WATER 50 %
15 SYRINGE (ML) INTRAVENOUS ONCE
Qty: 0 | Refills: 0 | Status: DISCONTINUED | OUTPATIENT
Start: 2018-12-01 | End: 2018-12-04

## 2018-12-01 RX ORDER — METOPROLOL TARTRATE 50 MG
75 TABLET ORAL
Qty: 0 | Refills: 0 | Status: DISCONTINUED | OUTPATIENT
Start: 2018-12-01 | End: 2018-12-04

## 2018-12-01 RX ORDER — CLOPIDOGREL BISULFATE 75 MG/1
75 TABLET, FILM COATED ORAL DAILY
Qty: 0 | Refills: 0 | Status: DISCONTINUED | OUTPATIENT
Start: 2018-12-01 | End: 2018-12-04

## 2018-12-01 RX ORDER — ASPIRIN/CALCIUM CARB/MAGNESIUM 324 MG
81 TABLET ORAL DAILY
Qty: 0 | Refills: 0 | Status: DISCONTINUED | OUTPATIENT
Start: 2018-12-01 | End: 2018-12-04

## 2018-12-01 RX ORDER — GLUCAGON INJECTION, SOLUTION 0.5 MG/.1ML
1 INJECTION, SOLUTION SUBCUTANEOUS ONCE
Qty: 0 | Refills: 0 | Status: DISCONTINUED | OUTPATIENT
Start: 2018-12-01 | End: 2018-12-04

## 2018-12-01 RX ORDER — CEFTRIAXONE 500 MG/1
1 INJECTION, POWDER, FOR SOLUTION INTRAMUSCULAR; INTRAVENOUS EVERY 24 HOURS
Qty: 0 | Refills: 0 | Status: COMPLETED | OUTPATIENT
Start: 2018-12-01 | End: 2018-12-03

## 2018-12-01 RX ORDER — SODIUM CHLORIDE 9 MG/ML
1000 INJECTION INTRAMUSCULAR; INTRAVENOUS; SUBCUTANEOUS ONCE
Qty: 0 | Refills: 0 | Status: COMPLETED | OUTPATIENT
Start: 2018-12-01 | End: 2018-12-01

## 2018-12-01 RX ORDER — SODIUM CHLORIDE 9 MG/ML
1000 INJECTION INTRAMUSCULAR; INTRAVENOUS; SUBCUTANEOUS
Qty: 0 | Refills: 0 | Status: DISCONTINUED | OUTPATIENT
Start: 2018-12-01 | End: 2018-12-03

## 2018-12-01 RX ORDER — SACCHAROMYCES BOULARDII 250 MG
250 POWDER IN PACKET (EA) ORAL
Qty: 0 | Refills: 0 | Status: DISCONTINUED | OUTPATIENT
Start: 2018-12-01 | End: 2018-12-04

## 2018-12-01 RX ORDER — NITROFURANTOIN MACROCRYSTAL 50 MG
100 CAPSULE ORAL
Qty: 0 | Refills: 0 | Status: DISCONTINUED | OUTPATIENT
Start: 2018-12-01 | End: 2018-12-01

## 2018-12-01 RX ORDER — DEXTROSE 50 % IN WATER 50 %
12.5 SYRINGE (ML) INTRAVENOUS ONCE
Qty: 0 | Refills: 0 | Status: DISCONTINUED | OUTPATIENT
Start: 2018-12-01 | End: 2018-12-04

## 2018-12-01 RX ORDER — INSULIN LISPRO 100/ML
VIAL (ML) SUBCUTANEOUS
Qty: 0 | Refills: 0 | Status: DISCONTINUED | OUTPATIENT
Start: 2018-12-01 | End: 2018-12-04

## 2018-12-01 RX ORDER — ASPIRIN/CALCIUM CARB/MAGNESIUM 324 MG
324 TABLET ORAL ONCE
Qty: 0 | Refills: 0 | Status: COMPLETED | OUTPATIENT
Start: 2018-12-01 | End: 2018-12-01

## 2018-12-01 RX ORDER — SODIUM CHLORIDE 9 MG/ML
1000 INJECTION, SOLUTION INTRAVENOUS
Qty: 0 | Refills: 0 | Status: DISCONTINUED | OUTPATIENT
Start: 2018-12-01 | End: 2018-12-04

## 2018-12-01 RX ORDER — ATORVASTATIN CALCIUM 80 MG/1
40 TABLET, FILM COATED ORAL AT BEDTIME
Qty: 0 | Refills: 0 | Status: DISCONTINUED | OUTPATIENT
Start: 2018-12-01 | End: 2018-12-04

## 2018-12-01 RX ORDER — HEPARIN SODIUM 5000 [USP'U]/ML
5000 INJECTION INTRAVENOUS; SUBCUTANEOUS EVERY 8 HOURS
Qty: 0 | Refills: 0 | Status: DISCONTINUED | OUTPATIENT
Start: 2018-12-01 | End: 2018-12-04

## 2018-12-01 RX ORDER — SODIUM CHLORIDE 9 MG/ML
3 INJECTION INTRAMUSCULAR; INTRAVENOUS; SUBCUTANEOUS EVERY 8 HOURS
Qty: 0 | Refills: 0 | Status: DISCONTINUED | OUTPATIENT
Start: 2018-12-01 | End: 2018-12-04

## 2018-12-01 RX ADMIN — SODIUM CHLORIDE 1000 MILLILITER(S): 9 INJECTION INTRAMUSCULAR; INTRAVENOUS; SUBCUTANEOUS at 17:55

## 2018-12-01 RX ADMIN — Medication 100 MILLIGRAM(S): at 20:37

## 2018-12-01 RX ADMIN — Medication 1 TABLET(S): at 22:27

## 2018-12-01 RX ADMIN — ATORVASTATIN CALCIUM 40 MILLIGRAM(S): 80 TABLET, FILM COATED ORAL at 22:27

## 2018-12-01 RX ADMIN — Medication 324 MILLIGRAM(S): at 20:39

## 2018-12-01 RX ADMIN — HEPARIN SODIUM 5000 UNIT(S): 5000 INJECTION INTRAVENOUS; SUBCUTANEOUS at 22:26

## 2018-12-01 RX ADMIN — SODIUM CHLORIDE 100 MILLILITER(S): 9 INJECTION INTRAMUSCULAR; INTRAVENOUS; SUBCUTANEOUS at 22:27

## 2018-12-01 RX ADMIN — CEFTRIAXONE 100 GRAM(S): 500 INJECTION, POWDER, FOR SOLUTION INTRAMUSCULAR; INTRAVENOUS at 22:28

## 2018-12-01 RX ADMIN — Medication 75 MILLIGRAM(S): at 22:27

## 2018-12-01 RX ADMIN — Medication 250 MILLIGRAM(S): at 22:27

## 2018-12-01 RX ADMIN — SODIUM CHLORIDE 1000 MILLILITER(S): 9 INJECTION INTRAMUSCULAR; INTRAVENOUS; SUBCUTANEOUS at 18:56

## 2018-12-01 RX ADMIN — Medication 2: at 22:33

## 2018-12-01 RX ADMIN — SODIUM CHLORIDE 100 MILLILITER(S): 9 INJECTION INTRAMUSCULAR; INTRAVENOUS; SUBCUTANEOUS at 18:56

## 2018-12-01 RX ADMIN — SODIUM CHLORIDE 3 MILLILITER(S): 9 INJECTION INTRAMUSCULAR; INTRAVENOUS; SUBCUTANEOUS at 22:29

## 2018-12-01 NOTE — H&P ADULT - HISTORY OF PRESENT ILLNESS
64 y/o female with history of CAD s/p CABG 8/18, preserved LV function, Carotid stenosis, HTN, DM-2, peripheral neuropathy s/p JEAN stent last month. Patient comes from home with Daughter c/o intermittent right face numbness and tongue numbness that started yesterday. She denies any motor weakness, HA, visual changes, falls, palpitations, or changes in medications. She does have intermittent neck stiffness which is not new. She checks her blood sugar regularly at home and its between . Denies CP, SOB, F/C. ED w/u non-diagnostic at this time and currently she has no focal weakness and NIH of 0. Of note patient was admitted to  in 8/18 for CP and while here was a code stroke and received TPA. Her symptoms resolved and her repeat head CT was neg. She was medically managed for her B/L Carotid disease and TIA. Patient also c/o dysuria, and increased frequency. Her Daughter recently started her on OTC UTI medication but can not recall name.

## 2018-12-01 NOTE — H&P ADULT - NSHPSOCIALHISTORY_GEN_ALL_CORE
Nonsmoker  no etoh  no illicit drugs  lives with family  From [facility]  No falls or assistive devices

## 2018-12-01 NOTE — ED STATDOCS - OBJECTIVE STATEMENT
Telemedicine assessment was conducted (using real time 2 way audio-video technology) by Dr. MARIELLA Anderson located at [address] [place] [address]  ++++++++++++++++++++++++  Pertinent patient history and initial plan:    62 y/o F with hx of DM and mesenteric ischemia s/p stent placement of superior mesenteric artery August, 2018 and ABD angiogram placed  November 21,2018.  Immediately s/p placement of angiogram has developed rash in hands b/l, itching in genital area, dysuria and hematuria. Additionally reports left sided facial numbness and tongue, which started yesterday. Has weakness in LE b/l at baseline. Telemedicine assessment was conducted (using real time 2 way audio-video technology) by Dr. MARIELLA Anderson located at [address] [place] [id number]  ++++++++++++++++++++++++  Pertinent patient history and initial plan:    62 y/o F with hx of DM and mesenteric ischemia s/p stent placement of superior mesenteric artery August, 2018 and ABD angiogram November 21,2018.  Immediately s/p placement of angiogram has developed rash in hands b/l, itching in genital area, dysuria and hematuria. Additionally reports left sided facial numbness and tongue, which started yesterday. Has weakness in LE b/l at baseline. Pt needs on site examination. Plan for urine check. She and daughter state that they voiced these concerns at the time of onset inpatient at [place] but it went unaddressed. They persist. Patient seen by me in intake for initial assessment and ordering. Physician on site to follow results and further evaluate and treat patient.

## 2018-12-01 NOTE — ED ADULT TRIAGE NOTE - CHIEF COMPLAINT QUOTE
PT axox3 s/p  abdominal stent placed 11/21 at [place] now c/o itching , rash noted to right hand, and decreased sensation to face  starting 1.5 weeks ago.

## 2018-12-01 NOTE — ED PROVIDER NOTE - MEDICAL DECISION MAKING DETAILS
pt with several episodes of transient facial numbness.  TIA vs cervical radiculatopathy, symptoms c/w UTI.  Will start on po macrobid, tolerates po fluids, TTE, carotid doppler, MR head and neck, neuro consult.

## 2018-12-01 NOTE — ED PROVIDER NOTE - SKIN, MLM
Skin normal color for race, warm, dry and intact. No evidence of rash. healing  groin catheter site on right

## 2018-12-01 NOTE — H&P ADULT - FAMILY HISTORY
Sibling  Still living? Unknown  Coronary artery disease, Age at diagnosis: Age Unknown     Sibling  Still living? Unknown  Family history of coronary arteriosclerosis, Age at diagnosis: Age Unknown  Family history of diabetes mellitus, Age at diagnosis: Age Unknown

## 2018-12-01 NOTE — ED ADULT NURSE NOTE - CHIEF COMPLAINT QUOTE
PT axox3 s/p  abdominal stent placed 11/21 at [place] now c/o itching , rash noted to right hand, and decreased sensation to face  starting 1.5 weeks ago.

## 2018-12-01 NOTE — ED STATDOCS - PROGRESS NOTE DETAILS
patient re-evaluated, BIB daughter with complicated medical history, c/o facial numbness, upgraded to MAIN for further evaluation

## 2018-12-01 NOTE — ED CDU PROVIDER INITIAL DAY NOTE - CHPI ED SYMPTOMS NEG
no blurred vision/no confusion/no vomiting/no dizziness/no change in level of consciousness/no fever/no loss of consciousness/no nausea

## 2018-12-01 NOTE — ED ADULT NURSE NOTE - NSIMPLEMENTINTERV_GEN_ALL_ED
Implemented All Fall Risk Interventions:  [place] to call system. Call bell, personal items and telephone within reach. Instruct patient to call for assistance. Room bathroom lighting operational. Non-slip footwear when patient is off stretcher. Physically safe environment: no spills, clutter or unnecessary equipment. Stretcher in lowest position, wheels locked, appropriate side rails in place. Provide visual cue, wrist band, yellow gown, etc. Monitor gait and stability. Monitor for mental status changes and reorient to person, place, and time. Review medications for side effects contributing to fall risk. Reinforce activity limits and safety measures with patient and family.

## 2018-12-01 NOTE — ED PROVIDER NOTE - OBJECTIVE STATEMENT
Intermittent left side facial numbness since yesterday. Some Nausea last evening, neither present at this time. episode of numbness this am, isolated to face and no other neuro symptoms, no prior similar, Does have known carotid disease, work up in September, declined invasive intervention. Also complains of dysuria since having MSA stent placed 11/21

## 2018-12-01 NOTE — H&P ADULT - PROBLEM SELECTOR PLAN 1
Admit to tele, Cont. DATP, statin, BP control, f/u lipid panel, A1C, MRI. Patient and daughter not interested in carotid surgery if that is ultimately what is recommended. They would like to cont. with medical therapy. Currently at baseline, no PT needs. No swallowing issues, fall risk, DVT-P, ambulation with assistance

## 2018-12-01 NOTE — ED PROVIDER NOTE - PMH
Bilateral carotid artery stenosis    Coronary artery disease    DM (diabetes mellitus)    HTN (hypertension)    Neuropathy    PVD (peripheral vascular disease)    Retinopathy

## 2018-12-01 NOTE — ED PROVIDER NOTE - CHPI ED SYMPTOMS NEG
no blurred vision/no confusion/no dizziness/no vomiting/no fever/no loss of consciousness/no nausea/no change in level of consciousness

## 2018-12-02 LAB
CHOLEST SERPL-MCNC: 98 MG/DL — LOW (ref 110–199)
CULTURE RESULTS: SIGNIFICANT CHANGE UP
GLUCOSE BLDC GLUCOMTR-MCNC: 118 MG/DL — HIGH (ref 70–99)
GLUCOSE BLDC GLUCOMTR-MCNC: 171 MG/DL — HIGH (ref 70–99)
GLUCOSE BLDC GLUCOMTR-MCNC: 173 MG/DL — HIGH (ref 70–99)
GLUCOSE BLDC GLUCOMTR-MCNC: 195 MG/DL — HIGH (ref 70–99)
HBA1C BLD-MCNC: 7.2 % — HIGH (ref 4–5.6)
HBA1C BLD-MCNC: 7.4 % — HIGH (ref 4–5.6)
HDLC SERPL-MCNC: 32 MG/DL — LOW
LIPID PNL WITH DIRECT LDL SERPL: 25 MG/DL — SIGNIFICANT CHANGE UP
SPECIMEN SOURCE: SIGNIFICANT CHANGE UP
TOTAL CHOLESTEROL/HDL RATIO MEASUREMENT: 3 RATIO — LOW (ref 3.3–7.1)
TRIGL SERPL-MCNC: 204 MG/DL — HIGH (ref 10–200)

## 2018-12-02 PROCEDURE — 99233 SBSQ HOSP IP/OBS HIGH 50: CPT

## 2018-12-02 PROCEDURE — 99223 1ST HOSP IP/OBS HIGH 75: CPT

## 2018-12-02 RX ADMIN — Medication 2: at 17:37

## 2018-12-02 RX ADMIN — SODIUM CHLORIDE 3 MILLILITER(S): 9 INJECTION INTRAMUSCULAR; INTRAVENOUS; SUBCUTANEOUS at 05:47

## 2018-12-02 RX ADMIN — Medication 2: at 08:51

## 2018-12-02 RX ADMIN — Medication 81 MILLIGRAM(S): at 12:18

## 2018-12-02 RX ADMIN — ATORVASTATIN CALCIUM 40 MILLIGRAM(S): 80 TABLET, FILM COATED ORAL at 21:20

## 2018-12-02 RX ADMIN — Medication 250 MILLIGRAM(S): at 17:39

## 2018-12-02 RX ADMIN — SODIUM CHLORIDE 100 MILLILITER(S): 9 INJECTION INTRAMUSCULAR; INTRAVENOUS; SUBCUTANEOUS at 19:00

## 2018-12-02 RX ADMIN — Medication 2: at 21:20

## 2018-12-02 RX ADMIN — HEPARIN SODIUM 5000 UNIT(S): 5000 INJECTION INTRAVENOUS; SUBCUTANEOUS at 13:12

## 2018-12-02 RX ADMIN — SODIUM CHLORIDE 3 MILLILITER(S): 9 INJECTION INTRAMUSCULAR; INTRAVENOUS; SUBCUTANEOUS at 13:15

## 2018-12-02 RX ADMIN — Medication 75 MILLIGRAM(S): at 17:39

## 2018-12-02 RX ADMIN — Medication 325 MILLIGRAM(S): at 12:18

## 2018-12-02 RX ADMIN — Medication 1 TABLET(S): at 12:18

## 2018-12-02 RX ADMIN — CLOPIDOGREL BISULFATE 75 MILLIGRAM(S): 75 TABLET, FILM COATED ORAL at 12:18

## 2018-12-02 RX ADMIN — Medication 75 MILLIGRAM(S): at 05:44

## 2018-12-02 RX ADMIN — Medication 250 MILLIGRAM(S): at 05:44

## 2018-12-02 RX ADMIN — SODIUM CHLORIDE 100 MILLILITER(S): 9 INJECTION INTRAMUSCULAR; INTRAVENOUS; SUBCUTANEOUS at 08:52

## 2018-12-02 RX ADMIN — HEPARIN SODIUM 5000 UNIT(S): 5000 INJECTION INTRAVENOUS; SUBCUTANEOUS at 21:20

## 2018-12-02 RX ADMIN — CEFTRIAXONE 100 GRAM(S): 500 INJECTION, POWDER, FOR SOLUTION INTRAMUSCULAR; INTRAVENOUS at 21:22

## 2018-12-02 RX ADMIN — HEPARIN SODIUM 5000 UNIT(S): 5000 INJECTION INTRAVENOUS; SUBCUTANEOUS at 05:44

## 2018-12-02 RX ADMIN — SODIUM CHLORIDE 3 MILLILITER(S): 9 INJECTION INTRAMUSCULAR; INTRAVENOUS; SUBCUTANEOUS at 21:18

## 2018-12-02 NOTE — CONSULT NOTE ADULT - ASSESSMENT
The patient is a 63y Female with prior episode of possible aphasia now with transient facial paresthesia.    Paresthesias  Resolved.    Possible TIA  Agree with plan for MRI brain.  Recent carotid duplex negative for stenosis.   Continue antiplatelet and statin.     CAD  Medical management per cardiology    Case discussed with Dr Mora.

## 2018-12-02 NOTE — PROGRESS NOTE ADULT - SUBJECTIVE AND OBJECTIVE BOX
CHIEF COMPLAINT/INTERVAL HISTORY:    Patient is a 63y old  Female who presents with a chief complaint of Right sided facial numbness, tongue numbness (01 Dec 2018 21:57)      HPI:  62 y/o female with history of CAD s/p CABG , preserved LV function, Carotid stenosis, HTN, DM-2, peripheral neuropathy s/p JEAN stent last month. Patient comes from home with Daughter c/o intermittent right face numbness and tongue numbness that started yesterday. She denies any motor weakness, HA, visual changes, falls, palpitations, or changes in medications. She does have intermittent neck stiffness which is not new. She checks her blood sugar regularly at home and its between . Denies CP, SOB, F/C. ED w/u non-diagnostic at this time and currently she has no focal weakness and NIH of 0. Of note patient was admitted to  in  for CP and while here was a code stroke and received TPA. Her symptoms resolved and her repeat head CT was neg. She was medically managed for her B/L Carotid disease and TIA. Patient also c/o dysuria, and increased frequency. Her Daughter recently started her on OTC UTI medication but can not recall name. (01 Dec 2018 21:57)      SUBJECTIVE & OBJECTIVE/ ROS: Pt seen and examined at bedside. No further numbness or tingling. No chest pain, palpitations, sob, light headedness/dizziness, difficulty breathing/cough, fevers/chills, abdominal pain, n/v, diarrhea/constipation, dysuria or increased urinary frequency.       ICU Vital Signs Last 24 Hrs  T(C): 37.3 (02 Dec 2018 11:09), Max: 37.3 (02 Dec 2018 11:09)  T(F): 99.2 (02 Dec 2018 11:09), Max: 99.2 (02 Dec 2018 11:09)  HR: 68 (02 Dec 2018 11:09) (64 - 81)  BP: 160/80 (02 Dec 2018 11:09) (142/74 - 160/80)  BP(mean): 100 (01 Dec 2018 21:57) (100 - 100)  ABP: --  ABP(mean): --  RR: 16 (02 Dec 2018 11:09) (16 - 18)  SpO2: 96% (02 Dec 2018 11:09) (96% - 100%)        MEDICATIONS  (STANDING):  aspirin enteric coated 81 milliGRAM(s) Oral daily  atorvastatin 40 milliGRAM(s) Oral at bedtime  cefTRIAXone   IVPB 1 Gram(s) IV Intermittent every 24 hours  clopidogrel Tablet 75 milliGRAM(s) Oral daily  dextrose 5%. 1000 milliLiter(s) (50 mL/Hr) IV Continuous <Continuous>  dextrose 50% Injectable 12.5 Gram(s) IV Push once  ferrous    sulfate 325 milliGRAM(s) Oral daily  heparin  Injectable 5000 Unit(s) SubCutaneous every 8 hours  insulin lispro (HumaLOG) corrective regimen sliding scale   SubCutaneous Before meals and at bedtime  metoprolol tartrate 75 milliGRAM(s) Oral two times a day  multivitamin 1 Tablet(s) Oral daily  saccharomyces boulardii 250 milliGRAM(s) Oral two times a day  sodium chloride 0.9% lock flush 3 milliLiter(s) IV Push every 8 hours  sodium chloride 0.9%. 1000 milliLiter(s) (100 mL/Hr) IV Continuous <Continuous>    MEDICATIONS  (PRN):  dextrose 40% Gel 15 Gram(s) Oral once PRN Blood Glucose LESS THAN 70 milliGRAM(s)/deciliter  glucagon  Injectable 1 milliGRAM(s) IntraMuscular once PRN Glucose LESS THAN 70 milligrams/deciliter      LABS:                        14.0   7.2   )-----------( 245      ( 01 Dec 2018 14:25 )             43.8     12    140  |  102  |  26.0<H>  ----------------------------<  260<H>  5.1   |  25.0  |  1.16    Ca    9.5      01 Dec 2018 14:25  Phos  3.2       Mg     2.1         TPro  7.2  /  Alb  4.0  /  TBili  <0.2<L>  /  DBili  x   /  AST  13  /  ALT  9   /  AlkPhos  83      PT/INR - ( 01 Dec 2018 15:03 )   PT: 11.1 sec;   INR: 0.97 ratio         PTT - ( 01 Dec 2018 15:03 )  PTT:31.5 sec  Urinalysis Basic - ( 01 Dec 2018 14:38 )    Color: Yellow / Appearance: Clear / S.010 / pH: x  Gluc: x / Ketone: Negative  / Bili: Negative / Urobili: Negative mg/dL   Blood: x / Protein: 30 mg/dL / Nitrite: Negative   Leuk Esterase: Moderate / RBC: 25-50 /HPF / WBC >50   Sq Epi: x / Non Sq Epi: Occasional / Bacteria: Few        CAPILLARY BLOOD GLUCOSE      POCT Blood Glucose.: 118 mg/dL (02 Dec 2018 12:31)  POCT Blood Glucose.: 173 mg/dL (02 Dec 2018 08:02)  POCT Blood Glucose.: 180 mg/dL (01 Dec 2018 22:26)      RECENT CULTURES:      RADIOLOGY & ADDITIONAL TESTS:      PHYSICAL EXAM:      GENERAL: NAD, well-groomed, well-developed  HEAD:  Atraumatic, Normocephalic  EYES: EOMI, PERRLA, conjunctiva and sclera clear  NECK: Supple, No JVD  NERVOUS SYSTEM:  Alert & Oriented X3, Motor Strength 5/5 B/L upper and lower extremities; DTRs 2+ intact and symmetric  CHEST/LUNG: Clear to percussion bilaterally; No rales, rhonchi, wheezing, or rubs  HEART: Regular rate and rhythm; No murmurs, rubs, or gallops  ABDOMEN: Soft, Nontender, Nondistended; Bowel sounds present  EXTREMITIES:  2+ Peripheral Pulses, No clubbing, cyanosis, or edema  SKIN: No rashes or lesions CHIEF COMPLAINT/INTERVAL HISTORY:    Patient is a 63y old  Female who presents with a chief complaint of Right sided facial numbness, tongue numbness (01 Dec 2018 21:57)      HPI:  62 y/o female with history of CAD s/p CABG , preserved LV function, Carotid stenosis, HTN, DM-2, peripheral neuropathy s/p JEAN stent last month. Patient comes from home with Daughter c/o intermittent right face numbness and tongue numbness that started yesterday. She denies any motor weakness, HA, visual changes, falls, palpitations, or changes in medications. She does have intermittent neck stiffness which is not new. She checks her blood sugar regularly at home and its between . Denies CP, SOB, F/C. ED w/u non-diagnostic at this time and currently she has no focal weakness and NIH of 0. Of note patient was admitted to  in  for CP and while here was a code stroke and received TPA. Her symptoms resolved and her repeat head CT was neg. She was medically managed for her B/L Carotid disease and TIA. Patient also c/o dysuria, and increased frequency. Her Daughter recently started her on OTC UTI medication but can not recall name. (01 Dec 2018 21:57)      SUBJECTIVE & OBJECTIVE/ ROS: Pt seen and examined at bedside. No further numbness or tingling. No chest pain, palpitations, sob, light headedness/dizziness, difficulty breathing/cough, fevers/chills, abdominal pain, n/v, diarrhea/constipation, dysuria or increased urinary frequency.       ICU Vital Signs Last 24 Hrs  T(C): 37.3 (02 Dec 2018 11:09), Max: 37.3 (02 Dec 2018 11:09)  T(F): 99.2 (02 Dec 2018 11:09), Max: 99.2 (02 Dec 2018 11:09)  HR: 68 (02 Dec 2018 11:09) (64 - 81)  BP: 160/80 (02 Dec 2018 11:09) (142/74 - 160/80)  BP(mean): 100 (01 Dec 2018 21:57) (100 - 100)  ABP: --  ABP(mean): --  RR: 16 (02 Dec 2018 11:09) (16 - 18)  SpO2: 96% (02 Dec 2018 11:09) (96% - 100%)        MEDICATIONS  (STANDING):  aspirin enteric coated 81 milliGRAM(s) Oral daily  atorvastatin 40 milliGRAM(s) Oral at bedtime  cefTRIAXone   IVPB 1 Gram(s) IV Intermittent every 24 hours  clopidogrel Tablet 75 milliGRAM(s) Oral daily  dextrose 5%. 1000 milliLiter(s) (50 mL/Hr) IV Continuous <Continuous>  dextrose 50% Injectable 12.5 Gram(s) IV Push once  ferrous    sulfate 325 milliGRAM(s) Oral daily  heparin  Injectable 5000 Unit(s) SubCutaneous every 8 hours  insulin lispro (HumaLOG) corrective regimen sliding scale   SubCutaneous Before meals and at bedtime  metoprolol tartrate 75 milliGRAM(s) Oral two times a day  multivitamin 1 Tablet(s) Oral daily  saccharomyces boulardii 250 milliGRAM(s) Oral two times a day  sodium chloride 0.9% lock flush 3 milliLiter(s) IV Push every 8 hours  sodium chloride 0.9%. 1000 milliLiter(s) (100 mL/Hr) IV Continuous <Continuous>    MEDICATIONS  (PRN):  dextrose 40% Gel 15 Gram(s) Oral once PRN Blood Glucose LESS THAN 70 milliGRAM(s)/deciliter  glucagon  Injectable 1 milliGRAM(s) IntraMuscular once PRN Glucose LESS THAN 70 milligrams/deciliter      LABS:                        14.0   7.2   )-----------( 245      ( 01 Dec 2018 14:25 )             43.8     12    140  |  102  |  26.0<H>  ----------------------------<  260<H>  5.1   |  25.0  |  1.16    Ca    9.5      01 Dec 2018 14:25  Phos  3.2       Mg     2.1         TPro  7.2  /  Alb  4.0  /  TBili  <0.2<L>  /  DBili  x   /  AST  13  /  ALT  9   /  AlkPhos  83      PT/INR - ( 01 Dec 2018 15:03 )   PT: 11.1 sec;   INR: 0.97 ratio         PTT - ( 01 Dec 2018 15:03 )  PTT:31.5 sec  Urinalysis Basic - ( 01 Dec 2018 14:38 )    Color: Yellow / Appearance: Clear / S.010 / pH: x  Gluc: x / Ketone: Negative  / Bili: Negative / Urobili: Negative mg/dL   Blood: x / Protein: 30 mg/dL / Nitrite: Negative   Leuk Esterase: Moderate / RBC: 25-50 /HPF / WBC >50   Sq Epi: x / Non Sq Epi: Occasional / Bacteria: Few        CAPILLARY BLOOD GLUCOSE      POCT Blood Glucose.: 118 mg/dL (02 Dec 2018 12:31)  POCT Blood Glucose.: 173 mg/dL (02 Dec 2018 08:02)  POCT Blood Glucose.: 180 mg/dL (01 Dec 2018 22:26)      RECENT CULTURES:      RADIOLOGY & ADDITIONAL TESTS:      PHYSICAL EXAM:      GENERAL: NAD, well-groomed, well-developed  HEAD:  Atraumatic, Normocephalic  EYES: EOMI, PERRLA, conjunctiva and sclera clear  NECK: Supple, No JVD  NERVOUS SYSTEM:  Alert & Oriented X3, Motor Strength 5/5 B/L upper and lower extremities; DTRs 2+ intact and symmetric, no facial numbness, sensation intact  CHEST/LUNG: Clear to percussion bilaterally; No rales, rhonchi, wheezing, or rubs  HEART: Regular rate and rhythm; No murmurs, rubs, or gallops  ABDOMEN: Soft, Nontender, Nondistended; Bowel sounds present  EXTREMITIES:  2+ Peripheral Pulses, No clubbing, cyanosis, or edema  SKIN: No rashes or lesions

## 2018-12-02 NOTE — CONSULT NOTE ADULT - SUBJECTIVE AND OBJECTIVE BOX
[facility]                                        Neurology at [place]                                  Mir Cabrera, & Ned                                      [address]                                           [place], [place], [address]                                                [phone]        CC: Paresthesias of the right side of the face.    HISTORY:  The patient is a 63y Female known to me from prior admission in 2018. She went for cardiac cath and after the procedure became aphasic. Code stroke was activated and she received t-PA.   She was now admitted 18 with symptoms of right face and tongue numbness and tingling which has subsequently resolved. She is not clear how long it lasted. It was mild to moderate in intensity. There was no associated speech difficulty or hemiparesis.     PAST MEDICAL & SURGICAL HISTORY:  Bilateral carotid artery stenosis  PVD (peripheral vascular disease)  Coronary artery disease  Retinopathy  Neuropathy  DM (diabetes mellitus)  HTN (hypertension)  S/P CABG (coronary artery bypass graft)  History of cholecystectomy    MEDICATIONS  (STANDING):  aspirin enteric coated 81 milliGRAM(s) Oral daily  atorvastatin 40 milliGRAM(s) Oral at bedtime  cefTRIAXone   IVPB 1 Gram(s) IV Intermittent every 24 hours  clopidogrel Tablet 75 milliGRAM(s) Oral daily  dextrose 5%. 1000 milliLiter(s) (50 mL/Hr) IV Continuous <Continuous>  dextrose 50% Injectable 12.5 Gram(s) IV Push once  ferrous    sulfate 325 milliGRAM(s) Oral daily  heparin  Injectable 5000 Unit(s) SubCutaneous every 8 hours  insulin lispro (HumaLOG) corrective regimen sliding scale   SubCutaneous Before meals and at bedtime  metoprolol tartrate 75 milliGRAM(s) Oral two times a day  multivitamin 1 Tablet(s) Oral daily  saccharomyces boulardii 250 milliGRAM(s) Oral two times a day  sodium chloride 0.9% lock flush 3 milliLiter(s) IV Push every 8 hours  sodium chloride 0.9%. 1000 milliLiter(s) (100 mL/Hr) IV Continuous <Continuous>    MEDICATIONS  (PRN):  dextrose 40% Gel 15 Gram(s) Oral once PRN Blood Glucose LESS THAN 70 milliGRAM(s)/deciliter  glucagon  Injectable 1 milliGRAM(s) IntraMuscular once PRN Glucose LESS THAN 70 milligrams/deciliter    Allergies  No Known Allergies    SOCIAL HISTORY:  Non smoker.     FAMILY HISTORY:  Mother/Father ; No history of CVA.  Family history of diabetes mellitus (Sibling)  Family history of coronary arteriosclerosis (Sibling): both brothers had CABG.  Coronary artery disease (Sibling): pre mature CAD    ROS:  Constitutional: The patient denies fevers or weight changes.  Neuro: As per HPI.  Eyes: Denies blurry vision.  Ears/nose/throat: Denies Tinnitus.   Cardiac: Denies chest pain. Denies palpitations.  Respiratory: Denies shortness of breath.  GI: Denies abdominal pain, nausea, or vomiting.  : Denies change in urinary pattern.  Integumentary: Denies rash.  Psych: Denies recent mood changes.  Heme: denies easy bleeding/bruising.    Exam:  Vital Signs Last 24 Hrs  T(C): 37.3 (02 Dec 2018 11:09), Max: 37.3 (02 Dec 2018 11:09)  T(F): 99.2 (02 Dec 2018 11:09), Max: 99.2 (02 Dec 2018 11:09)  HR: 68 (02 Dec 2018 11:09) (64 - 81)  BP: 160/80 (02 Dec 2018 11:09) (142/74 - 160/80)  BP(mean): 100 (01 Dec 2018 21:57) (100 - 100)  RR: 16 (02 Dec 2018 11:09) (16 - 18)  SpO2: 96% (02 Dec 2018 11:09) (96% - 100%)  General: NAD.   Carotid bruits absent.     Mental status: The patient is awake, alert, and fully oriented. There is no aphasia.    Cranial nerves: There is no papilledema. Pupils react symmetrically to light. There is no visual field deficit to confrontation. Extraocular motion is full with no nystagmus. There is no ptosis. Facial sensation is intact. Facial musculature is symmetric. Palate elevates symmetrically. Tongue is midline.    Motor: There is normal bulk and tone.  Strength is 5/5 in the right arm and leg.   Strength is 5/5 in the left arm and leg.    Sensation: Intact to light touch and pin.    Reflexes: 2+ throughout and plantar responses are flexor.    Cerebellar: There is no dysmetria on finger to nose testing.    LABS:                         14.0   7.2   )-----------( 245      ( 01 Dec 2018 14:25 )             43.8         140  |  102  |  26.0<H>  ----------------------------<  260<H>  5.1   |  25.0  |  1.16    Ca    9.5      01 Dec 2018 14:25  Phos  3.2       Mg     2.1         TPro  7.2  /  Alb  4.0  /  TBili  <0.2<L>  /  DBili  x   /  AST  13  /  ALT  9   /  AlkPhos  83        PT/INR - ( 01 Dec 2018 15:03 )   PT: 11.1 sec;   INR: 0.97 ratio    PTT - ( 01 Dec 2018 15:03 )  PTT:31.5 sec    RADIOLOGY   CT head images reviewed (and concur with report): There is no acute pathology. There is chronic ischemic change.

## 2018-12-02 NOTE — PROGRESS NOTE ADULT - PROBLEM SELECTOR PLAN 1
Cont. DATP, statin, BP control, f/u lipid panel, A1C, MRI. Patient and daughter not interested in carotid surgery if that is ultimately what is recommended. They would like to cont. with medical therapy. Currently at baseline, no PT needs. No swallowing issues, fall risk, DVT-P, ambulation with assistance  Pt has aphasia on last admission with ?TIA, was given TPA with sx resolution & MRI negative.   Neuro consult called.

## 2018-12-03 ENCOUNTER — APPOINTMENT (OUTPATIENT)
Dept: UROLOGY | Facility: HOSPITAL | Age: 63
End: 2018-12-03

## 2018-12-03 LAB
ANION GAP SERPL CALC-SCNC: 14 MMOL/L — SIGNIFICANT CHANGE UP (ref 5–17)
BASOPHILS # BLD AUTO: 0.1 K/UL — SIGNIFICANT CHANGE UP (ref 0–0.2)
BASOPHILS NFR BLD AUTO: 1.1 % — SIGNIFICANT CHANGE UP (ref 0–2)
BUN SERPL-MCNC: 16 MG/DL — SIGNIFICANT CHANGE UP (ref 8–20)
CALCIUM SERPL-MCNC: 9.3 MG/DL — SIGNIFICANT CHANGE UP (ref 8.6–10.2)
CHLORIDE SERPL-SCNC: 108 MMOL/L — HIGH (ref 98–107)
CO2 SERPL-SCNC: 20 MMOL/L — LOW (ref 22–29)
CREAT SERPL-MCNC: 0.91 MG/DL — SIGNIFICANT CHANGE UP (ref 0.5–1.3)
EOSINOPHIL # BLD AUTO: 0.3 K/UL — SIGNIFICANT CHANGE UP (ref 0–0.5)
EOSINOPHIL NFR BLD AUTO: 5.4 % — SIGNIFICANT CHANGE UP (ref 0–6)
GLUCOSE BLDC GLUCOMTR-MCNC: 112 MG/DL — HIGH (ref 70–99)
GLUCOSE BLDC GLUCOMTR-MCNC: 113 MG/DL — HIGH (ref 70–99)
GLUCOSE BLDC GLUCOMTR-MCNC: 166 MG/DL — HIGH (ref 70–99)
GLUCOSE BLDC GLUCOMTR-MCNC: 187 MG/DL — HIGH (ref 70–99)
GLUCOSE BLDC GLUCOMTR-MCNC: 235 MG/DL — HIGH (ref 70–99)
GLUCOSE SERPL-MCNC: 125 MG/DL — HIGH (ref 70–115)
HCT VFR BLD CALC: 42.1 % — SIGNIFICANT CHANGE UP (ref 37–47)
HGB BLD-MCNC: 13.8 G/DL — SIGNIFICANT CHANGE UP (ref 12–16)
LYMPHOCYTES # BLD AUTO: 2 K/UL — SIGNIFICANT CHANGE UP (ref 1–4.8)
LYMPHOCYTES # BLD AUTO: 32.3 % — SIGNIFICANT CHANGE UP (ref 20–55)
MAGNESIUM SERPL-MCNC: 1.6 MG/DL — LOW (ref 1.8–2.6)
MCHC RBC-ENTMCNC: 26.1 PG — LOW (ref 27–31)
MCHC RBC-ENTMCNC: 32.8 G/DL — SIGNIFICANT CHANGE UP (ref 32–36)
MCV RBC AUTO: 79.7 FL — LOW (ref 81–99)
MONOCYTES # BLD AUTO: 0.6 K/UL — SIGNIFICANT CHANGE UP (ref 0–0.8)
MONOCYTES NFR BLD AUTO: 9.6 % — SIGNIFICANT CHANGE UP (ref 3–10)
NEUTROPHILS # BLD AUTO: 3.2 K/UL — SIGNIFICANT CHANGE UP (ref 1.8–8)
NEUTROPHILS NFR BLD AUTO: 51.4 % — SIGNIFICANT CHANGE UP (ref 37–73)
PHOSPHATE SERPL-MCNC: 2.3 MG/DL — LOW (ref 2.4–4.7)
PLATELET # BLD AUTO: 244 K/UL — SIGNIFICANT CHANGE UP (ref 150–400)
POTASSIUM SERPL-MCNC: 4.4 MMOL/L — SIGNIFICANT CHANGE UP (ref 3.5–5.3)
POTASSIUM SERPL-SCNC: 4.4 MMOL/L — SIGNIFICANT CHANGE UP (ref 3.5–5.3)
RBC # BLD: 5.28 M/UL — HIGH (ref 4.4–5.2)
RBC # FLD: 18 % — HIGH (ref 11–15.6)
SODIUM SERPL-SCNC: 142 MMOL/L — SIGNIFICANT CHANGE UP (ref 135–145)
VIT B12 SERPL-MCNC: 363 PG/ML — SIGNIFICANT CHANGE UP (ref 232–1245)
WBC # BLD: 6.2 K/UL — SIGNIFICANT CHANGE UP (ref 4.8–10.8)
WBC # FLD AUTO: 6.2 K/UL — SIGNIFICANT CHANGE UP (ref 4.8–10.8)

## 2018-12-03 PROCEDURE — 99232 SBSQ HOSP IP/OBS MODERATE 35: CPT

## 2018-12-03 PROCEDURE — 70551 MRI BRAIN STEM W/O DYE: CPT | Mod: 26

## 2018-12-03 PROCEDURE — 70540 MRI ORBIT/FACE/NECK W/O DYE: CPT | Mod: 26

## 2018-12-03 RX ORDER — MAGNESIUM SULFATE 500 MG/ML
1 VIAL (ML) INJECTION EVERY 6 HOURS
Qty: 0 | Refills: 0 | Status: COMPLETED | OUTPATIENT
Start: 2018-12-03 | End: 2018-12-03

## 2018-12-03 RX ORDER — OXYCODONE AND ACETAMINOPHEN 5; 325 MG/1; MG/1
1 TABLET ORAL ONCE
Qty: 0 | Refills: 0 | Status: DISCONTINUED | OUTPATIENT
Start: 2018-12-03 | End: 2018-12-03

## 2018-12-03 RX ORDER — PREGABALIN 225 MG/1
1000 CAPSULE ORAL DAILY
Qty: 0 | Refills: 0 | Status: DISCONTINUED | OUTPATIENT
Start: 2018-12-03 | End: 2018-12-04

## 2018-12-03 RX ORDER — GABAPENTIN 400 MG/1
100 CAPSULE ORAL
Qty: 0 | Refills: 0 | Status: DISCONTINUED | OUTPATIENT
Start: 2018-12-03 | End: 2018-12-04

## 2018-12-03 RX ORDER — SODIUM,POTASSIUM PHOSPHATES 278-250MG
1 POWDER IN PACKET (EA) ORAL
Qty: 0 | Refills: 0 | Status: COMPLETED | OUTPATIENT
Start: 2018-12-03 | End: 2018-12-03

## 2018-12-03 RX ADMIN — CEFTRIAXONE 100 GRAM(S): 500 INJECTION, POWDER, FOR SOLUTION INTRAMUSCULAR; INTRAVENOUS at 21:39

## 2018-12-03 RX ADMIN — Medication 81 MILLIGRAM(S): at 11:28

## 2018-12-03 RX ADMIN — Medication 4: at 12:45

## 2018-12-03 RX ADMIN — HEPARIN SODIUM 5000 UNIT(S): 5000 INJECTION INTRAVENOUS; SUBCUTANEOUS at 05:02

## 2018-12-03 RX ADMIN — Medication 1 TABLET(S): at 09:12

## 2018-12-03 RX ADMIN — Medication 2: at 17:09

## 2018-12-03 RX ADMIN — Medication 75 MILLIGRAM(S): at 05:01

## 2018-12-03 RX ADMIN — OXYCODONE AND ACETAMINOPHEN 1 TABLET(S): 5; 325 TABLET ORAL at 04:58

## 2018-12-03 RX ADMIN — SODIUM CHLORIDE 100 MILLILITER(S): 9 INJECTION INTRAMUSCULAR; INTRAVENOUS; SUBCUTANEOUS at 11:28

## 2018-12-03 RX ADMIN — CLOPIDOGREL BISULFATE 75 MILLIGRAM(S): 75 TABLET, FILM COATED ORAL at 11:28

## 2018-12-03 RX ADMIN — HEPARIN SODIUM 5000 UNIT(S): 5000 INJECTION INTRAVENOUS; SUBCUTANEOUS at 14:41

## 2018-12-03 RX ADMIN — Medication 100 GRAM(S): at 09:13

## 2018-12-03 RX ADMIN — OXYCODONE AND ACETAMINOPHEN 1 TABLET(S): 5; 325 TABLET ORAL at 05:28

## 2018-12-03 RX ADMIN — HEPARIN SODIUM 5000 UNIT(S): 5000 INJECTION INTRAVENOUS; SUBCUTANEOUS at 21:39

## 2018-12-03 RX ADMIN — Medication 2: at 21:40

## 2018-12-03 RX ADMIN — Medication 250 MILLIGRAM(S): at 05:02

## 2018-12-03 RX ADMIN — Medication 250 MILLIGRAM(S): at 17:05

## 2018-12-03 RX ADMIN — GABAPENTIN 100 MILLIGRAM(S): 400 CAPSULE ORAL at 17:05

## 2018-12-03 RX ADMIN — PREGABALIN 1000 MICROGRAM(S): 225 CAPSULE ORAL at 22:42

## 2018-12-03 RX ADMIN — SODIUM CHLORIDE 3 MILLILITER(S): 9 INJECTION INTRAMUSCULAR; INTRAVENOUS; SUBCUTANEOUS at 21:40

## 2018-12-03 RX ADMIN — Medication 1 TABLET(S): at 11:28

## 2018-12-03 RX ADMIN — SODIUM CHLORIDE 3 MILLILITER(S): 9 INJECTION INTRAMUSCULAR; INTRAVENOUS; SUBCUTANEOUS at 06:01

## 2018-12-03 RX ADMIN — Medication 1 TABLET(S): at 12:55

## 2018-12-03 RX ADMIN — ATORVASTATIN CALCIUM 40 MILLIGRAM(S): 80 TABLET, FILM COATED ORAL at 21:39

## 2018-12-03 RX ADMIN — Medication 325 MILLIGRAM(S): at 11:28

## 2018-12-03 RX ADMIN — Medication 75 MILLIGRAM(S): at 17:05

## 2018-12-03 RX ADMIN — SODIUM CHLORIDE 3 MILLILITER(S): 9 INJECTION INTRAMUSCULAR; INTRAVENOUS; SUBCUTANEOUS at 14:01

## 2018-12-03 RX ADMIN — Medication 100 GRAM(S): at 14:42

## 2018-12-03 NOTE — PROGRESS NOTE ADULT - PROBLEM SELECTOR PLAN 1
Cont. DATP, statin, BP control, f/u lipid panel, A1C. Patient and daughter not interested in carotid surgery if that is ultimately what is recommended. They would like to cont. with medical therapy. Currently at baseline, no PT needs. No swallowing issues, fall risk, DVT-P, ambulation with assistance  Pt has aphasia on last admission with ?TIA, was given TPA with sx resolution & MRI negative.   Neuro consult called.  MRI brain ordered, pt has a hx of recent JEAN stent done by Vascular sx, called pt's vascular surgeon Dr. Miranda at [facility] who confirmed that the stent is compatible with MRI, MRI tech & RN notified Cont. DATP, statin, BP control, f/u lipid panel, A1C. Patient and daughter not interested in carotid surgery if that is ultimately what is recommended. They would like to cont. with medical therapy. Currently at baseline, no PT needs. No swallowing issues, fall risk, DVT-P, ambulation with assistance  Pt has aphasia on last admission with ?TIA, was given TPA with sx resolution & MRI negative.   Neuro consult called.  MRI brain ordered, pt has a hx of recent JEAN stent done by Vascular s[facility], called pt's vascular surgeon Dr. Miranda at [facility] who confirmed that the stent is compatible with MRI, MRI tech & RN notified  Facial & LE paresthesias- B12 levels low normal, will treat with PO supplementation, recheck levels in 3 months

## 2018-12-03 NOTE — PROGRESS NOTE ADULT - SUBJECTIVE AND OBJECTIVE BOX
CHIEF COMPLAINT/INTERVAL HISTORY:    Patient is a 63y old  Female who presents with a chief complaint of Right sided facial numbness, tongue numbness (03 Dec 2018 10:55)      HPI:  62 y/o female with history of CAD s/p CABG 8/18, preserved LV function, Carotid stenosis, HTN, DM-2, peripheral neuropathy s/p JEAN stent last month. Patient comes from home with Daughter c/o intermittent right face numbness and tongue numbness that started yesterday. She denies any motor weakness, HA, visual changes, falls, palpitations, or changes in medications. She does have intermittent neck stiffness which is not new. She checks her blood sugar regularly at home and its between . Denies CP, SOB, F/C. ED w/u non-diagnostic at this time and currently she has no focal weakness and NIH of 0. Of note patient was admitted to  in 8/18 for CP and while here was a code stroke and received TPA. Her symptoms resolved and her repeat head CT was neg. She was medically managed for her B/L Carotid disease and TIA. Patient also c/o dysuria, and increased frequency. Her Daughter recently started her on OTC UTI medication but can not recall name. (01 Dec 2018 21:57)      SUBJECTIVE & OBJECTIVE/ ROS: Pt seen and examined at bedside. No furtehr facial numbness. c/o paresthesias on b/l feet overnight. No chest pain, palpitations, sob, light headedness/dizziness, difficulty breathing/cough, fevers/chills, abdominal pain, n/v, diarrhea/constipation, dysuria or increased urinary frequency.     ICU Vital Signs Last 24 Hrs  T(C): 36.7 (03 Dec 2018 15:12), Max: 37 (02 Dec 2018 21:10)  T(F): 98 (03 Dec 2018 15:12), Max: 98.6 (02 Dec 2018 21:10)  HR: 73 (03 Dec 2018 17:06) (70 - 91)  BP: 174/68 (03 Dec 2018 17:06) (128/70 - 174/68)  BP(mean): --  ABP: --  ABP(mean): --  RR: 17 (03 Dec 2018 15:12) (16 - 17)  SpO2: 96% (03 Dec 2018 10:50) (96% - 99%)        MEDICATIONS  (STANDING):  aspirin enteric coated 81 milliGRAM(s) Oral daily  atorvastatin 40 milliGRAM(s) Oral at bedtime  cefTRIAXone   IVPB 1 Gram(s) IV Intermittent every 24 hours  clopidogrel Tablet 75 milliGRAM(s) Oral daily  dextrose 5%. 1000 milliLiter(s) (50 mL/Hr) IV Continuous <Continuous>  dextrose 50% Injectable 12.5 Gram(s) IV Push once  ferrous    sulfate 325 milliGRAM(s) Oral daily  gabapentin 100 milliGRAM(s) Oral two times a day  heparin  Injectable 5000 Unit(s) SubCutaneous every 8 hours  insulin lispro (HumaLOG) corrective regimen sliding scale   SubCutaneous Before meals and at bedtime  metoprolol tartrate 75 milliGRAM(s) Oral two times a day  multivitamin 1 Tablet(s) Oral daily  saccharomyces boulardii 250 milliGRAM(s) Oral two times a day  sodium chloride 0.9% lock flush 3 milliLiter(s) IV Push every 8 hours    MEDICATIONS  (PRN):  dextrose 40% Gel 15 Gram(s) Oral once PRN Blood Glucose LESS THAN 70 milliGRAM(s)/deciliter  glucagon  Injectable 1 milliGRAM(s) IntraMuscular once PRN Glucose LESS THAN 70 milligrams/deciliter      LABS:                        13.8   6.2   )-----------( 244      ( 03 Dec 2018 05:58 )             42.1     12-03    142  |  108<H>  |  16.0  ----------------------------<  125<H>  4.4   |  20.0<L>  |  0.91    Ca    9.3      03 Dec 2018 05:58  Phos  2.3     12-03  Mg     1.6     12-03            CAPILLARY BLOOD GLUCOSE      POCT Blood Glucose.: 187 mg/dL (03 Dec 2018 17:08)  POCT Blood Glucose.: 235 mg/dL (03 Dec 2018 12:15)  POCT Blood Glucose.: 112 mg/dL (03 Dec 2018 08:06)  POCT Blood Glucose.: 113 mg/dL (03 Dec 2018 05:55)  POCT Blood Glucose.: 171 mg/dL (02 Dec 2018 20:55)      RECENT CULTURES:      RADIOLOGY & ADDITIONAL TESTS:      PHYSICAL EXAM:  GENERAL: NAD, well-groomed, well-developed  HEAD:  Atraumatic, Normocephalic  EYES: EOMI, PERRLA, conjunctiva and sclera clear  NECK: Supple, No JVD  NERVOUS SYSTEM:  Alert & Oriented X3, Motor Strength 5/5 B/L upper and lower extremities; DTRs 2+ intact and symmetric, no facial numbness, sensation intact  CHEST/LUNG: Clear to percussion bilaterally; No rales, rhonchi, wheezing, or rubs  HEART: Regular rate and rhythm; No murmurs, rubs, or gallops  ABDOMEN: Soft, Nontender, Nondistended; Bowel sounds present  EXTREMITIES:  2+ Peripheral Pulses, No clubbing, cyanosis, or edema  SKIN: No rashes or lesions

## 2018-12-03 NOTE — PROGRESS NOTE ADULT - SUBJECTIVE AND OBJECTIVE BOX
[facility]                                        Neurology at [place]                                 Mir Cabrera, & Ned                                  [address]                                        [place], [place], [address]                                             [phone]        CC: Paresthesias of the right side of the face.    HPI:   The patient is a 63y Female known to me from prior admission in September 2018. She went for cardiac cath and after the procedure became aphasic. Code stroke was activated and she received t-PA.   She was now admitted 12/1/18 with symptoms of right face and tongue numbness and tingling which has subsequently resolved. She is not clear how long it lasted. It was mild to moderate in intensity. There was no associated speech difficulty or hemiparesis.     Interim history:  No new neurologic symptoms.    ROS:   Denies headache or dizziness.  Denies chest pain.  Denies shortness of breath.    MEDICATIONS  (STANDING):  aspirin enteric coated 81 milliGRAM(s) Oral daily  atorvastatin 40 milliGRAM(s) Oral at bedtime  cefTRIAXone   IVPB 1 Gram(s) IV Intermittent every 24 hours  clopidogrel Tablet 75 milliGRAM(s) Oral daily  dextrose 5%. 1000 milliLiter(s) (50 mL/Hr) IV Continuous <Continuous>  dextrose 50% Injectable 12.5 Gram(s) IV Push once  ferrous    sulfate 325 milliGRAM(s) Oral daily  heparin  Injectable 5000 Unit(s) SubCutaneous every 8 hours  insulin lispro (HumaLOG) corrective regimen sliding scale   SubCutaneous Before meals and at bedtime  magnesium sulfate  IVPB 1 Gram(s) IV Intermittent every 6 hours  metoprolol tartrate 75 milliGRAM(s) Oral two times a day  multivitamin 1 Tablet(s) Oral daily  potassium acid phosphate/sodium acid phosphate tablet (K-PHOS No. 2) 1 Tablet(s) Oral four times a day with meals  saccharomyces boulardii 250 milliGRAM(s) Oral two times a day  sodium chloride 0.9% lock flush 3 milliLiter(s) IV Push every 8 hours  sodium chloride 0.9%. 1000 milliLiter(s) (100 mL/Hr) IV Continuous <Continuous>      Vital Signs Last 24 Hrs  T(C): 36.4 (03 Dec 2018 10:50), Max: 37.3 (02 Dec 2018 11:09)  T(F): 97.5 (03 Dec 2018 10:50), Max: 99.2 (02 Dec 2018 11:09)  HR: 91 (03 Dec 2018 10:50) (68 - 91)  BP: 145/63 (03 Dec 2018 10:50) (128/70 - 160/80)  RR: 16 (03 Dec 2018 10:50) (16 - 17)  SpO2: 96% (03 Dec 2018 10:50) (96% - 99%)    Detailed Neurologic Exam:    Mental status: The patient is awake and alert. There is no aphasia. There is no dysarthria.     Cranial nerves: Pupils equal and react symmetrically to light. There is no visual field deficit to threat. Extraocular motion is full with no nystagmus. There is no ptosis. Facial sensation is intact. Facial musculature is symmetric. Palate elevates symmetrically. Tongue is midline.    Motor: There is normal bulk and tone.  There is no tremor.  Strength grossly 5/5 bilaterally.    Sensation: Grossly intact to light touch and pin.    Reflexes: 2+ throughout and plantar responses are flexor.    Cerebellar: No dysmetria on finger nose testing.    Labs:     12-03    142  |  108<H>  |  16.0  ----------------------------<  125<H>  4.4   |  20.0<L>  |  0.91    Ca    9.3      03 Dec 2018 05:58  Phos  2.3     12-03  Mg     1.6     12-03    TPro  7.2  /  Alb  4.0  /  TBili  <0.2<L>  /  DBili  x   /  AST  13  /  ALT  9   /  AlkPhos  83  12-01                            13.8   6.2   )-----------( 244      ( 03 Dec 2018 05:58 )             42.1

## 2018-12-03 NOTE — PROGRESS NOTE ADULT - ASSESSMENT
63y Female with prior episode of possible aphasia now with transient facial paresthesia.    Paresthesias  Resolved.    Possible TIA  Awaiting MRI brain.  Recent carotid duplex negative for stenosis.   Continue antiplatelet and statin.     CAD  Medical management per cardiology    Case discussed with Dr Mora.

## 2018-12-04 ENCOUNTER — APPOINTMENT (OUTPATIENT)
Dept: MRI IMAGING | Facility: CLINIC | Age: 63
End: 2018-12-04

## 2018-12-04 ENCOUNTER — TRANSCRIPTION ENCOUNTER (OUTPATIENT)
Age: 63
End: 2018-12-04

## 2018-12-04 VITALS
HEART RATE: 71 BPM | RESPIRATION RATE: 16 BRPM | SYSTOLIC BLOOD PRESSURE: 121 MMHG | TEMPERATURE: 98 F | OXYGEN SATURATION: 96 % | DIASTOLIC BLOOD PRESSURE: 74 MMHG

## 2018-12-04 LAB
ANION GAP SERPL CALC-SCNC: 10 MMOL/L — SIGNIFICANT CHANGE UP (ref 5–17)
BASOPHILS # BLD AUTO: 0.1 K/UL — SIGNIFICANT CHANGE UP (ref 0–0.2)
BASOPHILS NFR BLD AUTO: 0.9 % — SIGNIFICANT CHANGE UP (ref 0–2)
BUN SERPL-MCNC: 16 MG/DL — SIGNIFICANT CHANGE UP (ref 8–20)
CALCIUM SERPL-MCNC: 9.4 MG/DL — SIGNIFICANT CHANGE UP (ref 8.6–10.2)
CHLORIDE SERPL-SCNC: 108 MMOL/L — HIGH (ref 98–107)
CO2 SERPL-SCNC: 23 MMOL/L — SIGNIFICANT CHANGE UP (ref 22–29)
CREAT SERPL-MCNC: 0.9 MG/DL — SIGNIFICANT CHANGE UP (ref 0.5–1.3)
EOSINOPHIL # BLD AUTO: 0.4 K/UL — SIGNIFICANT CHANGE UP (ref 0–0.5)
EOSINOPHIL NFR BLD AUTO: 5.7 % — SIGNIFICANT CHANGE UP (ref 0–6)
GLUCOSE BLDC GLUCOMTR-MCNC: 112 MG/DL — HIGH (ref 70–99)
GLUCOSE BLDC GLUCOMTR-MCNC: 139 MG/DL — HIGH (ref 70–99)
GLUCOSE BLDC GLUCOMTR-MCNC: 257 MG/DL — HIGH (ref 70–99)
GLUCOSE SERPL-MCNC: 107 MG/DL — SIGNIFICANT CHANGE UP (ref 70–115)
HCT VFR BLD CALC: 41.5 % — SIGNIFICANT CHANGE UP (ref 37–47)
HGB BLD-MCNC: 13.8 G/DL — SIGNIFICANT CHANGE UP (ref 12–16)
LYMPHOCYTES # BLD AUTO: 1.9 K/UL — SIGNIFICANT CHANGE UP (ref 1–4.8)
LYMPHOCYTES # BLD AUTO: 28.2 % — SIGNIFICANT CHANGE UP (ref 20–55)
MAGNESIUM SERPL-MCNC: 2.2 MG/DL — SIGNIFICANT CHANGE UP (ref 1.6–2.6)
MCHC RBC-ENTMCNC: 26.7 PG — LOW (ref 27–31)
MCHC RBC-ENTMCNC: 33.3 G/DL — SIGNIFICANT CHANGE UP (ref 32–36)
MCV RBC AUTO: 80.3 FL — LOW (ref 81–99)
MONOCYTES # BLD AUTO: 0.7 K/UL — SIGNIFICANT CHANGE UP (ref 0–0.8)
MONOCYTES NFR BLD AUTO: 10.8 % — HIGH (ref 3–10)
NEUTROPHILS # BLD AUTO: 3.7 K/UL — SIGNIFICANT CHANGE UP (ref 1.8–8)
NEUTROPHILS NFR BLD AUTO: 54.1 % — SIGNIFICANT CHANGE UP (ref 37–73)
PHOSPHATE SERPL-MCNC: 4.6 MG/DL — SIGNIFICANT CHANGE UP (ref 2.4–4.7)
PLATELET # BLD AUTO: 232 K/UL — SIGNIFICANT CHANGE UP (ref 150–400)
POTASSIUM SERPL-MCNC: 4.8 MMOL/L — SIGNIFICANT CHANGE UP (ref 3.5–5.3)
POTASSIUM SERPL-SCNC: 4.8 MMOL/L — SIGNIFICANT CHANGE UP (ref 3.5–5.3)
RBC # BLD: 5.17 M/UL — SIGNIFICANT CHANGE UP (ref 4.4–5.2)
RBC # FLD: 18 % — HIGH (ref 11–15.6)
SODIUM SERPL-SCNC: 141 MMOL/L — SIGNIFICANT CHANGE UP (ref 135–145)
WBC # BLD: 6.9 K/UL — SIGNIFICANT CHANGE UP (ref 4.8–10.8)
WBC # FLD AUTO: 6.9 K/UL — SIGNIFICANT CHANGE UP (ref 4.8–10.8)

## 2018-12-04 PROCEDURE — 99239 HOSP IP/OBS DSCHRG MGMT >30: CPT

## 2018-12-04 PROCEDURE — 99233 SBSQ HOSP IP/OBS HIGH 50: CPT

## 2018-12-04 RX ORDER — PREGABALIN 225 MG/1
1 CAPSULE ORAL
Qty: 30 | Refills: 0 | OUTPATIENT
Start: 2018-12-04 | End: 2019-01-02

## 2018-12-04 RX ORDER — ATORVASTATIN CALCIUM 80 MG/1
1 TABLET, FILM COATED ORAL
Qty: 30 | Refills: 0 | OUTPATIENT
Start: 2018-12-04 | End: 2019-01-02

## 2018-12-04 RX ORDER — FERROUS SULFATE 325(65) MG
1 TABLET ORAL
Qty: 30 | Refills: 0 | OUTPATIENT
Start: 2018-12-04 | End: 2019-01-02

## 2018-12-04 RX ORDER — METOPROLOL TARTRATE 50 MG
1 TABLET ORAL
Qty: 0 | Refills: 0 | COMMUNITY
Start: 2018-12-04

## 2018-12-04 RX ORDER — GABAPENTIN 400 MG/1
1 CAPSULE ORAL
Qty: 60 | Refills: 0 | OUTPATIENT
Start: 2018-12-04 | End: 2019-01-02

## 2018-12-04 RX ORDER — ASCORBIC ACID 60 MG
1 TABLET,CHEWABLE ORAL
Qty: 30 | Refills: 0 | OUTPATIENT
Start: 2018-12-04 | End: 2019-01-02

## 2018-12-04 RX ADMIN — Medication 81 MILLIGRAM(S): at 11:33

## 2018-12-04 RX ADMIN — GABAPENTIN 100 MILLIGRAM(S): 400 CAPSULE ORAL at 05:38

## 2018-12-04 RX ADMIN — SODIUM CHLORIDE 3 MILLILITER(S): 9 INJECTION INTRAMUSCULAR; INTRAVENOUS; SUBCUTANEOUS at 05:46

## 2018-12-04 RX ADMIN — Medication 250 MILLIGRAM(S): at 17:13

## 2018-12-04 RX ADMIN — Medication 250 MILLIGRAM(S): at 05:38

## 2018-12-04 RX ADMIN — PREGABALIN 1000 MICROGRAM(S): 225 CAPSULE ORAL at 11:34

## 2018-12-04 RX ADMIN — Medication 75 MILLIGRAM(S): at 17:13

## 2018-12-04 RX ADMIN — Medication 6: at 12:33

## 2018-12-04 RX ADMIN — HEPARIN SODIUM 5000 UNIT(S): 5000 INJECTION INTRAVENOUS; SUBCUTANEOUS at 05:39

## 2018-12-04 RX ADMIN — GABAPENTIN 100 MILLIGRAM(S): 400 CAPSULE ORAL at 17:13

## 2018-12-04 RX ADMIN — CLOPIDOGREL BISULFATE 75 MILLIGRAM(S): 75 TABLET, FILM COATED ORAL at 11:33

## 2018-12-04 RX ADMIN — Medication 1 TABLET(S): at 11:33

## 2018-12-04 RX ADMIN — Medication 325 MILLIGRAM(S): at 11:33

## 2018-12-04 RX ADMIN — Medication 75 MILLIGRAM(S): at 05:38

## 2018-12-04 RX ADMIN — SODIUM CHLORIDE 3 MILLILITER(S): 9 INJECTION INTRAMUSCULAR; INTRAVENOUS; SUBCUTANEOUS at 14:51

## 2018-12-04 NOTE — DISCHARGE NOTE ADULT - PLAN OF CARE
resolved Continue with home meds as directed. Follow up with your primary doctor  & neurology within 1 week of discharge Continue with home meds as directed. Follow up with your primary doctor Continue with home meds as directed. Follow up with your primary doctor & cardiollogy Continue with home meds as directed. Follow up with your primary doctor & vascular surgery

## 2018-12-04 NOTE — CDI QUERY NOTE - NSCDIOTHERTXTBX_GEN_ALL_CORE_HH
H&P-Problem/Plan -   ·   TIA on medication.  Plan: Admit to tele, Cont. DATP, statin, BP control, f/u lipid panel, A1C, MRI. Patient and daughter not interested in carotid surgery if that is ultimately what is recommended. They would like to cont. with medical therapy  ·  Problem: Bilateral carotid artery stenosis.    Please specify if there is a relationship between the TIA and bilateral carotid stenosis.    1) TIA likely secondary to b/l carotid stenosis.  2) No relationship between the TIA and b/l carotid stenosis  3) Other

## 2018-12-04 NOTE — CDI QUERY NOTE - NSCDIOTHERTXTBX3_GEN_ALL_CORE_FT
Phosphorus Level, Serum (12.03.18 @ 05:58)    Phosphorus Level, Serum: 2.3 mg/dL  Phosphorus Level, Serum (12.04.18 @ 06:16)    Phosphorus Level, Serum: 4.6 mg/dL    Treatment with KPhos    Please provide a dx for abnormal lab with treatment provided if clinically significant.    1) hypophosphatemia  2) Other

## 2018-12-04 NOTE — DISCHARGE NOTE ADULT - CARE PROVIDER_API CALL
Dhruv Esparza), Neurology; Vascular Neurology  [address]  [address]  [place] [address]  Phone: [phone]  Fax: [phone]    Ngoc Kaplan), Surgery; Surgical Critical Care; Vascular Surgery  [address]  [address]  [place] [address]  Phone: [phone]  Fax: [phone]

## 2018-12-04 NOTE — DISCHARGE NOTE ADULT - SECONDARY DIAGNOSIS.
Type 2 diabetes mellitus with other circulatory complication, without long-term current use of insulin HTN (hypertension) Coronary artery disease Neuropathy Bilateral carotid artery stenosis PVD (peripheral vascular disease)

## 2018-12-04 NOTE — DISCHARGE NOTE ADULT - HOSPITAL COURSE
62 y/o female with likely TIA, PVD, CAD s/p CABG, DM-2, HTN, HLD, Neuropathy    Problem:? TIA.  Plan: Cont. DATP, statin, BP control, f/u lipid panel, A1C. Patient and daughter not interested in carotid surgery if that is ultimately what is recommended. They would like to cont. with medical therapy. Currently at baseline, no PT needs. No swallowing issues, fall risk, DVT-P, ambulation with assistance  Pt has aphasia on last admission with ?TIA, was given TPA with sx resolution & MRI negative.   Neuro consult called.  MRI brain ordered, pt has a hx of recent JEAN stent done by Vascular sx, called pt's vascular surgeon Dr. Miranda at [facility] who confirmed that the stent is compatible with MRI, MRI tech & RN notified  MRI brain: No acute intracranial hemorrhage or acute infarct.MRI of the orbits limited due to lack of IV contrast. Atrophy of bilateral optic nerves.  Pt has blurry vision & was notified about her MRI results. She has an appointment with her ophthalmologist tomorrow   Facial & LE paresthesias- B12 levels low normal, will treat with PO supplementation, recheck levels in 3 months.     Problem/Plan - 2:  ·  Problem: Bilateral carotid artery stenosis.  Plan: Plan as above. f/u with vascular surgery as outpt     Problem/Plan - 3:  ·  Problem: PVD (peripheral vascular disease).  Plan: cont. DATP, statin, BP control.      Problem/Plan - 4:  ·  Problem: Coronary artery disease involving native coronary artery of native heart without angina pectoris.  Plan: cont. o/p regimen.      Problem/Plan - 5:  ·  Problem: Neuropathy.  Plan: stable, not on meds at this time.      Problem/Plan - 6:  Problem: Type 2 diabetes mellitus with other circulatory complication, without long-term current use of insulin. Plan: hold oral agents, cont. ADA diet, HISS.     Problem/Plan - 7:  ·  Problem: Urinary tract infection with hematuria, site unspecified.  Plan: f/u culture, empiric abx completed      Neuropathy- gabapentin added.     Hypophosphatemia- replaced    50 minutes spent on total encounter; more than 50% of the visit was spent counseling and/or coordinating care by the attending physician.     Plan discussed with Patient, rn neuro, daughter.    PHYSICAL EXAM:  GENERAL: NAD, well-groomed, well-developed  HEAD:  Atraumatic, Normocephalic  EYES: EOMI, PERRLA, conjunctiva and sclera clear  NECK: Supple, No JVD  NERVOUS SYSTEM:  Alert & Oriented X3, Motor Strength 5/5 B/L upper and lower extremities; DTRs 2+ intact and symmetric, no facial numbness, sensation intact  CHEST/LUNG: Clear to percussion bilaterally; No rales, rhonchi, wheezing, or rubs  HEART: Regular rate and rhythm; No murmurs, rubs, or gallops  ABDOMEN: Soft, Nontender, Nondistended; Bowel sounds present  EXTREMITIES:  2+ Peripheral Pulses, No clubbing, cyanosis, or edema  SKIN: No rashes or lesions      VSS. Medically stable for d/c

## 2018-12-04 NOTE — DISCHARGE NOTE ADULT - CARE PROVIDERS DIRECT ADDRESSES
[email or web address]

## 2018-12-04 NOTE — PROGRESS NOTE ADULT - REASON FOR ADMISSION
Right sided facial numbness, tongue numbness

## 2018-12-04 NOTE — DISCHARGE NOTE ADULT - MEDICATION SUMMARY - MEDICATIONS TO TAKE
I will START or STAY ON the medications listed below when I get home from the hospital:    aspirin 81 mg oral tablet, chewable  -- 1 tab(s) by mouth once a day  -- Indication: For TIA on medication    lisinopril 2.5 mg oral tablet  -- 1 tab(s) by mouth once a day  -- Indication: For dm    gabapentin 100 mg oral capsule  -- 1 cap(s) by mouth 2 times a day  -- Indication: For Neuropathy    glimepiride 2 mg oral tablet  -- 0.5 tab(s) by mouth 2 times a day  -- Indication: For dm    atorvastatin 40 mg oral tablet  -- 1 tab(s) by mouth once a day (at bedtime)  -- Indication: For hld    clopidogrel 75 mg oral tablet  -- 1 tab(s) by mouth once a day  -- Indication: For TIA on medication    metoprolol tartrate 75 mg oral tablet  -- 1 tab(s) by mouth 2 times a day  -- Indication: For htn    famotidine 20 mg oral tablet  -- 1 tab(s) by mouth once a day  -- Indication: For gerd    ferrous sulfate 325 mg (65 mg elemental iron) oral tablet  -- 1 tab(s) by mouth once a day   -- Indication: For anemia    docusate sodium 100 mg oral capsule  -- 1 cap(s) by mouth 3 times a day  -- Indication: For Constipation    cyanocobalamin 1000 mcg oral tablet  -- 1 tab(s) by mouth once a day  -- Indication: For Nutrition    Vitamin C 500 mg oral tablet  -- 1 tab(s) by mouth once a day   -- Indication: For Nutrition

## 2018-12-04 NOTE — DISCHARGE NOTE ADULT - ADDITIONAL INSTRUCTIONS
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  Pt has blurry vision & was notified about her MRI results. She has an appointment with her ophthalmologist tomorrow

## 2018-12-04 NOTE — PROGRESS NOTE ADULT - SUBJECTIVE AND OBJECTIVE BOX
[facility]                                        [facility] [place]                                 Mir Cabrera, & Ned                                  [address]                                        [place], [place], [address]                                             [phone]        CC: Paresthesias of the right side of the face.    HPI:   The patient is a 63y Female known to me from prior admission in September 2018. She went for cardiac cath and after the procedure became aphasic. Code stroke was activated and she received t-PA.   She was now admitted 12/1/18 with symptoms of right face and tongue numbness and tingling which has subsequently resolved. She is not clear how long it lasted. It was mild to moderate in intensity. There was no associated speech difficulty or hemiparesis.     Interim history:  No new neurologic symptoms.    ROS:   Denies headache or dizziness.  Denies chest pain.  Denies shortness of breath.    MEDICATIONS  (STANDING):  aspirin enteric coated 81 milliGRAM(s) Oral daily  atorvastatin 40 milliGRAM(s) Oral at bedtime  clopidogrel Tablet 75 milliGRAM(s) Oral daily  cyanocobalamin 1000 MICROGram(s) Oral daily  dextrose 5%. 1000 milliLiter(s) (50 mL/Hr) IV Continuous <Continuous>  dextrose 50% Injectable 12.5 Gram(s) IV Push once  ferrous    sulfate 325 milliGRAM(s) Oral daily  gabapentin 100 milliGRAM(s) Oral two times a day  heparin  Injectable 5000 Unit(s) SubCutaneous every 8 hours  insulin lispro (HumaLOG) corrective regimen sliding scale   SubCutaneous Before meals and at bedtime  metoprolol tartrate 75 milliGRAM(s) Oral two times a day  multivitamin 1 Tablet(s) Oral daily  saccharomyces boulardii 250 milliGRAM(s) Oral two times a day  sodium chloride 0.9% lock flush 3 milliLiter(s) IV Push every 8 hours    Vital Signs Last 24 Hrs  T(C): 36.9 (04 Dec 2018 11:32), Max: 37 (03 Dec 2018 19:30)  T(F): 98.4 (04 Dec 2018 11:32), Max: 98.6 (03 Dec 2018 19:30)  HR: 68 (04 Dec 2018 11:32) (68 - 76)  BP: 131/68 (04 Dec 2018 11:32) (131/68 - 174/68)  BP(mean): --  RR: 16 (04 Dec 2018 11:32) (16 - 17)  SpO2: 100% (04 Dec 2018 11:32) (99% - 100%)    Detailed Neurologic Exam:    Mental status: The patient is awake and alert. There is no aphasia. There is no dysarthria.     Cranial nerves: Pupils equal and react symmetrically to light. There is no visual field deficit to threat. Extraocular motion is full with no nystagmus. There is no ptosis. Facial sensation is intact. Facial musculature is symmetric. Palate elevates symmetrically. Tongue is midline.    Motor: There is normal bulk and tone.  There is no tremor.  Strength grossly 5/5 bilaterally.    Sensation: Grossly intact to light touch and pin.    Reflexes: 2+ throughout and plantar responses are flexor.    Cerebellar: No dysmetria on finger nose testing.    Labs:     12-04    141  |  108<H>  |  16.0  ----------------------------<  107  4.8   |  23.0  |  0.90    Ca    9.4      04 Dec 2018 06:16  Phos  4.6     12-04  Mg     2.2     12-04                          13.8   6.9   )-----------( 232      ( 04 Dec 2018 06:16 )             41.5       Rad:   MRI brain images reviewed (and concur with report): There is no acute pathology. There are chronic ischemic changes.

## 2018-12-04 NOTE — PROGRESS NOTE ADULT - ASSESSMENT
63y Female with prior episode of possible aphasia now with transient facial paresthesia.    Paresthesias  Resolved.    Possible TIA  MRI negative.  Recent carotid duplex negative for stenosis.   Continue antiplatelet and statin.     CAD  Medical management per cardiology.    No further specific neurologic recommendations. Will be available as needed.     Case discussed with Dr Mora.

## 2018-12-04 NOTE — DISCHARGE NOTE ADULT - SECONDARY STROKE PREVENTION
.Phoned patient and informed of normal urine culture results. Patient verbalized an understanding.
Statement Selected

## 2018-12-04 NOTE — DISCHARGE NOTE ADULT - PATIENT PORTAL LINK FT
You can access the [facility] Portal, offered by [facility], by registering with the following website: [email or web address]

## 2018-12-04 NOTE — CDI QUERY NOTE - NSCDIOTHERTXTBX2_GEN_ALL_CORE_FT
Magnesium, Serum: 1.6 mg/dL (12.03.18 @ 05:58)  Magnesium, Serum (12.04.18 @ 06:16)    Magnesium, Serum: 2.2 mg/dL    Treatment with Magnesium Sulfate IV    Please provide a dx for abnormal lab with tx provided if clinically significant    1)  Hypomagnesemia  2) Other

## 2018-12-04 NOTE — DISCHARGE NOTE ADULT - CARE PLAN
Principal Discharge DX:	TIA (transient ischemic attack)  Goal:	resolved  Assessment and plan of treatment:	Continue with home meds as directed. Follow up with your primary doctor  & neurology within 1 week of discharge  Secondary Diagnosis:	Type 2 diabetes mellitus with other circulatory complication, without long-term current use of insulin  Assessment and plan of treatment:	Continue with home meds as directed. Follow up with your primary doctor  Secondary Diagnosis:	HTN (hypertension)  Assessment and plan of treatment:	Continue with home meds as directed. Follow up with your primary doctor  Secondary Diagnosis:	Coronary artery disease  Assessment and plan of treatment:	Continue with home meds as directed. Follow up with your primary doctor & cardiollogy  Secondary Diagnosis:	Neuropathy  Assessment and plan of treatment:	Continue with home meds as directed. Follow up with your primary doctor  Secondary Diagnosis:	Bilateral carotid artery stenosis  Assessment and plan of treatment:	Continue with home meds as directed. Follow up with your primary doctor & vascular surgery  Secondary Diagnosis:	PVD (peripheral vascular disease)  Assessment and plan of treatment:	Continue with home meds as directed. Follow up with your primary doctor & vascular surgery

## 2018-12-04 NOTE — DISCHARGE NOTE ADULT - NS AS DC STROKE DX YN
Patient called and states she didn't advise medications where sent to the incorrect pharmacy and she would like for them to go to the location at [facility]. Recalled the Rx into that location  
yes

## 2018-12-05 ENCOUNTER — APPOINTMENT (OUTPATIENT)
Dept: OPHTHALMOLOGY | Facility: CLINIC | Age: 63
End: 2018-12-05

## 2018-12-14 ENCOUNTER — APPOINTMENT (OUTPATIENT)
Dept: OPHTHALMOLOGY | Facility: CLINIC | Age: 63
End: 2018-12-14

## 2018-12-17 ENCOUNTER — OUTPATIENT (OUTPATIENT)
Dept: OUTPATIENT SERVICES | Facility: HOSPITAL | Age: 63
LOS: 1 days | End: 2018-12-17
Payer: SELF-PAY

## 2018-12-17 ENCOUNTER — APPOINTMENT (OUTPATIENT)
Dept: VASCULAR SURGERY | Facility: HOSPITAL | Age: 63
End: 2018-12-17

## 2018-12-17 VITALS
SYSTOLIC BLOOD PRESSURE: 158 MMHG | HEIGHT: 57 IN | WEIGHT: 108 LBS | BODY MASS INDEX: 23.3 KG/M2 | HEART RATE: 60 BPM | DIASTOLIC BLOOD PRESSURE: 77 MMHG

## 2018-12-17 DIAGNOSIS — Z90.49 ACQUIRED ABSENCE OF OTHER SPECIFIED PARTS OF DIGESTIVE TRACT: Chronic | ICD-10-CM

## 2018-12-17 DIAGNOSIS — Z95.1 PRESENCE OF AORTOCORONARY BYPASS GRAFT: Chronic | ICD-10-CM

## 2018-12-17 DIAGNOSIS — R30.0 DYSURIA: ICD-10-CM

## 2018-12-17 PROCEDURE — G0463: CPT

## 2018-12-18 ENCOUNTER — APPOINTMENT (OUTPATIENT)
Dept: NEUROLOGY | Facility: HOSPITAL | Age: 63
End: 2018-12-18

## 2018-12-18 DIAGNOSIS — I65.29 OCCLUSION AND STENOSIS OF UNSPECIFIED CAROTID ARTERY: ICD-10-CM

## 2018-12-19 ENCOUNTER — LABORATORY RESULT (OUTPATIENT)
Age: 63
End: 2018-12-19

## 2018-12-19 ENCOUNTER — APPOINTMENT (OUTPATIENT)
Dept: CARDIOLOGY | Facility: HOSPITAL | Age: 63
End: 2018-12-19

## 2018-12-19 ENCOUNTER — OUTPATIENT (OUTPATIENT)
Dept: OUTPATIENT SERVICES | Facility: HOSPITAL | Age: 63
LOS: 1 days | End: 2018-12-19
Payer: SELF-PAY

## 2018-12-19 VITALS
OXYGEN SATURATION: 100 % | BODY MASS INDEX: 23.3 KG/M2 | SYSTOLIC BLOOD PRESSURE: 154 MMHG | DIASTOLIC BLOOD PRESSURE: 84 MMHG | HEART RATE: 64 BPM | WEIGHT: 108 LBS | HEIGHT: 57 IN

## 2018-12-19 DIAGNOSIS — I25.10 ATHEROSCLEROTIC HEART DISEASE OF NATIVE CORONARY ARTERY WITHOUT ANGINA PECTORIS: ICD-10-CM

## 2018-12-19 DIAGNOSIS — E11.9 TYPE 2 DIABETES MELLITUS W/OUT COMPLICATIONS: ICD-10-CM

## 2018-12-19 DIAGNOSIS — I25.10 ATHEROSCLEROTIC HEART DISEASE OF NATIVE CORONARY ARTERY W/OUT ANGINA PECTORIS: ICD-10-CM

## 2018-12-19 DIAGNOSIS — Z95.1 PRESENCE OF AORTOCORONARY BYPASS GRAFT: ICD-10-CM

## 2018-12-19 DIAGNOSIS — I65.29 OCCLUSION AND STENOSIS OF UNSPECIFIED CAROTID ARTERY: ICD-10-CM

## 2018-12-19 DIAGNOSIS — Z95.1 PRESENCE OF AORTOCORONARY BYPASS GRAFT: Chronic | ICD-10-CM

## 2018-12-19 DIAGNOSIS — E11.29 TYPE 2 DIABETES MELLITUS WITH OTHER DIABETIC KIDNEY COMPLICATION: ICD-10-CM

## 2018-12-19 DIAGNOSIS — Z86.73 PERSONAL HISTORY OF TRANSIENT ISCHEMIC ATTACK (TIA), AND CEREBRAL INFARCTION W/OUT RESIDUAL DEFICITS: ICD-10-CM

## 2018-12-19 DIAGNOSIS — I10 ESSENTIAL (PRIMARY) HYPERTENSION: ICD-10-CM

## 2018-12-19 DIAGNOSIS — I77.1 STRICTURE OF ARTERY: ICD-10-CM

## 2018-12-19 DIAGNOSIS — Z90.49 ACQUIRED ABSENCE OF OTHER SPECIFIED PARTS OF DIGESTIVE TRACT: Chronic | ICD-10-CM

## 2018-12-19 PROCEDURE — G0463: CPT

## 2018-12-19 RX ORDER — ATORVASTATIN CALCIUM 40 MG/1
40 TABLET, FILM COATED ORAL
Qty: 90 | Refills: 3 | Status: ACTIVE | COMMUNITY
Start: 1900-01-01 | End: 1900-01-01

## 2018-12-19 RX ORDER — METOPROLOL TARTRATE 75 MG/1
75 TABLET, FILM COATED ORAL TWICE DAILY
Qty: 180 | Refills: 3 | Status: ACTIVE | COMMUNITY
Start: 1900-01-01 | End: 1900-01-01

## 2018-12-19 RX ORDER — LISINOPRIL 2.5 MG/1
2.5 TABLET ORAL DAILY
Qty: 90 | Refills: 3 | Status: ACTIVE | COMMUNITY
Start: 2018-09-17 | End: 1900-01-01

## 2018-12-19 RX ORDER — CLOPIDOGREL BISULFATE 75 MG/1
75 TABLET, FILM COATED ORAL
Qty: 90 | Refills: 3 | Status: ACTIVE | COMMUNITY
Start: 1900-01-01 | End: 1900-01-01

## 2018-12-19 RX ORDER — GLIMEPIRIDE 2 MG/1
2 TABLET ORAL DAILY
Qty: 90 | Refills: 3 | Status: ACTIVE | COMMUNITY
Start: 1900-01-01 | End: 1900-01-01

## 2018-12-19 NOTE — REVIEW OF SYSTEMS
[Leg Claudication] : intermittent leg claudication [see HPI] : see HPI [Abdominal Pain] : abdominal pain [Dysuria] : dysuria [Itching] : itching [Fever] : no fever [Headache] : no headache [Recent Weight Gain (___ Lbs)] : no recent weight gain [Chills] : no chills [Feeling Fatigued] : not feeling fatigued [Recent Weight Loss (___ Lbs)] : no recent weight loss [Sore Throat] : no sore throat [Shortness Of Breath] : no shortness of breath [Dyspnea on exertion] : not dyspnea during exertion [Chest  Pressure] : no chest pressure [Chest Pain] : no chest pain [Lower Ext Edema] : no extremity edema [Palpitations] : no palpitations [Cough] : no cough [Nausea] : no nausea [Vomiting] : no vomiting [Heartburn] : no heartburn [Change in Appetite] : no change in appetite [Change In The Stool] : no change in stool [Dysphagia] : no dysphagia [Vaginal Discharge] : no vaginal discharge [Abn Vaginal Bleeding] : no unexplained vaginal bleeding [Joint Pain] : no joint pain [Joint Stiffness] : no joint stiffness [Muscle Cramps] : no muscle cramps [Limb Weakness (Paresis)] : no limb weakness [Skin: A Rash] : no rash: [Skin Lesions] : no skin lesions [Dizziness] : no dizziness [Confusion] : no confusion was observed [Easy Bleeding] : no tendency for easy bleeding [Easy Bruising] : no tendency for easy bruising

## 2018-12-19 NOTE — PHYSICAL EXAM
[General Appearance - Well Developed] : well developed [General Appearance - Well Nourished] : well nourished [Normal Oral Mucosa] : normal oral mucosa [] : no respiratory distress [Respiration, Rhythm And Depth] : normal respiratory rhythm and effort [Exaggerated Use Of Accessory Muscles For Inspiration] : no accessory muscle use [Auscultation Breath Sounds / Voice Sounds] : lungs were clear to auscultation bilaterally [Heart Rate And Rhythm] : heart rate and rhythm were normal [Heart Sounds] : normal S1 and S2 [Murmurs] : no murmurs present [Arterial Pulses Normal] : the arterial pulses were normal [Edema] : no peripheral edema present [Bowel Sounds] : normal bowel sounds [Abdomen Soft] : soft [Abdomen Mass (___ Cm)] : no abdominal mass palpated [Abnormal Walk] : normal gait [Nail Clubbing] : no clubbing of the fingernails [Cyanosis, Localized] : no localized cyanosis [Petechial Hemorrhages (___cm)] : no petechial hemorrhages [Nail Splinter Hemorrhages] : no splinter hemorrhages of the nails [Oriented To Time, Place, And Person] : oriented to person, place, and time [Impaired Insight] : insight and judgment were intact [Affect] : the affect was normal [Mood] : the mood was normal [No Anxiety] : not feeling anxious [FreeTextEntry1] : R femoral cath access site C/D/I - able to palpate the Perclose device

## 2018-12-19 NOTE — HISTORY OF PRESENT ILLNESS
[FreeTextEntry1] : 62 y/o [place] F w/ PMHx of CAD (s/p 2vsl CABG 8/2018 LIMA to LAD, SVG to RCA), HTN, DM2 (HbA1c:6.2 from 8/2018), CVA (s/p TPA), TANNA (R ICA >70%), Mesenteric ischemia (s/p SMA PCI w/ BMS) who presents for follow up. \par \par   She had a BMS to the SMA performed on 11/20/2018 by Dr. Loo. She has not gained weight since the procedure and is not eating more. No change in her diet. Today she reports 2 weeks of lower back pain and lower abdominal pain. She has been taking Tylenol for the pain with some improvement.  The 8/10 pain is improved with laying down, not worse with eating. The pain is a/w swelling. She denies trauma. She remembers 1 bloody BM on the day following the SMA PCI but otherwise denies melena or BRBPR.\par \par She is still c/o b/l LE pain with ambulation. This has not progressed c/w prior visits. \par \par LHC (8/4/2018): pLAD:80%, pLCx:100%, pRCA:100%\par LHC (9/7/2018): pLAD:50%, mLAD:70% and 90%, dLAD – supplied by a bypass graft, pLCx:100% - , oRCA:100%, graft from LIMA to LAD 40% stenosis at the distal anastomosis, graft to PDA (SVG), no evidence of disease, mild, extensive, mildly ulcerated atheroma in the infrarenal abdominal aorta\par TTE (9/7/2018): EF:50-55%, basal inferolateral wall is hypokinetic \par Cath (11/20/2018): -SMA angiography demonstrated significant stenosis. -JEAN occluded. -Left renal artery with severe stenosis. -Successful PTA and stenting of the the SMA with a BMS stent.

## 2018-12-19 NOTE — DISCUSSION/SUMMARY
[FreeTextEntry1] : 62 y/o [place] F w/ PMHx of CAD (s/p 2vsl CABG 8/2018 LIMA to LAD, SVG to RCA), HTN, DM2 (HbA1c:6.2 from 8/2018), CVA (s/p TPA), TANNA (R ICA >70%), Mesenteric ischemia (s/p SMA PCI w/ BMS) who presents for follow up. Her Abd and back pain are concerning. SD bleed less likely givne onset >1w following her procedure. Dissection on DDx\par \par Plan:\par \par 1. Mesenteric ischemia (s/p SMA PCI) w/ Abd pain\par -Check CTA of the Abd (last Cr in [place] from 12/2018 0.9)\par -I explained to the patient not to take NSAIDs for her Abd pain\par -check CMP and CBC\par \par 2. Claudication\par -Graded exercise program - d/w patient today\par \par 2. CAD (s/p CABG)\par -c/w ASA 81, Plavix 75, Lipitor 40, Lopressor 75 bid (her EF is preserved), Lisinopril 2.5mg\par \par Refills for all meds given at this visit, d/w Dr. oLo, RTC on 1/2/2018. The patient is leaving for [facility] on 1/7/2018 (for 6m)\par

## 2018-12-20 DIAGNOSIS — I65.29 OCCLUSION AND STENOSIS OF UNSPECIFIED CAROTID ARTERY: ICD-10-CM

## 2018-12-20 DIAGNOSIS — E87.5 HYPERKALEMIA: ICD-10-CM

## 2018-12-20 DIAGNOSIS — E11.9 TYPE 2 DIABETES MELLITUS WITHOUT COMPLICATIONS: ICD-10-CM

## 2018-12-20 DIAGNOSIS — I10 ESSENTIAL (PRIMARY) HYPERTENSION: ICD-10-CM

## 2018-12-20 DIAGNOSIS — I77.1 STRICTURE OF ARTERY: ICD-10-CM

## 2018-12-20 LAB
ALBUMIN SERPL ELPH-MCNC: 4.2 G/DL
ALP BLD-CCNC: 96 U/L
ALT SERPL-CCNC: 10 U/L
ANION GAP SERPL CALC-SCNC: 11 MMOL/L
AST SERPL-CCNC: 17 U/L
BASOPHILS # BLD AUTO: 0.07 K/UL
BASOPHILS NFR BLD AUTO: 0.9 %
BILIRUB SERPL-MCNC: 0.3 MG/DL
BUN SERPL-MCNC: 20 MG/DL
CALCIUM SERPL-MCNC: 10.4 MG/DL
CHLORIDE SERPL-SCNC: 103 MMOL/L
CO2 SERPL-SCNC: 25 MMOL/L
CREAT SERPL-MCNC: 1.15 MG/DL
EOSINOPHIL # BLD AUTO: 0.44 K/UL
EOSINOPHIL NFR BLD AUTO: 5.8 %
GLUCOSE SERPL-MCNC: 151 MG/DL
HCT VFR BLD CALC: 45.8 %
HGB BLD-MCNC: 14.8 G/DL
IMM GRANULOCYTES NFR BLD AUTO: 0.4 %
LYMPHOCYTES # BLD AUTO: 2.31 K/UL
LYMPHOCYTES NFR BLD AUTO: 30.7 %
MAN DIFF?: NORMAL
MCHC RBC-ENTMCNC: 26.9 PG
MCHC RBC-ENTMCNC: 32.3 GM/DL
MCV RBC AUTO: 83.3 FL
MONOCYTES # BLD AUTO: 0.51 K/UL
MONOCYTES NFR BLD AUTO: 6.8 %
NEUTROPHILS # BLD AUTO: 4.17 K/UL
NEUTROPHILS NFR BLD AUTO: 55.4 %
PLATELET # BLD AUTO: 232 K/UL
POTASSIUM SERPL-SCNC: 5.6 MMOL/L
PROT SERPL-MCNC: 7.6 G/DL
RBC # BLD: 5.5 M/UL
RBC # FLD: 17.9 %
SODIUM SERPL-SCNC: 139 MMOL/L
WBC # FLD AUTO: 7.53 K/UL

## 2018-12-21 ENCOUNTER — APPOINTMENT (OUTPATIENT)
Dept: OPHTHALMOLOGY | Facility: CLINIC | Age: 63
End: 2018-12-21

## 2018-12-26 PROCEDURE — 93306 TTE W/DOPPLER COMPLETE: CPT

## 2018-12-26 PROCEDURE — 81001 URINALYSIS AUTO W/SCOPE: CPT

## 2018-12-26 PROCEDURE — 93880 EXTRACRANIAL BILAT STUDY: CPT

## 2018-12-26 PROCEDURE — 80053 COMPREHEN METABOLIC PANEL: CPT

## 2018-12-26 PROCEDURE — 82607 VITAMIN B-12: CPT

## 2018-12-26 PROCEDURE — 80048 BASIC METABOLIC PNL TOTAL CA: CPT

## 2018-12-26 PROCEDURE — 99285 EMERGENCY DEPT VISIT HI MDM: CPT | Mod: 25

## 2018-12-26 PROCEDURE — 70450 CT HEAD/BRAIN W/O DYE: CPT

## 2018-12-26 PROCEDURE — 70540 MRI ORBIT/FACE/NECK W/O DYE: CPT

## 2018-12-26 PROCEDURE — 84100 ASSAY OF PHOSPHORUS: CPT

## 2018-12-26 PROCEDURE — 83605 ASSAY OF LACTIC ACID: CPT

## 2018-12-26 PROCEDURE — 80061 LIPID PANEL: CPT

## 2018-12-26 PROCEDURE — 93005 ELECTROCARDIOGRAM TRACING: CPT

## 2018-12-26 PROCEDURE — 84484 ASSAY OF TROPONIN QUANT: CPT

## 2018-12-26 PROCEDURE — 83036 HEMOGLOBIN GLYCOSYLATED A1C: CPT

## 2018-12-26 PROCEDURE — 82962 GLUCOSE BLOOD TEST: CPT

## 2018-12-26 PROCEDURE — 85730 THROMBOPLASTIN TIME PARTIAL: CPT

## 2018-12-26 PROCEDURE — 96360 HYDRATION IV INFUSION INIT: CPT

## 2018-12-26 PROCEDURE — 70551 MRI BRAIN STEM W/O DYE: CPT

## 2018-12-26 PROCEDURE — 87086 URINE CULTURE/COLONY COUNT: CPT

## 2018-12-26 PROCEDURE — 85610 PROTHROMBIN TIME: CPT

## 2018-12-26 PROCEDURE — 36415 COLL VENOUS BLD VENIPUNCTURE: CPT

## 2018-12-26 PROCEDURE — 83735 ASSAY OF MAGNESIUM: CPT

## 2018-12-26 PROCEDURE — 85027 COMPLETE CBC AUTOMATED: CPT

## 2018-12-26 PROCEDURE — 82009 KETONE BODYS QUAL: CPT

## 2018-12-27 ENCOUNTER — OUTPATIENT (OUTPATIENT)
Dept: OUTPATIENT SERVICES | Facility: HOSPITAL | Age: 63
LOS: 1 days | End: 2018-12-27
Payer: SELF-PAY

## 2018-12-27 ENCOUNTER — APPOINTMENT (OUTPATIENT)
Dept: CT IMAGING | Facility: HOSPITAL | Age: 63
End: 2018-12-27

## 2018-12-27 DIAGNOSIS — Z90.49 ACQUIRED ABSENCE OF OTHER SPECIFIED PARTS OF DIGESTIVE TRACT: Chronic | ICD-10-CM

## 2018-12-27 DIAGNOSIS — I77.1 STRICTURE OF ARTERY: ICD-10-CM

## 2018-12-27 DIAGNOSIS — Z00.00 ENCOUNTER FOR GENERAL ADULT MEDICAL EXAMINATION WITHOUT ABNORMAL FINDINGS: ICD-10-CM

## 2018-12-27 DIAGNOSIS — Z95.1 PRESENCE OF AORTOCORONARY BYPASS GRAFT: Chronic | ICD-10-CM

## 2018-12-27 PROCEDURE — 74174 CTA ABD&PLVS W/CONTRAST: CPT

## 2018-12-27 PROCEDURE — 74174 CTA ABD&PLVS W/CONTRAST: CPT | Mod: 26

## 2019-01-01 LAB
ANION GAP SERPL CALC-SCNC: 11 MMOL/L
BUN SERPL-MCNC: 20 MG/DL
CALCIUM SERPL-MCNC: 10.2 MG/DL
CHLORIDE SERPL-SCNC: 102 MMOL/L
CO2 SERPL-SCNC: 27 MMOL/L
CREAT SERPL-MCNC: 1.15 MG/DL
GLUCOSE SERPL-MCNC: 128 MG/DL
POTASSIUM SERPL-SCNC: 5.2 MMOL/L
SODIUM SERPL-SCNC: 140 MMOL/L

## 2019-01-02 ENCOUNTER — APPOINTMENT (OUTPATIENT)
Dept: CARDIOLOGY | Facility: HOSPITAL | Age: 64
End: 2019-01-02

## 2019-04-12 NOTE — SWALLOW BEDSIDE ASSESSMENT ADULT - POSITIONING
[facility], [place]   Psychiatric Progress Note      Impression:   This is a 17 year old male admitted for SI, SIB and depression.  We are adjusting medications to target mood, trauma symptoms and anxiety.  We are also working with the patient on therapeutic skill building.  Basic physiological needs with stabilization of his nutrition given his eating disorder and of his sleep are the current priorities as are persistent SI. Given his perpetual situation, the extent of his mental health issues given his trauma and chronic stresses, and his substance use, Dual IOP would make the most sense so far. It is to be said if his family will be adapt along with him given the pervasiveness of each member's own dual diagnosis issues.           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 Fountain and at this time will continue to obtain collateral info but have started conversation with patient re moving forward with possible medication trials if unable to get collateral info  Laboratory/Imaging:  - no add'l  Consults:  - Rule 25 assessment pending  - appreciate Nutrition consult   - Seeking out prior psychological testing, specifically to clarify ADHD diagnosis  Patient will be treated in therapeutic milieu with appropriate individual and group therapies as described.  Family Assessment reviewed      "  Medical diagnoses to be addressed this admission:   Nutrition/Eating Disorder --> \"Unable to assess malnutrition at this time given lack of wt history\"  - Start on MVI per dietitian  - F/U Vitamin D and Zinc  - Lock out of bathroom for 60 minutes after meals and snacks, as well as remove trash bins from room to mitigate for purging  - Monitor food intake  - Blind weights twice weekly     Relevant psychosocial stressors: family dynamics, school, trauma and financial concerns       Legal Status: Voluntary    Safety Assessment:   Checks: Status 15  Precautions: Suicide  Self-harm  Pt has not required locked seclusion or restraints in the past 24 hours to maintain safety, please refer to RN documentation for further details.    The risks, benefits, alternatives and side effects have been discussed and are understood by the patient and other caregivers.     Anticipated Disposition/Discharge Date: 4/16  Target symptoms to stabilize: SI, SIB, depressed, neurovegetative symptoms, sleep issues, substance use, disordered eating, hyperarousal/flashbacks and anxiety  Target disposition: Dual IOP               - Monitor for prominence and severity of eating disorder, as this could take precedent       Attestation:  Patient has been seen and evaluated by me,  Bernice Mcclure MD          Interim History:   The patient's care was discussed with the treatment team and chart notes were reviewed.    Side effects to medication: no scheduled psychotropic medication  Sleep: difficulty staying asleep  Intake: improvement with intake and no purging behaviors  Groups: attending groups and participating  Peer interactions: gets along well with peers    RN reports no medical complaints, good intake.  Patient reports doing ok and agrees as continues to struggle with depression, anxiety would like to move forward with trial of antidepressant.  Reviewed with patient plan as per Dr. Diazo was to have opportunity to review collateral info in hopes of " "getting info that would be helpful in making choice.  Started reviewing medication history with patient in anticipation of moving forward with new medication even if not able to get collateral info.    The 10 point Review of Systems is negative other than noted in the HPI         Medications:       GUMMY VITAMINS & MINERALS  1 tablet Oral Daily             Allergies:     Allergies   Allergen Reactions     Amoxicillin      Cashews [Nuts]             Psychiatric Examination:   /70   Pulse 84   Temp 97.6  F (36.4  C) (Oral)   Resp 16   Ht 1.81 m (5' 11.26\")   Wt 90.3 kg (199 lb)   SpO2 98%   BMI 27.55 kg/m    Weight is 199 lbs 0 oz  Body mass index is 27.55 kg/m .    Appearance:  awake, alert, adequately groomed and appeared as age stated  Attitude:  cooperative  Eye Contact:  good  Mood:  \"ok\"  Affect:  mood congruent  Speech:  clear, coherent  Psychomotor Behavior:  no evidence of tardive dyskinesia, dystonia, or tics  Thought Process:  goal oriented  Associations:  no loose associations  Thought Content:  reports continues with SI though no intent; denies HI/perceptual disturbance sxs  Insight:  limited-fair  Judgment:  limited-fair  Oriented to:  time, person, and place  Attention Span and Concentration:  intact  Recent and Remote Memory:  intact  Language: no problems noted with expression, reception  Fund of Knowledge: appropriate  Muscle Strength and Tone: normal  Gait and Station: Normal         Labs:   No results found for this or any previous visit (from the past 24 hour(s)).  " upright (90 degrees)

## 2020-01-24 NOTE — CONSULT NOTE ADULT - CONSULT REQUESTED DATE/TIME
01-Nov-2018 08:38 Quality 431: Preventive Care And Screening: Unhealthy Alcohol Use - Screening: Patient screened for unhealthy alcohol use using a single question and scores less than 2 times per year Quality 110: Preventive Care And Screening: Influenza Immunization: Influenza immunization was not ordered or administered, reason not given Detail Level: Detailed Quality 130: Documentation Of Current Medications In The Medical Record: Current Medications with Name, Dosage, Frequency, or Route not Documented, Reason not Given Quality 226: Preventive Care And Screening: Tobacco Use: Screening And Cessation Intervention: Patient screened for tobacco use and is an ex/non-smoker

## 2020-03-12 NOTE — ED ADULT TRIAGE NOTE - NS ED NOTE AC HIGH RISK COUNTRIES
I spoke to the patient today at 10:50 a rell Fay She informed that overall she is feeling a little better and is willing to return back to work without any periodic breaks  She was advised to call us back if there is an increased worsening of her headache or other symptoms    An updated work note will be provided per patient request 
No

## 2020-05-28 NOTE — PROGRESS NOTE ADULT - ASSESSMENT
61 y/o  female visiting from [facility], s/p cardiac cath  with LAD 90 %, Cx 100% and % disease  H diabetes 2 , diabetic retinopathy with "eye bleed" per patient 2012.   8/5 P2Y12 163, Vascular Surgery consulted for right ICA stenosis - no intervention at this time; f/u as an outpatient after OHS  preop workup in progress  8/6 VSS; pt denies cp/sob; obtain vein mapping today by PA's; opth consulted called for bilateral retinal bleeding s/p laser sx 2013; continue asa/statin/bb  8/7 Pt. transferred to Dr. Anastasia Rojas's service - for CABG in am - 2nd case- will obtain head CT non con d/t carotid dz- OR Wednesday - 2nd case  s/p 8/8 CABGx2 LIMA  Post-op Course:   Multiple blood products for acute post-op anemia 2/2 surgery  Thrombocytopenia, DVT prophylaxis and plavix on hold  Transaminitis, now off statin.    8/10 VSS, transferred to floor. Plts 62, plavix and sq lovenox on hold. AST/ALT trending down to 750/602. 63 y/o  female visiting from [facility], s/p cardiac cath  with LAD 90 %, Cx 100% and % disease  H diabetes 2 , diabetic retinopathy with "eye bleed" per patient 2012.   8/5 P2Y12 163, Vascular Surgery consulted for right ICA stenosis - no intervention at this time; f/u as an outpatient after OHS  preop workup in progress  8/6 VSS; pt denies cp/sob; obtain vein mapping today by PA's; opth consulted called for bilateral retinal bleeding s/p laser sx 2013; continue asa/statin/bb  8/7 Pt. transferred to Dr. Anastasia Rojas's service - for CABG in am - 2nd case- will obtain head CT non con d/t carotid dz- OR Wednesday - 2nd case  s/p 8/8 CABGx2 LIMA  Post-op Course:   -Multiple blood products for acute post-op anemia 2/2 surgery  -Extubated POD 1, weaned off pressor support  -Thrombocytopenia, DVT prophylaxis and plavix on hold  -Transaminitis, now off statin.    8/10 VSS, transferred to floor. Plts 62, plavix and sq lovenox on hold. AST/ALT trending down to 750/602. No statin/tylenol. Tracheostomy to assisted vent

## 2021-01-27 NOTE — PATIENT PROFILE ADULT. - MEDICATIONS BROUGHT TO HOSPITAL, PROFILE
Detail Level: Zone Quality 128: Preventive Care And Screening: Body Mass Index (Bmi) Screening And Follow-Up Plan: BMI documented as out of range, follow up plan not documented, reason not otherwise specified. Quality 130: Documentation Of Current Medications In The Medical Record: Current Medications Documented yes Quality 226: Preventive Care And Screening: Tobacco Use: Screening And Cessation Intervention: Patient screened for tobacco use and is an ex/non-smoker Quality 110: Preventive Care And Screening: Influenza Immunization: Influenza Immunization Administered during Influenza season Quality 111:Pneumonia Vaccination Status For Older Adults: Pneumococcal Vaccination Previously Received

## 2021-04-03 NOTE — ED ADULT NURSE NOTE - NS ED NURSE DISCH DISPOSITION
q 4 hour neuro checks  No follow up CT per NS   MRI brain ; SAH  Plus infarcts ? CVA   Foster follows , GCS 14 4/4    Transferred

## 2021-06-09 NOTE — ED ADULT TRIAGE NOTE - NS ED NOTE AC HIGH RISK COUNTRIES
Spoke with patient who was last seen in clinic 2/17/2021 for left iliotibial band injection with PO sedation.  Patient states she received 100% pain relief for a little over 2 weeks after injection.  Patient states she was so happy with relief.  Patient states pain returned and that pain feels like it is in the bone on the outer left side of her left knee and it radiates around to the front of the knee and under the kneecap.  Patient states it feels like 1000 rubber bands squeezing her knee .  She states she does not have any swelling and the surgeon who performed her knee replacement did x-rays which did not show any abnormalities.  Patient states she was doing aqua therapy which also helped with the pain but that when it rains, the pain is terrible.  Patient looking for recommendations to relieve pain, asking if another injection could be performed or another type of injection.  Patient aware Dr. Bonner would be notified and she would be called back with recommendations. Patient very appreciative of call. Will update Dr. Bonner.   
Other Countries

## 2021-07-28 NOTE — DISCHARGE NOTE ADULT - HEMOGLOBIN A1C VALUE
Final Anesthesia Post-op Assessment    Patient: Radha Young  Procedure(s) Performed: EGD  Anesthesia type: MAC    Vitals Value Taken Time   Temp 37.2 °C (99 °F) 07/28/21 0824   Pulse 85 07/28/21 0824   Resp 22 07/28/21 0824   SpO2 98 % 07/28/21 0824   /83 07/28/21 0824         Patient Location: Phase II  Post-op Vital Signs:stable  Level of Consciousness: sedated  Respiratory Status: spontaneous ventilation and face mask  Cardiovascular stable  Hydration: euvolemic  Pain Management: adequately controlled  Handoff: Handoff to receiving nurse was performed and questions were answered  Vomiting: none  Nausea: None  Airway Patency:patent  Post-op Assessment: no complications and patient tolerated procedure well with no complications      No complications documented.    6.3

## 2022-01-27 NOTE — DISCHARGE NOTE ADULT - THE PATIENT HAS
Patient seen for follow-up of multiple medical problems and evaluation of shortness of breath.  Patient with shortness of breath for the last 2 weeks becoming more bothersome.  We need to evaluate for different causes of it.  Having epigastric nonspecific chest pain.  Will provider with Protonix and also have cardiology evaluation as well.  Once all this have been done patient will follow-up to discuss the evaluation and treatment plan.  Diagnoses and all orders for this visit:  Shortness of breath etiology undetermined will have to rule out multiple problems including PE and cardiac problems  -     CT CHEST PE IMAGING; Future  -     COMPREHENSIVE METABOLIC PANEL  -     CBC WITH DIFFERENTIAL; Future  -     D DIMER, QUANTITATIVE; Future  -     ELECTROCARDIOGRAM 12-LEAD; Future  -     SERVICE TO CARDIOLOGY  -     ECHO M-MODE/2D/DOPPLER (ROUTINE); Future  Essential hypertension  -     CREATININE; Future  -     COMPREHENSIVE METABOLIC PANEL  -     CBC WITH DIFFERENTIAL; Future  Hyperglycemia  Overweight  Controlled type 2 diabetes mellitus without complication, without long-term current use of insulin (CMS/[facility]) will check hemoglobin A1 c.  Discussion on lower carbohydrate diet.  Will continue to monitor.  Gastroesophageal reflux disease without esophagitis has been worse lately will provider with pantoprazole  Depression with anxiety  Generalized anxiety disorder  Fibromyalgia  Mild intermittent asthma without complication  Atypical chest pain  -     SERVICE TO CARDIOLOGY  -     ECHO M-MODE/2D/DOPPLER (ROUTINE); Future  Other orders  -     pantoprazole (Protonix) 20 MG tablet; Take 1 tablet by mouth daily.    
no difficulties

## 2022-05-16 NOTE — DISCHARGE NOTE ADULT - NS DC STATIN PRESCRIBED YN
yes

## 2022-06-23 NOTE — DISCHARGE NOTE ADULT - NS AS DC AMI YN
Patient: Dea Moyer    Procedure Summary     Date: 06/23/22 Room / Location: [place] [id number] / SURGERY [facility]    Anesthesia Start: 1140 Anesthesia Stop: 1451    Procedures:       LAPAROTOMY, EXPLORATORY (Abdomen)      SALPINGO-OOPHORECTOMY (Left Pelvis)      APPENDECTOMY (Abdomen)      OMENTECTOMY (Abdomen) Diagnosis: (mucinous cystadenoma cannot rule out borderline)    Surgeons: Remi Gaspar M.D. Responsible Provider: Wilton Will D.O.    Anesthesia Type: general ASA Status: 3          Final Anesthesia Type: general  Last vitals  BP   Blood Pressure: (!) 144/80    Temp   36.7 °C (98 °F)    Pulse   85   Resp   20    SpO2   96 %      Anesthesia Post Evaluation    Patient location during evaluation: PACU  Patient participation: complete - patient participated  Level of consciousness: awake and alert  Pain score: 4    Airway patency: patent  Anesthetic complications: no  Cardiovascular status: hemodynamically stable  Respiratory status: acceptable  Hydration status: euvolemic    PONV: none          No complications documented.     Nurse Pain Score: 9 (NPRS)        
no

## 2022-11-14 NOTE — ED ADULT NURSE NOTE - CHPI ED NUR SYMPTOMS POS
[de-identified] :  The patient is a 54-year-old female here for a subsequent re-evaluation of her right shoulder.  She is status post right shoulder arthroscopy arthroscopic medial and lateral row rotator cuff repair, subacromial decompression, extensive debridement of glenohumeral joint, biceps tenodesis and lysis of adhesions on 05/13/2022.  She is little over 6 months from her surgery.  She still has good and bad days with the shoulder, is doing formal physical therapy at [facility].
resolved/CHEST PAIN

## 2023-06-01 NOTE — ED PROVIDER NOTE - NS ED ATTENDING STATEMENT MOD
The 10-year ASCVD risk score (Abbey WILSON, et al., 2019) is: 21.4%    Values used to calculate the score:      Age: 41 years      Sex: Male      Is Non- : Yes      Diabetic: Yes      Tobacco smoker: Yes      Systolic Blood Pressure: 138 mmHg      Is BP treated: Yes      HDL Cholesterol: 31 mg/dL      Total Cholesterol: 180 mg/dL  
I have personally seen and examined this patient.  I have fully participated in the care of this patient. I have reviewed all pertinent clinical information, including history, physical exam, plan and the Resident’s note and agree except as noted.

## 2023-08-31 NOTE — ED ADULT TRIAGE NOTE - TEMPERATURE IN CELSIUS (DEGREES C)
36.7
17 y/o male with nosignificant PMH for PST having Repair Left Index Digit Flexor Digitorum Superficialis and Flexor Digitorum Profundus Tendon and Repair Left Index Digit Radial Digital Nerve Laceration by Dr. Toribio on 8/31/2023.

## 2024-02-12 NOTE — H&P ADULT - RESPIRATORY AND THORAX
"Jez Francis's goals for this visit include:   Chief Complaint   Patient presents with    RECHECK     Psoriatic arthritis   Reactive arthritis of multiple sites         PCP: Anthony Simeon    Referring Provider:  No referring provider defined for this encounter.      Initial /81 (BP Location: Left arm, Patient Position: Sitting, Cuff Size: Adult Large)   Pulse 105   Temp 97.5  F (36.4  C) (Oral)   Wt 110.1 kg (242 lb 11.2 oz)   SpO2 96%   BMI 28.59 kg/m   Estimated body mass index is 28.59 kg/m  as calculated from the following:    Height as of 7/6/23: 1.962 m (6' 5.25\").    Weight as of this encounter: 110.1 kg (242 lb 11.2 oz).    Medication Reconciliation: complete    Do you need any medication refills at today's visit? none    MILDRED Guo  Rheumatology/Infectious disease  [facility]   [phone]      "
negative

## 2024-10-01 NOTE — ED ADULT TRIAGE NOTE - WEIGHT IN LBS
Medical Necessity Information: It is in your best interest to select a reason for this procedure from the list below. All of these items fulfill various CMS LCD requirements except lesion extends to a margin. Include Z78.9 (Other Specified Conditions Influencing Health Status) As An Associated Diagnosis?: Yes Add Nub Associated Diagnoses If Applicable When Selecting Medical Necessity: No Medical Necessity Clause: This procedure was medically necessary because the lesion that was treated was: Lab: [id number] Lab Facility: [id number] Body Location Override (Optional - Billing Will Still Be Based On Selected Body Map Location If Applicable): left lateral buttock (superior) Size Of Lesion In Cm: 1 X Size Of Lesion In Cm (Optional): 0 Anesthesia Volume In Cc: 10 Eye Clamp Note Details: An eye clamp was used during the procedure. Excision Method: Elliptical Saucerization Depth: dermis and superficial adipose tissue Repair Type: Intermediate Intermediate / Complex Repair - Final Wound Length In Cm: 3 Suturegard Retention Suture: 2-0 Nylon Retention Suture Bite Size: 3 mm Undermining Type: Entire Wound Debridement Text: The wound edges were debrided prior to proceeding with the closure to facilitate wound healing. Helical Rim Text: The closure involved the helical rim. Vermilion Border Text: The closure involved the vermilion border. Nostril Rim Text: The closure involved the nostril rim. Retention Suture Text: Retention sutures were placed to support the closure and prevent dehiscence. Suture Removal: 14 days 119 Epidermal Closure Graft Donor Site (Optional): simple interrupted Graft Donor Site Bandage (Optional-Leave Blank If You Don't Want In Note): Steri-strips and a pressure bandage were applied to the donor site. Detail Level: Simple Excision Depth: adipose tissue Scalpel Size: 15 blade Anesthesia Type: 1% lidocaine with epinephrine Additional Anesthesia Volume In Cc: 9 Hemostasis: Electrocautery Estimated Blood Loss (Cc): minimal Repair Depth: use same depth as excision depth Anesthesia Type: 1% lidocaine with epinephrine and a 1:10 solution of 8.4% sodium bicarbonate Deep Sutures: 3-0 Monosyn Number Of Deep Sutures (Optional): 4 Epidermal Sutures: 4-0 Prolene Number Of Epidermal Sutures (Optional): 2 Epidermal Closure: running and interrupted Wound Care: Polysporin ointment Dressing: steri-strips and pressure dressing Suturegard Intro: Intraoperative tissue expansion was performed, utilizing the SUTUREGARD device, in order to reduce wound tension. Suturegard Body: The suture ends were repeatedly re-tightened and re-clamped to achieve the desired tissue expansion. Hemigard Intro: Due to skin fragility and wound tension, it was decided to use HEMIGARD adhesive retention suture devices to permit a linear closure. The skin was cleaned and dried for a 6cm distance away from the wound. Excessive hair, if present, was removed to allow for adhesion. Hemigard Postcare Instructions: The HEMIGARD strips are to remain completely dry for at least 5-7 days. Positioning (Leave Blank If You Do Not Want): The patient was placed in a comfortable position exposing the surgical site. Complex Repair Preamble Text (Leave Blank If You Do Not Want): Extensive wide undermining was performed. Intermediate Repair Preamble Text (Leave Blank If You Do Not Want): Undermining was performed with blunt dissection. Fusiform Excision Additional Text (Leave Blank If You Do Not Want): The margin was drawn around the clinically apparent lesion.  A fusiform shape was then drawn on the skin incorporating the lesion and margins.  Incisions were then made along these lines to the appropriate tissue plane and the lesion was extirpated. Eliptical Excision Additional Text (Leave Blank If You Do Not Want): The margin was drawn around the clinically apparent lesion.  An elliptical shape was then drawn on the skin incorporating the lesion and margins.  Incisions were then made along these lines to the appropriate tissue plane and the lesion was extirpated. Saucerization Excision Additional Text (Leave Blank If You Do Not Want): The margin was drawn around the clinically apparent lesion.  Incisions were then made along these lines, in a tangential fashion, to the appropriate tissue plane and the lesion was extirpated. Slit Excision Additional Text (Leave Blank If You Do Not Want): A linear line was drawn on the skin overlying the lesion. An incision was made slowly until the lesion was visualized.  Once visualized, the lesion was removed with blunt dissection. Excisional Biopsy Additional Text (Leave Blank If You Do Not Want): The margin was drawn around the clinically apparent lesion. An elliptical shape was then drawn on the skin incorporating the lesion and margins.  Incisions were then made along these lines to the appropriate tissue plane and the lesion was extirpated. Perilesional Excision Additional Text (Leave Blank If You Do Not Want): The margin was drawn around the clinically apparent lesion. Incisions were then made along these lines to the appropriate tissue plane and the lesion was extirpated. Repair Performed By Another Provider Text (Leave Blank If You Do Not Want): After the tissue was excised the defect was repaired by another provider. No Repair - Repaired With Adjacent Surgical Defect Text (Leave Blank If You Do Not Want): After the excision the defect was repaired concurrently with another surgical defect which was in close approximation. Adjacent Tissue Transfer Text: The defect edges were debeveled with a #15 scalpel blade. Given the location of the defect and the proximity to free margins an adjacent tissue transfer was deemed most appropriate. Using a sterile surgical marker, an appropriate flap was drawn incorporating the defect and placing the expected incisions within the relaxed skin tension lines where possible. The area thus outlined was incised deep to adipose tissue with a #15 scalpel blade. The skin margins were undermined to an appropriate distance in all directions utilizing iris scissors and carried over to close the primary defect. Advancement Flap (Single) Text: The defect edges were debeveled with a #15 scalpel blade.  Given the location of the defect and the proximity to free margins a single advancement flap was deemed most appropriate.  Using a sterile surgical marker, an appropriate advancement flap was drawn incorporating the defect and placing the expected incisions within the relaxed skin tension lines where possible.    The area thus outlined was incised deep to adipose tissue with a #15 scalpel blade.  The skin margins were undermined to an appropriate distance in all directions utilizing iris scissors. Advancement Flap (Double) Text: The defect edges were debeveled with a #15 scalpel blade.  Given the location of the defect and the proximity to free margins a double advancement flap was deemed most appropriate.  Using a sterile surgical marker, the appropriate advancement flaps were drawn incorporating the defect and placing the expected incisions within the relaxed skin tension lines where possible.    The area thus outlined was incised deep to adipose tissue with a #15 scalpel blade.  The skin margins were undermined to an appropriate distance in all directions utilizing iris scissors. Burow's Advancement Flap Text: The defect edges were debeveled with a #15 scalpel blade.  Given the location of the defect and the proximity to free margins a Burow's advancement flap was deemed most appropriate.  Using a sterile surgical marker, the appropriate advancement flap was drawn incorporating the defect and placing the expected incisions within the relaxed skin tension lines where possible.    The area thus outlined was incised deep to adipose tissue with a #15 scalpel blade.  The skin margins were undermined to an appropriate distance in all directions utilizing iris scissors. Chonodrocutaneous Helical Advancement Flap Text: The defect edges were debeveled with a #15 scalpel blade.  Given the location of the defect and the proximity to free margins a chondrocutaneous helical advancement flap was deemed most appropriate.  Using a sterile surgical marker, the appropriate advancement flap was drawn incorporating the defect and placing the expected incisions within the relaxed skin tension lines where possible.    The area thus outlined was incised deep to adipose tissue with a #15 scalpel blade.  The skin margins were undermined to an appropriate distance in all directions utilizing iris scissors. Crescentic Advancement Flap Text: The defect edges were debeveled with a #15 scalpel blade.  Given the location of the defect and the proximity to free margins a crescentic advancement flap was deemed most appropriate.  Using a sterile surgical marker, the appropriate advancement flap was drawn incorporating the defect and placing the expected incisions within the relaxed skin tension lines where possible.    The area thus outlined was incised deep to adipose tissue with a #15 scalpel blade.  The skin margins were undermined to an appropriate distance in all directions utilizing iris scissors. A-T Advancement Flap Text: The defect edges were debeveled with a #15 scalpel blade.  Given the location of the defect, shape of the defect and the proximity to free margins an A-T advancement flap was deemed most appropriate.  Using a sterile surgical marker, an appropriate advancement flap was drawn incorporating the defect and placing the expected incisions within the relaxed skin tension lines where possible.    The area thus outlined was incised deep to adipose tissue with a #15 scalpel blade.  The skin margins were undermined to an appropriate distance in all directions utilizing iris scissors. O-T Advancement Flap Text: The defect edges were debeveled with a #15 scalpel blade.  Given the location of the defect, shape of the defect and the proximity to free margins an O-T advancement flap was deemed most appropriate.  Using a sterile surgical marker, an appropriate advancement flap was drawn incorporating the defect and placing the expected incisions within the relaxed skin tension lines where possible.    The area thus outlined was incised deep to adipose tissue with a #15 scalpel blade.  The skin margins were undermined to an appropriate distance in all directions utilizing iris scissors. O-L Flap Text: The defect edges were debeveled with a #15 scalpel blade.  Given the location of the defect, shape of the defect and the proximity to free margins an O-L flap was deemed most appropriate.  Using a sterile surgical marker, an appropriate advancement flap was drawn incorporating the defect and placing the expected incisions within the relaxed skin tension lines where possible.    The area thus outlined was incised deep to adipose tissue with a #15 scalpel blade.  The skin margins were undermined to an appropriate distance in all directions utilizing iris scissors. O-Z Flap Text: The defect edges were debeveled with a #15 scalpel blade.  Given the location of the defect, shape of the defect and the proximity to free margins an O-Z flap was deemed most appropriate.  Using a sterile surgical marker, an appropriate transposition flap was drawn incorporating the defect and placing the expected incisions within the relaxed skin tension lines where possible. The area thus outlined was incised deep to adipose tissue with a #15 scalpel blade.  The skin margins were undermined to an appropriate distance in all directions utilizing iris scissors. Double O-Z Flap Text: The defect edges were debeveled with a #15 scalpel blade.  Given the location of the defect, shape of the defect and the proximity to free margins a Double O-Z flap was deemed most appropriate.  Using a sterile surgical marker, an appropriate transposition flap was drawn incorporating the defect and placing the expected incisions within the relaxed skin tension lines where possible. The area thus outlined was incised deep to adipose tissue with a #15 scalpel blade.  The skin margins were undermined to an appropriate distance in all directions utilizing iris scissors. V-Y Flap Text: The defect edges were debeveled with a #15 scalpel blade.  Given the location of the defect, shape of the defect and the proximity to free margins a V-Y flap was deemed most appropriate.  Using a sterile surgical marker, an appropriate advancement flap was drawn incorporating the defect and placing the expected incisions within the relaxed skin tension lines where possible.    The area thus outlined was incised deep to adipose tissue with a #15 scalpel blade.  The skin margins were undermined to an appropriate distance in all directions utilizing iris scissors. Advancement-Rotation Flap Text: The defect edges were debeveled with a #15 scalpel blade. Given the location of the defect, shape of the defect and the proximity to free margins an advancement-rotation flap was deemed most appropriate. Using a sterile surgical marker, an appropriate flap was drawn incorporating the defect and placing the expected incisions within the relaxed skin tension lines where possible. The area thus outlined was incised deep to adipose tissue with a #15 scalpel blade. The skin margins were undermined to an appropriate distance in all directions utilizing iris scissors. Following this, the designed flap was carried over into the primary defect and sutured into place. Mercedes Flap Text: The defect edges were debeveled with a #15 scalpel blade.  Given the location of the defect, shape of the defect and the proximity to free margins a Mercedes flap was deemed most appropriate.  Using a sterile surgical marker, an appropriate advancement flap was drawn incorporating the defect and placing the expected incisions within the relaxed skin tension lines where possible. The area thus outlined was incised deep to adipose tissue with a #15 scalpel blade.  The skin margins were undermined to an appropriate distance in all directions utilizing iris scissors. Modified Advancement Flap Text: The defect edges were debeveled with a #15 scalpel blade.  Given the location of the defect, shape of the defect and the proximity to free margins a modified advancement flap was deemed most appropriate.  Using a sterile surgical marker, an appropriate advancement flap was drawn incorporating the defect and placing the expected incisions within the relaxed skin tension lines where possible.    The area thus outlined was incised deep to adipose tissue with a #15 scalpel blade.  The skin margins were undermined to an appropriate distance in all directions utilizing iris scissors. Mucosal Advancement Flap Text: Given the location of the defect, shape of the defect and the proximity to free margins a mucosal advancement flap was deemed most appropriate. Incisions were made with a 15 blade scalpel in the appropriate fashion along the cutaneous vermillion border and the mucosal lip. The remaining actinically damaged mucosal tissue was excised.  The mucosal advancement flap was then elevated to the gingival sulcus with care taken to preserve the neurovascular structures and advanced into the primary defect. Care was taken to ensure that precise realignment of the vermilion border was achieved. Peng Advancement Flap Text: The defect edges were debeveled with a #15 scalpel blade. Given the location of the defect, shape of the defect and the proximity to free margins a Peng advancement flap was deemed most appropriate. Using a sterile surgical marker, an appropriate advancement flap was drawn incorporating the defect and placing the expected incisions within the relaxed skin tension lines where possible. The area thus outlined was incised deep to adipose tissue with a #15 scalpel blade. The skin margins were undermined to an appropriate distance in all directions utilizing iris scissors. Following this, the designed flap was advanced and carried over into the primary defect and sutured into place. Hatchet Flap Text: The defect edges were debeveled with a #15 scalpel blade.  Given the location of the defect, shape of the defect and the proximity to free margins a hatchet flap was deemed most appropriate.  Using a sterile surgical marker, an appropriate hatchet flap was drawn incorporating the defect and placing the expected incisions within the relaxed skin tension lines where possible.    The area thus outlined was incised deep to adipose tissue with a #15 scalpel blade.  The skin margins were undermined to an appropriate distance in all directions utilizing iris scissors. Rotation Flap Text: The defect edges were debeveled with a #15 scalpel blade.  Given the location of the defect, shape of the defect and the proximity to free margins a rotation flap was deemed most appropriate.  Using a sterile surgical marker, an appropriate rotation flap was drawn incorporating the defect and placing the expected incisions within the relaxed skin tension lines where possible.    The area thus outlined was incised deep to adipose tissue with a #15 scalpel blade.  The skin margins were undermined to an appropriate distance in all directions utilizing iris scissors. Bilateral Rotation Flap Text: The defect edges were debeveled with a #15 scalpel blade. Given the location of the defect, shape of the defect and the proximity to free margins a bilateral rotation flap was deemed most appropriate. Using a sterile surgical marker, an appropriate rotation flap was drawn incorporating the defect and placing the expected incisions within the relaxed skin tension lines where possible. The area thus outlined was incised deep to adipose tissue with a #15 scalpel blade. The skin margins were undermined to an appropriate distance in all directions utilizing iris scissors. Following this, the designed flap was carried over into the primary defect and sutured into place. Spiral Flap Text: The defect edges were debeveled with a #15 scalpel blade.  Given the location of the defect, shape of the defect and the proximity to free margins a spiral flap was deemed most appropriate.  Using a sterile surgical marker, an appropriate rotation flap was drawn incorporating the defect and placing the expected incisions within the relaxed skin tension lines where possible. The area thus outlined was incised deep to adipose tissue with a #15 scalpel blade.  The skin margins were undermined to an appropriate distance in all directions utilizing iris scissors. Staged Advancement Flap Text: The defect edges were debeveled with a #15 scalpel blade. Given the location of the defect, shape of the defect and the proximity to free margins a staged advancement flap was deemed most appropriate. Using a sterile surgical marker, an appropriate advancement flap was drawn incorporating the defect and placing the expected incisions within the relaxed skin tension lines where possible. The area thus outlined was incised deep to adipose tissue with a #15 scalpel blade. The skin margins were undermined to an appropriate distance in all directions utilizing iris scissors. Following this, the designed flap was carried over into the primary defect and sutured into place. Star Wedge Flap Text: The defect edges were debeveled with a #15 scalpel blade.  Given the location of the defect, shape of the defect and the proximity to free margins a star wedge flap was deemed most appropriate.  Using a sterile surgical marker, an appropriate rotation flap was drawn incorporating the defect and placing the expected incisions within the relaxed skin tension lines where possible. The area thus outlined was incised deep to adipose tissue with a #15 scalpel blade.  The skin margins were undermined to an appropriate distance in all directions utilizing iris scissors. Transposition Flap Text: The defect edges were debeveled with a #15 scalpel blade.  Given the location of the defect and the proximity to free margins a transposition flap was deemed most appropriate.  Using a sterile surgical marker, an appropriate transposition flap was drawn incorporating the defect.    The area thus outlined was incised deep to adipose tissue with a #15 scalpel blade.  The skin margins were undermined to an appropriate distance in all directions utilizing iris scissors. Muscle Hinge Flap Text: The defect edges were debeveled with a #15 scalpel blade.  Given the size, depth and location of the defect and the proximity to free margins a muscle hinge flap was deemed most appropriate.  Using a sterile surgical marker, an appropriate hinge flap was drawn incorporating the defect. The area thus outlined was incised with a #15 scalpel blade.  The skin margins were undermined to an appropriate distance in all directions utilizing iris scissors. Mustarde Flap Text: The defect edges were debeveled with a #15 scalpel blade.  Given the size, depth and location of the defect and the proximity to free margins a Mustarde flap was deemed most appropriate. Using a sterile surgical marker, an appropriate flap was drawn incorporating the defect. The area thus outlined was incised with a #15 scalpel blade. The skin margins were undermined to an appropriate distance in all directions utilizing iris scissors. Following this, the designed flap was carried into the primary defect and sutured into place. Nasal Turnover Hinge Flap Text: The defect edges were debeveled with a #15 scalpel blade.  Given the size, depth, location of the defect and the defect being full thickness a nasal turnover hinge flap was deemed most appropriate. Using a sterile surgical marker, an appropriate hinge flap was drawn incorporating the defect. The area thus outlined was incised with a #15 scalpel blade. The flap was designed to recreate the nasal mucosal lining and the alar rim. The skin margins were undermined to an appropriate distance in all directions utilizing iris scissors. Following this, the designed flap was carried over into the primary defect and sutured into place Nasalis-Muscle-Based Myocutaneous Island Pedicle Flap Text: Using a #15 blade, an incision was made around the donor flap to the level of the nasalis muscle. Wide lateral undermining was then performed in both the subcutaneous plane above the nasalis muscle, and in a submuscular plane just above periosteum. This allowed the formation of a free nasalis muscle axial pedicle (based on the angular artery) which was still attached to the actual cutaneous flap, increasing its mobility and vascular viability. Hemostasis was obtained with pinpoint electrocoagulation. The flap was mobilized into position and the pivotal anchor points positioned and stabilized with buried interrupted sutures. Subcutaneous and dermal tissues were closed in a multilayered fashion with sutures. Tissue redundancies were excised, and the epidermal edges were apposed without significant tension and sutured with sutures. Nasalis Myocutaneous Flap Text: Using a #15 blade, an incision was made around the donor flap to the level of the nasalis muscle. Wide lateral undermining was then performed in both the subcutaneous plane above the nasalis muscle, and in a submuscular plane just above periosteum. This allowed the formation of a free nasalis muscle axial pedicle which was still attached to the actual cutaneous flap, increasing its mobility and vascular viability. Hemostasis was obtained with pinpoint electrocoagulation. The flap was mobilized into position and the pivotal anchor points positioned and stabilized with buried interrupted sutures. Subcutaneous and dermal tissues were closed in a multilayered fashion with sutures. Tissue redundancies were excised, and the epidermal edges were apposed without significant tension and sutured with sutures. Nasolabial Transposition Flap Text: The defect edges were debeveled with a #15 scalpel blade.  Given the size, depth and location of the defect and the proximity to free margins a nasolabial transposition flap was deemed most appropriate. Using a sterile surgical marker, an appropriate flap was drawn incorporating the defect. The area thus outlined was incised with a #15 scalpel blade. The skin margins were undermined to an appropriate distance in all directions utilizing iris scissors. Following this, the designed flap was carried into the primary defect and sutured into place. Orbicularis Oris Muscle Flap Text: The defect edges were debeveled with a #15 scalpel blade.  Given that the defect affected the competency of the oral sphincter an orbicularis oris muscle flap was deemed most appropriate to restore this competency and normal muscle function.  Using a sterile surgical marker, an appropriate flap was drawn incorporating the defect. The area thus outlined was incised with a #15 scalpel blade. Following this, the designed flap was carried over into the primary defect and sutured into place. Melolabial Transposition Flap Text: The defect edges were debeveled with a #15 scalpel blade.  Given the location of the defect and the proximity to free margins a melolabial flap was deemed most appropriate.  Using a sterile surgical marker, an appropriate melolabial transposition flap was drawn incorporating the defect.    The area thus outlined was incised deep to adipose tissue with a #15 scalpel blade.  The skin margins were undermined to an appropriate distance in all directions utilizing iris scissors. Rectangular Flap Text: The defect edges were debeveled with a #15 scalpel blade. Given the location of the defect and the proximity to free margins a rectangular flap was deemed most appropriate. Using a sterile surgical marker, an appropriate rectangular flap was drawn incorporating the defect. The area thus outlined was incised deep to adipose tissue with a #15 scalpel blade. The skin margins were undermined to an appropriate distance in all directions utilizing iris scissors. Following this, the designed flap was carried over into the primary defect and sutured into place. Rhombic Flap Text: The defect edges were debeveled with a #15 scalpel blade.  Given the location of the defect and the proximity to free margins a rhombic flap was deemed most appropriate.  Using a sterile surgical marker, an appropriate rhombic flap was drawn incorporating the defect.    The area thus outlined was incised deep to adipose tissue with a #15 scalpel blade.  The skin margins were undermined to an appropriate distance in all directions utilizing iris scissors. Rhomboid Transposition Flap Text: The defect edges were debeveled with a #15 scalpel blade.  Given the location of the defect and the proximity to free margins a rhomboid transposition flap was deemed most appropriate.  Using a sterile surgical marker, an appropriate rhomboid flap was drawn incorporating the defect.    The area thus outlined was incised deep to adipose tissue with a #15 scalpel blade.  The skin margins were undermined to an appropriate distance in all directions utilizing iris scissors. Bi-Rhombic Flap Text: The defect edges were debeveled with a #15 scalpel blade.  Given the location of the defect and the proximity to free margins a bi-rhombic flap was deemed most appropriate.  Using a sterile surgical marker, an appropriate rhombic flap was drawn incorporating the defect. The area thus outlined was incised deep to adipose tissue with a #15 scalpel blade.  The skin margins were undermined to an appropriate distance in all directions utilizing iris scissors. Helical Rim Advancement Flap Text: The defect edges were debeveled with a #15 blade scalpel.  Given the location of the defect and the proximity to free margins (helical rim) a double helical rim advancement flap was deemed most appropriate.  Using a sterile surgical marker, the appropriate advancement flaps were drawn incorporating the defect and placing the expected incisions between the helical rim and antihelix where possible.  The area thus outlined was incised through and through with a #15 scalpel blade.  With a skin hook and iris scissors, the flaps were gently and sharply undermined and freed up. Bilateral Helical Rim Advancement Flap Text: The defect edges were debeveled with a #15 blade scalpel.  Given the location of the defect and the proximity to free margins (helical rim) a bilateral helical rim advancement flap was deemed most appropriate.  Using a sterile surgical marker, the appropriate advancement flaps were drawn incorporating the defect and placing the expected incisions between the helical rim and antihelix where possible.  The area thus outlined was incised through and through with a #15 scalpel blade.  With a skin hook and iris scissors, the flaps were gently and sharply undermined and freed up. Ear Star Wedge Flap Text: The defect edges were debeveled with a #15 blade scalpel.  Given the location of the defect and the proximity to free margins (helical rim) an ear star wedge flap was deemed most appropriate.  Using a sterile surgical marker, the appropriate flap was drawn incorporating the defect and placing the expected incisions between the helical rim and antihelix where possible.  The area thus outlined was incised through and through with a #15 scalpel blade. Flip-Flop Flap Text: The defect edges were debeveled with a #15 blade scalpel.  Given the location of the defect and the proximity to free margins a flip-flop flap was deemed most appropriate. Using a sterile surgical marker, the appropriate flap was drawn incorporating the defect and placing the expected incisions between the helical rim and antihelix where possible.  The area thus outlined was incised through and through with a #15 scalpel blade. Following this, the designed flap was carried over into the primary defect and sutured into place. Banner Transposition Flap Text: The defect edges were debeveled with a #15 scalpel blade.  Given the location of the defect and the proximity to free margins a Banner transposition flap was deemed most appropriate.  Using a sterile surgical marker, an appropriate flap drawn around the defect. The area thus outlined was incised deep to adipose tissue with a #15 scalpel blade.  The skin margins were undermined to an appropriate distance in all directions utilizing iris scissors. Bilobed Flap Text: The defect edges were debeveled with a #15 scalpel blade.  Given the location of the defect and the proximity to free margins a bilobe flap was deemed most appropriate.  Using a sterile surgical marker, an appropriate bilobe flap drawn around the defect.    The area thus outlined was incised deep to adipose tissue with a #15 scalpel blade.  The skin margins were undermined to an appropriate distance in all directions utilizing iris scissors. Bilobed Transposition Flap Text: The defect edges were debeveled with a #15 scalpel blade.  Given the location of the defect and the proximity to free margins a bilobed transposition flap was deemed most appropriate.  Using a sterile surgical marker, an appropriate bilobe flap drawn around the defect.    The area thus outlined was incised deep to adipose tissue with a #15 scalpel blade.  The skin margins were undermined to an appropriate distance in all directions utilizing iris scissors. Trilobed Flap Text: The defect edges were debeveled with a #15 scalpel blade.  Given the location of the defect and the proximity to free margins a trilobed flap was deemed most appropriate.  Using a sterile surgical marker, an appropriate trilobed flap drawn around the defect.    The area thus outlined was incised deep to adipose tissue with a #15 scalpel blade.  The skin margins were undermined to an appropriate distance in all directions utilizing iris scissors. Dorsal Nasal Flap Text: The defect edges were debeveled with a #15 scalpel blade.  Given the location of the defect and the proximity to free margins a dorsal nasal flap was deemed most appropriate.  Using a sterile surgical marker, an appropriate dorsal nasal flap was drawn around the defect.    The area thus outlined was incised deep to adipose tissue with a #15 scalpel blade.  The skin margins were undermined to an appropriate distance in all directions utilizing iris scissors. Island Pedicle Flap Text: The defect edges were debeveled with a #15 scalpel blade.  Given the location of the defect, shape of the defect and the proximity to free margins an island pedicle advancement flap was deemed most appropriate.  Using a sterile surgical marker, an appropriate advancement flap was drawn incorporating the defect, outlining the appropriate donor tissue and placing the expected incisions within the relaxed skin tension lines where possible.    The area thus outlined was incised deep to adipose tissue with a #15 scalpel blade.  The skin margins were undermined to an appropriate distance in all directions around the primary defect and laterally outward around the island pedicle utilizing iris scissors.  There was minimal undermining beneath the pedicle flap. Island Pedicle Flap With Canthal Suspension Text: The defect edges were debeveled with a #15 scalpel blade.  Given the location of the defect, shape of the defect and the proximity to free margins an island pedicle advancement flap was deemed most appropriate.  Using a sterile surgical marker, an appropriate advancement flap was drawn incorporating the defect, outlining the appropriate donor tissue and placing the expected incisions within the relaxed skin tension lines where possible. The area thus outlined was incised deep to adipose tissue with a #15 scalpel blade.  The skin margins were undermined to an appropriate distance in all directions around the primary defect and laterally outward around the island pedicle utilizing iris scissors.  There was minimal undermining beneath the pedicle flap. A suspension suture was placed in the canthal tendon to prevent tension and prevent ectropion. Alar Island Pedicle Flap Text: The defect edges were debeveled with a #15 scalpel blade.  Given the location of the defect, shape of the defect and the proximity to the alar rim an island pedicle advancement flap was deemed most appropriate.  Using a sterile surgical marker, an appropriate advancement flap was drawn incorporating the defect, outlining the appropriate donor tissue and placing the expected incisions within the nasal ala running parallel to the alar rim. The area thus outlined was incised with a #15 scalpel blade.  The skin margins were undermined minimally to an appropriate distance in all directions around the primary defect and laterally outward around the island pedicle utilizing iris scissors.  There was minimal undermining beneath the pedicle flap. Double Island Pedicle Flap Text: The defect edges were debeveled with a #15 scalpel blade.  Given the location of the defect, shape of the defect and the proximity to free margins a double island pedicle advancement flap was deemed most appropriate.  Using a sterile surgical marker, an appropriate advancement flap was drawn incorporating the defect, outlining the appropriate donor tissue and placing the expected incisions within the relaxed skin tension lines where possible.    The area thus outlined was incised deep to adipose tissue with a #15 scalpel blade.  The skin margins were undermined to an appropriate distance in all directions around the primary defect and laterally outward around the island pedicle utilizing iris scissors.  There was minimal undermining beneath the pedicle flap. Island Pedicle Flap-Requiring Vessel Identification Text: The defect edges were debeveled with a #15 scalpel blade.  Given the location of the defect, shape of the defect and the proximity to free margins an island pedicle advancement flap was deemed most appropriate.  Using a sterile surgical marker, an appropriate advancement flap was drawn, based on the axial vessel mentioned above, incorporating the defect, outlining the appropriate donor tissue and placing the expected incisions within the relaxed skin tension lines where possible.    The area thus outlined was incised deep to adipose tissue with a #15 scalpel blade.  The skin margins were undermined to an appropriate distance in all directions around the primary defect and laterally outward around the island pedicle utilizing iris scissors.  There was minimal undermining beneath the pedicle flap. Keystone Flap Text: The defect edges were debeveled with a #15 scalpel blade.  Given the location of the defect, shape of the defect a keystone flap was deemed most appropriate.  Using a sterile surgical marker, an appropriate keystone flap was drawn incorporating the defect, outlining the appropriate donor tissue and placing the expected incisions within the relaxed skin tension lines where possible. The area thus outlined was incised deep to adipose tissue with a #15 scalpel blade.  The skin margins were undermined to an appropriate distance in all directions around the primary defect and laterally outward around the flap utilizing iris scissors. O-T Plasty Text: The defect edges were debeveled with a #15 scalpel blade.  Given the location of the defect, shape of the defect and the proximity to free margins an O-T plasty was deemed most appropriate.  Using a sterile surgical marker, an appropriate O-T plasty was drawn incorporating the defect and placing the expected incisions within the relaxed skin tension lines where possible.    The area thus outlined was incised deep to adipose tissue with a #15 scalpel blade.  The skin margins were undermined to an appropriate distance in all directions utilizing iris scissors. O-Z Plasty Text: The defect edges were debeveled with a #15 scalpel blade.  Given the location of the defect, shape of the defect and the proximity to free margins an O-Z plasty (double transposition flap) was deemed most appropriate.  Using a sterile surgical marker, the appropriate transposition flaps were drawn incorporating the defect and placing the expected incisions within the relaxed skin tension lines where possible.    The area thus outlined was incised deep to adipose tissue with a #15 scalpel blade.  The skin margins were undermined to an appropriate distance in all directions utilizing iris scissors.  Hemostasis was achieved with electrocautery.  The flaps were then transposed into place, one clockwise and the other counterclockwise, and anchored with interrupted buried subcutaneous sutures. Double O-Z Plasty Text: The defect edges were debeveled with a #15 scalpel blade.  Given the location of the defect, shape of the defect and the proximity to free margins a Double O-Z plasty (double transposition flap) was deemed most appropriate.  Using a sterile surgical marker, the appropriate transposition flaps were drawn incorporating the defect and placing the expected incisions within the relaxed skin tension lines where possible. The area thus outlined was incised deep to adipose tissue with a #15 scalpel blade.  The skin margins were undermined to an appropriate distance in all directions utilizing iris scissors.  Hemostasis was achieved with electrocautery.  The flaps were then transposed into place, one clockwise and the other counterclockwise, and anchored with interrupted buried subcutaneous sutures. V-Y Plasty Text: The defect edges were debeveled with a #15 scalpel blade.  Given the location of the defect, shape of the defect and the proximity to free margins an V-Y advancement flap was deemed most appropriate.  Using a sterile surgical marker, an appropriate advancement flap was drawn incorporating the defect and placing the expected incisions within the relaxed skin tension lines where possible.    The area thus outlined was incised deep to adipose tissue with a #15 scalpel blade.  The skin margins were undermined to an appropriate distance in all directions utilizing iris scissors. H Plasty Text: Given the location of the defect, shape of the defect and the proximity to free margins a H-plasty was deemed most appropriate for repair.  Using a sterile surgical marker, the appropriate advancement arms of the H-plasty were drawn incorporating the defect and placing the expected incisions within the relaxed skin tension lines where possible. The area thus outlined was incised deep to adipose tissue with a #15 scalpel blade. The skin margins were undermined to an appropriate distance in all directions utilizing iris scissors.  The opposing advancement arms were then advanced into place in opposite direction and anchored with interrupted buried subcutaneous sutures. W Plasty Text: The lesion was extirpated to the level of the fat with a #15 scalpel blade.  Given the location of the defect, shape of the defect and the proximity to free margins a W-plasty was deemed most appropriate for repair.  Using a sterile surgical marker, the appropriate transposition arms of the W-plasty were drawn incorporating the defect and placing the expected incisions within the relaxed skin tension lines where possible.    The area thus outlined was incised deep to adipose tissue with a #15 scalpel blade.  The skin margins were undermined to an appropriate distance in all directions utilizing iris scissors.  The opposing transposition arms were then transposed into place in opposite direction and anchored with interrupted buried subcutaneous sutures. Z Plasty Text: The lesion was extirpated to the level of the fat with a #15 scalpel blade.  Given the location of the defect, shape of the defect and the proximity to free margins a Z-plasty was deemed most appropriate for repair.  Using a sterile surgical marker, the appropriate transposition arms of the Z-plasty were drawn incorporating the defect and placing the expected incisions within the relaxed skin tension lines where possible.    The area thus outlined was incised deep to adipose tissue with a #15 scalpel blade.  The skin margins were undermined to an appropriate distance in all directions utilizing iris scissors.  The opposing transposition arms were then transposed into place in opposite direction and anchored with interrupted buried subcutaneous sutures. Double Z Plasty Text: The lesion was extirpated to the level of the fat with a #15 scalpel blade. Given the location of the defect, shape of the defect and the proximity to free margins a double Z-plasty was deemed most appropriate for repair. Using a sterile surgical marker, the appropriate transposition arms of the double Z-plasty were drawn incorporating the defect and placing the expected incisions within the relaxed skin tension lines where possible. The area thus outlined was incised deep to adipose tissue with a #15 scalpel blade. The skin margins were undermined to an appropriate distance in all directions utilizing iris scissors. The opposing transposition arms were then transposed and carried over into place in opposite direction and anchored with interrupted buried subcutaneous sutures. Zygomaticofacial Flap Text: Given the location of the defect, shape of the defect and the proximity to free margins a zygomaticofacial flap was deemed most appropriate for repair. Using a sterile surgical marker, the appropriate flap was drawn incorporating the defect and placing the expected incisions within the relaxed skin tension lines where possible. The area thus outlined was incised deep to adipose tissue with a #15 scalpel blade with preservation of a vascular pedicle.  The skin margins were undermined to an appropriate distance in all directions utilizing iris scissors. The flap was then carried over into the defect and anchored with interrupted buried subcutaneous sutures. Cheek Interpolation Flap Text: A decision was made to reconstruct the defect utilizing an interpolation axial flap and a staged reconstruction.  A telfa template was made of the defect.  This telfa template was then used to outline the Cheek Interpolation flap.  The donor area for the pedicle flap was then injected with anesthesia.  The flap was excised through the skin and subcutaneous tissue down to the layer of the underlying musculature.  The interpolation flap was carefully excised within this deep plane to maintain its blood supply.  The edges of the donor site were undermined.   The donor site was closed in a primary fashion.  The pedicle was then rotated into position and sutured.  Once the tube was sutured into place, adequate blood supply was confirmed with blanching and refill.  The pedicle was then wrapped with xeroform gauze and dressed appropriately with a telfa and gauze bandage to ensure continued blood supply and protect the attached pedicle. Cheek-To-Nose Interpolation Flap Text: A decision was made to reconstruct the defect utilizing an interpolation axial flap and a staged reconstruction.  A telfa template was made of the defect.  This telfa template was then used to outline the Cheek-To-Nose Interpolation flap.  The donor area for the pedicle flap was then injected with anesthesia.  The flap was excised through the skin and subcutaneous tissue down to the layer of the underlying musculature.  The interpolation flap was carefully excised within this deep plane to maintain its blood supply.  The edges of the donor site were undermined.   The donor site was closed in a primary fashion.  The pedicle was then rotated into position and sutured.  Once the tube was sutured into place, adequate blood supply was confirmed with blanching and refill.  The pedicle was then wrapped with xeroform gauze and dressed appropriately with a telfa and gauze bandage to ensure continued blood supply and protect the attached pedicle. Interpolation Flap Text: A decision was made to reconstruct the defect utilizing an interpolation axial flap and a staged reconstruction.  A telfa template was made of the defect.  This telfa template was then used to outline the interpolation flap.  The donor area for the pedicle flap was then injected with anesthesia.  The flap was excised through the skin and subcutaneous tissue down to the layer of the underlying musculature.  The interpolation flap was carefully excised within this deep plane to maintain its blood supply.  The edges of the donor site were undermined.   The donor site was closed in a primary fashion.  The pedicle was then rotated into position and sutured.  Once the tube was sutured into place, adequate blood supply was confirmed with blanching and refill.  The pedicle was then wrapped with xeroform gauze and dressed appropriately with a telfa and gauze bandage to ensure continued blood supply and protect the attached pedicle. Melolabial Interpolation Flap Text: A decision was made to reconstruct the defect utilizing an interpolation axial flap and a staged reconstruction.  A telfa template was made of the defect.  This telfa template was then used to outline the melolabial interpolation flap.  The donor area for the pedicle flap was then injected with anesthesia.  The flap was excised through the skin and subcutaneous tissue down to the layer of the underlying musculature.  The pedicle flap was carefully excised within this deep plane to maintain its blood supply.  The edges of the donor site were undermined.   The donor site was closed in a primary fashion.  The pedicle was then rotated into position and sutured.  Once the tube was sutured into place, adequate blood supply was confirmed with blanching and refill.  The pedicle was then wrapped with xeroform gauze and dressed appropriately with a telfa and gauze bandage to ensure continued blood supply and protect the attached pedicle. Mastoid Interpolation Flap Text: A decision was made to reconstruct the defect utilizing an interpolation axial flap and a staged reconstruction.  A telfa template was made of the defect.  This telfa template was then used to outline the mastoid interpolation flap.  The donor area for the pedicle flap was then injected with anesthesia.  The flap was excised through the skin and subcutaneous tissue down to the layer of the underlying musculature.  The pedicle flap was carefully excised within this deep plane to maintain its blood supply.  The edges of the donor site were undermined.   The donor site was closed in a primary fashion.  The pedicle was then rotated into position and sutured.  Once the tube was sutured into place, adequate blood supply was confirmed with blanching and refill.  The pedicle was then wrapped with xeroform gauze and dressed appropriately with a telfa and gauze bandage to ensure continued blood supply and protect the attached pedicle. Posterior Auricular Interpolation Flap Text: A decision was made to reconstruct the defect utilizing an interpolation axial flap and a staged reconstruction.  A telfa template was made of the defect.  This telfa template was then used to outline the posterior auricular interpolation flap.  The donor area for the pedicle flap was then injected with anesthesia.  The flap was excised through the skin and subcutaneous tissue down to the layer of the underlying musculature.  The pedicle flap was carefully excised within this deep plane to maintain its blood supply.  The edges of the donor site were undermined.   The donor site was closed in a primary fashion.  The pedicle was then rotated into position and sutured.  Once the tube was sutured into place, adequate blood supply was confirmed with blanching and refill.  The pedicle was then wrapped with xeroform gauze and dressed appropriately with a telfa and gauze bandage to ensure continued blood supply and protect the attached pedicle. Paramedian Forehead Flap Text: A decision was made to reconstruct the defect utilizing an interpolation axial flap and a staged reconstruction.  A telfa template was made of the defect.  This telfa template was then used to outline the paramedian forehead pedicle flap.  The donor area for the pedicle flap was then injected with anesthesia.  The flap was excised through the skin and subcutaneous tissue down to the layer of the underlying musculature.  The pedicle flap was carefully excised within this deep plane to maintain its blood supply.  The edges of the donor site were undermined.   The donor site was closed in a primary fashion.  The pedicle was then rotated into position and sutured.  Once the tube was sutured into place, adequate blood supply was confirmed with blanching and refill.  The pedicle was then wrapped with xeroform gauze and dressed appropriately with a telfa and gauze bandage to ensure continued blood supply and protect the attached pedicle. Abbe Flap (Upper To Lower Lip) Text: The defect of the lower lip was assessed and measured.  Given the location and size of the defect, an Abbe flap was deemed most appropriate. Using a sterile surgical marker, an appropriate Abbe flap was measured and drawn on the upper lip. Local anesthesia was then infiltrated.  A scalpel was then used to incise the upper lip through and through the skin, vermilion, muscle and mucosa, leaving the flap pedicled on the opposite side.  The flap was then rotated and transferred to the lower lip defect.  The flap was then sutured into place with a three layer technique, closing the orbicularis oris muscle layer with subcutaneous buried sutures, followed by a mucosal layer and an epidermal layer. Abbe Flap (Lower To Upper Lip) Text: The defect of the upper lip was assessed and measured.  Given the location and size of the defect, an Abbe flap was deemed most appropriate. Using a sterile surgical marker, an appropriate Abbe flap was measured and drawn on the lower lip. Local anesthesia was then infiltrated. A scalpel was then used to incise the upper lip through and through the skin, vermilion, muscle and mucosa, leaving the flap pedicled on the opposite side.  The flap was then rotated and transferred to the lower lip defect.  The flap was then sutured into place with a three layer technique, closing the orbicularis oris muscle layer with subcutaneous buried sutures, followed by a mucosal layer and an epidermal layer. Estlander Flap (Upper To Lower Lip) Text: The defect of the lower lip was assessed and measured.  Given the location and size of the defect, an Estlander flap was deemed most appropriate. Using a sterile surgical marker, an appropriate Estlander flap was measured and drawn on the upper lip. Local anesthesia was then infiltrated. A scalpel was then used to incise the lateral aspect of the flap, through skin, muscle and mucosa, leaving the flap pedicled medially.  The flap was then rotated and positioned to fill the lower lip defect.  The flap was then sutured into place with a three layer technique, closing the orbicularis oris muscle layer with subcutaneous buried sutures, followed by a mucosal layer and an epidermal layer. Lip Wedge Excision Repair Text: Given the location of the defect and the proximity to free margins a full thickness wedge repair was deemed most appropriate.  Using a sterile surgical marker, the appropriate repair was drawn incorporating the defect and placing the expected incisions perpendicular to the vermilion border.  The vermilion border was also meticulously outlined to ensure appropriate reapproximation during the repair.  The area thus outlined was incised through and through with a #15 scalpel blade.  The muscularis and dermis were reaproximated with deep sutures following hemostasis. Care was taken to realign the vermilion border before proceeding with the superficial closure.  Once the vermilion was realigned the superfical and mucosal closure was finished. Ftsg Text: The defect edges were debeveled with a #15 scalpel blade.  Given the location of the defect, shape of the defect and the proximity to free margins a full thickness skin graft was deemed most appropriate.  Using a sterile surgical marker, the primary defect shape was transferred to the donor site. The area thus outlined was incised deep to adipose tissue with a #15 scalpel blade.  The harvested graft was then trimmed of adipose tissue until only dermis and epidermis was left.  The skin margins of the secondary defect were undermined to an appropriate distance in all directions utilizing iris scissors.  The secondary defect was closed with interrupted buried subcutaneous sutures.  The skin edges were then re-apposed with running  sutures.  The skin graft was then placed in the primary defect and oriented appropriately. Split-Thickness Skin Graft Text: The defect edges were debeveled with a #15 scalpel blade.  Given the location of the defect, shape of the defect and the proximity to free margins a split thickness skin graft was deemed most appropriate.  Using a sterile surgical marker, the primary defect shape was transferred to the donor site. The split thickness graft was then harvested.  The skin graft was then placed in the primary defect and oriented appropriately. Pinch Graft Text: The defect edges were debeveled with a #15 scalpel blade. Given the location of the defect, shape of the defect and the proximity to free margins a pinch graft was deemed most appropriate. Using a sterile surgical marker, the primary defect shape was transferred to the donor site. The area thus outlined was incised deep to adipose tissue with a #15 scalpel blade.  The harvested graft was then trimmed of adipose tissue until only dermis and epidermis was left. The skin graft was then placed in the primary defect and oriented appropriately. Burow's Graft Text: The defect edges were debeveled with a #15 scalpel blade. Given the location of the defect, shape of the defect, the proximity to free margins and the presence of a standing cone deformity a Burow's skin graft was deemed most appropriate. The standing cone was removed and this tissue was then trimmed to the shape of the primary defect. The adipose tissue was also removed until only dermis and epidermis were left.  The skin graft was then placed in the primary defect and oriented appropriately. Cartilage Graft Text: The defect edges were debeveled with a #15 scalpel blade.  Given the location of the defect, shape of the defect, the fact the defect involved a full thickness cartilage defect a cartilage graft was deemed most appropriate.  An appropriate donor site was identified, cleansed, and anesthetized. The cartilage graft was then harvested and transferred to the recipient site, oriented appropriately and then sutured into place.  The secondary defect was then repaired using a primary closure. Composite Graft Text: The defect edges were debeveled with a #15 scalpel blade.  Given the location of the defect, shape of the defect, the proximity to free margins and the fact the defect was full thickness a composite graft was deemed most appropriate.  The defect was outline and then transferred to the donor site.  A full thickness graft was then excised from the donor site. The graft was then placed in the primary defect, oriented appropriately and then sutured into place.  The secondary defect was then repaired using a primary closure. Epidermal Autograft Text: The defect edges were debeveled with a #15 scalpel blade.  Given the location of the defect, shape of the defect and the proximity to free margins an epidermal autograft was deemed most appropriate.  Using a sterile surgical marker, the primary defect shape was transferred to the donor site. The epidermal graft was then harvested.  The skin graft was then placed in the primary defect and oriented appropriately. Dermal Autograft Text: The defect edges were debeveled with a #15 scalpel blade.  Given the location of the defect, shape of the defect and the proximity to free margins a dermal autograft was deemed most appropriate.  Using a sterile surgical marker, the primary defect shape was transferred to the donor site. The area thus outlined was incised deep to adipose tissue with a #15 scalpel blade.  The harvested graft was then trimmed of adipose and epidermal tissue until only dermis was left.  The skin graft was then placed in the primary defect and oriented appropriately. Skin Substitute Text: The defect edges were debeveled with a #15 scalpel blade.  Given the location of the defect, shape of the defect and the proximity to free margins a skin substitute graft was deemed most appropriate.  The graft material was trimmed to fit the size of the defect. The graft was then placed in the primary defect and oriented appropriately. Tissue Cultured Epidermal Autograft Text: The defect edges were debeveled with a #15 scalpel blade.  Given the location of the defect, shape of the defect and the proximity to free margins a tissue cultured epidermal autograft was deemed most appropriate.  The graft was then trimmed to fit the size of the defect.  The graft was then placed in the primary defect and oriented appropriately. Xenograft Text: The defect edges were debeveled with a #15 scalpel blade.  Given the location of the defect, shape of the defect and the proximity to free margins a xenograft was deemed most appropriate.  The graft was then trimmed to fit the size of the defect.  The graft was then placed in the primary defect and oriented appropriately. Purse String (Intermediate) Text: Given the location of the defect and the characteristics of the surrounding skin a purse string intermediate closure was deemed most appropriate.  Undermining was performed circumferentially around the surgical defect.  A purse string suture was then placed and tightened. Purse String (Simple) Text: Given the location of the defect and the characteristics of the surrounding skin a purse string simple closure was deemed most appropriate.  Undermining was performed circumferentially around the surgical defect.  A purse string suture was then placed and tightened. Complex Repair And Single Advancement Flap Text: The defect edges were debeveled with a #15 scalpel blade.  The primary defect was closed partially with a complex linear closure.  Given the location of the remaining defect, shape of the defect and the proximity to free margins a single advancement flap was deemed most appropriate for complete closure of the defect.  Using a sterile surgical marker, an appropriate advancement flap was drawn incorporating the defect and placing the expected incisions within the relaxed skin tension lines where possible.    The area thus outlined was incised deep to adipose tissue with a #15 scalpel blade.  The skin margins were undermined to an appropriate distance in all directions utilizing iris scissors. Complex Repair And Double Advancement Flap Text: The defect edges were debeveled with a #15 scalpel blade.  The primary defect was closed partially with a complex linear closure.  Given the location of the remaining defect, shape of the defect and the proximity to free margins a double advancement flap was deemed most appropriate for complete closure of the defect.  Using a sterile surgical marker, an appropriate advancement flap was drawn incorporating the defect and placing the expected incisions within the relaxed skin tension lines where possible.    The area thus outlined was incised deep to adipose tissue with a #15 scalpel blade.  The skin margins were undermined to an appropriate distance in all directions utilizing iris scissors. Complex Repair And Modified Advancement Flap Text: The defect edges were debeveled with a #15 scalpel blade.  The primary defect was closed partially with a complex linear closure.  Given the location of the remaining defect, shape of the defect and the proximity to free margins a modified advancement flap was deemed most appropriate for complete closure of the defect.  Using a sterile surgical marker, an appropriate advancement flap was drawn incorporating the defect and placing the expected incisions within the relaxed skin tension lines where possible.    The area thus outlined was incised deep to adipose tissue with a #15 scalpel blade.  The skin margins were undermined to an appropriate distance in all directions utilizing iris scissors. Complex Repair And A-T Advancement Flap Text: The defect edges were debeveled with a #15 scalpel blade.  The primary defect was closed partially with a complex linear closure.  Given the location of the remaining defect, shape of the defect and the proximity to free margins an A-T advancement flap was deemed most appropriate for complete closure of the defect.  Using a sterile surgical marker, an appropriate advancement flap was drawn incorporating the defect and placing the expected incisions within the relaxed skin tension lines where possible.    The area thus outlined was incised deep to adipose tissue with a #15 scalpel blade.  The skin margins were undermined to an appropriate distance in all directions utilizing iris scissors. Complex Repair And O-T Advancement Flap Text: The defect edges were debeveled with a #15 scalpel blade.  The primary defect was closed partially with a complex linear closure.  Given the location of the remaining defect, shape of the defect and the proximity to free margins an O-T advancement flap was deemed most appropriate for complete closure of the defect.  Using a sterile surgical marker, an appropriate advancement flap was drawn incorporating the defect and placing the expected incisions within the relaxed skin tension lines where possible.    The area thus outlined was incised deep to adipose tissue with a #15 scalpel blade.  The skin margins were undermined to an appropriate distance in all directions utilizing iris scissors. Complex Repair And O-L Flap Text: The defect edges were debeveled with a #15 scalpel blade.  The primary defect was closed partially with a complex linear closure.  Given the location of the remaining defect, shape of the defect and the proximity to free margins an O-L flap was deemed most appropriate for complete closure of the defect.  Using a sterile surgical marker, an appropriate flap was drawn incorporating the defect and placing the expected incisions within the relaxed skin tension lines where possible.    The area thus outlined was incised deep to adipose tissue with a #15 scalpel blade.  The skin margins were undermined to an appropriate distance in all directions utilizing iris scissors. Complex Repair And Bilobe Flap Text: The defect edges were debeveled with a #15 scalpel blade.  The primary defect was closed partially with a complex linear closure.  Given the location of the remaining defect, shape of the defect and the proximity to free margins a bilobe flap was deemed most appropriate for complete closure of the defect.  Using a sterile surgical marker, an appropriate advancement flap was drawn incorporating the defect and placing the expected incisions within the relaxed skin tension lines where possible.    The area thus outlined was incised deep to adipose tissue with a #15 scalpel blade.  The skin margins were undermined to an appropriate distance in all directions utilizing iris scissors. Complex Repair And Melolabial Flap Text: The defect edges were debeveled with a #15 scalpel blade.  The primary defect was closed partially with a complex linear closure.  Given the location of the remaining defect, shape of the defect and the proximity to free margins a melolabial flap was deemed most appropriate for complete closure of the defect.  Using a sterile surgical marker, an appropriate advancement flap was drawn incorporating the defect and placing the expected incisions within the relaxed skin tension lines where possible.    The area thus outlined was incised deep to adipose tissue with a #15 scalpel blade.  The skin margins were undermined to an appropriate distance in all directions utilizing iris scissors. Complex Repair And Rotation Flap Text: The defect edges were debeveled with a #15 scalpel blade.  The primary defect was closed partially with a complex linear closure.  Given the location of the remaining defect, shape of the defect and the proximity to free margins a rotation flap was deemed most appropriate for complete closure of the defect.  Using a sterile surgical marker, an appropriate advancement flap was drawn incorporating the defect and placing the expected incisions within the relaxed skin tension lines where possible.    The area thus outlined was incised deep to adipose tissue with a #15 scalpel blade.  The skin margins were undermined to an appropriate distance in all directions utilizing iris scissors. Complex Repair And Rhombic Flap Text: The defect edges were debeveled with a #15 scalpel blade.  The primary defect was closed partially with a complex linear closure.  Given the location of the remaining defect, shape of the defect and the proximity to free margins a rhombic flap was deemed most appropriate for complete closure of the defect.  Using a sterile surgical marker, an appropriate advancement flap was drawn incorporating the defect and placing the expected incisions within the relaxed skin tension lines where possible.    The area thus outlined was incised deep to adipose tissue with a #15 scalpel blade.  The skin margins were undermined to an appropriate distance in all directions utilizing iris scissors. Complex Repair And Transposition Flap Text: The defect edges were debeveled with a #15 scalpel blade.  The primary defect was closed partially with a complex linear closure.  Given the location of the remaining defect, shape of the defect and the proximity to free margins a transposition flap was deemed most appropriate for complete closure of the defect.  Using a sterile surgical marker, an appropriate advancement flap was drawn incorporating the defect and placing the expected incisions within the relaxed skin tension lines where possible.    The area thus outlined was incised deep to adipose tissue with a #15 scalpel blade.  The skin margins were undermined to an appropriate distance in all directions utilizing iris scissors. Complex Repair And V-Y Plasty Text: The defect edges were debeveled with a #15 scalpel blade.  The primary defect was closed partially with a complex linear closure.  Given the location of the remaining defect, shape of the defect and the proximity to free margins a V-Y plasty was deemed most appropriate for complete closure of the defect.  Using a sterile surgical marker, an appropriate advancement flap was drawn incorporating the defect and placing the expected incisions within the relaxed skin tension lines where possible.    The area thus outlined was incised deep to adipose tissue with a #15 scalpel blade.  The skin margins were undermined to an appropriate distance in all directions utilizing iris scissors. Complex Repair And M Plasty Text: The defect edges were debeveled with a #15 scalpel blade.  The primary defect was closed partially with a complex linear closure.  Given the location of the remaining defect, shape of the defect and the proximity to free margins an M plasty was deemed most appropriate for complete closure of the defect.  Using a sterile surgical marker, an appropriate advancement flap was drawn incorporating the defect and placing the expected incisions within the relaxed skin tension lines where possible.    The area thus outlined was incised deep to adipose tissue with a #15 scalpel blade.  The skin margins were undermined to an appropriate distance in all directions utilizing iris scissors. Complex Repair And Double M Plasty Text: The defect edges were debeveled with a #15 scalpel blade.  The primary defect was closed partially with a complex linear closure.  Given the location of the remaining defect, shape of the defect and the proximity to free margins a double M plasty was deemed most appropriate for complete closure of the defect.  Using a sterile surgical marker, an appropriate advancement flap was drawn incorporating the defect and placing the expected incisions within the relaxed skin tension lines where possible.    The area thus outlined was incised deep to adipose tissue with a #15 scalpel blade.  The skin margins were undermined to an appropriate distance in all directions utilizing iris scissors. Complex Repair And W Plasty Text: The defect edges were debeveled with a #15 scalpel blade.  The primary defect was closed partially with a complex linear closure.  Given the location of the remaining defect, shape of the defect and the proximity to free margins a W plasty was deemed most appropriate for complete closure of the defect.  Using a sterile surgical marker, an appropriate advancement flap was drawn incorporating the defect and placing the expected incisions within the relaxed skin tension lines where possible.    The area thus outlined was incised deep to adipose tissue with a #15 scalpel blade.  The skin margins were undermined to an appropriate distance in all directions utilizing iris scissors. Complex Repair And Z Plasty Text: The defect edges were debeveled with a #15 scalpel blade.  The primary defect was closed partially with a complex linear closure.  Given the location of the remaining defect, shape of the defect and the proximity to free margins a Z plasty was deemed most appropriate for complete closure of the defect.  Using a sterile surgical marker, an appropriate advancement flap was drawn incorporating the defect and placing the expected incisions within the relaxed skin tension lines where possible.    The area thus outlined was incised deep to adipose tissue with a #15 scalpel blade.  The skin margins were undermined to an appropriate distance in all directions utilizing iris scissors. Complex Repair And Dorsal Nasal Flap Text: The defect edges were debeveled with a #15 scalpel blade.  The primary defect was closed partially with a complex linear closure.  Given the location of the remaining defect, shape of the defect and the proximity to free margins a dorsal nasal flap was deemed most appropriate for complete closure of the defect.  Using a sterile surgical marker, an appropriate flap was drawn incorporating the defect and placing the expected incisions within the relaxed skin tension lines where possible.    The area thus outlined was incised deep to adipose tissue with a #15 scalpel blade.  The skin margins were undermined to an appropriate distance in all directions utilizing iris scissors. Complex Repair And Ftsg Text: The defect edges were debeveled with a #15 scalpel blade.  The primary defect was closed partially with a complex linear closure.  Given the location of the defect, shape of the defect and the proximity to free margins a full thickness skin graft was deemed most appropriate to repair the remaining defect.  The graft was trimmed to fit the size of the remaining defect.  The graft was then placed in the primary defect, oriented appropriately, and sutured into place. Complex Repair And Burow's Graft Text: The defect edges were debeveled with a #15 scalpel blade.  The primary defect was closed partially with a complex linear closure.  Given the location of the defect, shape of the defect, the proximity to free margins and the presence of a standing cone deformity a Burow's graft was deemed most appropriate to repair the remaining defect.  The graft was trimmed to fit the size of the remaining defect.  The graft was then placed in the primary defect, oriented appropriately, and sutured into place. Complex Repair And Split-Thickness Skin Graft Text: The defect edges were debeveled with a #15 scalpel blade.  The primary defect was closed partially with a complex linear closure.  Given the location of the defect, shape of the defect and the proximity to free margins a split thickness skin graft was deemed most appropriate to repair the remaining defect.  The graft was trimmed to fit the size of the remaining defect.  The graft was then placed in the primary defect, oriented appropriately, and sutured into place. Complex Repair And Epidermal Autograft Text: The defect edges were debeveled with a #15 scalpel blade.  The primary defect was closed partially with a complex linear closure.  Given the location of the defect, shape of the defect and the proximity to free margins an epidermal autograft was deemed most appropriate to repair the remaining defect.  The graft was trimmed to fit the size of the remaining defect.  The graft was then placed in the primary defect, oriented appropriately, and sutured into place. Complex Repair And Dermal Autograft Text: The defect edges were debeveled with a #15 scalpel blade.  The primary defect was closed partially with a complex linear closure.  Given the location of the defect, shape of the defect and the proximity to free margins an dermal autograft was deemed most appropriate to repair the remaining defect.  The graft was trimmed to fit the size of the remaining defect.  The graft was then placed in the primary defect, oriented appropriately, and sutured into place. Complex Repair And Tissue Cultured Epidermal Autograft Text: The defect edges were debeveled with a #15 scalpel blade.  The primary defect was closed partially with a complex linear closure.  Given the location of the defect, shape of the defect and the proximity to free margins an tissue cultured epidermal autograft was deemed most appropriate to repair the remaining defect.  The graft was trimmed to fit the size of the remaining defect.  The graft was then placed in the primary defect, oriented appropriately, and sutured into place. Complex Repair And Xenograft Text: The defect edges were debeveled with a #15 scalpel blade.  The primary defect was closed partially with a complex linear closure.  Given the location of the defect, shape of the defect and the proximity to free margins a xenograft was deemed most appropriate to repair the remaining defect.  The graft was trimmed to fit the size of the remaining defect.  The graft was then placed in the primary defect, oriented appropriately, and sutured into place. Complex Repair And Skin Substitute Graft Text: The defect edges were debeveled with a #15 scalpel blade.  The primary defect was closed partially with a complex linear closure.  Given the location of the remaining defect, shape of the defect and the proximity to free margins a skin substitute graft was deemed most appropriate to repair the remaining defect.  The graft was trimmed to fit the size of the remaining defect.  The graft was then placed in the primary defect, oriented appropriately, and sutured into place. Consent was obtained from the patient. The risks and benefits to therapy were discussed in detail. Specifically, the risks of infection, scarring, bleeding, prolonged wound healing, incomplete removal, allergy to anesthesia, nerve injury and recurrence were addressed. Prior to the procedure, the treatment site was clearly identified and confirmed by the patient. All components of Universal Protocol/PAUSE Rule completed. Post-Care Instructions: I reviewed with the patient in detail post-care instructions. Patient is not to engage in any heavy lifting, exercise, or swimming for the next 14 days. Should the patient develop any fevers, chills, bleeding, severe pain patient will contact the office immediately. Home Suture Removal Text: Patient was provided a home suture removal kit and will remove their sutures at home.  If they have any questions or difficulties they will call the office. Where Do You Want The Question To Include Opioid Counseling Located?: Case Summary Tab Billing Type: Third-Party Bill Information: Selecting Yes will display possible errors in your note based on the variables you have selected. This validation is only offered as a suggestion for you. PLEASE NOTE THAT THE VALIDATION TEXT WILL BE REMOVED WHEN YOU FINALIZE YOUR NOTE. IF YOU WANT TO FAX A PRELIMINARY NOTE YOU WILL NEED TO TOGGLE THIS TO 'NO' IF YOU DO NOT WANT IT IN YOUR FAXED NOTE. Body Location Override (Optional - Billing Will Still Be Based On Selected Body Map Location If Applicable): left lateral buttock (inferior) Size Of Lesion In Cm: 1.2

## 2024-12-03 NOTE — ED ADULT NURSE NOTE - CHIEF COMPLAINT
Patient in through EMS from [facility] with complaints of a fall. EMS reports the patient was \"acting anxious\" while IT personnel were in her house attempting to fix her phone. According to IT the patient supposedly vagaled and smacked her head on the marble countertop. Patient is uncertain if she lost consciousness. Hx of seizures, had a clear EEG a few weeks ago. Also has a hx of wearing Holter Monitors. EMS report that the patient was alert and oriented x4 on arrival. Patient placed in c-collar due to nature of the fall but she denies any pain. Dried blood noted to be on patient's face and hair, small amount of active bleeding on bridge of nose.   The patient is a 63y Female complaining of multiple medical complaints.